# Patient Record
Sex: MALE | Race: WHITE | NOT HISPANIC OR LATINO | Employment: OTHER | URBAN - METROPOLITAN AREA
[De-identification: names, ages, dates, MRNs, and addresses within clinical notes are randomized per-mention and may not be internally consistent; named-entity substitution may affect disease eponyms.]

---

## 2017-03-15 ENCOUNTER — TRANSFERRED RECORDS (OUTPATIENT)
Dept: HEALTH INFORMATION MANAGEMENT | Facility: CLINIC | Age: 72
End: 2017-03-15

## 2017-03-15 LAB
EJECTION FRACTION: 53
HBA1C MFR BLD: 9.2 % (ref 0–5.7)

## 2017-03-16 LAB
ALT SERPL-CCNC: 19 IU/L (ref 10–60)
AST SERPL-CCNC: 15 IU/L (ref 10–50)
CHOLEST SERPL-MCNC: 133 MG/DL
CREAT SERPL-MCNC: 1.36 MG/DL (ref 0.6–1.5)
GFR SERPL CREATININE-BSD FRML MDRD: 55 ML/MIN/1.73M2
GLUCOSE SERPL-MCNC: 195 MG/DL (ref 65–99)
HDLC SERPL-MCNC: 24 MG/DL
INR PPP: 1 (ref 0.9–1.1)
LDLC SERPL CALC-MCNC: 52 MG/DL
NONHDLC SERPL-MCNC: 109 MG/DL
POTASSIUM SERPL-SCNC: 3.6 MMOL/L (ref 3.5–5.2)
TRIGL SERPL-MCNC: 285 MG/DL
TSH SERPL-ACNC: 1.69 MIU/L (ref 0.45–4.5)

## 2017-05-09 ENCOUNTER — APPOINTMENT (OUTPATIENT)
Dept: CT IMAGING | Facility: CLINIC | Age: 72
End: 2017-05-09
Attending: EMERGENCY MEDICINE
Payer: COMMERCIAL

## 2017-05-09 ENCOUNTER — HOSPITAL ENCOUNTER (EMERGENCY)
Facility: CLINIC | Age: 72
Discharge: HOME | End: 2017-05-09
Attending: EMERGENCY MEDICINE | Admitting: EMERGENCY MEDICINE
Payer: COMMERCIAL

## 2017-05-09 VITALS
RESPIRATION RATE: 18 BRPM | SYSTOLIC BLOOD PRESSURE: 149 MMHG | DIASTOLIC BLOOD PRESSURE: 98 MMHG | OXYGEN SATURATION: 97 % | HEART RATE: 96 BPM | TEMPERATURE: 98.3 F

## 2017-05-09 DIAGNOSIS — R41.0 EPISODIC CONFUSION: ICD-10-CM

## 2017-05-09 LAB
ANION GAP SERPL CALCULATED.3IONS-SCNC: 7 MMOL/L (ref 3–14)
BASOPHILS # BLD AUTO: 0.1 10E9/L (ref 0–0.2)
BASOPHILS NFR BLD AUTO: 1.2 %
BUN SERPL-MCNC: 22 MG/DL (ref 7–30)
CALCIUM SERPL-MCNC: 8.8 MG/DL (ref 8.5–10.1)
CHLORIDE SERPL-SCNC: 108 MMOL/L (ref 94–109)
CO2 SERPL-SCNC: 24 MMOL/L (ref 20–32)
CREAT BLD-MCNC: 1.5 MG/DL (ref 0.66–1.25)
CREAT SERPL-MCNC: 1.42 MG/DL (ref 0.66–1.25)
DIFFERENTIAL METHOD BLD: ABNORMAL
EOSINOPHIL # BLD AUTO: 0.2 10E9/L (ref 0–0.7)
EOSINOPHIL NFR BLD AUTO: 2.3 %
ERYTHROCYTE [DISTWIDTH] IN BLOOD BY AUTOMATED COUNT: 14.1 % (ref 10–15)
GFR SERPL CREATININE-BSD FRML MDRD: 46 ML/MIN/1.7M2
GFR SERPL CREATININE-BSD FRML MDRD: 49 ML/MIN/1.7M2
GLUCOSE SERPL-MCNC: 255 MG/DL (ref 70–99)
HCT VFR BLD AUTO: 40.2 % (ref 40–53)
HGB BLD-MCNC: 12.7 G/DL (ref 13.3–17.7)
IMM GRANULOCYTES # BLD: 0 10E9/L (ref 0–0.4)
IMM GRANULOCYTES NFR BLD: 0.6 %
INTERPRETATION ECG - MUSE: NORMAL
LYMPHOCYTES # BLD AUTO: 1 10E9/L (ref 0.8–5.3)
LYMPHOCYTES NFR BLD AUTO: 15.7 %
MCH RBC QN AUTO: 28.4 PG (ref 26.5–33)
MCHC RBC AUTO-ENTMCNC: 31.6 G/DL (ref 31.5–36.5)
MCV RBC AUTO: 90 FL (ref 78–100)
MONOCYTES # BLD AUTO: 0.5 10E9/L (ref 0–1.3)
MONOCYTES NFR BLD AUTO: 7.2 %
NEUTROPHILS # BLD AUTO: 4.8 10E9/L (ref 1.6–8.3)
NEUTROPHILS NFR BLD AUTO: 73 %
NRBC # BLD AUTO: 0 10*3/UL
NRBC BLD AUTO-RTO: 0 /100
PLATELET # BLD AUTO: 240 10E9/L (ref 150–450)
POTASSIUM SERPL-SCNC: 3.6 MMOL/L (ref 3.4–5.3)
RBC # BLD AUTO: 4.47 10E12/L (ref 4.4–5.9)
SODIUM SERPL-SCNC: 139 MMOL/L (ref 133–144)
WBC # BLD AUTO: 6.6 10E9/L (ref 4–11)

## 2017-05-09 PROCEDURE — 85025 COMPLETE CBC W/AUTO DIFF WBC: CPT | Performed by: EMERGENCY MEDICINE

## 2017-05-09 PROCEDURE — 70450 CT HEAD/BRAIN W/O DYE: CPT

## 2017-05-09 PROCEDURE — 82565 ASSAY OF CREATININE: CPT

## 2017-05-09 PROCEDURE — 93005 ELECTROCARDIOGRAM TRACING: CPT

## 2017-05-09 PROCEDURE — 99285 EMERGENCY DEPT VISIT HI MDM: CPT | Mod: 25

## 2017-05-09 PROCEDURE — 80048 BASIC METABOLIC PNL TOTAL CA: CPT | Performed by: EMERGENCY MEDICINE

## 2017-05-09 NOTE — ED PROVIDER NOTES
"  History     Chief Complaint:  Altered Mental Status      HPI   Donnie Vincent is a 71 year old male with known meningioma, seizure disorder on Keppra, and history of CVA previously on aspirin and plavix (no longer on coumadin due to GI bleed), with recent evaluation for apparent TIA presenting with similar symptoms in [place], who presents with transient altered mental status or speech difficulty.  His wife reports that they were watching TV when he became somewhat confused.  The wife states that he would not know her and couldn't repeat the name of his granddaughter.  They report that he had a similar episode in [place] several months ago. At the time, MRI shows no acute infarction and redemonstrated his meningioma.  He states that he feels \"a bit slow.\" No shaking or loss of consciousness was noted by his wife.  He denies headache, focal weakness, or any other concerns.    3/16/17 imaging in [place]  MRI Brain:   1. No evidence of acute infarction  2. Suspected 2.3 x 2.7 cm meningioma emanating from the right anterior clinoid process with resultant edematous changes in the adjacent anterior-inferior right frontal lobe. Confirmatory imaging utilizing postcontrast MRI is warranted.  3. Mild, presumed small vessel ischemic change.    MRA brain:  1. No acute abnormality.    MRA neck:  1. No acute abnormality.    Allergies:  PCN       Medications:    fenofibrate 160 MG tablet   acetaminophen-codeine (TYLENOL W/CODEINE NO. 3) 300-30 MG per tablet   insulin glargine (LANTUS SOLOSTAR) 100 UNIT/ML PEN   glipiZIDE (GLUCOTROL) 10 MG tablet   insulin pen needle 31G X 5 MM   ASPIRIN PO   FLUoxetine (PROZAC) 40 MG capsule   lisinopril (PRINIVIL,ZESTRIL) 10 MG tablet   ATORVASTATIN CALCIUM PO   ferrous sulfate (IRON) 325 (65 FE) MG tablet   budesonide-formoterol (SYMBICORT) 80-4.5 MCG/ACT inhaler   TIOTROPIUM BROMIDE INHALATION POWDER 18MCG CAP   albuterol (PROAIR HFA, PROVENTIL HFA, VENTOLIN HFA) 108 (90 BASE) MCG/ACT inhaler "   folic acid (FOLVITE) 1 MG tablet   topiramate (TOPAMAX) 50 MG tablet   tamsulosin (FLOMAX) 0.4 MG 24 hr capsule   cyanocobalamin 1000 MCG/ML injection   levetiracetam (KEPPRA) 500 MG tablet         Past Medical History:    Past Medical History:   Diagnosis Date     BPH (benign prostatic hypertrophy)      Brain tumor (benign) (H)      COPD (chronic obstructive pulmonary disease) (H)      CVA (cerebral infarction)      Diabetes new 4/09     Diverticulitis of colon (without mention of hemorrhage)(562.11) 2001; 9/06     Gastro-oesophageal reflux disease      HTN (hypertension)      Hyperlipidemia LDL goal < 100      Other forms of migraine, without mention of intractable migraine without mention of status migrainosus      PFO (patent foramen ovale)      Renal disease      Seizure disorder (H)      Seizures (H)      Sleep apnea      Unspecified congenital anomaly of lung        Past Surgical History:    Past Surgical History:   Procedure Laterality Date     C NONSPECIFIC PROCEDURE      colonoscopy 2005     COLONOSCOPY  11/4/2015    Dr. Orozco [facility]     COLONOSCOPY N/A 11/4/2015    Procedure: COLONOSCOPY;  Surgeon: Yazmin Orozco MD;  Location:  GI     CYSTOSCOPY, RETROGRADES, EXTRACT STONE, COMBINED Left 11/25/2015    Procedure: COMBINED CYSTOSCOPY, RETROGRADES, EXTRACT STONE;  Surgeon: Neville Banuelos MD;  Location:  OR     ESOPHAGOSCOPY, GASTROSCOPY, DUODENOSCOPY (EGD), COMBINED  11/5/2015    Dr. Orozco [facility]     GENITOURINARY SURGERY      kidney stone - lithotripsy     PHACOEMULSIFICATION CLEAR CORNEA WITH STANDARD INTRAOCULAR LENS IMPLANT  12/20/2011    Procedure:PHACOEMULSIFICATION CLEAR CORNEA WITH STANDARD INTRAOCULAR LENS IMPLANT; RIGHT PHACOEMULSIFICATION CLEAR CORNEA WITH STANDARD INTRAOCULAR LENS IMPLANT ; Surgeon:GUILHERME KAUFMAN; Location:[facility]       Family History:    Family History   Problem Relation Age of Onset     Family History Negative Mother      migraines     Family History  Negative Father      lived to be 92     Colon Cancer No family hx of        Social History:  Marital Status:   [2]  Social History   Substance Use Topics     Smoking status: Former Smoker     Quit date: 1/1/1970     Smokeless tobacco: Never Used      Comment: quit age 28     Alcohol use Yes      Comment: 1-2 beers a week        Review of Systems   Neurological:        Confusion episode   All other systems reviewed and are negative.        Physical Exam   First Vitals:  98.3    RR 18  100% RA  149/93    Physical Exam  Constitutional: Alert, attentive  HENT:    Nose: Nose normal.    Mouth/Throat: Oropharynx is clear, mucous membranes are moist  Eyes: EOM are normal. Pupils are equal, round, and reactive to light.   CV: Regular rate and rhythm, no murmurs, rubs or gallops.  Chest: Effort normal and breath sounds normal.   GI: No distension. There is no tenderness  MSK: Normal range of motion.   Neurological:   GCS 15; A/Ox3; Cranial nerves 2-12 intact;   5/5 strength throughout the upper and lower extremities;   sensation intact to light touch throughout the upper and lower extremities;   2+ DTRs to the bilateral upper and lower extremities (biceps, BRs, patellar, achilles);   normal fine motor coordination intact bilaterally;   No meningismus   Skin: Skin is warm and dry.      Emergency Department Course   ECG taken at 1413, ECG read at 1417  Sinus tachycardia with premature supraventricular complexes  Left axis deviation  Nonspecific intraventricular block  Abnormal ECG  Rate 111 bpm. SD interval 168. QRS duration 130. QT/QTc 362/492. P-R-T axes -16  -46  86    Results for orders placed or performed during the hospital encounter of 05/09/17 (from the past 24 hour(s))   CBC + differential   Result Value Ref Range    WBC 6.6 4.0 - 11.0 10e9/L    RBC Count 4.47 4.4 - 5.9 10e12/L    Hemoglobin 12.7 (L) 13.3 - 17.7 g/dL    Hematocrit 40.2 40.0 - 53.0 %    MCV 90 78 - 100 fl    MCH 28.4 26.5 - 33.0 pg    MCHC  31.6 31.5 - 36.5 g/dL    RDW 14.1 10.0 - 15.0 %    Platelet Count 240 150 - 450 10e9/L    Diff Method Automated Method     % Neutrophils 73.0 %    % Lymphocytes 15.7 %    % Monocytes 7.2 %    % Eosinophils 2.3 %    % Basophils 1.2 %    % Immature Granulocytes 0.6 %    Nucleated RBCs 0 0 /100    Absolute Neutrophil 4.8 1.6 - 8.3 10e9/L    Absolute Lymphocytes 1.0 0.8 - 5.3 10e9/L    Absolute Monocytes 0.5 0.0 - 1.3 10e9/L    Absolute Eosinophils 0.2 0.0 - 0.7 10e9/L    Absolute Basophils 0.1 0.0 - 0.2 10e9/L    Abs Immature Granulocytes 0.0 0 - 0.4 10e9/L    Absolute Nucleated RBC 0.0    Basic metabolic panel (BMP)   Result Value Ref Range    Sodium 139 133 - 144 mmol/L    Potassium 3.6 3.4 - 5.3 mmol/L    Chloride 108 94 - 109 mmol/L    Carbon Dioxide 24 20 - 32 mmol/L    Anion Gap 7 3 - 14 mmol/L    Glucose 255 (H) 70 - 99 mg/dL    Urea Nitrogen 22 7 - 30 mg/dL    Creatinine 1.42 (H) 0.66 - 1.25 mg/dL    GFR Estimate 49 (L) >60 mL/min/1.7m2    GFR Estimate If Black 59 (L) >60 mL/min/1.7m2    Calcium 8.8 8.5 - 10.1 mg/dL   EKG 12 lead   Result Value Ref Range    Interpretation ECG Click View Image link to view waveform and result    Creatinine POCT   Result Value Ref Range    Creatinine 1.5 (H) 0.66 - 1.25 mg/dL    GFR Estimate 46 (L) >60 mL/min/1.7m2    GFR Estimate If Black 56 (L) >60 mL/min/1.7m2   CT Head w/o Contrast    Narrative    CT SCAN OF THE HEAD WITHOUT CONTRAST  5/9/2017  2:59 PM     HISTORY: Known meningioma; confusion episode, possible seizure.    TECHNIQUE: Axial images of the head and coronal reformations without  IV contrast material. Radiation dose for this scan was reduced using  automated exposure control, adjustment of the mA and/or kV according  to patient size, or iterative reconstruction technique.    COMPARISON: MRI 11/14/2016.    FINDINGS: There is generalized atrophy of the brain. There is low  attenuation in the white matter of the cerebral hemispheres consistent  with sequelae of small  vessel ischemic disease. Encephalomalacia in  lateral left frontal lobe with underlying gliosis is unchanged. The  known meningioma of the greater wing of the right sphenoid bone  protruding into the inferior right frontal lobe is barely visible as  it is nearly isoattenuating with gray matter but it measures about 2.3  cm transverse x 1.2 cm craniocaudal x 2.6 cm diameter on the scans on  which it can be recognized. There is no adjacent vasogenic edema.  There is no evidence of intracranial hemorrhage or acute infarct.     The visualized portions of the sinuses and mastoids appear normal.  There is no evidence of trauma.       Impression    IMPRESSION:   1. No acute abnormality.  2. Atrophy of the brain. White matter changes consistent with sequelae  of small vessel ischemic disease. This is unchanged.  3. Left lateral frontal lobe encephalomalacia, unchanged.  4. Meningioma projecting superiorly off the greater ring of the right  sphenoid bone is unchanged.    ELSIE KONG MD       Emergency Department Course:  Nursing notes and vitals reviewed.  I performed an exam of the patient as documented above.     I personally reviewed the laboratory results with the Patient and answered all related questions prior to discharge.    Impression & Plan      Medical Decision Making:  This is a very pleasant 71 year old male with history of meningioma, CVA, and seizure disorder on Keppra, with recent TIA vs seizures who presents with a similar episode. His most recent episode involved slurred speech, suggesting possible TIA. This episode today appears more consistent with brief seizure and typical of previous. No acute findings are identified on CT. He cannot take Coumadin given his bleeding episode previously. The remainder of his workup is normal, and he underwent similar negative workup in [place] several weeks ago. He would prefer close Neurology follow-up to further discuss these episodes, and possible augmentation of his  antiepileptic regimen. This appears very reasonable given the context. I recommended follow up in 2-3 days with Dr. Chi; and I advised strict return precautions for pain, fever, vomiting, confusion, weakness, or any other concerning symptoms.    Visit Diagnosis, Associated Orders, and Comments     ICD-10-CM    1. Episodic confusion R41.0 Seizure vs TIA     Disposition:  Home in improved condition      Gilson Diallo MD  [facility], [facility] Emergency Department      Scribe Disclosure:  I, Abena Jones, am serving as a scribe at 2:18 PM on 5/9/2017 to document services personally performed by Gilson Diallo MD, based on my observations and the provider's statements to me.      Abena Jones  5/9/2017   [facility] EMERGENCY DEPARTMENT       Gilson Diallo MD  05/09/17 [id number]

## 2017-05-09 NOTE — ED AVS SNAPSHOT
[facility] Emergency Department    [address]    [facility] [address]    Phone:  [phone]    Fax:  [phone]                                       Donnie Vincent   MRN: [id number]    Department:  [facility] Emergency Department   Date of Visit:  5/9/2017           Patient Information     Date Of Birth          1945        Your diagnoses for this visit were:     Episodic confusion        You were seen by Gilson Diallo MD.      Follow-up Information     Follow up with Noel Chi MD In 3 days.    Specialty:  Neurology    Contact information:    [facility]  [address]  [facility] [address]  [phone]          Discharge Instructions         Altered Level of Consciousness  Level of consciousness (LOC) is a measure of a person s ability to interact with other people and to react to the surroundings. A person with an altered level of consciousness may not respond to touch or voices. He or she may look vacant or blank and may not make eye contact with others. He or she may be limp and may not move for a long time or show little interest in moving. He or she may also be confused.  There are many causes of altered LOC. They include low blood sugar, infection, medicines, injuries, or other medical problems    Altered LOC is a medical emergency. The healthcare provider will do tests to help find the cause. These may include blood tests and imaging tests. The person is treated so breathing and heart rate are stable. An intravenous (IV) line may be put into a vein in the arm or hand to give medicines. Once the cause of altered LOC is found, the goal is to treat the cause.   In almost all cases, the person will be admitted to the hospital for observation.  Home care  When your loved one is released from the hospital, you will be given guidelines for caring for him or her. In general:    Follow the healthcare provider's instructions  for giving any prescribed medicines to your child.     Stay with your loved one or have another responsible adult look after him or her. Watch carefully for any return of symptoms or changes in behavior.    If the person has diabetes, make sure that any approved medicines are given on time and as prescribed.  Follow-up care  Follow up with the healthcare provider or our staff.  When to seek medical advice  Call the healthcare provider right away for any of the following:    Symptoms of altered LOC return    New symptoms appear    [id number] [employer]. [address] [place] [address]. All rights reserved. This information is not intended as a substitute for professional medical care. Always follow your healthcare professional's instructions.          24 Hour Appointment Hotline       To make an appointment at any [facility], call [phone] ([phone]). If you don't have a family doctor or clinic, we will help you find one. [facility] are conveniently located to serve the needs of you and your family.             Review of your medicines      Our records show that you are taking the medicines listed below. If these are incorrect, please call your family doctor or clinic.        Dose / Directions Last dose taken    acetaminophen-codeine 300-30 MG per tablet   Commonly known as:  TYLENOL w/CODEINE No. 3   Dose:  1 tablet   Quantity:  40 tablet        Take 1 tablet by mouth 2 times daily as needed for mild pain   Refills:  0        albuterol 108 (90 BASE) MCG/ACT Inhaler   Commonly known as:  PROAIR HFA/PROVENTIL HFA/VENTOLIN HFA   Dose:  2 puff   Quantity:  1 Inhaler        Inhale 2 puffs into the lungs every 6 hours   Refills:  6        ASPIRIN PO   Dose:  81 mg        Take 81 mg by mouth   Refills:  0        ATORVASTATIN CALCIUM PO   Dose:  40 mg        Take 40 mg by mouth daily   Refills:  0        cyanocobalamin 1000 MCG/ML injection   Commonly known as:  VITAMIN B12    Dose:  1 mL        Inject 1 mL into the muscle every 30 days See standing orders Dr. Chi. Vit B-12 injection every week x 4. Than monthly for one year. 1000 mcg/ml   Refills:  0        fenofibrate 160 MG tablet   Dose:  160 mg   Quantity:  90 tablet        Take 1 tablet (160 mg) by mouth daily   Refills:  1        ferrous sulfate 325 (65 FE) MG tablet   Commonly known as:  IRON   Dose:  325 mg   Quantity:  60 tablet        Take 1 tablet (325 mg) by mouth 2 times daily   Refills:  2        FLUoxetine 40 MG capsule   Commonly known as:  PROzac   Dose:  40 mg   Quantity:  90 capsule        Take 1 capsule (40 mg) by mouth daily   Refills:  1        folic acid 1 MG tablet   Commonly known as:  FOLVITE   Dose:  1 mg   Quantity:  90 tablet        Take 1 tablet (1 mg) by mouth daily   Refills:  0        glipiZIDE 10 MG tablet   Commonly known as:  GLUCOTROL   Dose:  10 mg   Quantity:  180 tablet        Take 1 tablet (10 mg) by mouth 2 times daily (before meals)   Refills:  1        insulin glargine 100 UNIT/ML injection   Commonly known as:  LANTUS SOLOSTAR   Quantity:  15 mL        Lantus Solostar Pen   Refills:  3        insulin pen needle 31G X 5 MM   Quantity:  100 each        Use 1-2 pen needles daily or as directed.   Refills:  3        KEPPRA 500 MG tablet   Dose:  500 mg   Generic drug:  levETIRAcetam        Take 500 mg by mouth 2 times daily Takes 750 mg in am and 500 mg at hs   Refills:  0        lisinopril 10 MG tablet   Commonly known as:  PRINIVIL/ZESTRIL   Dose:  10 mg   Quantity:  90 tablet        Take 1 tablet (10 mg) by mouth daily   Refills:  1        SYMBICORT 80-4.5 MCG/ACT Inhaler   Dose:  2 puff   Generic drug:  budesonide-formoterol        Inhale 2 puffs into the lungs as needed   Refills:  0        tamsulosin 0.4 MG capsule   Commonly known as:  FLOMAX   Dose:  0.4 mg   Quantity:  90 capsule        Take 1 capsule (0.4 mg) by mouth daily   Refills:  3        TIOTROPIUM BROMIDE INHALATION POWDER  18MCG CAP        daily Once a day   Refills:  0        topiramate 50 MG tablet   Commonly known as:  TOPAMAX   Dose:  50 mg   Quantity:  90 tablet        Take 1 tablet (50 mg) by mouth daily   Refills:  0                Procedures and tests performed during your visit     Basic metabolic panel (BMP)    CBC + differential    CT Head w/o Contrast    Creatinine POCT    EKG 12 lead      Orders Needing Specimen Collection     None      Pending Results     No orders found from 5/7/2017 to 5/10/2017.            Pending Culture Results     No orders found from 5/7/2017 to 5/10/2017.            Pending Results Instructions     If you had any lab results that were not finalized at the time of your Discharge, you can call the ED Lab Result RN at [phone]. You will be contacted by this team for any positive Lab results or changes in treatment. The nurses are available 7 days a week from 10A to 6:30P.  You can leave a message 24 hours per day and they will return your call.        Test Results From Your Hospital Stay        5/9/2017  2:51 PM      Component Results     Component Value Ref Range & Units Status    WBC 6.6 4.0 - 11.0 10e9/L Final    RBC Count 4.47 4.4 - 5.9 10e12/L Final    Hemoglobin 12.7 (L) 13.3 - 17.7 g/dL Final    Hematocrit 40.2 40.0 - 53.0 % Final    MCV 90 78 - 100 fl Final    MCH 28.4 26.5 - 33.0 pg Final    MCHC 31.6 31.5 - 36.5 g/dL Final    RDW 14.1 10.0 - 15.0 % Final    Platelet Count 240 150 - 450 10e9/L Final    Diff Method Automated Method  Final    % Neutrophils 73.0 % Final    % Lymphocytes 15.7 % Final    % Monocytes 7.2 % Final    % Eosinophils 2.3 % Final    % Basophils 1.2 % Final    % Immature Granulocytes 0.6 % Final    Nucleated RBCs 0 0 /100 Final    Absolute Neutrophil 4.8 1.6 - 8.3 10e9/L Final    Absolute Lymphocytes 1.0 0.8 - 5.3 10e9/L Final    Absolute Monocytes 0.5 0.0 - 1.3 10e9/L Final    Absolute Eosinophils 0.2 0.0 - 0.7 10e9/L Final    Absolute Basophils 0.1 0.0 - 0.2  10e9/L Final    Abs Immature Granulocytes 0.0 0 - 0.4 10e9/L Final    Absolute Nucleated RBC 0.0  Final         5/9/2017  3:09 PM      Component Results     Component Value Ref Range & Units Status    Sodium 139 133 - 144 mmol/L Final    Potassium 3.6 3.4 - 5.3 mmol/L Final    Chloride 108 94 - 109 mmol/L Final    Carbon Dioxide 24 20 - 32 mmol/L Final    Anion Gap 7 3 - 14 mmol/L Final    Glucose 255 (H) 70 - 99 mg/dL Final    Urea Nitrogen 22 7 - 30 mg/dL Final    Creatinine 1.42 (H) 0.66 - 1.25 mg/dL Final    GFR Estimate 49 (L) >60 mL/min/1.7m2 Final    Non  GFR Calc    GFR Estimate If Black 59 (L) >60 mL/min/1.7m2 Final    African American GFR Calc    Calcium 8.8 8.5 - 10.1 mg/dL Final         5/9/2017  3:27 PM      Narrative     CT SCAN OF THE HEAD WITHOUT CONTRAST  5/9/2017  2:59 PM     HISTORY: Known meningioma; confusion episode, possible seizure.    TECHNIQUE: Axial images of the head and coronal reformations without  IV contrast material. Radiation dose for this scan was reduced using  automated exposure control, adjustment of the mA and/or kV according  to patient size, or iterative reconstruction technique.    COMPARISON: MRI 11/14/2016.    FINDINGS: There is generalized atrophy of the brain. There is low  attenuation in the white matter of the cerebral hemispheres consistent  with sequelae of small vessel ischemic disease. Encephalomalacia in  lateral left frontal lobe with underlying gliosis is unchanged. The  known meningioma of the greater wing of the right sphenoid bone  protruding into the inferior right frontal lobe is barely visible as  it is nearly isoattenuating with gray matter but it measures about 2.3  cm transverse x 1.2 cm craniocaudal x 2.6 cm diameter on the scans on  which it can be recognized. There is no adjacent vasogenic edema.  There is no evidence of intracranial hemorrhage or acute infarct.     The visualized portions of the sinuses and mastoids appear  normal.  There is no evidence of trauma.         Impression     IMPRESSION:   1. No acute abnormality.  2. Atrophy of the brain. White matter changes consistent with sequelae  of small vessel ischemic disease. This is unchanged.  3. Left lateral frontal lobe encephalomalacia, unchanged.  4. Meningioma projecting superiorly off the greater ring of the right  sphenoid bone is unchanged.    ELSIE KONG MD         5/9/2017  2:35 PM      Component Results     Component Value Ref Range & Units Status    Creatinine 1.5 (H) 0.66 - 1.25 mg/dL Final    GFR Estimate 46 (L) >60 mL/min/1.7m2 Final    GFR Estimate If Black 56 (L) >60 mL/min/1.7m2 Final                Clinical Quality Measure: Blood Pressure Screening     Your blood pressure was checked while you were in the emergency department today. The last reading we obtained was  BP: (!) 149/98 . Please read the guidelines below about what these numbers mean and what you should do about them.  If your systolic blood pressure (the top number) is less than 120 and your diastolic blood pressure (the bottom number) is less than 80, then your blood pressure is normal. There is nothing more that you need to do about it.  If your systolic blood pressure (the top number) is 120-139 or your diastolic blood pressure (the bottom number) is 80-89, your blood pressure may be higher than it should be. You should have your blood pressure rechecked within a year by a primary care provider.  If your systolic blood pressure (the top number) is 140 or greater or your diastolic blood pressure (the bottom number) is 90 or greater, you may have high blood pressure. High blood pressure is treatable, but if left untreated over time it can put you at risk for heart attack, stroke, or kidney failure. You should have your blood pressure rechecked by a primary care provider within the next 4 weeks.  If your provider in the emergency department today gave you specific instructions to follow-up with  "your doctor or provider even sooner than that, you should follow that instruction and not wait for up to 4 weeks for your follow-up visit.        Thank you for choosing [facility]       Thank you for choosing [facility] for your care. Our goal is always to provide you with excellent care. Hearing back from our patients is one way we can continue to improve our services. Please take a few minutes to complete the written survey that you may receive in the mail after you visit with us. Thank you!        [employer] Information     [employer] lets you send messages to your doctor, view your test results, renew your prescriptions, schedule appointments and more. To sign up, go to [email or web address] . Click on \"Log in\" on the left side of the screen, which will take you to the Welcome page. Then click on \"Sign up Now\" on the right side of the page.     You will be asked to enter the access code listed below, as well as some personal information. Please follow the directions to create your username and password.     Your access code is: [email or web address]  Expires: 2017  3:28 PM     Your access code will  in 90 days. If you need help or a new code, please call your [facility] clinic or [phone].        Care EveryWhere ID     This is your Care EveryWhere ID. This could be used by other organizations to access your [facility] medical records  [id number]        After Visit Summary       This is your record. Keep this with you and show to your community pharmacist(s) and doctor(s) at your next visit.                  "

## 2017-05-09 NOTE — ED NOTES
Pt arrives with AMS per wife. Approx at 1345 didn't recognize his granddaughter, didn't know how old he is. Symptoms resolved upon arrival to ED. Denies CP, SOB, n/v/d, weakness. A&O x4. No slurred speech, facial droop.

## 2017-05-09 NOTE — DISCHARGE INSTRUCTIONS
Altered Level of Consciousness  Level of consciousness (LOC) is a measure of a person s ability to interact with other people and to react to the surroundings. A person with an altered level of consciousness may not respond to touch or voices. He or she may look vacant or blank and may not make eye contact with others. He or she may be limp and may not move for a long time or show little interest in moving. He or she may also be confused.  There are many causes of altered LOC. They include low blood sugar, infection, medicines, injuries, or other medical problems    Altered LOC is a medical emergency. The healthcare provider will do tests to help find the cause. These may include blood tests and imaging tests. The person is treated so breathing and heart rate are stable. An intravenous (IV) line may be put into a vein in the arm or hand to give medicines. Once the cause of altered LOC is found, the goal is to treat the cause.   In almost all cases, the person will be admitted to the hospital for observation.  Home care  When your loved one is released from the hospital, you will be given guidelines for caring for him or her. In general:    Follow the healthcare provider's instructions for giving any prescribed medicines to your child.     Stay with your loved one or have another responsible adult look after him or her. Watch carefully for any return of symptoms or changes in behavior.    If the person has diabetes, make sure that any approved medicines are given on time and as prescribed.  Follow-up care  Follow up with the healthcare provider or our staff.  When to seek medical advice  Call the healthcare provider right away for any of the following:    Symptoms of altered LOC return    New symptoms appear    1832-2429 [facility]. [address] [place] [address], [place] [address]. All rights reserved. This information is not intended as a substitute for professional medical care. Always follow your  healthcare professional's instructions.

## 2017-05-09 NOTE — ED AVS SNAPSHOT
[facility] Emergency Department    [address]    [facility] [address]    Phone:  [phone]    Fax:  [phone]                                       Donnie Vincent   MRN: [id number]    Department:  [facility] Emergency Department   Date of Visit:  5/9/2017           After Visit Summary Signature Page     I have received my discharge instructions, and my questions have been answered. I have discussed any challenges I see with this plan with the nurse or doctor.    ..........................................................................................................................................  Patient/Patient Representative Signature      ..........................................................................................................................................  Patient Representative Print Name and Relationship to Patient    ..................................................               ................................................  Date                                            Time    ..........................................................................................................................................  Reviewed by Signature/Title    ...................................................              ..............................................  Date                                                            Time

## 2017-05-15 ENCOUNTER — TELEPHONE (OUTPATIENT)
Dept: INTERNAL MEDICINE | Facility: CLINIC | Age: 72
End: 2017-05-15

## 2017-05-15 DIAGNOSIS — I63.9 ACUTE ISCHEMIC STROKE (H): Primary | ICD-10-CM

## 2017-05-15 RX ORDER — CLOPIDOGREL BISULFATE 75 MG/1
75 TABLET ORAL DAILY
Qty: 90 TABLET | Refills: 0 | Status: SHIPPED | OUTPATIENT
Start: 2017-05-15 | End: 2018-10-12

## 2017-05-15 NOTE — TELEPHONE ENCOUNTER
Pt calling back.  States his provider in [place] (goes to [place] for the winter) started him on this med x 4 mo ago.  Advised of PCP's message below re: OV.  Transferred to schedule.    Pt states he'll run out of med prior to OV.  Asking if he can get a refill until he is seen.    Please advise, thanks.    (Has a future appt scheduled 5-22-17.)

## 2017-05-15 NOTE — TELEPHONE ENCOUNTER
Plavix is not in pts med list, pl check who started this and when and for what reason this was started.  .Pt is over due for diabetic labs and  office visit. Pl advise appt to see me.

## 2017-05-15 NOTE — TELEPHONE ENCOUNTER
Plavix 75MG           Last Written Prescription Date: 12/31/13  Last Fill Quantity: 30, # refills: 0    Last Office Visit with [facility] P or [facility] prescribing provider:  08/03/16   Future Office Visit:       Lab Results   Component Value Date    WBC 6.6 05/09/2017     Lab Results   Component Value Date    RBC 4.47 05/09/2017     Lab Results   Component Value Date    HGB 12.7 05/09/2017     Lab Results   Component Value Date    HCT 40.2 05/09/2017     No components found for: MCT  Lab Results   Component Value Date    MCV 90 05/09/2017     Lab Results   Component Value Date    MCH 28.4 05/09/2017     Lab Results   Component Value Date    MCHC 31.6 05/09/2017     Lab Results   Component Value Date    RDW 14.1 05/09/2017     Lab Results   Component Value Date     05/09/2017     Lab Results   Component Value Date    AST 14 05/13/2016     Lab Results   Component Value Date    ALT 24 05/13/2016     Creatinine   Date Value Ref Range Status   05/09/2017 1.42 (H) 0.66 - 1.25 mg/dL Final   ]    Labs showing if normal/abnormal  Lab Results   Component Value Date    WBC 6.6 05/09/2017    RBC 4.47 05/09/2017    HGB 12.7 (L) 05/09/2017    HCT 40.2 05/09/2017    MCV 90 05/09/2017    MCH 28.4 05/09/2017    MCHC 31.6 05/09/2017    RDW 14.1 05/09/2017     05/09/2017    DTYP Automated Method 05/09/2017    NEUTROPHIL 73.0 05/09/2017    LYMPH 15.7 05/09/2017    MONOCYTE 7.2 05/09/2017    EOSINOPHIL 2.3 05/09/2017    BASOPHIL 1.2 05/09/2017    IG 0.6 05/09/2017    ANEU 4.8 05/09/2017    ALYM 1.0 05/09/2017    KENDRA 0.5 05/09/2017    AEOS 0.2 05/09/2017    ABAS 0.1 05/09/2017    AIG 0.0 05/09/2017      Lab Results   Component Value Date    AST 14 05/13/2016    ALT 24 05/13/2016

## 2017-05-22 ENCOUNTER — OFFICE VISIT (OUTPATIENT)
Dept: INTERNAL MEDICINE | Facility: CLINIC | Age: 72
End: 2017-05-22
Payer: COMMERCIAL

## 2017-05-22 ENCOUNTER — RADIANT APPOINTMENT (OUTPATIENT)
Dept: GENERAL RADIOLOGY | Facility: CLINIC | Age: 72
End: 2017-05-22
Attending: INTERNAL MEDICINE
Payer: COMMERCIAL

## 2017-05-22 VITALS
SYSTOLIC BLOOD PRESSURE: 112 MMHG | HEIGHT: 71 IN | BODY MASS INDEX: 36.96 KG/M2 | DIASTOLIC BLOOD PRESSURE: 72 MMHG | OXYGEN SATURATION: 96 % | WEIGHT: 264 LBS | HEART RATE: 82 BPM | TEMPERATURE: 98.1 F

## 2017-05-22 DIAGNOSIS — E78.5 HYPERLIPIDEMIA LDL GOAL <100: ICD-10-CM

## 2017-05-22 DIAGNOSIS — J44.9 CHRONIC OBSTRUCTIVE PULMONARY DISEASE, UNSPECIFIED COPD TYPE (H): ICD-10-CM

## 2017-05-22 DIAGNOSIS — F33.0 MAJOR DEPRESSIVE DISORDER, RECURRENT EPISODE, MILD (H): ICD-10-CM

## 2017-05-22 DIAGNOSIS — N18.30 TYPE 2 DIABETES MELLITUS WITH STAGE 3 CHRONIC KIDNEY DISEASE, WITH LONG-TERM CURRENT USE OF INSULIN (H): Primary | ICD-10-CM

## 2017-05-22 DIAGNOSIS — I63.9 ACUTE ISCHEMIC STROKE (H): ICD-10-CM

## 2017-05-22 DIAGNOSIS — E11.22 TYPE 2 DIABETES MELLITUS WITH STAGE 3 CHRONIC KIDNEY DISEASE, WITH LONG-TERM CURRENT USE OF INSULIN (H): Primary | ICD-10-CM

## 2017-05-22 DIAGNOSIS — E78.2 MIXED HYPERLIPIDEMIA: ICD-10-CM

## 2017-05-22 DIAGNOSIS — M25.561 RIGHT KNEE PAIN, UNSPECIFIED CHRONICITY: ICD-10-CM

## 2017-05-22 DIAGNOSIS — Z79.4 TYPE 2 DIABETES MELLITUS WITH STAGE 3 CHRONIC KIDNEY DISEASE, WITH LONG-TERM CURRENT USE OF INSULIN (H): Primary | ICD-10-CM

## 2017-05-22 DIAGNOSIS — E66.01 MORBID OBESITY, UNSPECIFIED OBESITY TYPE (H): ICD-10-CM

## 2017-05-22 DIAGNOSIS — I10 ESSENTIAL HYPERTENSION: ICD-10-CM

## 2017-05-22 LAB — HBA1C MFR BLD: 7.8 % (ref 4.3–6)

## 2017-05-22 PROCEDURE — 84443 ASSAY THYROID STIM HORMONE: CPT | Performed by: INTERNAL MEDICINE

## 2017-05-22 PROCEDURE — 73562 X-RAY EXAM OF KNEE 3: CPT | Mod: RT

## 2017-05-22 PROCEDURE — 36415 COLL VENOUS BLD VENIPUNCTURE: CPT | Performed by: INTERNAL MEDICINE

## 2017-05-22 PROCEDURE — 82043 UR ALBUMIN QUANTITATIVE: CPT | Performed by: INTERNAL MEDICINE

## 2017-05-22 PROCEDURE — 99215 OFFICE O/P EST HI 40 MIN: CPT | Performed by: INTERNAL MEDICINE

## 2017-05-22 PROCEDURE — 83036 HEMOGLOBIN GLYCOSYLATED A1C: CPT | Performed by: INTERNAL MEDICINE

## 2017-05-22 RX ORDER — LEVETIRACETAM 500 MG/1
500 TABLET ORAL 2 TIMES DAILY
COMMUNITY

## 2017-05-22 RX ORDER — PANTOPRAZOLE SODIUM 40 MG/1
1 FOR SUSPENSION ORAL DAILY
COMMUNITY
End: 2018-05-18

## 2017-05-22 RX ORDER — METOPROLOL SUCCINATE 100 MG/1
100 TABLET, EXTENDED RELEASE ORAL 2 TIMES DAILY
COMMUNITY
End: 2018-05-18

## 2017-05-22 NOTE — NURSING NOTE
"Chief Complaint   Patient presents with     Chronic Disease Management     depression-maybe increase med, DM, htn, lipid,COPD, fasting       Initial /72 (BP Location: Right arm, Cuff Size: Adult Large)  Pulse 82  Temp 98.1  F (36.7  C) (Oral)  Ht 5' 11\" (1.803 m)  Wt 264 lb (119.7 kg)  SpO2 96%  BMI 36.82 kg/m2 Estimated body mass index is 36.82 kg/(m^2) as calculated from the following:    Height as of this encounter: 5' 11\" (1.803 m).    Weight as of this encounter: 264 lb (119.7 kg).  Medication Reconciliation: complete   Gosia Shipley CMA      "

## 2017-05-22 NOTE — MR AVS SNAPSHOT
"              After Visit Summary   5/22/2017    Donnie Vincent    MRN: [id number]           Patient Information     Date Of Birth          1945        Visit Information        Provider Department      5/22/2017 1:00 PM Suyapa Leon MD [facility]        Today's Diagnoses     Type 2 diabetes mellitus with stage 3 chronic kidney disease, with long-term current use of insulin (H)    -  1    Acute ischemic stroke (H)        Mixed hyperlipidemia        Hyperlipidemia LDL goal <100        Chronic obstructive pulmonary disease, unspecified COPD type (H)        Major depressive disorder, recurrent episode, mild (H)        Morbid obesity, unspecified obesity type (H)        Right knee pain, unspecified chronicity        Essential hypertension           Follow-ups after your visit        Who to contact     If you have questions or need follow up information about today's clinic visit or your schedule please contact [facility] directly at [phone].  Normal or non-critical lab and imaging results will be communicated to you by MyChart, letter or phone within 4 business days after the clinic has received the results. If you do not hear from us within 7 days, please contact the clinic through MyChart or phone. If you have a critical or abnormal lab result, we will notify you by phone as soon as possible.  Submit refill requests through [employer] or call your pharmacy and they will forward the refill request to us. Please allow 3 business days for your refill to be completed.          Additional Information About Your Visit        MyChart Information     [employer] lets you send messages to your doctor, view your test results, renew your prescriptions, schedule appointments and more. To sign up, go to [email or web address] . Click on \"Log in\" on the left side of the screen, which will take you to the Welcome page. Then click on \"Sign up Now\" on the right side of the page. " "    You will be asked to enter the access code listed below, as well as some personal information. Please follow the directions to create your username and password.     Your access code is: [email or web address]  Expires: 2017  3:28 PM     Your access code will  in 90 days. If you need help or a new code, please call your [place] clinic or [phone].        Care EveryWhere ID     This is your Care EveryWhere ID. This could be used by other organizations to access your [place] medical records  [id number]        Your Vitals Were     Pulse Temperature Height Pulse Oximetry BMI (Body Mass Index)       82 98.1  F (36.7  C) (Oral) 5' 11\" (1.803 m) 96% 36.82 kg/m2        Blood Pressure from Last 3 Encounters:   17 112/72   17 (!) 149/98   16 124/72    Weight from Last 3 Encounters:   17 264 lb (119.7 kg)   16 265 lb (120.2 kg)   16 262 lb (118.8 kg)              We Performed the Following     Albumin Random Urine Quantitative     HEMOGLOBIN A1C     TSH with free T4 reflex          Today's Medication Changes          These changes are accurate as of: 17 11:59 PM.  If you have any questions, ask your nurse or doctor.               These medicines have changed or have updated prescriptions.        Dose/Directions    albuterol 108 (90 BASE) MCG/ACT Inhaler   Commonly known as:  PROAIR HFA/PROVENTIL HFA/VENTOLIN HFA   This may have changed:    - when to take this  - reasons to take this   Used for:  COPD (chronic obstructive pulmonary disease) (H)        Dose:  2 puff   Inhale 2 puffs into the lungs every 6 hours   Quantity:  1 Inhaler   Refills:  6       KEPPRA 500 MG tablet   This may have changed:  Another medication with the same name was removed. Continue taking this medication, and follow the directions you see here.   Generic drug:  levETIRAcetam   Changed by:  Suyapa Leon MD        Dose:  500 mg   Take 500 mg by mouth 2 times daily   Refills:  0       " LANTUS SOLOSTAR 100 UNIT/ML injection   This may have changed:  Another medication with the same name was removed. Continue taking this medication, and follow the directions you see here.   Generic drug:  insulin glargine   Changed by:  Suyapa Leon MD        Dose:  30 Units   Inject 30 Units Subcutaneous At Bedtime   Refills:  0         Stop taking these medicines if you haven't already. Please contact your care team if you have questions.     ATORVASTATIN CALCIUM PO   Stopped by:  Suyapa Leon MD                Where to get your medicines      These medications were sent to [facility] [address] [place] [address] [id number] [place] [address] AT [place] [address]  [id number] [place] [address]    Hours:  24-hours Phone:  [phone]     fenofibrate 160 MG tablet    glipiZIDE 10 MG tablet    lisinopril 10 MG tablet                Primary Care Provider Office Phone # Fax #    Suyapa Leon [phone]       [facility] [address] [place] [address]        Thank you!     Thank you for choosing [facility]  for your care. Our goal is always to provide you with excellent care. Hearing back from our patients is one way we can continue to improve our services. Please take a few minutes to complete the written survey that you may receive in the mail after your visit with us. Thank you!             Your Updated Medication List - Protect others around you: Learn how to safely use, store and throw away your medicines at www.disposemymeds.org.          This list is accurate as of: 5/22/17 11:59 PM.  Always use your most recent med list.                   Brand Name Dispense Instructions for use    acetaminophen-codeine 300-30 MG per tablet    TYLENOL w/CODEINE No. 3    40 tablet    Take 1 tablet by mouth 2 times daily as needed for mild pain       albuterol 108 (90 BASE) MCG/ACT Inhaler    PROAIR HFA/PROVENTIL  HFA/VENTOLIN HFA    1 Inhaler    Inhale 2 puffs into the lungs every 6 hours       ASPIRIN PO      Take 81 mg by mouth       clopidogrel 75 MG tablet    PLAVIX    90 tablet    Take 1 tablet (75 mg) by mouth daily       cyanocobalamin 1000 MCG/ML injection    VITAMIN B12     Inject 1 mL into the muscle every 30 days See standing orders Dr. Chi. Vit B-12 injection every week x 4. Than monthly for one year. 1000 mcg/ml       fenofibrate 160 MG tablet     90 tablet    Take 1 tablet (160 mg) by mouth daily       FLUoxetine 40 MG capsule    PROzac    90 capsule    Take 1 capsule (40 mg) by mouth daily       folic acid 1 MG tablet    FOLVITE    90 tablet    Take 1 tablet (1 mg) by mouth daily       glipiZIDE 10 MG tablet    GLUCOTROL    180 tablet    Take 1 tablet (10 mg) by mouth 2 times daily (before meals)       insulin pen needle 31G X 5 MM     100 each    Use 1-2 pen needles daily or as directed.       KEPPRA 500 MG tablet   Generic drug:  levETIRAcetam      Take 500 mg by mouth 2 times daily       LANTUS SOLOSTAR 100 UNIT/ML injection   Generic drug:  insulin glargine      Inject 30 Units Subcutaneous At Bedtime       lisinopril 10 MG tablet    PRINIVIL/ZESTRIL    90 tablet    Take 1 tablet (10 mg) by mouth daily       metoprolol 100 MG 24 hr tablet    TOPROL-XL     Take 100 mg by mouth 2 times daily       pantoprazole sodium 40 MG tablet      Take 1 packet by mouth daily       simvastatin 10 MG tablet    ZOCOR    90 tablet    Take 1 tablet (10 mg) by mouth At Bedtime       SYMBICORT 80-4.5 MCG/ACT Inhaler   Generic drug:  budesonide-formoterol      Inhale 2 puffs into the lungs as needed       tamsulosin 0.4 MG capsule    FLOMAX    90 capsule    Take 1 capsule (0.4 mg) by mouth daily       TIOTROPIUM BROMIDE INHALATION POWDER 18MCG CAP      daily Once a day       topiramate 50 MG tablet    TOPAMAX    90 tablet    Take 1 tablet (50 mg) by mouth daily       TRADJENTA 5 MG Tabs tablet   Generic drug:  linagliptin       Take 5 mg by mouth daily Pt takes 1/2 tab daily

## 2017-05-22 NOTE — PROGRESS NOTES
SUBJECTIVE:                                                    Donnie Vincent is a 71 year old male who presents to clinic today for the following health issues:      Diabetes Follow-up    Patient is checking blood sugars: once daily.  Results are as follows:         am - 150-180    Diabetic concerns: None     Symptoms of hypoglycemia (low blood sugar): none     Paresthesias (numbness or burning in feet) or sores: No     Date of last diabetic eye exam: 1/2/17 at [facility] ,seeign ophthalmologist in 3 wks.      Hyperlipidemia Follow-Up      Rate your low fat/cholesterol diet?: good    Taking statin?  Yes, no muscle aches from statin    Other lipid medications/supplements?:  none     Hypertension Follow-up      Outpatient blood pressures are not being checked.    Low Salt Diet: low salt     Cerebrovascular Follow-up    Patient history: ischemic cerebrovascular incident and TIA. Pt recently had an episode of TIA in 03/17 while in [place] and was started on Plavix,   Recent MRI brain 03/17 imaging in [place]     No evidence of acute infarction  Suspected 2.3 x 2.7 cm meningioma emanating from the right anterior clinoid process with resultant edematous changes in the adjacent anterior-inferior right frontal lobe. Mild, presumed small vessel ischemic change.    Residual symptoms: None    Worsened or new symptoms since last visit: No    Daily aspirin use: Yes    Hypertension controlled: Yes       COPD Follow-Up    Symptoms are currently: stable    Current fatigue or dyspnea with ambulation: none    Shortness of breath: some sob     Increased or change in Cough/Sputum: No    Fever(s): No     Any ER/UC or hospital admissions since your last visit? No     History   Smoking Status     Former Smoker     Quit date: 1/1/1970   Smokeless Tobacco     Never Used     Comment: quit age 28     No results found for: FEV1, ELK7CCA     Depression Followup    Status since last visit: Stable     See PHQ-9 for current symptoms.  Other  associated symptoms: None    Complicating factors:   Significant life event:  No   Current substance abuse:  None  Anxiety or Panic symptoms:  No    PHQ-9  English PHQ-9   Any Language          Pt also c/o rt knee pain since 6mths, pain with walking and stairs, no h/o injury, no swelling, not takign any meds for pain        Amount of exercise or physical activity: None    Problems taking medications regularly: No    Medication side effects: none    Diet: regular (no restrictions)       Patient Active Problem List   Diagnosis     Other type of migraine     Sleep apnea     Diverticulitis     Seizure disorder (H)     Mixed hyperlipidemia     HYPERLIPIDEMIA LDL GOAL <100     Obstructive sleep apnea     Meningioma (H)     Advanced directives, counseling/discussion     COPD (chronic obstructive pulmonary disease) (H)     GERD (gastroesophageal reflux disease)     Thoracic aortic aneurysm (H)     Bilateral renal cysts     Acute ischemic stroke (H)     Increased homocysteine (H)     BPH (benign prostatic hypertrophy)     Cerebral infarction (H)     PFO (patent foramen ovale)     Type 2 diabetes with stage 3 chronic kidney disease GFR 30-59 (H)     Morbid obesity (H)     Major depressive disorder, recurrent episode, mild (H)     Gastrointestinal hemorrhage, unspecified gastrointestinal hemorrhage type     Essential hypertension     Thoracic aortic aneurysm without rupture (H)     Essential hypertension with goal blood pressure less than 130/80     Renal failure     SOB (shortness of breath)     Long-term (current) use of anticoagulants [Z79.01]     Past Surgical History:   Procedure Laterality Date     C NONSPECIFIC PROCEDURE      colonoscopy 2005     COLONOSCOPY  11/4/2015    Dr. Orozco [facility]     COLONOSCOPY N/A 11/4/2015    Procedure: COLONOSCOPY;  Surgeon: Yazmin Orozco MD;  Location:  GI     CYSTOSCOPY, RETROGRADES, EXTRACT STONE, COMBINED Left 11/25/2015    Procedure: COMBINED CYSTOSCOPY, RETROGRADES, EXTRACT  EDOUARD;  Surgeon: Neville Banuelos MD;  Location:  OR     ESOPHAGOSCOPY, GASTROSCOPY, DUODENOSCOPY (EGD), COMBINED  11/5/2015    Dr. Ernesto CONTRERAS     GENITOURINARY SURGERY      kidney stone - lithotripsy     PHACOEMULSIFICATION CLEAR CORNEA WITH STANDARD INTRAOCULAR LENS IMPLANT  12/20/2011    Procedure:PHACOEMULSIFICATION CLEAR CORNEA WITH STANDARD INTRAOCULAR LENS IMPLANT; RIGHT PHACOEMULSIFICATION CLEAR CORNEA WITH STANDARD INTRAOCULAR LENS IMPLANT ; Surgeon:GUILHERME KAUFMAN; Location:[facility]       Social History   Substance Use Topics     Smoking status: Former Smoker     Quit date: 1/1/1970     Smokeless tobacco: Never Used      Comment: quit age 28     Alcohol use Yes      Comment: 1-2 beers a week     Family History   Problem Relation Age of Onset     Family History Negative Mother      migraines     Family History Negative Father      lived to be 92     Colon Cancer No family hx of          Current Outpatient Prescriptions   Medication Sig Dispense Refill     insulin glargine (LANTUS SOLOSTAR) 100 UNIT/ML injection Inject 30 Units Subcutaneous At Bedtime       levETIRAcetam (KEPPRA) 500 MG tablet Take 500 mg by mouth 2 times daily       pantoprazole sodium 40 MG tablet Take 1 packet by mouth daily       metoprolol (TOPROL-XL) 100 MG 24 hr tablet Take 100 mg by mouth 2 times daily       linagliptin (TRADJENTA) 5 MG TABS tablet Take 5 mg by mouth daily Pt takes 1/2 tab daily       clopidogrel (PLAVIX) 75 MG tablet Take 1 tablet (75 mg) by mouth daily 90 tablet 0     fenofibrate 160 MG tablet Take 1 tablet (160 mg) by mouth daily 90 tablet 1     glipiZIDE (GLUCOTROL) 10 MG tablet Take 1 tablet (10 mg) by mouth 2 times daily (before meals) 180 tablet 1     insulin pen needle 31G X 5 MM Use 1-2 pen needles daily or as directed. 100 each 3     ASPIRIN PO Take 81 mg by mouth       FLUoxetine (PROZAC) 40 MG capsule Take 1 capsule (40 mg) by mouth daily 90 capsule 1     lisinopril (PRINIVIL,ZESTRIL) 10 MG  "tablet Take 1 tablet (10 mg) by mouth daily 90 tablet 1     TIOTROPIUM BROMIDE INHALATION POWDER 18MCG CAP daily Once a day       folic acid (FOLVITE) 1 MG tablet Take 1 tablet (1 mg) by mouth daily 90 tablet 0     topiramate (TOPAMAX) 50 MG tablet Take 1 tablet (50 mg) by mouth daily 90 tablet 0     tamsulosin (FLOMAX) 0.4 MG 24 hr capsule Take 1 capsule (0.4 mg) by mouth daily 90 capsule 3     cyanocobalamin 1000 MCG/ML injection Inject 1 mL into the muscle every 30 days See standing orders Dr. Chi.  Vit B-12 injection every week x 4.  Than monthly for one year.  1000 mcg/ml       ferrous sulfate (IRON) 325 (65 FE) MG tablet Take 1 tablet (325 mg) by mouth daily (with breakfast) 90 tablet 1     acetaminophen-codeine (TYLENOL W/CODEINE NO. 3) 300-30 MG per tablet Take 1 tablet by mouth 2 times daily as needed for mild pain 40 tablet 0     budesonide-formoterol (SYMBICORT) 80-4.5 MCG/ACT inhaler Inhale 2 puffs into the lungs as needed       albuterol (PROAIR HFA, PROVENTIL HFA, VENTOLIN HFA) 108 (90 BASE) MCG/ACT inhaler Inhale 2 puffs into the lungs every 6 hours (Patient taking differently: Inhale 2 puffs into the lungs every 6 hours as needed ) 1 Inhaler 6       Reviewed and updated as needed this visit by clinical staff       Reviewed and updated as needed this visit by Provider         ROS:  C: NEGATIVE for fever, chills, change in weight  EYES: NEGATIVE for vision changes or irritation  E/M: NEGATIVE for ear, mouth and throat problems  R: NEGATIVE for significant cough or SOB  CV: NEGATIVE for chest pain, palpitations or peripheral edema  MUSCULOSKELETAL: rt knee pain   Endo; diabetes  Neuro; no tingling.numbness or weakness   Psych;depression stable   Ros otherwise negative      OBJECTIVE:                                                    /72 (BP Location: Right arm, Cuff Size: Adult Large)  Pulse 82  Temp 98.1  F (36.7  C) (Oral)  Ht 5' 11\" (1.803 m)  Wt 264 lb (119.7 kg)  SpO2 96%  BMI 36.82 " kg/m2  Body mass index is 36.82 kg/(m^2).   GENERAL:   alert, well nourished, well hydrated, no distress  EYES: Eyes grossly normal to inspection, extraocular movements - intact, and PERRL  HENT:   Mouth- no ulcers, no lesions  NECK: no tenderness, no adenopathy, no asymmetry, no masses, no stiffness; thyroid- normal to palpation  RESP: lungs clear to auscultation - no rales, no rhonchi, no wheezes  CV: regular rates and rhythm, normal S1 S2,    MS: extremities- no gross deformities noted, no edema, no calf tenderness  NEURO: strength and tone- normal, sensory exam- grossly normal, mentation- intact, speech- normal, reflexes- symmetric  Diabetic foot exam: normal DP and PT pulses, no trophic changes or ulcerative lesions and normal sensory exam         ASSESSMENT/PLAN:                                                       (E11.22,  N18.3,  Z79.4) Type 2 diabetes mellitus with stage 3 chronic kidney disease, with long-term current use of insulin (H)  (primary encounter diagnosis)  Plan: on lantus 30 units , check HEMOGLOBIN A1C, TSH with free T4 reflex,         Albumin Random Urine Quantitative, refilled glipiZIDE         (GLUCOTROL) 10 MG tablet.explained clearly about the medication,insructions and side effects.  Advised to follow ADA diet and exercise and f/u in 6 mths. Had lipid panel at [place] recently.      (I63.9) Acute ischemic stroke (H)  Plan: recent TIA, on plavix. ,seeign neurologist next week.     (E78.5) Hyperlipidemia LDL goal <100  Plan: fenofibrate 160 MG tablet refilled.explained clearly about the medication,insructions and side effects. Continue simvastatin           (J44.9) Chronic obstructive pulmonary disease, unspecified COPD type (H)  Plan: stable.    (F33.0) Major depressive disorder, recurrent episode, mild (H)  Plan: controlled on prozac 40 mg daily.    (E66.01) Morbid obesity, unspecified obesity type (H)  Plan:  Discussed in detail about Diet,calorie intake,and importance of regular  exercise       (M25.561) Right knee pain, unspecified chronicity  Plan: XR Knee Right 3 Views.pt was told I will contact him after results and proceed accordingly.          (I10) Essential hypertension  Plan: lisinopril (PRINIVIL/ZESTRIL) 10 MG tablet refilled.explained clearly about the medication,insructions and side effects.Advised to follow low salt diet and exercise and f/u in 6 mths.     40 minutes is spent with the patient discussing multiple health concerns; Over 50% Counselling/Education provided.            Suyapa Leon MD  [facility]

## 2017-05-23 ENCOUNTER — TRANSFERRED RECORDS (OUTPATIENT)
Dept: HEALTH INFORMATION MANAGEMENT | Facility: CLINIC | Age: 72
End: 2017-05-23

## 2017-05-23 LAB
CREAT UR-MCNC: 209 MG/DL
MICROALBUMIN UR-MCNC: 22 MG/L
MICROALBUMIN/CREAT UR: 10.48 MG/G CR (ref 0–17)
TSH SERPL DL<=0.005 MIU/L-ACNC: 2.11 MU/L (ref 0.4–4)

## 2017-05-25 DIAGNOSIS — D50.9 IRON DEFICIENCY ANEMIA, UNSPECIFIED: ICD-10-CM

## 2017-05-25 NOTE — TELEPHONE ENCOUNTER
IRON        Last Written Prescription Date: 10/26/15  Last Fill Quantity: 60,    # refills: 2  [place] [facility] [place] prescribing provider:  05/22/17      Lab Results   Component Value Date    WBC 6.6 05/09/2017     Lab Results   Component Value Date    RBC 4.47 05/09/2017     Lab Results   Component Value Date    HGB 12.7 05/09/2017     Lab Results   Component Value Date    HCT 40.2 05/09/2017     No components found for: MCT  Lab Results   Component Value Date    MCV 90 05/09/2017     Lab Results   Component Value Date    MCH 28.4 05/09/2017     Lab Results   Component Value Date    MCHC 31.6 05/09/2017     Lab Results   Component Value Date    RDW 14.1 05/09/2017     Lab Results   Component Value Date     05/09/2017     Lab Results   Component Value Date    AST 14 05/13/2016     Lab Results   Component Value Date    ALT 24 05/13/2016     Creatinine   Date Value Ref Range Status   05/09/2017 1.42 (H) 0.66 - 1.25 mg/dL Final       Labs showing if normal/abnormal  Lab Results   Component Value Date    WBC 6.6 05/09/2017    RBC 4.47 05/09/2017    HGB 12.7 (L) 05/09/2017    HCT 40.2 05/09/2017    MCV 90 05/09/2017    MCH 28.4 05/09/2017    MCHC 31.6 05/09/2017    RDW 14.1 05/09/2017     05/09/2017    DTYP Automated Method 05/09/2017    NEUTROPHIL 73.0 05/09/2017    LYMPH 15.7 05/09/2017    MONOCYTE 7.2 05/09/2017    EOSINOPHIL 2.3 05/09/2017    BASOPHIL 1.2 05/09/2017    IG 0.6 05/09/2017    ANEU 4.8 05/09/2017    ALYM 1.0 05/09/2017    KENDRA 0.5 05/09/2017    AEOS 0.2 05/09/2017    ABAS 0.1 05/09/2017    AIG 0.0 05/09/2017      Lab Results   Component Value Date    AST 14 05/13/2016    ALT 24 05/13/2016

## 2017-05-26 RX ORDER — FERROUS SULFATE 325(65) MG
325 TABLET ORAL
Qty: 90 TABLET | Refills: 1 | Status: SHIPPED | OUTPATIENT
Start: 2017-05-26 | End: 2018-10-17

## 2017-05-29 RX ORDER — LISINOPRIL 10 MG/1
10 TABLET ORAL DAILY
Qty: 90 TABLET | Refills: 1 | Status: SHIPPED | OUTPATIENT
Start: 2017-05-29 | End: 2018-05-18

## 2017-05-29 RX ORDER — GLIPIZIDE 10 MG/1
10 TABLET ORAL
Qty: 180 TABLET | Refills: 1 | Status: SHIPPED | OUTPATIENT
Start: 2017-05-29 | End: 2018-05-17

## 2017-05-29 RX ORDER — FENOFIBRATE 160 MG/1
160 TABLET ORAL DAILY
Qty: 90 TABLET | Refills: 1 | Status: SHIPPED | OUTPATIENT
Start: 2017-05-29 | End: 2018-05-17

## 2017-05-29 RX ORDER — SIMVASTATIN 10 MG
10 TABLET ORAL AT BEDTIME
Qty: 90 TABLET | Refills: 1 | COMMUNITY
Start: 2017-05-29 | End: 2018-05-17

## 2017-05-30 ASSESSMENT — PATIENT HEALTH QUESTIONNAIRE - PHQ9: SUM OF ALL RESPONSES TO PHQ QUESTIONS 1-9: 4

## 2017-06-08 ENCOUNTER — TELEPHONE (OUTPATIENT)
Dept: INTERNAL MEDICINE | Facility: CLINIC | Age: 72
End: 2017-06-08

## 2017-06-08 NOTE — TELEPHONE ENCOUNTER
Maico calls asking for pts Xray results. Advised of normal results except mild arthritis. She agrees, but wants to know if he should be doing anything for the pain. Still has the pain intermittently.     Please advise.

## 2017-06-08 NOTE — TELEPHONE ENCOUNTER
Mild osteoarthritis, can take OTC Tylenol for pain , will refer to physical therapy if pain continues.

## 2017-08-29 ENCOUNTER — TRANSFERRED RECORDS (OUTPATIENT)
Dept: HEALTH INFORMATION MANAGEMENT | Facility: CLINIC | Age: 72
End: 2017-08-29

## 2017-10-05 ENCOUNTER — TRANSFERRED RECORDS (OUTPATIENT)
Dept: HEALTH INFORMATION MANAGEMENT | Facility: CLINIC | Age: 72
End: 2017-10-05

## 2018-05-08 ENCOUNTER — TRANSFERRED RECORDS (OUTPATIENT)
Dept: HEALTH INFORMATION MANAGEMENT | Facility: CLINIC | Age: 73
End: 2018-05-08

## 2018-05-15 ENCOUNTER — OFFICE VISIT (OUTPATIENT)
Dept: INTERNAL MEDICINE | Facility: CLINIC | Age: 73
End: 2018-05-15
Payer: COMMERCIAL

## 2018-05-15 VITALS
WEIGHT: 250.5 LBS | RESPIRATION RATE: 16 BRPM | OXYGEN SATURATION: 98 % | SYSTOLIC BLOOD PRESSURE: 118 MMHG | HEART RATE: 102 BPM | BODY MASS INDEX: 34.94 KG/M2 | DIASTOLIC BLOOD PRESSURE: 74 MMHG | TEMPERATURE: 97.7 F

## 2018-05-15 DIAGNOSIS — E66.01 MORBID OBESITY (H): ICD-10-CM

## 2018-05-15 DIAGNOSIS — E78.2 MIXED HYPERLIPIDEMIA: ICD-10-CM

## 2018-05-15 DIAGNOSIS — E11.22 TYPE 2 DIABETES MELLITUS WITH STAGE 3 CHRONIC KIDNEY DISEASE, WITH LONG-TERM CURRENT USE OF INSULIN (H): Primary | ICD-10-CM

## 2018-05-15 DIAGNOSIS — D50.9 IRON DEFICIENCY ANEMIA, UNSPECIFIED IRON DEFICIENCY ANEMIA TYPE: ICD-10-CM

## 2018-05-15 DIAGNOSIS — G40.909 SEIZURE DISORDER (H): ICD-10-CM

## 2018-05-15 DIAGNOSIS — E78.5 HYPERLIPIDEMIA LDL GOAL <100: ICD-10-CM

## 2018-05-15 DIAGNOSIS — R53.83 OTHER FATIGUE: ICD-10-CM

## 2018-05-15 DIAGNOSIS — Z79.4 TYPE 2 DIABETES MELLITUS WITH STAGE 3 CHRONIC KIDNEY DISEASE, WITH LONG-TERM CURRENT USE OF INSULIN (H): Primary | ICD-10-CM

## 2018-05-15 DIAGNOSIS — I10 ESSENTIAL HYPERTENSION: ICD-10-CM

## 2018-05-15 DIAGNOSIS — F33.0 MAJOR DEPRESSIVE DISORDER, RECURRENT EPISODE, MILD (H): ICD-10-CM

## 2018-05-15 DIAGNOSIS — N18.30 TYPE 2 DIABETES MELLITUS WITH STAGE 3 CHRONIC KIDNEY DISEASE, WITH LONG-TERM CURRENT USE OF INSULIN (H): Primary | ICD-10-CM

## 2018-05-15 LAB
ERYTHROCYTE [DISTWIDTH] IN BLOOD BY AUTOMATED COUNT: 16.7 % (ref 10–15)
HBA1C MFR BLD: 6 % (ref 0–5.6)
HCT VFR BLD AUTO: 29.3 % (ref 40–53)
HGB BLD-MCNC: 8.2 G/DL (ref 13.3–17.7)
MCH RBC QN AUTO: 21.7 PG (ref 26.5–33)
MCHC RBC AUTO-ENTMCNC: 28 G/DL (ref 31.5–36.5)
MCV RBC AUTO: 78 FL (ref 78–100)
PLATELET # BLD AUTO: 291 10E9/L (ref 150–450)
RBC # BLD AUTO: 3.78 10E12/L (ref 4.4–5.9)
VIT B12 SERPL-MCNC: 482 PG/ML (ref 193–986)
WBC # BLD AUTO: 7.1 10E9/L (ref 4–11)

## 2018-05-15 PROCEDURE — 99215 OFFICE O/P EST HI 40 MIN: CPT | Performed by: INTERNAL MEDICINE

## 2018-05-15 PROCEDURE — 84443 ASSAY THYROID STIM HORMONE: CPT | Performed by: INTERNAL MEDICINE

## 2018-05-15 PROCEDURE — 82607 VITAMIN B-12: CPT | Performed by: INTERNAL MEDICINE

## 2018-05-15 PROCEDURE — 82043 UR ALBUMIN QUANTITATIVE: CPT | Performed by: INTERNAL MEDICINE

## 2018-05-15 PROCEDURE — 85027 COMPLETE CBC AUTOMATED: CPT | Performed by: INTERNAL MEDICINE

## 2018-05-15 PROCEDURE — 80053 COMPREHEN METABOLIC PANEL: CPT | Performed by: INTERNAL MEDICINE

## 2018-05-15 PROCEDURE — 80061 LIPID PANEL: CPT | Performed by: INTERNAL MEDICINE

## 2018-05-15 PROCEDURE — 36415 COLL VENOUS BLD VENIPUNCTURE: CPT | Performed by: INTERNAL MEDICINE

## 2018-05-15 PROCEDURE — 83036 HEMOGLOBIN GLYCOSYLATED A1C: CPT | Performed by: INTERNAL MEDICINE

## 2018-05-15 RX ORDER — LIRAGLUTIDE 6 MG/ML
1.8 INJECTION SUBCUTANEOUS AT BEDTIME
Status: ON HOLD | COMMUNITY
End: 2018-10-09

## 2018-05-15 NOTE — PROGRESS NOTES
SUBJECTIVE:   Donnie Vincent is a 72 year old male who presents to clinic today for the following health issues:      Diabetes Follow-up      Patient is checking blood sugars: 3 times a week, patient is on now Lantus 14 units daily ,was on 30 units per Epic. Patient also has some medication changes for his diabetes. Not sure if the changes were [facility] in [place] [facility]. He has been started on Victoza and also Tradjenta. Patient is also on glipizide.    Diabetic concerns: None     Symptoms of hypoglycemia (low blood sugar): none     Paresthesias (numbness or burning in feet) or sores: Yes     Date of last diabetic eye exam: 2018    Hyperlipidemia Follow-Up      Rate your low fat/cholesterol diet?: good    Taking statin?  Yes, no muscle aches from statin    Other lipid medications/supplements?:  none    Hypertension Follow-up      Outpatient blood pressures are being checked at home.  Results are <120/80.    Low Salt Diet: no added salt    Cerebrovascular Follow-up      Patient history: ischemic cerebrovascular incident    Residual symptoms: None    Worsened or new symptoms since last visit: No    Daily aspirin use: Yes    Hypertension controlled: Yes    Depression Followup    Status since last visit: Stable      See PHQ-9 for current symptoms.  Other associated symptoms: None    Complicating factors:   Significant life event:  No   Current substance abuse:  None  Anxiety or Panic symptoms:  No    PHQ-9 8/3/2016 5/22/2017 5/15/2018   Total Score 4 4 3   Q9: Suicide Ideation Not at all Not at all Not at all          COPD Follow-Up    Symptoms are currently: stable    Current fatigue or dyspnea with ambulation: none    Shortness of breath: stable    Increased or change in Cough/Sputum: No    Fever(s): No  History   Smoking Status     Former Smoker     Quit date: 1/1/1970   Smokeless Tobacco     Never Used     Comment: quit age 28         BP Readings from Last 2 Encounters:   05/15/18 118/74   05/22/17  112/72     Hemoglobin A1C (%)   Date Value   05/22/2017 7.8 (H)   03/15/2017 9.2 (H)     LDL Cholesterol Calculated (mg/dL)   Date Value   03/16/2017 52   05/13/2016 33       Also complains of having fatigue, tiredness f. Has not been taking his B-12 injections and would like to be checked for B-12 today.    Pt says he has SOB since 6-8 mths,mainly with exertion,mowing lawn ,with  one flight of stairs. No orthopnea. Pt had on and off chest discomfort also while in [place] and was admitted to hospital in 03/18 and had extensive evaluation for SOB and chest tightness, no records here , pt says he had CT chest, possible stress test and will be getting records from [place]        Amount of exercise or physical activity: 2-3 days/week for an average of 15-30 minutes    Problems taking medications regularly: No    Medication side effects: none    Diet: low salt      Patient Active Problem List   Diagnosis     Other type of migraine     Sleep apnea     Diverticulitis     Seizure disorder (H)     Mixed hyperlipidemia     HYPERLIPIDEMIA LDL GOAL <100     Obstructive sleep apnea     Meningioma (H)     Advanced directives, counseling/discussion     COPD (chronic obstructive pulmonary disease) (H)     GERD (gastroesophageal reflux disease)     Thoracic aortic aneurysm (H)     Bilateral renal cysts     Acute ischemic stroke (H)     Increased homocysteine (H)     BPH (benign prostatic hypertrophy)     Cerebral infarction (H)     PFO (patent foramen ovale)     Type 2 diabetes with stage 3 chronic kidney disease GFR 30-59 (H)     Morbid obesity (H)     Major depressive disorder, recurrent episode, mild (H)     Gastrointestinal hemorrhage, unspecified gastrointestinal hemorrhage type     Essential hypertension     Thoracic aortic aneurysm without rupture (H)     Essential hypertension with goal blood pressure less than 130/80     Renal failure     SOB (shortness of breath)     Long-term (current) use of anticoagulants [Z79.01]      Past Surgical History:   Procedure Laterality Date     C NONSPECIFIC PROCEDURE      colonoscopy 2005     COLONOSCOPY  11/4/2015    Dr. Orozco [facility]     COLONOSCOPY N/A 11/4/2015    Procedure: COLONOSCOPY;  Surgeon: Yazmin Orozco MD;  Location:  GI     CYSTOSCOPY, RETROGRADES, EXTRACT STONE, COMBINED Left 11/25/2015    Procedure: COMBINED CYSTOSCOPY, RETROGRADES, EXTRACT STONE;  Surgeon: Neville Banuelos MD;  Location:  OR     ESOPHAGOSCOPY, GASTROSCOPY, DUODENOSCOPY (EGD), COMBINED  11/5/2015    Dr. Orozco [facility]     GENITOURINARY SURGERY      kidney stone - lithotripsy     PHACOEMULSIFICATION CLEAR CORNEA WITH STANDARD INTRAOCULAR LENS IMPLANT  12/20/2011    Procedure:PHACOEMULSIFICATION CLEAR CORNEA WITH STANDARD INTRAOCULAR LENS IMPLANT; RIGHT PHACOEMULSIFICATION CLEAR CORNEA WITH STANDARD INTRAOCULAR LENS IMPLANT ; Surgeon:GUILHERME KAUFMAN; Location:[facility]       Social History   Substance Use Topics     Smoking status: Former Smoker     Quit date: 1/1/1970     Smokeless tobacco: Never Used      Comment: quit age 28     Alcohol use Yes      Comment: 1-2 beers a week     Family History   Problem Relation Age of Onset     Family History Negative Mother      migraines     Family History Negative Father      lived to be 92     Colon Cancer No family hx of          Current Outpatient Prescriptions   Medication Sig Dispense Refill     acetaminophen-codeine (TYLENOL W/CODEINE NO. 3) 300-30 MG per tablet Take 1 tablet by mouth 2 times daily as needed for mild pain 40 tablet 0     albuterol (PROAIR HFA, PROVENTIL HFA, VENTOLIN HFA) 108 (90 BASE) MCG/ACT inhaler Inhale 2 puffs into the lungs every 6 hours (Patient taking differently: Inhale 2 puffs into the lungs every 6 hours as needed ) 1 Inhaler 6     ASPIRIN PO Take 81 mg by mouth       budesonide-formoterol (SYMBICORT) 80-4.5 MCG/ACT inhaler Inhale 2 puffs into the lungs as needed       clopidogrel (PLAVIX) 75 MG tablet Take 1 tablet (75 mg) by mouth  daily 90 tablet 0     cyanocobalamin 1000 MCG/ML injection Inject 1 mL into the muscle every 30 days See standing orders Dr. Chi.  Vit B-12 injection every week x 4.  Than monthly for one year.  1000 mcg/ml       fenofibrate 160 MG tablet Take 1 tablet (160 mg) by mouth daily 90 tablet 1     ferrous sulfate (IRON) 325 (65 FE) MG tablet Take 1 tablet (325 mg) by mouth daily (with breakfast) 90 tablet 1     FLUoxetine (PROZAC) 40 MG capsule Take 1 capsule (40 mg) by mouth daily 90 capsule 1     folic acid (FOLVITE) 1 MG tablet Take 1 tablet (1 mg) by mouth daily 90 tablet 0     glipiZIDE (GLUCOTROL) 10 MG tablet Take 1 tablet (10 mg) by mouth 2 times daily (before meals) 180 tablet 1     insulin glargine (LANTUS SOLOSTAR) 100 UNIT/ML pen Inject 14 Units Subcutaneous At Bedtime       insulin pen needle 31G X 5 MM Use 1-2 pen needles daily or as directed. 100 each 3     levETIRAcetam (KEPPRA) 500 MG tablet Take 500 mg by mouth 2 times daily       linagliptin (TRADJENTA) 5 MG TABS tablet Take 5 mg by mouth daily Pt takes 1/2 tab daily       liraglutide (VICTOZA) 18 MG/3ML soln Inject 1.8 mg Subcutaneous daily       lisinopril (PRINIVIL/ZESTRIL) 10 MG tablet Take 1 tablet (10 mg) by mouth daily 90 tablet 1     metoprolol (TOPROL-XL) 100 MG 24 hr tablet Take 100 mg by mouth 2 times daily       pantoprazole sodium 40 MG tablet Take 1 packet by mouth daily       simvastatin (ZOCOR) 10 MG tablet Take 1 tablet (10 mg) by mouth At Bedtime 90 tablet 1     tamsulosin (FLOMAX) 0.4 MG 24 hr capsule Take 1 capsule (0.4 mg) by mouth daily 90 capsule 3     TIOTROPIUM BROMIDE INHALATION POWDER 18MCG CAP daily Once a day       topiramate (TOPAMAX) 50 MG tablet Take 1 tablet (50 mg) by mouth daily 90 tablet 0     [DISCONTINUED] insulin glargine (LANTUS SOLOSTAR) 100 UNIT/ML injection Inject 30 Units Subcutaneous At Bedtime         Reviewed and updated as needed this visit by clinical staff  Tobacco  Allergies  Meds  Problems  Med  Hx  Surg Hx  Fam Hx  Soc Hx        Reviewed and updated as needed this visit by Provider         ROS:  CONSTITUTIONAL: NEGATIVE for fever, chills, change in weight  EYES: NEGATIVE for vision changes or irritation  ENT/MOUTH: NEGATIVE for ear, mouth and throat problems  RESP: on and off sob, no cough  CV: NEGATIVE for chest pain, palpitations or peripheral edema  MUSCULOSKELETAL: NEGATIVE for significant arthralgias or myalgia  NEURO: NEGATIVE for weakness,   paresthesias  ENDOCRINE: some fatigue ,NEGATIVE for temperature intolerance, skin/hair changes  PSYCHIATRIC: HX depression  ROS otherwise negative    OBJECTIVE:                                                    /74  Pulse 102  Temp 97.7  F (36.5  C) (Oral)  Resp 16  Wt 250 lb 8 oz (113.6 kg)  SpO2 98%  BMI 34.94 kg/m2  Body mass index is 34.94 kg/(m^2).   GENERAL: healthy, alert, well nourished, well hydrated, no distress  EYES: Eyes grossly normal to inspection, extraocular movements - intact, and PERRL  HENT: ear canals- normal; TMs- normal; Nose- normal; Mouth- no ulcers, no lesions  NECK: no tenderness, no adenopathy, no asymmetry, no masses, no stiffness; thyroid- normal to palpation  RESP: lungs clear to auscultation - no rales, no rhonchi, no wheezes  CV: regular rates and rhythm,    MS: extremities- no gross deformities noted, no edema, no calf tenderness  NEURO: strength and tone- normal, sensory exam- grossly normal, mentation- intact, speech- normal, reflexes- symmetric  Diabetic foot exam: normal DP and PT pulses, no trophic changes or ulcerative lesions, normal sensory exam and normal monofilament exam         ASSESSMENT/PLAN:                                                         (E11.22,  N18.3,  Z79.4) Type 2 diabetes mellitus with stage 3 chronic kidney disease, with long-term current use of insulin (H)  (primary encounter diagnosis)  Plan: Comprehensive metabolic panel, Lipid panel reflex to direct LDL Fasting, TSH with free T4  reflex, Vitamin B12, Hemoglobin A1c, Albumin Random Urine Quantitative with Creat Ratio, pt [facility] Victoza[facility] and lantus has been reduced to 14 units, also on glipiZIDE (GLUCOTROL) 10 MG tablet bid. Will adjust med after A1c results.Advised to follow ADA diet and exercise and f/u in 6 mths.       (F33.0) Major depressive disorder, recurrent episode, mild (H)  Plan: stable, PHQ 9 score 3, continue Prozac 40 mg daily.      (R53.83) Other fatigue  Plan: TSH with free T4 reflex, Vitamin B12, CBC with         platelets            (G40.909) Seizure disorder (H)  Plan: see neurologist and on Keppra       (E66.01) Morbid obesity (H)  Plan:  Discussed in detail about Diet,calorie intake,and importance of regular exercise       (I10) Essential hypertension  Plan: BP well controlled, continue lisinopril 10 mg and metoprolol 100 mg daily,.Advised to follow low salt diet and exercise and f/u in 6 mths.        (E78.5) Hyperlipidemia LDL goal <100  Plan: fenofibrate 160 MG tablet, simvastatin (ZOCOR) 10 MG tablet refilled..explained clearly about the medication,insructions and side effects.            (D50.9) Iron deficiency anemia, unspecified iron deficiency anemia type  Plan:h/o bleeding AVM noted in upper endoscopy in 2015, on iron tabs 325 mg once daily , check CBC today. pt was told I will contact her after results and proceed accordingly.  CBC - Iron deficiency anemia with Hgb - 8.2, will get stool test and also refer to GI , will increase ferrous sulfate 325 mg po bid. Rpt cbc in 1 mth.    40 minutes is spent with the patient discussing multiple health concerns; Over 50% Counselling/Education provided.    Suyapa Leon MD  [facility]

## 2018-05-16 ASSESSMENT — PATIENT HEALTH QUESTIONNAIRE - PHQ9: SUM OF ALL RESPONSES TO PHQ QUESTIONS 1-9: 3

## 2018-05-17 LAB
ALBUMIN SERPL-MCNC: 3.8 G/DL (ref 3.4–5)
ALP SERPL-CCNC: 56 U/L (ref 40–150)
ALT SERPL W P-5'-P-CCNC: 16 U/L (ref 0–70)
ANION GAP SERPL CALCULATED.3IONS-SCNC: 8 MMOL/L (ref 3–14)
AST SERPL W P-5'-P-CCNC: 10 U/L (ref 0–45)
BILIRUB SERPL-MCNC: 0.5 MG/DL (ref 0.2–1.3)
BUN SERPL-MCNC: 17 MG/DL (ref 7–30)
CALCIUM SERPL-MCNC: 8.8 MG/DL (ref 8.5–10.1)
CHLORIDE SERPL-SCNC: 113 MMOL/L (ref 94–109)
CHOLEST SERPL-MCNC: 108 MG/DL
CO2 SERPL-SCNC: 21 MMOL/L (ref 20–32)
CREAT SERPL-MCNC: 1.25 MG/DL (ref 0.66–1.25)
CREAT UR-MCNC: 235 MG/DL
GFR SERPL CREATININE-BSD FRML MDRD: 56 ML/MIN/1.7M2
GLUCOSE SERPL-MCNC: 179 MG/DL (ref 70–99)
HDLC SERPL-MCNC: 32 MG/DL
LDLC SERPL CALC-MCNC: 54 MG/DL
MICROALBUMIN UR-MCNC: 92 MG/L
MICROALBUMIN/CREAT UR: 39.32 MG/G CR (ref 0–17)
NONHDLC SERPL-MCNC: 76 MG/DL
POTASSIUM SERPL-SCNC: 4.5 MMOL/L (ref 3.4–5.3)
PROT SERPL-MCNC: 7.4 G/DL (ref 6.8–8.8)
SODIUM SERPL-SCNC: 142 MMOL/L (ref 133–144)
TRIGL SERPL-MCNC: 110 MG/DL
TSH SERPL DL<=0.005 MIU/L-ACNC: 1.53 MU/L (ref 0.4–4)

## 2018-05-17 RX ORDER — GLIPIZIDE 10 MG/1
10 TABLET ORAL
Qty: 180 TABLET | Refills: 1 | Status: ON HOLD | OUTPATIENT
Start: 2018-05-17 | End: 2018-10-09

## 2018-05-17 RX ORDER — SIMVASTATIN 10 MG
10 TABLET ORAL AT BEDTIME
Qty: 90 TABLET | Refills: 1 | Status: SHIPPED | OUTPATIENT
Start: 2018-05-17 | End: 2018-10-12

## 2018-05-17 RX ORDER — FENOFIBRATE 160 MG/1
160 TABLET ORAL DAILY
Qty: 90 TABLET | Refills: 1 | Status: SHIPPED | OUTPATIENT
Start: 2018-05-17 | End: 2018-10-12

## 2018-05-18 ENCOUNTER — TELEPHONE (OUTPATIENT)
Dept: INTERNAL MEDICINE | Facility: CLINIC | Age: 73
End: 2018-05-18

## 2018-05-23 NOTE — TELEPHONE ENCOUNTER
"Need name of facility that patient requested Release of Information Form for. Pt did give name \"[facility], -[phone]\" but unable to locate facility.  Form can be found at Celeste's desk when able to update form.  Zoe Taylor, ROBERTO    "
[facility], [place], [place] Fax [phone].  Looking for information from 3/9/18, lab work.   Please update form and fax request with date of service to obtain labs for patient.   
Form completed and faxed.  Zoe Taylor, ROBERTO    
No

## 2018-05-30 ENCOUNTER — TRANSFERRED RECORDS (OUTPATIENT)
Dept: HEALTH INFORMATION MANAGEMENT | Facility: CLINIC | Age: 73
End: 2018-05-30

## 2018-06-01 ENCOUNTER — OFFICE VISIT (OUTPATIENT)
Dept: INTERNAL MEDICINE | Facility: CLINIC | Age: 73
End: 2018-06-01
Payer: COMMERCIAL

## 2018-06-01 VITALS
WEIGHT: 245.6 LBS | OXYGEN SATURATION: 98 % | RESPIRATION RATE: 16 BRPM | TEMPERATURE: 98 F | DIASTOLIC BLOOD PRESSURE: 60 MMHG | HEART RATE: 99 BPM | SYSTOLIC BLOOD PRESSURE: 108 MMHG | BODY MASS INDEX: 34.25 KG/M2

## 2018-06-01 DIAGNOSIS — R31.9 URINARY TRACT INFECTION WITH HEMATURIA, SITE UNSPECIFIED: ICD-10-CM

## 2018-06-01 DIAGNOSIS — R30.0 DYSURIA: Primary | ICD-10-CM

## 2018-06-01 DIAGNOSIS — N39.0 URINARY TRACT INFECTION WITH HEMATURIA, SITE UNSPECIFIED: ICD-10-CM

## 2018-06-01 DIAGNOSIS — D64.9 ANEMIA, UNSPECIFIED TYPE: ICD-10-CM

## 2018-06-01 LAB
ALBUMIN UR-MCNC: 100 MG/DL
AMORPH CRY #/AREA URNS HPF: ABNORMAL /HPF
APPEARANCE UR: ABNORMAL
BILIRUB UR QL STRIP: NEGATIVE
COLOR UR AUTO: YELLOW
ERYTHROCYTE [DISTWIDTH] IN BLOOD BY AUTOMATED COUNT: 17.5 % (ref 10–15)
GLUCOSE UR STRIP-MCNC: NEGATIVE MG/DL
HCT VFR BLD AUTO: 30.7 % (ref 40–53)
HGB BLD-MCNC: 8.7 G/DL (ref 13.3–17.7)
HGB UR QL STRIP: ABNORMAL
KETONES UR STRIP-MCNC: NEGATIVE MG/DL
LEUKOCYTE ESTERASE UR QL STRIP: ABNORMAL
MCH RBC QN AUTO: 21.4 PG (ref 26.5–33)
MCHC RBC AUTO-ENTMCNC: 28.3 G/DL (ref 31.5–36.5)
MCV RBC AUTO: 76 FL (ref 78–100)
MUCOUS THREADS #/AREA URNS LPF: PRESENT /LPF
NITRATE UR QL: POSITIVE
PH UR STRIP: 6 PH (ref 5–7)
PLATELET # BLD AUTO: 339 10E9/L (ref 150–450)
RBC # BLD AUTO: 4.06 10E12/L (ref 4.4–5.9)
RBC #/AREA URNS AUTO: ABNORMAL /HPF
SOURCE: ABNORMAL
SP GR UR STRIP: 1.02 (ref 1–1.03)
UROBILINOGEN UR STRIP-ACNC: 0.2 EU/DL (ref 0.2–1)
WBC # BLD AUTO: 8.2 10E9/L (ref 4–11)
WBC #/AREA URNS AUTO: >100 /HPF

## 2018-06-01 PROCEDURE — 81001 URINALYSIS AUTO W/SCOPE: CPT | Performed by: INTERNAL MEDICINE

## 2018-06-01 PROCEDURE — 36415 COLL VENOUS BLD VENIPUNCTURE: CPT | Performed by: INTERNAL MEDICINE

## 2018-06-01 PROCEDURE — 87086 URINE CULTURE/COLONY COUNT: CPT | Performed by: INTERNAL MEDICINE

## 2018-06-01 PROCEDURE — 99214 OFFICE O/P EST MOD 30 MIN: CPT | Performed by: INTERNAL MEDICINE

## 2018-06-01 PROCEDURE — 85027 COMPLETE CBC AUTOMATED: CPT | Performed by: INTERNAL MEDICINE

## 2018-06-01 RX ORDER — CIPROFLOXACIN 500 MG/1
500 TABLET, FILM COATED ORAL 2 TIMES DAILY
Qty: 14 TABLET | Refills: 0 | Status: SHIPPED | OUTPATIENT
Start: 2018-06-01 | End: 2018-06-01

## 2018-06-01 RX ORDER — CIPROFLOXACIN 500 MG/1
500 TABLET, FILM COATED ORAL 2 TIMES DAILY
Qty: 14 TABLET | Refills: 0 | Status: SHIPPED | OUTPATIENT
Start: 2018-06-01 | End: 2018-07-10

## 2018-06-01 NOTE — PROGRESS NOTES
SUBJECTIVE:   Donnie Vincent is a 72 year old male who presents to clinic today for the following health issues:      Genitourinary symptoms      Duration: 1 week    Description:  dysuria, frequency, hesitancy and retention    Intensity:  mild    Accompanying signs and symptoms (fever/discharge/nausea/vomiting/back or abdominal pain):  None    History (frequent UTI's/kidney stones/prostate problems): None  Sexually active: no     Precipitating or alleviating factors: None    Therapies tried and outcome: none            Pt is also here to f/u on anemia, has increased iron supplements to bid. Has seen GI and getting colonoscopy and upper endoscopy       Current Outpatient Prescriptions   Medication Sig Dispense Refill     albuterol (PROAIR HFA, PROVENTIL HFA, VENTOLIN HFA) 108 (90 BASE) MCG/ACT inhaler Inhale 2 puffs into the lungs every 6 hours (Patient taking differently: Inhale 2 puffs into the lungs every 6 hours as needed ) 1 Inhaler 6     budesonide-formoterol (SYMBICORT) 80-4.5 MCG/ACT inhaler Inhale 2 puffs into the lungs as needed       ciprofloxacin (CIPRO) 500 MG tablet Take 1 tablet (500 mg) by mouth 2 times daily 14 tablet 0     clopidogrel (PLAVIX) 75 MG tablet Take 1 tablet (75 mg) by mouth daily 90 tablet 0     fenofibrate 160 MG tablet Take 1 tablet (160 mg) by mouth daily 90 tablet 1     ferrous sulfate (IRON) 325 (65 FE) MG tablet Take 1 tablet (325 mg) by mouth daily (with breakfast) 90 tablet 1     FLUoxetine (PROZAC) 40 MG capsule Take 1 capsule (40 mg) by mouth daily 90 capsule 1     folic acid (FOLVITE) 1 MG tablet Take 1 tablet (1 mg) by mouth daily 90 tablet 0     glipiZIDE (GLUCOTROL) 10 MG tablet Take 1 tablet (10 mg) by mouth 2 times daily (before meals) 180 tablet 1     insulin glargine (LANTUS SOLOSTAR) 100 UNIT/ML pen Inject 14 Units Subcutaneous At Bedtime       insulin pen needle 31G X 5 MM Use 1-2 pen needles daily or as directed. 100 each 3     levETIRAcetam (KEPPRA) 500 MG  tablet Take 500 mg by mouth 2 times daily       linagliptin (TRADJENTA) 5 MG TABS tablet Take 5 mg by mouth daily Pt takes 1/2 tab daily       liraglutide (VICTOZA) 18 MG/3ML soln Inject 1.8 mg Subcutaneous daily       LISINOPRIL PO Take 20 mg by mouth daily       METFORMIN HCL PO Take 500 mg by mouth 2 times daily (with meals)       METOPROLOL SUCCINATE ER PO Take 50 mg by mouth daily       PANTOPRAZOLE SODIUM PO Take 40 mg by mouth 2 times daily (before meals)       simvastatin (ZOCOR) 10 MG tablet Take 1 tablet (10 mg) by mouth At Bedtime 90 tablet 1     tamsulosin (FLOMAX) 0.4 MG 24 hr capsule Take 1 capsule (0.4 mg) by mouth daily 90 capsule 3     TIOTROPIUM BROMIDE INHALATION POWDER 18MCG CAP daily Once a day       Topiramate (TOPAMAX PO) Take 50 mg by mouth 2 times daily         Reviewed and updated as needed this visit by clinical staff  Tobacco  Allergies  Meds  Med Hx  Surg Hx  Fam Hx  Soc Hx      Reviewed and updated as needed this visit by Provider         ROS:  CONSTITUTIONAL: NEGATIVE for fever, chills, change in weight  : dysuria, frequency and hesitancy    OBJECTIVE:                                                    /60  Pulse 99  Temp 98  F (36.7  C) (Oral)  Resp 16  Wt 245 lb 9.6 oz (111.4 kg)  SpO2 98%  BMI 34.25 kg/m2  Body mass index is 34.25 kg/(m^2).   GENERAL: healthy, alert, well nourished, well hydrated, no distress  ABDOMEN: soft, no tenderness,  normal bowel sounds  BACK: no CVA tenderness, no paralumbar tenderness    UA- Nitrite positive, WBC > 100        ASSESSMENT/PLAN:                                                         (N39.0,  R31.9) Urinary tract infection with hematuria, site unspecified  Plan: started on ciprofloxacin (CIPRO) 500 MG tablet, bid for 7 days.explained clearly about the medication,insructions and side effects. Call or return to clinic prn if these symtoms worsen, fail to improve as anticipated, or if new symptoms develop.        (R30.0)  Dysuria   Plan: UA reflex to Microscopic and Culture, Urine         Microscopic, Urine Culture Aerobic Bacterial           (D64.9) Anemia, unspecified type  Plan: CBC with platelets- Hgb 8.7 , slightly  improved since before , continue Ferrous sulphate 325 mg bid and getting  colonoscopy and upper endoscopy ,h/o bleeding AVM in 2015.                Suyapa Leon MD  [facility]

## 2018-06-01 NOTE — MR AVS SNAPSHOT
"              After Visit Summary   2018    Donnie Vincent    MRN: [id number]           Patient Information     Date Of Birth          1945        Visit Information        Provider Department      2018 2:40 PM Suyapa Leon MD [facility]        Today's Diagnoses     Dysuria    -  1    Urinary tract infection with hematuria, site unspecified        Anemia, unspecified type           Follow-ups after your visit        Who to contact     If you have questions or need follow up information about today's clinic visit or your schedule please contact [facility] directly at [phone].  Normal or non-critical lab and imaging results will be communicated to you by MyChart, letter or phone within 4 business days after the clinic has received the results. If you do not hear from us within 7 days, please contact the clinic through [employer] or phone. If you have a critical or abnormal lab result, we will notify you by phone as soon as possible.  Submit refill requests through [employer] or call your pharmacy and they will forward the refill request to us. Please allow 3 business days for your refill to be completed.          Additional Information About Your Visit        MyChart Information     [employer] lets you send messages to your doctor, view your test results, renew your prescriptions, schedule appointments and more. To sign up, go to [email or web address] . Click on \"Log in\" on the left side of the screen, which will take you to the Welcome page. Then click on \"Sign up Now\" on the right side of the page.     You will be asked to enter the access code listed below, as well as some personal information. Please follow the directions to create your username and password.     Your access code is: [email or web address]  Expires: 8/15/2018 11:04 PM     Your access code will  in 90 days. If you need help or a new code, please call your [facility] or [phone].   "      Care EveryWhere ID     This is your Care EveryWhere ID. This could be used by other organizations to access your [facility] medical records  [id number]        Your Vitals Were     Pulse Temperature Respirations Pulse Oximetry BMI (Body Mass Index)       99 98  F (36.7  C) (Oral) 16 98% 34.25 kg/m2        Blood Pressure from Last 3 Encounters:   06/01/18 108/60   05/15/18 118/74   05/22/17 112/72    Weight from Last 3 Encounters:   06/01/18 245 lb 9.6 oz (111.4 kg)   05/15/18 250 lb 8 oz (113.6 kg)   05/22/17 264 lb (119.7 kg)              We Performed the Following     CBC with platelets     UA reflex to Microscopic and Culture     Urine Culture Aerobic Bacterial     Urine Microscopic          Today's Medication Changes          These changes are accurate as of 6/1/18 11:59 PM.  If you have any questions, ask your nurse or doctor.               Start taking these medicines.        Dose/Directions    ciprofloxacin 500 MG tablet   Commonly known as:  CIPRO   Used for:  Urinary tract infection with hematuria, site unspecified   Started by:  Suyaap Leon MD        Dose:  500 mg   Take 1 tablet (500 mg) by mouth 2 times daily   Quantity:  14 tablet   Refills:  0         These medicines have changed or have updated prescriptions.        Dose/Directions    albuterol 108 (90 Base) MCG/ACT Inhaler   Commonly known as:  PROAIR HFA/PROVENTIL HFA/VENTOLIN HFA   This may have changed:    - when to take this  - reasons to take this   Used for:  COPD (chronic obstructive pulmonary disease) (H)        Dose:  2 puff   Inhale 2 puffs into the lungs every 6 hours   Quantity:  1 Inhaler   Refills:  6            Where to get your medicines      These medications were sent to [facility] [place], [place] - [address]  [address] [facility] [address]     Phone:  [phone]     ciprofloxacin 500 MG tablet                Primary Care Provider Office Phone # Fax #    Suyapa DUMAS  MD Edward [phone]       [address] [facility] [address]        Equal Access to Services     KINA CEDILLO : Hadii aad ku hadchinojuan Fishman, wamajorda alejandrazainabha, barbieta kaparminder janisdenise, aquilino jenniferin hayaan janismanfred baker lahardeepjanny dumont. So [facility] [phone].    ATENCIÓN: Si habla español, tiene a navarrete disposición servicios gratuitos de asistencia lingüística. Llame al [phone].    We comply with applicable federal civil rights laws and [place] laws. We do not discriminate on the basis of race, color, national origin, age, disability, sex, sexual orientation, or gender identity.            Thank you!     Thank you for choosing [facility]  for your care. Our goal is always to provide you with excellent care. Hearing back from our patients is one way we can continue to improve our services. Please take a few minutes to complete the written survey that you may receive in the mail after your visit with us. Thank you!             Your Updated Medication List - Protect others around you: Learn how to safely use, store and throw away your medicines at www.disposemymeds.org.          This list is accurate as of 6/1/18 11:59 PM.  Always use your most recent med list.                   Brand Name Dispense Instructions for use Diagnosis    albuterol 108 (90 Base) MCG/ACT Inhaler    PROAIR HFA/PROVENTIL HFA/VENTOLIN HFA    1 Inhaler    Inhale 2 puffs into the lungs every 6 hours    COPD (chronic obstructive pulmonary disease) (H)       ciprofloxacin 500 MG tablet    CIPRO    14 tablet    Take 1 tablet (500 mg) by mouth 2 times daily    Urinary tract infection with hematuria, site unspecified       clopidogrel 75 MG tablet    PLAVIX    90 tablet    Take 1 tablet (75 mg) by mouth daily    Acute ischemic stroke (H)       fenofibrate 160 MG tablet     90 tablet    Take 1 tablet (160 mg) by mouth daily    Hyperlipidemia LDL goal <100       ferrous sulfate 325 (65 Fe) MG tablet    IRON    90  tablet    Take 1 tablet (325 mg) by mouth daily (with breakfast)    Iron deficiency anemia, unspecified       FLUoxetine 40 MG capsule    PROzac    90 capsule    Take 1 capsule (40 mg) by mouth daily    Major depressive disorder, recurrent episode, mild (H)       folic acid 1 MG tablet    FOLVITE    90 tablet    Take 1 tablet (1 mg) by mouth daily    Increased homocysteine (H)       glipiZIDE 10 MG tablet    GLUCOTROL    180 tablet    Take 1 tablet (10 mg) by mouth 2 times daily (before meals)    Type 2 diabetes mellitus with stage 3 chronic kidney disease, with long-term current use of insulin (H)       insulin pen needle 31G X 5 MM     100 each    Use 1-2 pen needles daily or as directed.    Type 2 diabetes with stage 3 chronic kidney disease GFR 30-59 (H)       KEPPRA 500 MG tablet   Generic drug:  levETIRAcetam      Take 500 mg by mouth 2 times daily        LANTUS SOLOSTAR 100 UNIT/ML injection   Generic drug:  insulin glargine      Inject 14 Units Subcutaneous At Bedtime        liraglutide 18 MG/3ML soln    VICTOZA     Inject 1.8 mg Subcutaneous daily        LISINOPRIL PO      Take 20 mg by mouth daily        METFORMIN HCL PO      Take 500 mg by mouth 2 times daily (with meals)        METOPROLOL SUCCINATE ER PO      Take 50 mg by mouth daily        PANTOPRAZOLE SODIUM PO      Take 40 mg by mouth 2 times daily (before meals)        simvastatin 10 MG tablet    ZOCOR    90 tablet    Take 1 tablet (10 mg) by mouth At Bedtime    Hyperlipidemia LDL goal <100       SYMBICORT 80-4.5 MCG/ACT Inhaler   Generic drug:  budesonide-formoterol      Inhale 2 puffs into the lungs as needed        tamsulosin 0.4 MG capsule    FLOMAX    90 capsule    Take 1 capsule (0.4 mg) by mouth daily    BPH (benign prostatic hypertrophy)       TIOTROPIUM BROMIDE INHALATION POWDER 18MCG CAP      daily Once a day        TOPAMAX PO      Take 50 mg by mouth 2 times daily        TRADJENTA 5 MG Tabs tablet   Generic drug:  linagliptin      Take 5  mg by mouth daily Pt takes 1/2 tab daily

## 2018-06-02 LAB
BACTERIA SPEC CULT: NORMAL
SPECIMEN SOURCE: NORMAL

## 2018-06-05 ENCOUNTER — TELEPHONE (OUTPATIENT)
Dept: INTERNAL MEDICINE | Facility: CLINIC | Age: 73
End: 2018-06-05

## 2018-06-05 DIAGNOSIS — R53.83 FATIGUE: Primary | ICD-10-CM

## 2018-06-05 NOTE — TELEPHONE ENCOUNTER
Wife asking when pt should have his hemoglobin rechecked and how often.  She just wants to make sure it is checked often enough that a downward trend would be caught right away.  He is currently taking iron twice a day.  DORETHA Noriega R.N.

## 2018-06-06 NOTE — TELEPHONE ENCOUNTER
Patient advised of message below from provider, ordered CBC per written order. Scheduled patient for lab only appointment 6/20/18 at 1000.

## 2018-06-13 DIAGNOSIS — R53.83 FATIGUE: ICD-10-CM

## 2018-06-13 LAB
BASOPHILS # BLD AUTO: 0 10E9/L (ref 0–0.2)
BASOPHILS NFR BLD AUTO: 0.5 %
DIFFERENTIAL METHOD BLD: ABNORMAL
EOSINOPHIL # BLD AUTO: 0.3 10E9/L (ref 0–0.7)
EOSINOPHIL NFR BLD AUTO: 4.5 %
ERYTHROCYTE [DISTWIDTH] IN BLOOD BY AUTOMATED COUNT: 17.4 % (ref 10–15)
HCT VFR BLD AUTO: 28.9 % (ref 40–53)
HGB BLD-MCNC: 8.2 G/DL (ref 13.3–17.7)
LYMPHOCYTES # BLD AUTO: 0.8 10E9/L (ref 0.8–5.3)
LYMPHOCYTES NFR BLD AUTO: 12.6 %
MCH RBC QN AUTO: 21.3 PG (ref 26.5–33)
MCHC RBC AUTO-ENTMCNC: 28.4 G/DL (ref 31.5–36.5)
MCV RBC AUTO: 75 FL (ref 78–100)
MONOCYTES # BLD AUTO: 0.5 10E9/L (ref 0–1.3)
MONOCYTES NFR BLD AUTO: 8.7 %
NEUTROPHILS # BLD AUTO: 4.6 10E9/L (ref 1.6–8.3)
NEUTROPHILS NFR BLD AUTO: 73.7 %
PLATELET # BLD AUTO: 270 10E9/L (ref 150–450)
RBC # BLD AUTO: 3.85 10E12/L (ref 4.4–5.9)
WBC # BLD AUTO: 6.2 10E9/L (ref 4–11)

## 2018-06-13 PROCEDURE — 85025 COMPLETE CBC W/AUTO DIFF WBC: CPT | Performed by: INTERNAL MEDICINE

## 2018-06-13 PROCEDURE — 36415 COLL VENOUS BLD VENIPUNCTURE: CPT | Performed by: INTERNAL MEDICINE

## 2018-06-19 ENCOUNTER — TELEPHONE (OUTPATIENT)
Dept: INTERNAL MEDICINE | Facility: CLINIC | Age: 73
End: 2018-06-19

## 2018-06-19 DIAGNOSIS — D50.9 IRON DEFICIENCY ANEMIA, UNSPECIFIED IRON DEFICIENCY ANEMIA TYPE: Primary | ICD-10-CM

## 2018-06-19 NOTE — TELEPHONE ENCOUNTER
Called pt-left detailed message order has been ordered, and left ph# for pt to call to sched test

## 2018-06-19 NOTE — TELEPHONE ENCOUNTER
Pt has been scheduled for colonoscopy, requesting endoscopy also , ordered     Notes Recorded by Gina Marmolejo RN on 6/18/2018 at 11:39 AM  Pt called. Relayed above. Pt stated he is sched for a colonoscopy on 7/10-Relayed I will ask Kushr to enter orders for Endoscopy.  ------

## 2018-06-19 NOTE — TELEPHONE ENCOUNTER
Pt's wife called. Stated she called [facility] to schedule Endo, and was told the soonest pt could get in for both colonoscopy and Endo would be 7/10. Pt is already sched for Colonoscopy at [facility] (will be done by [facility] doc) on 7/10, and first Endo appt is not til 7/10. Wife was told that if Edward calls and does a doc to doc call pt could get in sooner for both. Can call [phone] chose option #4

## 2018-06-27 ENCOUNTER — HOSPITAL ENCOUNTER (OUTPATIENT)
Facility: CLINIC | Age: 73
Discharge: HOME | End: 2018-06-27
Attending: INTERNAL MEDICINE | Admitting: INTERNAL MEDICINE
Payer: COMMERCIAL

## 2018-06-27 VITALS
OXYGEN SATURATION: 98 % | SYSTOLIC BLOOD PRESSURE: 117 MMHG | DIASTOLIC BLOOD PRESSURE: 66 MMHG | RESPIRATION RATE: 16 BRPM | HEIGHT: 72 IN | HEART RATE: 61 BPM | WEIGHT: 245 LBS | BODY MASS INDEX: 33.18 KG/M2

## 2018-06-27 LAB
GLUCOSE BLDC GLUCOMTR-MCNC: 149 MG/DL (ref 70–99)
UPPER GI ENDOSCOPY: NORMAL

## 2018-06-27 PROCEDURE — 82962 GLUCOSE BLOOD TEST: CPT

## 2018-06-27 PROCEDURE — G0500 MOD SEDAT ENDO SERVICE >5YRS: HCPCS | Performed by: INTERNAL MEDICINE

## 2018-06-27 PROCEDURE — 25000125 ZZHC RX 250: Performed by: INTERNAL MEDICINE

## 2018-06-27 PROCEDURE — 43255 EGD CONTROL BLEEDING ANY: CPT | Performed by: INTERNAL MEDICINE

## 2018-06-27 PROCEDURE — 25000128 H RX IP 250 OP 636: Performed by: INTERNAL MEDICINE

## 2018-06-27 PROCEDURE — 27210585 ZZH DEVICE CLIP QUICK: Performed by: INTERNAL MEDICINE

## 2018-06-27 RX ORDER — NALOXONE HYDROCHLORIDE 0.4 MG/ML
.1-.4 INJECTION, SOLUTION INTRAMUSCULAR; INTRAVENOUS; SUBCUTANEOUS
Status: DISCONTINUED | OUTPATIENT
Start: 2018-06-27 | End: 2018-06-27 | Stop reason: HOSPADM

## 2018-06-27 RX ORDER — FLUMAZENIL 0.1 MG/ML
0.2 INJECTION, SOLUTION INTRAVENOUS
Status: DISCONTINUED | OUTPATIENT
Start: 2018-06-27 | End: 2018-06-27 | Stop reason: HOSPADM

## 2018-06-27 RX ORDER — ONDANSETRON 2 MG/ML
4 INJECTION INTRAMUSCULAR; INTRAVENOUS EVERY 6 HOURS PRN
Status: DISCONTINUED | OUTPATIENT
Start: 2018-06-27 | End: 2018-06-27 | Stop reason: HOSPADM

## 2018-06-27 RX ORDER — LIDOCAINE 40 MG/G
CREAM TOPICAL
Status: DISCONTINUED | OUTPATIENT
Start: 2018-06-27 | End: 2018-06-27 | Stop reason: HOSPADM

## 2018-06-27 RX ORDER — ONDANSETRON 2 MG/ML
4 INJECTION INTRAMUSCULAR; INTRAVENOUS
Status: DISCONTINUED | OUTPATIENT
Start: 2018-06-27 | End: 2018-06-27 | Stop reason: HOSPADM

## 2018-06-27 RX ORDER — ONDANSETRON 4 MG/1
4 TABLET, ORALLY DISINTEGRATING ORAL EVERY 6 HOURS PRN
Status: DISCONTINUED | OUTPATIENT
Start: 2018-06-27 | End: 2018-06-27 | Stop reason: HOSPADM

## 2018-06-27 RX ORDER — FENTANYL CITRATE 50 UG/ML
INJECTION, SOLUTION INTRAMUSCULAR; INTRAVENOUS PRN
Status: DISCONTINUED | OUTPATIENT
Start: 2018-06-27 | End: 2018-06-27 | Stop reason: HOSPADM

## 2018-06-27 NOTE — LETTER
June 27, 2018      Donnie Vincent  [address] [place] [address]        Dear Donnie,         Thank you for choosing [facility]. You are scheduled for the following service.   Date:  07-25-18       Procedure: COLONOSCOPY  Doctor:   Ernesto      Arrival Time:  1045   *check in at Emergency/Endoscopy desk*  Procedure Time:      1115   Location:   [facility]        Endoscopy Department, First Floor (Enter through ER Doors) *         [address] [place], [place] [address]      [phone] or [phone] ([facility]) to reschedule        NuLYTELY  PREP  Colonoscopy is the most accurate test to detect colon polyps and colon cancer; and the only test where polyps can be removed. During this procedure, a doctor examines the lining of your large intestine and rectum through a flexible tube.         Transportation  Arrange for a ride for the day of your procedures with a responsible adult.  A taxi ride is not an option unless you are accompanied by a responsible adult. If you fail to arrange transportation with a responsible adult, your procedure will be cancelled and rescheduled.    Fill your enclosed prescription for NuLYTELY  at your local pharmacy. Please call our office at [phone] if you did not receive a prescription.      PREPARATION FOR COLONOSCOPY    7 days before:    Discontinue fiber supplements and medications containing iron. This includes Metamucil  and Fibercon ; and multivitamins with iron.  3 days before:    Begin a low-fiber diet. A low-fiber diet helps making the cleanout more effective. For additional details on low-fiber diet, please refer to the table on the last page.  2 days before:    In the morning: begin a clear liquid diet (liquids you can see through).     Examples of a clear liquid diet include: water, clear broth or bouillon, Gatorade, Pedialyte or Powerade, carbonated and non-carbonated soft drinks (Sprite , 7-Up , kilo  kamala), strained fruit juices without pulp (apple, white grape, white cranberry), Jell-O  and popsicles.     The following are not allowed on a clear liquid diet: red liquids, alcoholic beverages, , dairy products (milk, creamer, and yogurt), protein shakes,  juice with pulp and chewing tobacco.    At 4pm: drink 1 (one) 8 oz glass of NuLYTELY  solution every 15 minutes until the bottle (approximately 16 glasses of 8 oz) is gone. Keep the solution refrigerated. Do not drink any other liquids while you are drinking the NuLYTELY  solution.    Over the course of the evening, drink an additional   liter of clear liquids and continue clear liquid diet.  1 day before:    In the morning: begin a clear liquid diet (liquids you can see through).     Examples of a clear liquid diet include: water, clear broth or bouillon, Gatorade, Pedialyte or Powerade, carbonated and non-carbonated soft drinks (Sprite , 7-Up , ginger ale), strained fruit juices without pulp (apple, white grape, white cranberry), Jell-O  and popsicles.     The following are not allowed on a clear liquid diet: red liquids, alcoholic beverages, , dairy products (milk, creamer, and yogurt), protein shakes,  juice with pulp and chewing tobacco.    At 4pm: drink 1 (one) 8 oz glass of NuLYTELY  solution every 15 minutes until the bottle (approximately 16 glasses of 8 oz) is gone. Keep the solution refrigerated. Do not drink any other liquids while you are drinking the NuLYTELY  solution.    Over the course of the evening, drink an additional   liter of clear liquids and continue clear liquid diet.    COLON CLEANSING TIPS: drink adequate amounts of fluids before and after your colon cleansing to prevent dehydration. Stay near a toilet because you will have diarrhea. Even if you are sitting on the toilet, continue to drink the cleansing solution every 15 minutes. If you feel nauseous or vomit, rinse your mouth with water, take a 15 to 30-minute-break and then continue  drinking the solution. You will be uncomfortable until the stool has flushed from your colon (in about 2 to 4 hours). You may feel chilled.    DAY OF YOUR PROCEDURE  You may take all of your morning medications including blood pressure medications, blood thinners (if you have not been instructed to stop these by our office), methadone, and anti-seizure medications with sips of water 3 hours prior to your procedure or earlier. Do not take insulin or vitamins prior to your procedure. Continue the clear liquid diet.      2 hours prior:   o STOP consuming all liquids   o Do not take anything by mouth during this time.   o Allow extra time to travel to your procedure as you may need to stop and use a restroom along the way.  You are ready for the procedure, if you followed all instructions and your stool is no longer formed, but clear or yellow liquid. If you are unsure whether your colon is clean, please call our office at [phone] before you leave for your appointment.    Bring the following to your procedure:  - Insurance Card/Photo ID.   - List of current medications including over-the-counter medications and supplements.   - Your rescue inhaler if you currently use one to control asthma.    Canceling or rescheduling your appointment:   If you must cancel or reschedule your appointment, please call [phone] as soon as possible.    COLONOSCOPY PRE-PROCEDURE CHECKLIST  If you have diabetes, ask your regular doctor for diet and medication restrictions.  If you take an anticoagulant or anti-platelet medication (such as Coumadin , Lovenox , Pradaxa , Xarelto , Eliquis , etc.), please call your primary doctor for advice on holding this medication.  If you take aspirin you may continue to do so.  If you are or may be pregnant, please discuss the risks and benefits of this procedure with your doctor.    What happens during a colonoscopy?    Plan to spend up to two hours, starting at registration time, at the  endoscopy center the day of your procedure. The colonoscopy takes an average of 15 to 30 minutes. Recovery time is about 30 minutes.    Before the exam:    You will change into a gown.    Your medical history and medication list will be reviewed with you, unless that has been done over the phone prior to the procedure.     A nurse will insert an intravenous (IV) line into your hand or arm.    The doctor will meet with you and will give you a consent form to sign.    During the exam:     Medicine will be given through the IV line to help you relax.     Your heart rate and oxygen levels will be monitored. If your blood pressure is low, you may be given fluids through the IV line.     The doctor will insert a flexible hollow tube, called a colonoscope, into your rectum. The scope will be advanced slowly through the large intestine (colon).    You may have a feeling of fullness or pressure.     If an abnormal tissue or a polyp is found, the doctor may remove it through the endoscope for closer examination, or biopsy. Tissue removal is painless.    After the exam:           Any tissue samples removed during the exam will be sent to a lab for evaluation. It may take 5-7 working days for you to be notified of the results.     A nurse will provide you with complete discharge instructions before you leave the endoscopy center. Be sure to ask the nurse for specific instructions if you take blood thinners such as Aspirin, Coumadin or Plavix.     The doctor will prepare a full report for you and for the physician who referred you for the procedure.     Your doctor will talk with you about the initial results of your exam.      Medication given during the exam will prohibit you from driving for the rest of the day.     Following the exam, you may resume your normal diet. Your first meal should be light, no greasy foods. Avoid alcohol until the next day.     You may resume your regular activities the day after the procedure.      LOW-FIBER DIET  Foods RECOMMENDED Foods to AVOID   Breads, Cereal, Rice and Pasta:   White bread, rolls, biscuits, croissant and eddie toast.   Waffles, Italian toast and pancakes.   White rice, noodles, pasta, macaroni and peeled cooked potatoes.   Plain crackers and saltines.   Cooked cereals: farina, cream of rice.   Cold cereals: Puffed Rice , Rice Krispies , Corn Flakes  and Special K    Breads, Cereal, Rice and Pasta:   Breads or rolls with nuts, seeds or fruit.   Whole wheat, pumpernickel, rye breads and cornbread.   Potatoes with skin, brown or wild rice, and kasha (buckwheat).     Vegetables:   Tender cooked and canned vegetables without seeds: carrots, asparagus tips, green or wax beans, pumpkin, spinach, lima beans. Vegetables:   Raw or steamed vegetables.   Vegetables with seeds.   Sauerkraut.   Winter squash, peas, broccoli, Brussel sprouts, cabbage, onions, cauliflower, baked beans, peas and corn.   Fruits:   Strained fruit juice.   Canned fruit, except pineapple.   Ripe bananas and melon. Fruits:   Prunes and prune juice.   Raw fruits.   Dried fruits: figs, dates and raisins.   Milk/Dairy:   Milk: plain or flavored.   Yogurt, custard and ice cream.   Cheese and cottage cheese Milk/Dairy:     Meat and other proteins:   ground, well-cooked tender beef, lamb, ham, veal, pork, fish, poultry and organ meats.   Eggs.   Peanut butter without nuts. Meat and other proteins:   Tough, fibrous meats with gristle.   Dry beans, peas and lentils.   Peanut butter with nuts.   Tofu.   Fats, Snack, Sweets, Condiments and Beverages:   Margarine, butter, oils, mayonnaise, sour cream and salad dressing, plain gravy.   Sugar, hard candy, clear jelly, honey and syrup.   Spices, cooked herbs, bouillon, broth and soups made with allowed vegetable, ketchup and mustard.   Coffee, tea and carbonated drinks.   Plain cakes, cookies and pretzels.   Gelatin, plain puddings, custard, ice cream, sherbet and popsicles. Fats, Snack,  Sweets, Condiments and Beverages:   Nuts, seeds and coconut.   Jam, marmalade and preserves.   Pickles, olives, relish and horseradish.   All desserts containing nuts, seeds, dried fruit and coconut; or made from whole grains or bran.   Candy made with nuts or seeds.   Popcorn.       DIRECTIONS TO THE ENDOSCOPY DEPARTMENT    From the north ([facility])  Take [address] South, exit on [address] . Get into the left hand roslyn, turn left (east), go one-half mile to [address] and turn left. Go north to the first stoplight, take a right on [address] and follow it to the Emergency entrance.    From the south ([facility])  Take 35N to the 35E split and exit on [address] . On [address] , turn left (west) to [address]. Turn right (north) on [address]. Go north to the first stoplight, take a right on [address] and follow it to the Emergency entrance.    From the east via 35E ([facility])  Take [address] south to [place] [address] exit. Turn right on [address]. Go west to [address]. Turn right (north) on [address]. Go to the first stoplight, take a right and follow on [address] to the Emergency entrance.    From the east via [address] ([facility])  Take [address] to [address]. Turn left (south) on [address] to [address]. Turn left (east) on [address] and follow it to the Emergency entrance.    From the west via [address] ([place])  Take [address] east to [address]. Turn right (south) on [address] to [address]. Turn left (east) on [address] and follow it to the Emergency entrance.

## 2018-06-27 NOTE — PROCEDURES
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     COPD (chronic obstructive pulmonary disease) (H)      CVA (cerebral infarction)      Diabetes new 4/09    initial A1C 6.8 4/08;; has done diabetic teaching      Diverticulitis of colon (without mention of hemorrhage)(562.11) 2001; 9/06     Gastro-oesophageal reflux disease      HTN (hypertension)      Hyperlipidemia LDL goal < 100      Other forms of migraine, without mention of intractable migraine without mention of status migrainosus      PFO (patent foramen ovale)     small per echo 11/2013     Renal disease     Hx kidney stones 12 yrs ago     Seizure disorder (H)     sees neurologist     Seizures (H)     8 years ago - no recurrence     Sleep apnea     wears CPAP     Stroke (H) early april 2017     Unspecified congenital anomaly of lung     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 possibly from asbestos exposure in [employer] (remote)       Past Surgical History:   Procedure Laterality Date     C NONSPECIFIC PROCEDURE      colonoscopy 2005     COLONOSCOPY  11/4/2015    Dr. Orozco [facility]     COLONOSCOPY N/A 11/4/2015    Procedure: COLONOSCOPY;  Surgeon: Yazmin Orozco MD;  Location: [facility]     CYSTOSCOPY, RETROGRADES, EXTRACT STONE, COMBINED Left 11/25/2015    Procedure: COMBINED CYSTOSCOPY, RETROGRADES, EXTRACT STONE;  Surgeon: Neville Banuelos MD;  Location: [facility] OR     ESOPHAGOSCOPY, GASTROSCOPY, DUODENOSCOPY (EGD), COMBINED  11/5/2015    Dr. Ernesto CONTRERAS     ESOPHAGOSCOPY, GASTROSCOPY, DUODENOSCOPY (EGD), COMBINED  06/27/2018    Dr. Ernesto SPEARS     GENITOURINARY SURGERY      kidney stone - lithotripsy     PHACOEMULSIFICATION CLEAR CORNEA WITH STANDARD INTRAOCULAR LENS IMPLANT  12/20/2011    Procedure:PHACOEMULSIFICATION CLEAR CORNEA  WITH STANDARD INTRAOCULAR LENS IMPLANT; RIGHT PHACOEMULSIFICATION CLEAR CORNEA WITH STANDARD INTRAOCULAR LENS IMPLANT ; Surgeon:GUILHERME KAUFMAN; Location:[facility]         Bleeding tendencies:  No    Relevant Family History:  NONE     Relevant Social History:  NONE      A relevant review of systems was performed and was negative      ALLERGIES/SENSITIVITIES:   Allergies   Allergen Reactions     Penicillins      Sore joints and he had to be hospitalized for it       CURRENT MEDICATIONS:   No current outpatient prescriptions on file.        PRE-SEDATION ASSESSMENT:    Lung Exam:  normal  Heart Exam:  normal  Airway Exam: normal  Previous reaction to anesthesia/sedation:   No  Sedation plan based on assessment: Moderate (conscious) sedation  ASA Classification:  3 - Severe systemic disease, but not incapacitating        IMPRESSION:  anemia    PLAN:  EGD     Yazmin Orozco  [facility]  Office: [phone]

## 2018-07-09 ENCOUNTER — TELEPHONE (OUTPATIENT)
Dept: INTERNAL MEDICINE | Facility: CLINIC | Age: 73
End: 2018-07-09

## 2018-07-09 NOTE — TELEPHONE ENCOUNTER
Patient calling.  Req a refill on Cipro for a UTI.  Was given rx 6-1-18 and helped.  But symptoms have returned.    C/o dysuria, slow stream, and low back pain x 2 days.    Refill or recommend office visit?    Please advise, thanks.

## 2018-07-10 ENCOUNTER — TELEPHONE (OUTPATIENT)
Dept: INTERNAL MEDICINE | Facility: CLINIC | Age: 73
End: 2018-07-10

## 2018-07-10 ENCOUNTER — OFFICE VISIT (OUTPATIENT)
Dept: INTERNAL MEDICINE | Facility: CLINIC | Age: 73
End: 2018-07-10
Payer: COMMERCIAL

## 2018-07-10 VITALS
OXYGEN SATURATION: 98 % | TEMPERATURE: 97.6 F | WEIGHT: 250 LBS | DIASTOLIC BLOOD PRESSURE: 78 MMHG | BODY MASS INDEX: 33.86 KG/M2 | RESPIRATION RATE: 16 BRPM | HEIGHT: 72 IN | SYSTOLIC BLOOD PRESSURE: 130 MMHG | HEART RATE: 114 BPM

## 2018-07-10 DIAGNOSIS — R82.90 NONSPECIFIC FINDING ON EXAMINATION OF URINE: ICD-10-CM

## 2018-07-10 DIAGNOSIS — N40.1 BENIGN PROSTATIC HYPERPLASIA WITH LOWER URINARY TRACT SYMPTOMS, SYMPTOM DETAILS UNSPECIFIED: ICD-10-CM

## 2018-07-10 DIAGNOSIS — R30.0 DYSURIA: Primary | ICD-10-CM

## 2018-07-10 LAB
ALBUMIN UR-MCNC: NEGATIVE MG/DL
APPEARANCE UR: ABNORMAL
BACTERIA #/AREA URNS HPF: ABNORMAL /HPF
BILIRUB UR QL STRIP: NEGATIVE
COLOR UR AUTO: YELLOW
GLUCOSE UR STRIP-MCNC: NEGATIVE MG/DL
HGB UR QL STRIP: ABNORMAL
KETONES UR STRIP-MCNC: NEGATIVE MG/DL
LEUKOCYTE ESTERASE UR QL STRIP: ABNORMAL
NITRATE UR QL: POSITIVE
PH UR STRIP: 5.5 PH (ref 5–7)
RBC #/AREA URNS AUTO: ABNORMAL /HPF
SOURCE: ABNORMAL
SP GR UR STRIP: 1.02 (ref 1–1.03)
UROBILINOGEN UR STRIP-ACNC: 0.2 EU/DL (ref 0.2–1)
WBC #/AREA URNS AUTO: >100 /HPF

## 2018-07-10 PROCEDURE — 81001 URINALYSIS AUTO W/SCOPE: CPT | Performed by: NURSE PRACTITIONER

## 2018-07-10 PROCEDURE — 87086 URINE CULTURE/COLONY COUNT: CPT | Performed by: NURSE PRACTITIONER

## 2018-07-10 PROCEDURE — 99213 OFFICE O/P EST LOW 20 MIN: CPT | Performed by: NURSE PRACTITIONER

## 2018-07-10 RX ORDER — NITROFURANTOIN 25; 75 MG/1; MG/1
100 CAPSULE ORAL 2 TIMES DAILY
Qty: 14 CAPSULE | Refills: 0 | Status: SHIPPED | OUTPATIENT
Start: 2018-07-10 | End: 2018-09-28

## 2018-07-10 NOTE — MR AVS SNAPSHOT
After Visit Summary   7/10/2018    Donnie Vincent    MRN: [id number]           Patient Information     Date Of Birth          1945        Visit Information        Provider Department      7/10/2018 11:00 AM Kayla Lorenzo NP [facility]        Today's Diagnoses     Dysuria    -  1    Benign prostatic hyperplasia with lower urinary tract symptoms, symptom details unspecified           Follow-ups after your visit        Additional Services     UROLOGY ADULT REFERRAL       Your provider has referred you to: [facility]: [facility] - [place] [phone]   [email or web address]    Please be aware that coverage of these services is subject to the terms and limitations of your health insurance plan.  Call member services at your health plan with any benefit or coverage questions.      Please bring the following with you to your appointment:    (1) Any X-Rays, CTs or MRIs which have been performed.  Contact the facility where they were done to arrange for  prior to your scheduled appointment.    (2) List of current medications  (3) This referral request   (4) Any documents/labs given to you for this referral                  Your next 10 appointments already scheduled     Jul 25, 2018   Procedure with Yazmin Orozco MD   [facility] Endoscopy ([facility])    [address] [place] [address] [phone]           [facility] is located at [address] [place]              Who to contact     If you have questions or need follow up information about today's clinic visit or your schedule please contact [facility] directly at [phone].  Normal or non-critical lab and imaging results will be communicated to you by MyChart, letter or phone within 4 business days after the clinic has received the results. If you do not hear from us within 7 days, please  "contact the clinic through [employer] or phone. If you have a critical or abnormal lab result, we will notify you by phone as soon as possible.  Submit refill requests through [employer] or call your pharmacy and they will forward the refill request to us. Please allow 3 business days for your refill to be completed.          Additional Information About Your Visit        Construct Information     Construct lets you send messages to your doctor, view your test results, renew your prescriptions, schedule appointments and more. To sign up, go to [email or web address] . Click on \"Log in\" on the left side of the screen, which will take you to the Welcome page. Then click on \"Sign up Now\" on the right side of the page.     You will be asked to enter the access code listed below, as well as some personal information. Please follow the directions to create your username and password.     Your access code is: [email or web address]  Expires: 8/15/2018 11:04 PM     Your access code will  in 90 days. If you need help or a new code, please call your [place] clinic or [phone].        Care EveryWhere ID     This is your Care EveryWhere ID. This could be used by other organizations to access your [place] medical records  [id number]        Your Vitals Were     Pulse Temperature Respirations Height Pulse Oximetry BMI (Body Mass Index)    114 97.6  F (36.4  C) (Oral) 16 6' (1.829 m) 98% 33.91 kg/m2       Blood Pressure from Last 3 Encounters:   07/10/18 130/78   18 117/66   18 108/60    Weight from Last 3 Encounters:   07/10/18 250 lb (113.4 kg)   18 245 lb (111.1 kg)   18 245 lb 9.6 oz (111.4 kg)              We Performed the Following     UA reflex to Microscopic and Culture     UROLOGY ADULT REFERRAL          Today's Medication Changes          These changes are accurate as of 7/10/18 11:29 AM.  If you have any questions, ask your nurse or doctor.               These medicines have changed or have updated " prescriptions.        Dose/Directions    albuterol 108 (90 Base) MCG/ACT Inhaler   Commonly known as:  PROAIR HFA/PROVENTIL HFA/VENTOLIN HFA   This may have changed:    - when to take this  - reasons to take this   Used for:  COPD (chronic obstructive pulmonary disease) (H)        Dose:  2 puff   Inhale 2 puffs into the lungs every 6 hours   Quantity:  1 Inhaler   Refills:  6                Primary Care Provider Office Phone # Fax #    Suyapa DUMAS MD Edward [phone]       Gianfranco JOSEPHANA M [facility] [address]        Equal Access to Services     [facility]: Hadii raisa jo hadasho Soomaali, waaxda luqadaha, qaybta kaalmada simonyajulia, aquilino luna . So [facility] [phone].    ATENCIÓN: Si habla español, tiene a navarrete disposición servicios gratuitos de asistencia lingüística. Ll[facility] [phone].    We comply with applicable federal civil rights laws and [place] laws. We do not discriminate on the basis of race, color, national origin, age, disability, sex, sexual orientation, or gender identity.            Thank you!     Thank you for choosing [facility]  for your care. Our goal is always to provide you with excellent care. Hearing back from our patients is one way we can continue to improve our services. Please take a few minutes to complete the written survey that you may receive in the mail after your visit with us. Thank you!             Your Updated Medication List - Protect others around you: Learn how to safely use, store and throw away your medicines at www.disposemymeds.org.          This list is accurate as of 7/10/18 11:29 AM.  Always use your most recent med list.                   Brand Name Dispense Instructions for use Diagnosis    albuterol 108 (90 Base) MCG/ACT Inhaler    PROAIR HFA/PROVENTIL HFA/VENTOLIN HFA    1 Inhaler    Inhale 2 puffs into the lungs every 6 hours    COPD (chronic obstructive pulmonary disease) (H)        clopidogrel 75 MG tablet    PLAVIX    90 tablet    Take 1 tablet (75 mg) by mouth daily    Acute ischemic stroke (H)       fenofibrate 160 MG tablet     90 tablet    Take 1 tablet (160 mg) by mouth daily    Hyperlipidemia LDL goal <100       ferrous sulfate 325 (65 Fe) MG tablet    IRON    90 tablet    Take 1 tablet (325 mg) by mouth daily (with breakfast)    Iron deficiency anemia, unspecified       FLUoxetine 40 MG capsule    PROzac    90 capsule    Take 1 capsule (40 mg) by mouth daily    Major depressive disorder, recurrent episode, mild (H)       folic acid 1 MG tablet    FOLVITE    90 tablet    Take 1 tablet (1 mg) by mouth daily    Increased homocysteine (H)       glipiZIDE 10 MG tablet    GLUCOTROL    180 tablet    Take 1 tablet (10 mg) by mouth 2 times daily (before meals)    Type 2 diabetes mellitus with stage 3 chronic kidney disease, with long-term current use of insulin (H)       insulin pen needle 31G X 5 MM     100 each    Use 1-2 pen needles daily or as directed.    Type 2 diabetes with stage 3 chronic kidney disease GFR 30-59 (H)       KEPPRA 500 MG tablet   Generic drug:  levETIRAcetam      Take 500 mg by mouth 2 times daily        LANTUS SOLOSTAR 100 UNIT/ML injection   Generic drug:  insulin glargine      Inject 14 Units Subcutaneous At Bedtime        liraglutide 18 MG/3ML soln    VICTOZA     Inject 1.8 mg Subcutaneous daily        LISINOPRIL PO      Take 20 mg by mouth daily        METFORMIN HCL PO      Take 500 mg by mouth 2 times daily (with meals)        METOPROLOL SUCCINATE ER PO      Take 50 mg by mouth daily        PANTOPRAZOLE SODIUM PO      Take 40 mg by mouth 2 times daily (before meals)        simvastatin 10 MG tablet    ZOCOR    90 tablet    Take 1 tablet (10 mg) by mouth At Bedtime    Hyperlipidemia LDL goal <100       SYMBICORT 80-4.5 MCG/ACT Inhaler   Generic drug:  budesonide-formoterol      Inhale 2 puffs into the lungs as needed        tamsulosin 0.4 MG capsule    FLOMAX    90  capsule    Take 1 capsule (0.4 mg) by mouth daily    BPH (benign prostatic hypertrophy)       TIOTROPIUM BROMIDE INHALATION POWDER 18MCG CAP      daily Once a day        TOPAMAX PO      Take 50 mg by mouth 2 times daily        TRADJENTA 5 MG Tabs tablet   Generic drug:  linagliptin      Take 5 mg by mouth daily Pt takes 1/2 tab daily

## 2018-07-10 NOTE — TELEPHONE ENCOUNTER
Please advise pt UA positive for small blood and white cells, UC pending.  eRx sent macrobid x 7 days.  Schedule urology consult as planned.  Kayla Lorenzo CNP

## 2018-07-10 NOTE — PROGRESS NOTES
SUBJECTIVE:   Donnie Vincent is a 72 year old male who presents to clinic today for the following health issues:      Genitourinary symptoms      Duration: 5 days    Description:  dysuria and frequency, urgency    Intensity:  mild, moderate    Accompanying signs and symptoms (fever/discharge/nausea/vomiting/back or abdominal pain):  None    History (frequent UTI's/kidney stones/prostate problems): BPH, treated UTI last month  Sexually active: no     Precipitating or alleviating factors: None    Therapies tried and outcome: none               Patient Active Problem List   Diagnosis     Other type of migraine     Diverticulitis     Seizure disorder (H)     Mixed hyperlipidemia     HYPERLIPIDEMIA LDL GOAL <100     Obstructive sleep apnea     Meningioma (H)     Advanced directives, counseling/discussion     COPD (chronic obstructive pulmonary disease) (H)     GERD (gastroesophageal reflux disease)     Thoracic aortic aneurysm (H)     Bilateral renal cysts     Acute ischemic stroke (H)     Increased homocysteine (H)     Benign prostatic hyperplasia with lower urinary tract symptoms     Cerebral infarction (H)     PFO (patent foramen ovale)     Type 2 diabetes with stage 3 chronic kidney disease GFR 30-59 (H)     Morbid obesity (H)     Major depressive disorder, recurrent episode, mild (H)     Gastrointestinal hemorrhage, unspecified gastrointestinal hemorrhage type     Essential hypertension     Thoracic aortic aneurysm without rupture (H)     Renal failure     SOB (shortness of breath)     Long-term (current) use of anticoagulants [Z79.01]     Past Surgical History:   Procedure Laterality Date     C NONSPECIFIC PROCEDURE      colonoscopy 2005     COLONOSCOPY  11/4/2015    Dr. Orozco [facility]     COLONOSCOPY N/A 11/4/2015    Procedure: COLONOSCOPY;  Surgeon: Yazmin Orozco MD;  Location:  GI     CYSTOSCOPY, RETROGRADES, EXTRACT STONE, COMBINED Left 11/25/2015    Procedure: COMBINED CYSTOSCOPY, RETROGRADES,  EXTRACT STONE;  Surgeon: Neville Banuelos MD;  Location:  OR     ESOPHAGOSCOPY, GASTROSCOPY, DUODENOSCOPY (EGD), COMBINED  11/5/2015    Dr. Ernesto CONTRERAS     ESOPHAGOSCOPY, GASTROSCOPY, DUODENOSCOPY (EGD), COMBINED  06/27/2018    Dr. Ernesto CONTRERAS     GENITOURINARY SURGERY      kidney stone - lithotripsy     PHACOEMULSIFICATION CLEAR CORNEA WITH STANDARD INTRAOCULAR LENS IMPLANT  12/20/2011    Procedure:PHACOEMULSIFICATION CLEAR CORNEA WITH STANDARD INTRAOCULAR LENS IMPLANT; RIGHT PHACOEMULSIFICATION CLEAR CORNEA WITH STANDARD INTRAOCULAR LENS IMPLANT ; Surgeon:GUILHERME KAUFMAN; Location:[facility]       Social History   Substance Use Topics     Smoking status: Former Smoker     Quit date: 1/1/1970     Smokeless tobacco: Never Used      Comment: quit age 28     Alcohol use Yes      Comment: 1-2 beers a week     Family History   Problem Relation Age of Onset     Family History Negative Mother      migraines     Family History Negative Father      lived to be 92     Colon Cancer No family hx of          Current Outpatient Prescriptions   Medication Sig Dispense Refill     albuterol (PROAIR HFA, PROVENTIL HFA, VENTOLIN HFA) 108 (90 BASE) MCG/ACT inhaler Inhale 2 puffs into the lungs every 6 hours (Patient taking differently: Inhale 2 puffs into the lungs every 6 hours as needed ) 1 Inhaler 6     budesonide-formoterol (SYMBICORT) 80-4.5 MCG/ACT inhaler Inhale 2 puffs into the lungs as needed       clopidogrel (PLAVIX) 75 MG tablet Take 1 tablet (75 mg) by mouth daily 90 tablet 0     fenofibrate 160 MG tablet Take 1 tablet (160 mg) by mouth daily 90 tablet 1     ferrous sulfate (IRON) 325 (65 FE) MG tablet Take 1 tablet (325 mg) by mouth daily (with breakfast) 90 tablet 1     FLUoxetine (PROZAC) 40 MG capsule Take 1 capsule (40 mg) by mouth daily 90 capsule 1     folic acid (FOLVITE) 1 MG tablet Take 1 tablet (1 mg) by mouth daily 90 tablet 0     glipiZIDE (GLUCOTROL) 10 MG tablet Take 1 tablet (10 mg) by mouth 2 times  daily (before meals) 180 tablet 1     insulin glargine (LANTUS SOLOSTAR) 100 UNIT/ML pen Inject 14 Units Subcutaneous At Bedtime       insulin pen needle 31G X 5 MM Use 1-2 pen needles daily or as directed. 100 each 3     levETIRAcetam (KEPPRA) 500 MG tablet Take 500 mg by mouth 2 times daily       linagliptin (TRADJENTA) 5 MG TABS tablet Take 5 mg by mouth daily Pt takes 1/2 tab daily       liraglutide (VICTOZA) 18 MG/3ML soln Inject 1.8 mg Subcutaneous daily       LISINOPRIL PO Take 20 mg by mouth daily       METFORMIN HCL PO Take 500 mg by mouth 2 times daily (with meals)       METOPROLOL SUCCINATE ER PO Take 50 mg by mouth daily       PANTOPRAZOLE SODIUM PO Take 40 mg by mouth 2 times daily (before meals)       simvastatin (ZOCOR) 10 MG tablet Take 1 tablet (10 mg) by mouth At Bedtime 90 tablet 1     tamsulosin (FLOMAX) 0.4 MG 24 hr capsule Take 1 capsule (0.4 mg) by mouth daily 90 capsule 3     TIOTROPIUM BROMIDE INHALATION POWDER 18MCG CAP daily Once a day       Topiramate (TOPAMAX PO) Take 50 mg by mouth 2 times daily       nitroFURantoin, macrocrystal-monohydrate, (MACROBID) 100 MG capsule Take 1 capsule (100 mg) by mouth 2 times daily 14 capsule 0     BP Readings from Last 3 Encounters:   07/10/18 130/78   06/27/18 117/66   06/01/18 108/60    Wt Readings from Last 3 Encounters:   07/10/18 250 lb (113.4 kg)   06/27/18 245 lb (111.1 kg)   06/01/18 245 lb 9.6 oz (111.4 kg)                    Reviewed and updated as needed this visit by clinical staff  Tobacco  Allergies  Meds  Med Hx  Surg Hx  Fam Hx  Soc Hx      Reviewed and updated as needed this visit by Provider         ROS:  Constitutional, HEENT, cardiovascular, pulmonary, gi and gu systems are negative, except as otherwise noted.    OBJECTIVE:     /78 (BP Location: Right arm, Patient Position: Sitting, Cuff Size: Adult Large)  Pulse 114  Temp 97.6  F (36.4  C) (Oral)  Resp 16  Ht 6' (1.829 m)  Wt 250 lb (113.4 kg)  SpO2 98%  BMI 33.91  kg/m2  Body mass index is 33.91 kg/(m^2).  GENERAL: no distress, over weight and elderly  ABDOMEN: soft, nontender, no hepatosplenomegaly, no masses and bowel sounds normal  BACK: no CVA tenderness, no paralumbar tenderness        ASSESSMENT/PLAN:               ICD-10-CM    1. Dysuria R30.0 UA reflex to Microscopic and Culture     UROLOGY ADULT REFERRAL     Urine Microscopic   2. Benign prostatic hyperplasia with lower urinary tract symptoms, symptom details unspecified N40.1    3. Nonspecific finding on examination of urine R82.90 Urine Culture Aerobic Bacterial       F/u pending UA/UC    Kayla Lorenzo NP  [facility]

## 2018-07-11 ENCOUNTER — TELEPHONE (OUTPATIENT)
Dept: INTERNAL MEDICINE | Facility: CLINIC | Age: 73
End: 2018-07-11

## 2018-07-11 LAB
BACTERIA SPEC CULT: NORMAL
SPECIMEN SOURCE: NORMAL

## 2018-07-11 NOTE — TELEPHONE ENCOUNTER
Please advise pt UC showed no bacterial infection, finish antibiotics anyway and see urology as discussed  Kayla Lorenzo CNP

## 2018-07-13 DIAGNOSIS — N39.0 URINARY TRACT INFECTION: Primary | ICD-10-CM

## 2018-07-17 ENCOUNTER — OFFICE VISIT (OUTPATIENT)
Dept: UROLOGY | Facility: CLINIC | Age: 73
End: 2018-07-17
Payer: COMMERCIAL

## 2018-07-17 VITALS
SYSTOLIC BLOOD PRESSURE: 122 MMHG | WEIGHT: 245 LBS | HEART RATE: 104 BPM | DIASTOLIC BLOOD PRESSURE: 78 MMHG | OXYGEN SATURATION: 98 % | BODY MASS INDEX: 33.18 KG/M2 | HEIGHT: 72 IN

## 2018-07-17 DIAGNOSIS — R30.0 DYSURIA: Primary | ICD-10-CM

## 2018-07-17 DIAGNOSIS — Z12.5 SCREENING FOR PROSTATE CANCER: ICD-10-CM

## 2018-07-17 DIAGNOSIS — N30.01 ACUTE CYSTITIS WITH HEMATURIA: ICD-10-CM

## 2018-07-17 LAB
ALBUMIN UR-MCNC: ABNORMAL MG/DL
APPEARANCE UR: CLEAR
BILIRUB UR QL STRIP: NEGATIVE
CAOX CRY #/AREA URNS HPF: ABNORMAL /HPF
COLOR UR AUTO: YELLOW
GLUCOSE UR STRIP-MCNC: NEGATIVE MG/DL
HGB UR QL STRIP: ABNORMAL
KETONES UR STRIP-MCNC: NEGATIVE MG/DL
LEUKOCYTE ESTERASE UR QL STRIP: ABNORMAL
MUCOUS THREADS #/AREA URNS LPF: PRESENT /LPF
NITRATE UR QL: POSITIVE
PH UR STRIP: 7 PH (ref 5–7)
PSA SERPL-MCNC: 1 NG/ML (ref 0–4)
RBC #/AREA URNS AUTO: <1 /HPF (ref 0–2)
RESIDUAL VOLUME (RV) (EXTERNAL): 39
SOURCE: ABNORMAL
SP GR UR STRIP: 1.02 (ref 1–1.03)
UROBILINOGEN UR STRIP-ACNC: 0.2 EU/DL (ref 0.2–1)
WBC #/AREA URNS AUTO: 33 /HPF (ref 0–5)

## 2018-07-17 PROCEDURE — 36415 COLL VENOUS BLD VENIPUNCTURE: CPT | Performed by: PHYSICIAN ASSISTANT

## 2018-07-17 PROCEDURE — 87086 URINE CULTURE/COLONY COUNT: CPT | Performed by: PHYSICIAN ASSISTANT

## 2018-07-17 PROCEDURE — 81003 URINALYSIS AUTO W/O SCOPE: CPT | Performed by: PHYSICIAN ASSISTANT

## 2018-07-17 PROCEDURE — 51798 US URINE CAPACITY MEASURE: CPT | Performed by: PHYSICIAN ASSISTANT

## 2018-07-17 PROCEDURE — 81015 MICROSCOPIC EXAM OF URINE: CPT | Performed by: PHYSICIAN ASSISTANT

## 2018-07-17 PROCEDURE — 99203 OFFICE O/P NEW LOW 30 MIN: CPT | Mod: 25 | Performed by: PHYSICIAN ASSISTANT

## 2018-07-17 PROCEDURE — G0103 PSA SCREENING: HCPCS | Performed by: PHYSICIAN ASSISTANT

## 2018-07-17 ASSESSMENT — PAIN SCALES - GENERAL: PAINLEVEL: MODERATE PAIN (4)

## 2018-07-17 NOTE — NURSING NOTE
Chief Complaint   Patient presents with     Clinic Care Coordination - Follow-up     Pt here for UTI symptoms   UA RESULTS:  Recent Labs   Lab Test  07/17/18   1254  07/10/18   1110   COLOR  Yellow  Yellow   APPEARANCE  Clear  Slightly Cloudy   URINEGLC  Negative  Negative   URINEBILI  Negative  Negative   URINEKETONE  Negative  Negative   SG  1.020  1.025   UBLD  Trace*  Small*   URINEPH  7.0  5.5   PROTEIN  Trace*  Negative   UROBILINOGEN  0.2  0.2   NITRITE  Positive*  Positive*   LEUKEST  Moderate*  Small*   RBCU   --   O - 2   WBCU   --   >100*     UA done today shows trace blood, positive nitrites, and moderate leuks  Gricel Hamm, CMA

## 2018-07-17 NOTE — PROGRESS NOTES
July 17, 2018      CC: Dysuria    HPI:  Donnie Vincent is a 72 year old male who presents in consultation from Kayla Lorenzo CNP for evaluation of the above. Ongoing for over a month now (urgency, freq, double voiding). Has been treated for UTI twice (Mixed jono). Hx of bladder stones and BPH with LUTS (On Flomax for years). Removal of bladder stones by Dr. Banuelos in 2015.    On Plavix for history of CVA.    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     COPD (chronic obstructive pulmonary disease) (H)      CVA (cerebral infarction)      Diabetes new 4/09    initial A1C 6.8 4/08;; has done diabetic teaching      Diverticulitis of colon (without mention of hemorrhage)(562.11) 2001; 9/06     Gastro-oesophageal reflux disease      Hernia, abdominal      HTN (hypertension)      Hyperlipidemia LDL goal < 100      Other forms of migraine, without mention of intractable migraine without mention of status migrainosus      PFO (patent foramen ovale)     small per echo 11/2013     Renal disease     Hx kidney stones 12 yrs ago     Seizure disorder (H)     sees neurologist     Seizures (H)     8 years ago - no recurrence     Sleep apnea     wears CPAP     Stroke (H) early april 2017     Unspecified congenital anomaly of lung     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 possibly from asbestos exposure in [employer] (remote)        Past Surgical History:   Procedure Laterality Date     C NONSPECIFIC PROCEDURE      colonoscopy 2005     COLONOSCOPY  11/4/2015    Dr. Orozco [facility]     COLONOSCOPY N/A 11/4/2015    Procedure: COLONOSCOPY;  Surgeon: Yazmin Orozco MD;  Location:  GI     CYSTOSCOPY, RETROGRADES, EXTRACT STONE, COMBINED Left 11/25/2015    Procedure: COMBINED CYSTOSCOPY, RETROGRADES, EXTRACT STONE;  Surgeon: Neville Banuelos MD;  Location:  OR     ESOPHAGOSCOPY,  GASTROSCOPY, DUODENOSCOPY (EGD), COMBINED  11/5/2015    Dr. Ernesto CONTRERAS     ESOPHAGOSCOPY, GASTROSCOPY, DUODENOSCOPY (EGD), COMBINED  06/27/2018    Dr. Ernesto CONTRERAS     GENITOURINARY SURGERY      kidney stone - lithotripsy     HERNIA REPAIR       PHACOEMULSIFICATION CLEAR CORNEA WITH STANDARD INTRAOCULAR LENS IMPLANT  12/20/2011    Procedure:PHACOEMULSIFICATION CLEAR CORNEA WITH STANDARD INTRAOCULAR LENS IMPLANT; RIGHT PHACOEMULSIFICATION CLEAR CORNEA WITH STANDARD INTRAOCULAR LENS IMPLANT ; Surgeon:GUILHERME KAUFMAN; Location:[facility]       Social History     Social History     Marital status:      Spouse name: Maico     Number of children: 2     Years of education: N/A     Occupational History     teacher Retired     retired 2005      Social History Main Topics     Smoking status: Former Smoker     Quit date: 1/1/1970     Smokeless tobacco: Never Used      Comment: quit age 28     Alcohol use Yes      Comment: 1-2 beers a week     Drug use: No     Sexual activity: Yes     Partners: Female     Other Topics Concern     Caffeine Concern No     1-2 coffee a day     Sleep Concern No     sleep apnea , c-pap     Special Diet Yes     trying diabetic      Exercise No     Seat Belt Yes     Social History Narrative       Family History   Problem Relation Age of Onset     Family History Negative Mother      migraines     Family History Negative Father      lived to be 92     Colon Cancer No family hx of        ROS:14 point ROS neg other than the symptoms noted above in the HPI.    Allergies   Allergen Reactions     Penicillins      Sore joints and he had to be hospitalized for it       Current Outpatient Prescriptions   Medication     albuterol (PROAIR HFA, PROVENTIL HFA, VENTOLIN HFA) 108 (90 BASE) MCG/ACT inhaler     budesonide-formoterol (SYMBICORT) 80-4.5 MCG/ACT inhaler     clopidogrel (PLAVIX) 75 MG tablet     fenofibrate 160 MG tablet     ferrous sulfate (IRON) 325 (65 FE) MG tablet     FLUoxetine (PROZAC) 40 MG  "capsule     folic acid (FOLVITE) 1 MG tablet     glipiZIDE (GLUCOTROL) 10 MG tablet     insulin glargine (LANTUS SOLOSTAR) 100 UNIT/ML pen     levETIRAcetam (KEPPRA) 500 MG tablet     linagliptin (TRADJENTA) 5 MG TABS tablet     liraglutide (VICTOZA) 18 MG/3ML soln     LISINOPRIL PO     METFORMIN HCL PO     METOPROLOL SUCCINATE ER PO     NITROFURANTOIN MACROCRYSTAL PO     nitroFURantoin, macrocrystal-monohydrate, (MACROBID) 100 MG capsule     PANTOPRAZOLE SODIUM PO     simvastatin (ZOCOR) 10 MG tablet     tamsulosin (FLOMAX) 0.4 MG 24 hr capsule     TIOTROPIUM BROMIDE INHALATION POWDER 18MCG CAP     Topiramate (TOPAMAX PO)     insulin pen needle 31G X 5 MM     No current facility-administered medications for this visit.      Facility-Administered Medications Ordered in Other Visits   Medication     gadobutrol (GADAVIST) injection 15 mL         PEx:   Blood pressure 122/78, pulse 104, height 1.829 m (6'), weight 111.1 kg (245 lb), SpO2 98 %.  6' 0\", Body mass index is 33.23 kg/(m^2)., 245 lbs 0 oz  Gen appearance:  Well groomed,: age-appropriate appearing male in NAD.   HEENT:  EOMI, AT NC, CN grossly normal  Psych:  alert , comfortable in no acute distress  Neuro:  A/O X 3  Skin:  Warm to touch, clear of rashes, ecchymoses  Resp:  No increased respiratory effort  lymph:  No LE edema  Abd:  Soft/NT, ND, no palpable masses, no CVAT  Back: bony spine is non-tender, flanks are nontender  Normal external genitalia.  Normal appearing urethral meatus.  Bilateral descended testes without masses or tenderness.    MARIA D: normal tone, no masses, (large) prostate without nodules or tenderness.    Urine: +nitrite, trace blood, mod leuk est  PVR 39cc    A/P: Donnie Vincent is a 72 year old male with symptomatic BPH vs bladder stones.   Over due for PSA, check today  Micro  Cysto with Dr. Banuelos  Continue on Flomax    Sadia Hawthorne PA-C  [facility]    30 minutes were spent with the patient today, > 50% in counseling and " coordination of care of dysuria

## 2018-07-17 NOTE — LETTER
7/17/2018       RE: Donnie Vincent  [address]  [place] [address]     Dear Colleague,    Thank you for referring your patient, Donnie Vincent, to the [facility] [place] at [facility]. Please see a copy of my visit note below.    July 17, 2018      CC: Dysuria    HPI:  Donnie Vincent is a 72 year old male who presents in consultation from Kayla Lorenzo CNP for evaluation of the above. Ongoing for over a month now (urgency, freq, double voiding). Has been treated for UTI twice (Mixed jono). Hx of bladder stones and BPH with LUTS (On Flomax for years). Removal of bladder stones by Dr. Banuelos in 2015.    On Plavix for history of CVA.    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     COPD (chronic obstructive pulmonary disease) (H)      CVA (cerebral infarction)      Diabetes new 4/09    initial A1C 6.8 4/08;; has done diabetic teaching      Diverticulitis of colon (without mention of hemorrhage)(562.11) 2001; 9/06     Gastro-oesophageal reflux disease      Hernia, abdominal      HTN (hypertension)      Hyperlipidemia LDL goal < 100      Other forms of migraine, without mention of intractable migraine without mention of status migrainosus      PFO (patent foramen ovale)     small per echo 11/2013     Renal disease     Hx kidney stones 12 yrs ago     Seizure disorder (H)     sees neurologist     Seizures (H)     8 years ago - no recurrence     Sleep apnea     wears CPAP     Stroke (H) early april 2017     Unspecified congenital anomaly of lung     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 possibly from asbestos exposure in [employer] (remote)        Past Surgical History:   Procedure Laterality Date     C NONSPECIFIC PROCEDURE      colonoscopy 2005     COLONOSCOPY  11/4/2015    Dr. Orozco [facility]      COLONOSCOPY N/A 11/4/2015    Procedure: COLONOSCOPY;  Surgeon: Yazmin Orozco MD;  Location:  GI     CYSTOSCOPY, RETROGRADES, EXTRACT STONE, COMBINED Left 11/25/2015    Procedure: COMBINED CYSTOSCOPY, RETROGRADES, EXTRACT STONE;  Surgeon: Neville Banuelos MD;  Location:  OR     ESOPHAGOSCOPY, GASTROSCOPY, DUODENOSCOPY (EGD), COMBINED  11/5/2015    Dr. Ernesto CONTRERAS     ESOPHAGOSCOPY, GASTROSCOPY, DUODENOSCOPY (EGD), COMBINED  06/27/2018    Dr. Ernesto CONTRERAS     GENITOURINARY SURGERY      kidney stone - lithotripsy     HERNIA REPAIR       PHACOEMULSIFICATION CLEAR CORNEA WITH STANDARD INTRAOCULAR LENS IMPLANT  12/20/2011    Procedure:PHACOEMULSIFICATION CLEAR CORNEA WITH STANDARD INTRAOCULAR LENS IMPLANT; RIGHT PHACOEMULSIFICATION CLEAR CORNEA WITH STANDARD INTRAOCULAR LENS IMPLANT ; Surgeon:GUILHERME KAUFMAN; Location:[facility]       Social History     Social History     Marital status:      Spouse name: Maico     Number of children: 2     Years of education: N/A     Occupational History     teacher Retired     retired 2005      Social History Main Topics     Smoking status: Former Smoker     Quit date: 1/1/1970     Smokeless tobacco: Never Used      Comment: quit age 28     Alcohol use Yes      Comment: 1-2 beers a week     Drug use: No     Sexual activity: Yes     Partners: Female     Other Topics Concern     Caffeine Concern No     1-2 coffee a day     Sleep Concern No     sleep apnea , c-pap     Special Diet Yes     trying diabetic      Exercise No     Seat Belt Yes     Social History Narrative       Family History   Problem Relation Age of Onset     Family History Negative Mother      migraines     Family History Negative Father      lived to be 92     Colon Cancer No family hx of        ROS:14 point ROS neg other than the symptoms noted above in the HPI.    Allergies   Allergen Reactions     Penicillins      Sore joints and he had to be hospitalized for it       Current Outpatient  "Prescriptions   Medication     albuterol (PROAIR HFA, PROVENTIL HFA, VENTOLIN HFA) 108 (90 BASE) MCG/ACT inhaler     budesonide-formoterol (SYMBICORT) 80-4.5 MCG/ACT inhaler     clopidogrel (PLAVIX) 75 MG tablet     fenofibrate 160 MG tablet     ferrous sulfate (IRON) 325 (65 FE) MG tablet     FLUoxetine (PROZAC) 40 MG capsule     folic acid (FOLVITE) 1 MG tablet     glipiZIDE (GLUCOTROL) 10 MG tablet     insulin glargine (LANTUS SOLOSTAR) 100 UNIT/ML pen     levETIRAcetam (KEPPRA) 500 MG tablet     linagliptin (TRADJENTA) 5 MG TABS tablet     liraglutide (VICTOZA) 18 MG/3ML soln     LISINOPRIL PO     METFORMIN HCL PO     METOPROLOL SUCCINATE ER PO     NITROFURANTOIN MACROCRYSTAL PO     nitroFURantoin, macrocrystal-monohydrate, (MACROBID) 100 MG capsule     PANTOPRAZOLE SODIUM PO     simvastatin (ZOCOR) 10 MG tablet     tamsulosin (FLOMAX) 0.4 MG 24 hr capsule     TIOTROPIUM BROMIDE INHALATION POWDER 18MCG CAP     Topiramate (TOPAMAX PO)     insulin pen needle 31G X 5 MM     No current facility-administered medications for this visit.      Facility-Administered Medications Ordered in Other Visits   Medication     gadobutrol (GADAVIST) injection 15 mL         PEx:   Blood pressure 122/78, pulse 104, height 1.829 m (6'), weight 111.1 kg (245 lb), SpO2 98 %.  6' 0\", Body mass index is 33.23 kg/(m^2)., 245 lbs 0 oz  Gen appearance:  Well groomed,: age-appropriate appearing male in NAD.   HEENT:  EOMI, AT NC, CN grossly normal  Psych:  alert , comfortable in no acute distress  Neuro:  A/O X 3  Skin:  Warm to touch, clear of rashes, ecchymoses  Resp:  No increased respiratory effort  lymph:  No LE edema  Abd:  Soft/NT, ND, no palpable masses, no CVAT  Back: bony spine is non-tender, flanks are nontender  Normal external genitalia.  Normal appearing urethral meatus.  Bilateral descended testes without masses or tenderness.    MARIA D: normal tone, no masses, (large) prostate without nodules or tenderness.    Urine: +nitrite, " trace blood, mod leuk est  PVR 39cc    A/P: Donnie Vincent is a 72 year old male with symptomatic BPH vs bladder stones.   Over due for PSA, check today  Micro  Cysto with Dr. Banuelos  Continue on Flomax    Sadia [place] PA-C  [facility]    30 minutes were spent with the patient today, > 50% in counseling and coordination of care of dysuria         Called patient and LM. Will wait for a return phone call. Alejandra Holm LPN

## 2018-07-17 NOTE — PATIENT INSTRUCTIONS
Cystoscopy, to check for bladder stones and look at the prostate.     PSA screening test for prostate cancer.

## 2018-07-17 NOTE — MR AVS SNAPSHOT
After Visit Summary   7/17/2018    Donnie Vincent    MRN: [id number]           Patient Information     Date Of Birth          1945        Visit Information        Provider Department      7/17/2018 1:00 PM Sadia [place] PA-C [facility] [place]        Today's Diagnoses     Dysuria    -  1    Urinary tract infection        Screening for prostate cancer          Care Instructions    Cystoscopy, to check for bladder stones and look at the prostate.     PSA screening test for prostate cancer.          Follow-ups after your visit        Your next 10 appointments already scheduled     Jul 25, 2018   Procedure with Yazmin Orozco MD   [facility] ([facility])    [address]  [facility] [id number] [phone]           [facility] is located at [address] [place]            Aug 22, 2018  9:00 AM CDT   Cystoscopy with Neville Banuelos MD, UB CYF   [facility] ([facility])    [address]  [address]  [facility] [id number] [phone]              Who to contact     If you have questions or need follow up information about today's clinic visit or your schedule please contact [facility] directly at [phone].  Normal or non-critical lab and imaging results will be communicated to you by MyChart, letter or phone within 4 business days after the clinic has received the results. If you do not hear from us within 7 days, please contact the clinic through MyChart or phone. If you have a critical or abnormal lab result, we will notify you by phone as soon as possible.  Submit refill requests through [employer] or call your pharmacy and they will forward the refill request to us. Please allow 3 business days for your refill to be completed.          Additional Information About  "Your Visit        "[employer]hart Information     The Author Hub lets you send messages to your doctor, view your test results, renew your prescriptions, schedule appointments and more. To sign up, go to [email or web address] . Click on \"Log in\" on the left side of the screen, which will take you to the Welcome page. Then click on \"Sign up Now\" on the right side of the page.     You will be asked to enter the access code listed below, as well as some personal information. Please follow the directions to create your username and password.     Your access code is: [email or web address]  Expires: 8/15/2018 11:04 PM     Your access code will  in 90 days. If you need help or a new code, please call your [facility] clinic or [phone].        Care EveryWhere ID     This is your Care EveryWhere ID. This could be used by other organizations to access your [facility] medical records  [id number]        Your Vitals Were     Pulse Height Pulse Oximetry BMI (Body Mass Index)          104 1.829 m (6') 98% 33.23 kg/m2         Blood Pressure from Last 3 Encounters:   18 122/78   07/10/18 130/78   18 117/66    Weight from Last 3 Encounters:   18 111.1 kg (245 lb)   07/10/18 113.4 kg (250 lb)   18 111.1 kg (245 lb)              We Performed the Following     MEASURE POST-VOID RESIDUAL URINE/BLADDER CAPACITY, US NON-IMAGING (56280)     PSA Screen Urologic Phys [[id number]]     UA without Microscopic     Urine Culture Aerobic Bacterial          Today's Medication Changes          These changes are accurate as of 18  1:32 PM.  If you have any questions, ask your nurse or doctor.               These medicines have changed or have updated prescriptions.        Dose/Directions    albuterol 108 (90 Base) MCG/ACT Inhaler   Commonly known as:  PROAIR HFA/PROVENTIL HFA/VENTOLIN HFA   This may have changed:    - when to take this  - reasons to take this   Used for:  COPD (chronic obstructive pulmonary disease) (H)        Dose:  2 " puff   Inhale 2 puffs into the lungs every 6 hours   Quantity:  1 Inhaler   Refills:  6                Primary Care Provider Office Phone # Fax #    Suyapa DUMAS MD Edward [phone]       [address]  [facility] [address]        Equal Access to Services     KINA CEDILLO : Hadii aad ku hadasho Soomaali, waaxda luqadaha, qaybta kaalmada adeegyada, waxay jenniferin haysekoun ademanfred pauloairam luna . So [facility] [phone].    ATENCIÓN: Si habla español, tiene a navarrete disposición servicios gratuitos de asistencia lingüística. Llame al [phone].    We comply with applicable federal civil rights laws and [place] laws. We do not discriminate on the basis of race, color, national origin, age, disability, sex, sexual orientation, or gender identity.            Thank you!     Thank you for choosing [facility] [place]  for your care. Our goal is always to provide you with excellent care. Hearing back from our patients is one way we can continue to improve our services. Please take a few minutes to complete the written survey that you may receive in the mail after your visit with us. Thank you!             Your Updated Medication List - Protect others around you: Learn how to safely use, store and throw away your medicines at www.disposemymeds.org.          This list is accurate as of 7/17/18  1:32 PM.  Always use your most recent med list.                   Brand Name Dispense Instructions for use Diagnosis    albuterol 108 (90 Base) MCG/ACT Inhaler    PROAIR HFA/PROVENTIL HFA/VENTOLIN HFA    1 Inhaler    Inhale 2 puffs into the lungs every 6 hours    COPD (chronic obstructive pulmonary disease) (H)       clopidogrel 75 MG tablet    PLAVIX    90 tablet    Take 1 tablet (75 mg) by mouth daily    Acute ischemic stroke (H)       fenofibrate 160 MG tablet     90 tablet    Take 1 tablet (160 mg) by mouth daily    Hyperlipidemia LDL goal <100       ferrous sulfate 325 (65 Fe) MG  tablet    IRON    90 tablet    Take 1 tablet (325 mg) by mouth daily (with breakfast)    Iron deficiency anemia, unspecified       FLUoxetine 40 MG capsule    PROzac    90 capsule    Take 1 capsule (40 mg) by mouth daily    Major depressive disorder, recurrent episode, mild (H)       folic acid 1 MG tablet    FOLVITE    90 tablet    Take 1 tablet (1 mg) by mouth daily    Increased homocysteine (H)       glipiZIDE 10 MG tablet    GLUCOTROL    180 tablet    Take 1 tablet (10 mg) by mouth 2 times daily (before meals)    Type 2 diabetes mellitus with stage 3 chronic kidney disease, with long-term current use of insulin (H)       insulin pen needle 31G X 5 MM     100 each    Use 1-2 pen needles daily or as directed.    Type 2 diabetes with stage 3 chronic kidney disease GFR 30-59 (H)       KEPPRA 500 MG tablet   Generic drug:  levETIRAcetam      Take 500 mg by mouth 2 times daily        LANTUS SOLOSTAR 100 UNIT/ML injection   Generic drug:  insulin glargine      Inject 14 Units Subcutaneous At Bedtime        liraglutide 18 MG/3ML soln    VICTOZA     Inject 1.8 mg Subcutaneous daily        LISINOPRIL PO      Take 20 mg by mouth daily        METFORMIN HCL PO      Take 500 mg by mouth 2 times daily (with meals)        METOPROLOL SUCCINATE ER PO      Take 50 mg by mouth daily        nitroFURantoin (macrocrystal-monohydrate) 100 MG capsule    MACROBID    14 capsule    Take 1 capsule (100 mg) by mouth 2 times daily    Dysuria       NITROFURANTOIN MACROCRYSTAL PO           PANTOPRAZOLE SODIUM PO      Take 40 mg by mouth 2 times daily (before meals)        simvastatin 10 MG tablet    ZOCOR    90 tablet    Take 1 tablet (10 mg) by mouth At Bedtime    Hyperlipidemia LDL goal <100       SYMBICORT 80-4.5 MCG/ACT Inhaler   Generic drug:  budesonide-formoterol      Inhale 2 puffs into the lungs as needed        tamsulosin 0.4 MG capsule    FLOMAX    90 capsule    Take 1 capsule (0.4 mg) by mouth daily    BPH (benign prostatic  hypertrophy)       TIOTROPIUM BROMIDE INHALATION POWDER 18MCG CAP      daily Once a day        TOPAMAX PO      Take 50 mg by mouth 2 times daily        TRADJENTA 5 MG Tabs tablet   Generic drug:  linagliptin      Take 5 mg by mouth daily Pt takes 1/2 tab daily

## 2018-07-18 LAB
BACTERIA SPEC CULT: NORMAL
Lab: NORMAL
SPECIMEN SOURCE: NORMAL

## 2018-07-19 ENCOUNTER — TELEPHONE (OUTPATIENT)
Dept: INTERNAL MEDICINE | Facility: CLINIC | Age: 73
End: 2018-07-19

## 2018-07-19 DIAGNOSIS — D50.9 IRON DEFICIENCY ANEMIA, UNSPECIFIED IRON DEFICIENCY ANEMIA TYPE: Primary | ICD-10-CM

## 2018-07-19 NOTE — TELEPHONE ENCOUNTER
Patient 's wife called. Stated patient is scheduled for colonoscopy on 7/25, and Pallegar wanted patient 's hgb checked one more time, but there is not lab orders entered    I dont see any information on Pallegar wanting hgb rechecked       Lab Results   Component Value Date    WBC 6.2 06/13/2018     Lab Results   Component Value Date    RBC 3.85 06/13/2018     Lab Results   Component Value Date    HGB 8.2 06/13/2018     Lab Results   Component Value Date    HCT 28.9 06/13/2018     Lab Results   Component Value Date    MCV 75 06/13/2018     Lab Results   Component Value Date    MCH 21.3 06/13/2018     Lab Results   Component Value Date    MCHC 28.4 06/13/2018     Lab Results   Component Value Date    RDW 17.4 06/13/2018     Lab Results   Component Value Date     06/13/2018

## 2018-07-20 NOTE — TELEPHONE ENCOUNTER
Called patient 's wife (Maico)-left message relaying orders have been entered and patient just needs to scheduled a lab only appointment

## 2018-07-25 ENCOUNTER — HOSPITAL ENCOUNTER (OUTPATIENT)
Facility: CLINIC | Age: 73
Discharge: HOME | End: 2018-07-25
Attending: INTERNAL MEDICINE | Admitting: INTERNAL MEDICINE
Payer: COMMERCIAL

## 2018-07-25 ENCOUNTER — SURGERY (OUTPATIENT)
Age: 73
End: 2018-07-25

## 2018-07-25 VITALS
OXYGEN SATURATION: 99 % | RESPIRATION RATE: 18 BRPM | DIASTOLIC BLOOD PRESSURE: 77 MMHG | SYSTOLIC BLOOD PRESSURE: 132 MMHG

## 2018-07-25 LAB
COLONOSCOPY: NORMAL
GLUCOSE BLDC GLUCOMTR-MCNC: 114 MG/DL (ref 70–99)

## 2018-07-25 PROCEDURE — 45382 COLONOSCOPY W/CONTROL BLEED: CPT | Performed by: INTERNAL MEDICINE

## 2018-07-25 PROCEDURE — 25000128 H RX IP 250 OP 636: Performed by: INTERNAL MEDICINE

## 2018-07-25 PROCEDURE — 99153 MOD SED SAME PHYS/QHP EA: CPT | Performed by: INTERNAL MEDICINE

## 2018-07-25 PROCEDURE — 82962 GLUCOSE BLOOD TEST: CPT

## 2018-07-25 PROCEDURE — 88305 TISSUE EXAM BY PATHOLOGIST: CPT | Mod: 26 | Performed by: INTERNAL MEDICINE

## 2018-07-25 PROCEDURE — 45385 COLONOSCOPY W/LESION REMOVAL: CPT | Performed by: INTERNAL MEDICINE

## 2018-07-25 PROCEDURE — 88305 TISSUE EXAM BY PATHOLOGIST: CPT | Performed by: INTERNAL MEDICINE

## 2018-07-25 PROCEDURE — G0500 MOD SEDAT ENDO SERVICE >5YRS: HCPCS | Performed by: INTERNAL MEDICINE

## 2018-07-25 RX ORDER — ONDANSETRON 2 MG/ML
4 INJECTION INTRAMUSCULAR; INTRAVENOUS EVERY 6 HOURS PRN
Status: DISCONTINUED | OUTPATIENT
Start: 2018-07-25 | End: 2018-07-25 | Stop reason: HOSPADM

## 2018-07-25 RX ORDER — FENTANYL CITRATE 50 UG/ML
INJECTION, SOLUTION INTRAMUSCULAR; INTRAVENOUS PRN
Status: DISCONTINUED | OUTPATIENT
Start: 2018-07-25 | End: 2018-07-25 | Stop reason: HOSPADM

## 2018-07-25 RX ORDER — LIDOCAINE 40 MG/G
CREAM TOPICAL
Status: DISCONTINUED | OUTPATIENT
Start: 2018-07-25 | End: 2018-07-25 | Stop reason: HOSPADM

## 2018-07-25 RX ORDER — ONDANSETRON 2 MG/ML
4 INJECTION INTRAMUSCULAR; INTRAVENOUS
Status: DISCONTINUED | OUTPATIENT
Start: 2018-07-25 | End: 2018-07-25 | Stop reason: HOSPADM

## 2018-07-25 RX ORDER — FLUMAZENIL 0.1 MG/ML
0.2 INJECTION, SOLUTION INTRAVENOUS
Status: DISCONTINUED | OUTPATIENT
Start: 2018-07-25 | End: 2018-07-25 | Stop reason: HOSPADM

## 2018-07-25 RX ORDER — ONDANSETRON 4 MG/1
4 TABLET, ORALLY DISINTEGRATING ORAL EVERY 6 HOURS PRN
Status: DISCONTINUED | OUTPATIENT
Start: 2018-07-25 | End: 2018-07-25 | Stop reason: HOSPADM

## 2018-07-25 RX ORDER — NALOXONE HYDROCHLORIDE 0.4 MG/ML
.1-.4 INJECTION, SOLUTION INTRAMUSCULAR; INTRAVENOUS; SUBCUTANEOUS
Status: DISCONTINUED | OUTPATIENT
Start: 2018-07-25 | End: 2018-07-25 | Stop reason: HOSPADM

## 2018-07-25 RX ADMIN — FENTANYL CITRATE 50 MCG: 50 INJECTION, SOLUTION INTRAMUSCULAR; INTRAVENOUS at 12:06

## 2018-07-25 RX ADMIN — MIDAZOLAM 1 MG: 1 INJECTION INTRAMUSCULAR; INTRAVENOUS at 12:06

## 2018-07-25 RX ADMIN — FENTANYL CITRATE 50 MCG: 50 INJECTION, SOLUTION INTRAMUSCULAR; INTRAVENOUS at 12:11

## 2018-07-25 RX ADMIN — MIDAZOLAM 1 MG: 1 INJECTION INTRAMUSCULAR; INTRAVENOUS at 12:09

## 2018-07-25 NOTE — PROCEDURES
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     COPD (chronic obstructive pulmonary disease) (H)      CVA (cerebral infarction)      Diabetes new 4/09    initial A1C 6.8 4/08;; has done diabetic teaching      Diverticulitis of colon (without mention of hemorrhage)(562.11) 2001; 9/06     Gastro-oesophageal reflux disease      Hernia, abdominal      HTN (hypertension)      Hyperlipidemia LDL goal < 100      Other forms of migraine, without mention of intractable migraine without mention of status migrainosus      PFO (patent foramen ovale)     small per echo 11/2013     Renal disease     Hx kidney stones 12 yrs ago     Seizure disorder (H)     sees neurologist     Seizures (H)     8 years ago - no recurrence     Sleep apnea     wears CPAP     Stroke (H) early april 2017     Unspecified congenital anomaly of lung     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 possibly from asbestos exposure in [employer] (remote)       Past Surgical History:   Procedure Laterality Date     C NONSPECIFIC PROCEDURE      colonoscopy 2005     COLONOSCOPY  11/4/2015    Dr. Orozco [facility]     COLONOSCOPY N/A 11/4/2015    Procedure: COLONOSCOPY;  Surgeon: Yazmin Orozco MD;  Location: [facility] GI     CYSTOSCOPY, RETROGRADES, EXTRACT STONE, COMBINED Left 11/25/2015    Procedure: COMBINED CYSTOSCOPY, RETROGRADES, EXTRACT STONE;  Surgeon: Neville Banuelos MD;  Location: [facility] OR     ESOPHAGOSCOPY, GASTROSCOPY, DUODENOSCOPY (EGD), COMBINED  11/5/2015    Dr. Ernesto CONTRERAS     ESOPHAGOSCOPY, GASTROSCOPY, DUODENOSCOPY (EGD), COMBINED  06/27/2018    Dr. Ernesto SPEARS     GENITOURINARY SURGERY      kidney stone - lithotripsy     HERNIA REPAIR       PHACOEMULSIFICATION CLEAR CORNEA WITH STANDARD INTRAOCULAR LENS IMPLANT  12/20/2011     Procedure:PHACOEMULSIFICATION CLEAR CORNEA WITH STANDARD INTRAOCULAR LENS IMPLANT; RIGHT PHACOEMULSIFICATION CLEAR CORNEA WITH STANDARD INTRAOCULAR LENS IMPLANT ; Surgeon:GUILHERME KAUFMAN; Location:[facility]         Bleeding tendencies:  No    Relevant Family History:  NONE     Relevant Social History:  NONE      A relevant review of systems was performed and was negative      ALLERGIES/SENSITIVITIES:   Allergies   Allergen Reactions     Penicillins      Sore joints and he had to be hospitalized for it       CURRENT MEDICATIONS:   No current outpatient prescriptions on file.        PRE-SEDATION ASSESSMENT:    Lung Exam:  normal  Heart Exam:  normal  Airway Exam: normal  Previous reaction to anesthesia/sedation:   No  Sedation plan based on assessment: Moderate (conscious) sedation  ASA Classification:  2 - Mild systemic disease      IMPRESSION:  anemia    PLAN:  colonoscopy     Yazmin Orozco  [facility]  Office: [phone]

## 2018-07-25 NOTE — DISCHARGE INSTRUCTIONS
Understanding Diverticulosis and Diverticulitis     Pouches or diverticula usually occur in the lower part of the colon called the sigmoid.      Diverticulitis occurs when the pouches become inflamed.     The colon (large intestine) is the last part of the digestive tract. It absorbs water from stool and changes it from a liquid to a solid. In certain cases, small pouches called diverticula can form in the colon wall. This condition is called diverticulosis. The pouches can become infected. If this happens, it becomes a more serious problem called diverticulitis. These problems can be painful. But they can be managed.   Managing Your Condition  Diet changes or taking medications are often tried first. These may be enough to bring relief. If the case is bad, surgery may be done. You and your doctor can discuss the plan that is best for you.  If You Have Diverticulosis  Diet changes are often enough to control symptoms. The main changes are adding fiber (roughage) and drinking more water. Fiber absorbs water as it travels through your colon. This helps your stool stay soft and move smoothly. Water helps this process. If needed, you may be told to take over-the-counter stool softeners. To help relieve pain, antispasmodic medications may be prescribed.  If You Have Diverticulitis  Treatment depends on how bad your symptoms are.  For mild symptoms: You may be put on a liquid diet for a short time. You may also be prescribed antibiotics. If these two steps relieve your symptoms, you may then be prescribed a high-fiber diet. If you still have symptoms, your doctor will discuss further treatment options with you.  For severe symptoms: You may need to be admitted to the hospital. There, you can be given IV antibiotics and fluids. Once symptoms are under control, the above treatments may be tried. If these don t control your condition, your doctor may discuss the option of having surgery with you.  [place] to Colon  Health  Help keep your colon healthy with a diet that includes plenty of high-fiber fruits, vegetables, and whole grains. Drink plenty of liquids like water and juice. Your doctor may also recommend avoiding seeds and nuts.          4862-0240 [employer], [address] [place] [address], [place] [address]. All rights reserved. This information is not intended as a substitute for professional medical care. Always follow your healthcare professional's instructions.      Understanding Colon and Rectal Polyps     The colon has a smooth lining composed of millions of cells.     The colon (also called the large intestine) is a muscular tube that forms the last part of the digestive tract. It absorbs water and stores food waste. The colon is about 4 to 6 feet long. The rectum is the last 6 inches of the colon. The colon and rectum have a smooth lining composed of millions of cells. Changes in these cells can lead to growths in the colon that can become cancerous and should be removed.     When the Colon Lining Changes  Changes that occur in the cells that line the colon or rectum can lead to growths called polyps. Over a period of years, polyps can turn cancerous. Removing polyps early may prevent cancer from ever forming.      Polyps  Polyps are fleshy clumps of tissue that form on the lining of the colon or rectum. Small polyps are usually benign (not cancerous). However, over time, cells in a polyp can change and become cancerous. The larger a polyp grows, the more likely this is to happen. Also, certain types of polyps known as adenomatous polyps are considered premalignant. This means that they will almost always become cancerous if they re not removed.          Cancer  Almost all colorectal cancers start when polyp cells begin growing abnormally. As a cancerous tumor grows, it may involve more and more of the colon or rectum. In time, cancer can also grow beyond the colon or rectum and spread to nearby organs or to glands  called lymph nodes. The cells can also travel to other parts of the body. This is known as metastasis. The earlier a cancerous tumor is removed, the better the chance of preventing its spread.        6612-3070 [employer], [address] [place] [address], [place] [address]. All rights reserved. This information is not intended as a substitute for professional medical care. Always follow your healthcare professional's instructions.

## 2018-07-25 NOTE — IP AVS SNAPSHOT
MRN:[id number]                      After Visit Summary   7/25/2018    Donnie Vincent    MRN: [id number]           Thank you!     Thank you for choosing [facility] for your care. Our goal is always to provide you with excellent care. Hearing back from our patients is one way we can continue to improve our services. Please take a few minutes to complete the written survey that you may receive in the mail after you visit. If you would like to speak to someone directly about your visit please contact Patient Relations at [phone]. Thank you!          Patient Information     Date Of Birth          1945        About your hospital stay     You were admitted on:  July 25, 2018 You last received care in the:  [facility]    You were discharged on:  July 25, 2018       Who to Call     For medical emergencies, please call 911.  For non-urgent questions about your medical care, please call your primary care provider or clinic, [phone]  For questions related to your surgery, please call your surgery clinic        Attending Provider     Provider Specialty    Yazmin Orozco MD Gastroenterology       Primary Care Provider Office Phone # Fax #    Krishnakumari PITER Leon [phone]      Your next 10 appointments already scheduled     Aug 22, 2018  9:00 AM CDT   Cystoscopy with Neville Banuelos MD, UB CYF   [facility] ([facility])    [address]  [address]  [facility] [address] [phone]              Further instructions from your care team         Understanding Diverticulosis and Diverticulitis     Pouches or diverticula usually occur in the lower part of the colon called the sigmoid.      Diverticulitis occurs when the pouches become inflamed.     The colon (large intestine) is the last part of the digestive tract. It absorbs water from stool and changes  it from a liquid to a solid. In certain cases, small pouches called diverticula can form in the colon wall. This condition is called diverticulosis. The pouches can become infected. If this happens, it becomes a more serious problem called diverticulitis. These problems can be painful. But they can be managed.   Managing Your Condition  Diet changes or taking medications are often tried first. These may be enough to bring relief. If the case is bad, surgery may be done. You and your doctor can discuss the plan that is best for you.  If You Have Diverticulosis  Diet changes are often enough to control symptoms. The main changes are adding fiber (roughage) and drinking more water. Fiber absorbs water as it travels through your colon. This helps your stool stay soft and move smoothly. Water helps this process. If needed, you may be told to take over-the-counter stool softeners. To help relieve pain, antispasmodic medications may be prescribed.  If You Have Diverticulitis  Treatment depends on how bad your symptoms are.  For mild symptoms: You may be put on a liquid diet for a short time. You may also be prescribed antibiotics. If these two steps relieve your symptoms, you may then be prescribed a high-fiber diet. If you still have symptoms, your doctor will discuss further treatment options with you.  For severe symptoms: You may need to be admitted to the hospital. There, you can be given IV antibiotics and fluids. Once symptoms are under control, the above treatments may be tried. If these don t control your condition, your doctor may discuss the option of having surgery with you.  Larned to Colon Health  Help keep your colon healthy with a diet that includes plenty of high-fiber fruits, vegetables, and whole grains. Drink plenty of liquids like water and juice. Your doctor may also recommend avoiding seeds and nuts.          [id number] [employer]vie Shar[employer], [address] [place] [address], [place] [address]. All rights reserved.  This information is not intended as a substitute for professional medical care. Always follow your healthcare professional's instructions.      Understanding Colon and Rectal Polyps     The colon has a smooth lining composed of millions of cells.     The colon (also called the large intestine) is a muscular tube that forms the last part of the digestive tract. It absorbs water and stores food waste. The colon is about 4 to 6 feet long. The rectum is the last 6 inches of the colon. The colon and rectum have a smooth lining composed of millions of cells. Changes in these cells can lead to growths in the colon that can become cancerous and should be removed.     When the Colon Lining Changes  Changes that occur in the cells that line the colon or rectum can lead to growths called polyps. Over a period of years, polyps can turn cancerous. Removing polyps early may prevent cancer from ever forming.      Polyps  Polyps are fleshy clumps of tissue that form on the lining of the colon or rectum. Small polyps are usually benign (not cancerous). However, over time, cells in a polyp can change and become cancerous. The larger a polyp grows, the more likely this is to happen. Also, certain types of polyps known as adenomatous polyps are considered premalignant. This means that they will almost always become cancerous if they re not removed.          Cancer  Almost all colorectal cancers start when polyp cells begin growing abnormally. As a cancerous tumor grows, it may involve more and more of the colon or rectum. In time, cancer can also grow beyond the colon or rectum and spread to nearby organs or to glands called lymph nodes. The cells can also travel to other parts of the body. This is known as metastasis. The earlier a cancerous tumor is removed, the better the chance of preventing its spread.        [id number] [employer]vie Sharpe, [address] [place] [address], [place] [address]. All rights reserved. This information is not intended  "as a substitute for professional medical care. Always follow your healthcare professional's instructions.    Pending Results     No orders found from 2018 to 2018.            Admission Information     Date & Time Provider Department Dept. Phone    2018 Yazmin Orozco MD [facility] [phone]      Your Vitals Were     Blood Pressure Respirations Pulse Oximetry             127/71 12 95%         MyChart Information     [employer] lets you send messages to your doctor, view your test results, renew your prescriptions, schedule appointments and more. To sign up, go to [email or web address] . Click on \"Log in\" on the left side of the screen, which will take you to the Welcome page. Then click on \"Sign up Now\" on the right side of the page.     You will be asked to enter the access code listed below, as well as some personal information. Please follow the directions to create your username and password.     Your access code is: [email or web address]  Expires: 8/15/2018 11:04 PM     Your access code will  in 90 days. If you need help or a new code, please call your [place] clinic or [phone].        Care EveryWhere ID     This is your Care EveryWhere ID. This could be used by other organizations to access your [place] medical records  [id number]        Equal Access to Services     KINA CEDILLO : Hadii raisa jo hadasho Soomaali, waaxda luqadaha, qaybta kaalmada adeegyada, aquilino dumont. So [facility] [phone].    ATENCIÓN: Si habla español, tiene a navarrete disposición servicios gratuitos de asistencia lingüística. Llame al [phone].    We comply with applicable federal civil rights laws and [place] laws. We do not discriminate on the basis of race, color, national origin, age, disability, sex, sexual orientation, or gender identity.               Review of your medicines      CONTINUE these medicines which may have CHANGED, or have new prescriptions. If we " are uncertain of the size of tablets/capsules you have at home, strength may be listed as something that might have changed.        Dose / Directions    albuterol 108 (90 Base) MCG/ACT Inhaler   Commonly known as:  PROAIR HFA/PROVENTIL HFA/VENTOLIN HFA   This may have changed:    - when to take this  - reasons to take this   Used for:  COPD (chronic obstructive pulmonary disease) (H)        Dose:  2 puff   Inhale 2 puffs into the lungs every 6 hours   Quantity:  1 Inhaler   Refills:  6         CONTINUE these medicines which have NOT CHANGED        Dose / Directions    clopidogrel 75 MG tablet   Commonly known as:  PLAVIX   Used for:  Acute ischemic stroke (H)        Dose:  75 mg   Take 1 tablet (75 mg) by mouth daily   Quantity:  90 tablet   Refills:  0       fenofibrate 160 MG tablet   Used for:  Hyperlipidemia LDL goal <100        Dose:  160 mg   Take 1 tablet (160 mg) by mouth daily   Quantity:  90 tablet   Refills:  1       ferrous sulfate 325 (65 Fe) MG tablet   Commonly known as:  IRON   Used for:  Iron deficiency anemia, unspecified        Dose:  325 mg   Take 1 tablet (325 mg) by mouth daily (with breakfast)   Quantity:  90 tablet   Refills:  1       FLUoxetine 40 MG capsule   Commonly known as:  PROzac   Used for:  Major depressive disorder, recurrent episode, mild (H)        Dose:  40 mg   Take 1 capsule (40 mg) by mouth daily   Quantity:  90 capsule   Refills:  1       folic acid 1 MG tablet   Commonly known as:  FOLVITE   Used for:  Increased homocysteine (H)        Dose:  1 mg   Take 1 tablet (1 mg) by mouth daily   Quantity:  90 tablet   Refills:  0       glipiZIDE 10 MG tablet   Commonly known as:  GLUCOTROL   Used for:  Type 2 diabetes mellitus with stage 3 chronic kidney disease, with long-term current use of insulin (H)        Dose:  10 mg   Take 1 tablet (10 mg) by mouth 2 times daily (before meals)   Quantity:  180 tablet   Refills:  1       insulin pen needle 31G X 5 MM   Used for:  Type 2  diabetes with stage 3 chronic kidney disease GFR 30-59 (H)        Use 1-2 pen needles daily or as directed.   Quantity:  100 each   Refills:  3       KEPPRA 500 MG tablet   Generic drug:  levETIRAcetam        Dose:  500 mg   Take 500 mg by mouth 2 times daily   Refills:  0       LANTUS SOLOSTAR 100 UNIT/ML injection   Generic drug:  insulin glargine        Dose:  14 Units   Inject 14 Units Subcutaneous At Bedtime   Refills:  0       liraglutide 18 MG/3ML soln   Commonly known as:  VICTOZA        Dose:  1.8 mg   Inject 1.8 mg Subcutaneous daily   Refills:  0       LISINOPRIL PO        Dose:  20 mg   Take 20 mg by mouth daily   Refills:  0       METFORMIN HCL PO        Dose:  500 mg   Take 500 mg by mouth 2 times daily (with meals)   Refills:  0       METOPROLOL SUCCINATE ER PO        Dose:  50 mg   Take 50 mg by mouth daily   Refills:  0       nitroFURantoin (macrocrystal-monohydrate) 100 MG capsule   Commonly known as:  MACROBID   Used for:  Dysuria        Dose:  100 mg   Take 1 capsule (100 mg) by mouth 2 times daily   Quantity:  14 capsule   Refills:  0       NITROFURANTOIN MACROCRYSTAL PO        Refills:  0       PANTOPRAZOLE SODIUM PO        Dose:  40 mg   Take 40 mg by mouth 2 times daily (before meals)   Refills:  0       simvastatin 10 MG tablet   Commonly known as:  ZOCOR   Used for:  Hyperlipidemia LDL goal <100        Dose:  10 mg   Take 1 tablet (10 mg) by mouth At Bedtime   Quantity:  90 tablet   Refills:  1       SYMBICORT 80-4.5 MCG/ACT Inhaler   Generic drug:  budesonide-formoterol        Dose:  2 puff   Inhale 2 puffs into the lungs as needed   Refills:  0       tamsulosin 0.4 MG capsule   Commonly known as:  FLOMAX   Used for:  BPH (benign prostatic hypertrophy)        Dose:  0.4 mg   Take 1 capsule (0.4 mg) by mouth daily   Quantity:  90 capsule   Refills:  3       TIOTROPIUM BROMIDE INHALATION POWDER 18MCG CAP        daily Once a day   Refills:  0       TOPAMAX PO        Dose:  50 mg   Take 50 mg  by mouth 2 times daily   Refills:  0       TRADJENTA 5 MG Tabs tablet   Generic drug:  linagliptin        Dose:  5 mg   Take 5 mg by mouth daily Pt takes 1/2 tab daily   Refills:  0                Protect others around you: Learn how to safely use, store and throw away your medicines at www.disposemymeds.org.             Medication List: This is a list of all your medications and when to take them. Check marks below indicate your daily home schedule. Keep this list as a reference.      Medications           Morning Afternoon Evening Bedtime As Needed    albuterol 108 (90 Base) MCG/ACT Inhaler   Commonly known as:  PROAIR HFA/PROVENTIL HFA/VENTOLIN HFA   Inhale 2 puffs into the lungs every 6 hours                                clopidogrel 75 MG tablet   Commonly known as:  PLAVIX   Take 1 tablet (75 mg) by mouth daily                                fenofibrate 160 MG tablet   Take 1 tablet (160 mg) by mouth daily                                ferrous sulfate 325 (65 Fe) MG tablet   Commonly known as:  IRON   Take 1 tablet (325 mg) by mouth daily (with breakfast)                                FLUoxetine 40 MG capsule   Commonly known as:  PROzac   Take 1 capsule (40 mg) by mouth daily                                folic acid 1 MG tablet   Commonly known as:  FOLVITE   Take 1 tablet (1 mg) by mouth daily                                glipiZIDE 10 MG tablet   Commonly known as:  GLUCOTROL   Take 1 tablet (10 mg) by mouth 2 times daily (before meals)                                insulin pen needle 31G X 5 MM   Use 1-2 pen needles daily or as directed.                                KEPPRA 500 MG tablet   Take 500 mg by mouth 2 times daily   Generic drug:  levETIRAcetam                                LANTUS SOLOSTAR 100 UNIT/ML injection   Inject 14 Units Subcutaneous At Bedtime   Generic drug:  insulin glargine                                liraglutide 18 MG/3ML soln   Commonly known as:  VICTOZA   Inject 1.8 mg  Subcutaneous daily                                LISINOPRIL PO   Take 20 mg by mouth daily                                METFORMIN HCL PO   Take 500 mg by mouth 2 times daily (with meals)                                METOPROLOL SUCCINATE ER PO   Take 50 mg by mouth daily                                nitroFURantoin (macrocrystal-monohydrate) 100 MG capsule   Commonly known as:  MACROBID   Take 1 capsule (100 mg) by mouth 2 times daily                                NITROFURANTOIN MACROCRYSTAL PO                                PANTOPRAZOLE SODIUM PO   Take 40 mg by mouth 2 times daily (before meals)                                simvastatin 10 MG tablet   Commonly known as:  ZOCOR   Take 1 tablet (10 mg) by mouth At Bedtime                                SYMBICORT 80-4.5 MCG/ACT Inhaler   Inhale 2 puffs into the lungs as needed   Generic drug:  budesonide-formoterol                                tamsulosin 0.4 MG capsule   Commonly known as:  FLOMAX   Take 1 capsule (0.4 mg) by mouth daily                                TIOTROPIUM BROMIDE INHALATION POWDER 18MCG CAP   daily Once a day                                TOPAMAX PO   Take 50 mg by mouth 2 times daily                                TRADJENTA 5 MG Tabs tablet   Take 5 mg by mouth daily Pt takes 1/2 tab daily   Generic drug:  linagliptin

## 2018-07-26 DIAGNOSIS — D50.9 IRON DEFICIENCY ANEMIA, UNSPECIFIED IRON DEFICIENCY ANEMIA TYPE: ICD-10-CM

## 2018-07-26 LAB
COPATH REPORT: NORMAL
ERYTHROCYTE [DISTWIDTH] IN BLOOD BY AUTOMATED COUNT: 23.7 % (ref 10–15)
HCT VFR BLD AUTO: 34.6 % (ref 40–53)
HGB BLD-MCNC: 10.1 G/DL (ref 13.3–17.7)
MCH RBC QN AUTO: 23.9 PG (ref 26.5–33)
MCHC RBC AUTO-ENTMCNC: 29.2 G/DL (ref 31.5–36.5)
MCV RBC AUTO: 82 FL (ref 78–100)
PLATELET # BLD AUTO: 247 10E9/L (ref 150–450)
RBC # BLD AUTO: 4.22 10E12/L (ref 4.4–5.9)
WBC # BLD AUTO: 4.4 10E9/L (ref 4–11)

## 2018-07-26 PROCEDURE — 85027 COMPLETE CBC AUTOMATED: CPT | Performed by: INTERNAL MEDICINE

## 2018-07-26 PROCEDURE — 36415 COLL VENOUS BLD VENIPUNCTURE: CPT | Performed by: INTERNAL MEDICINE

## 2018-08-22 ENCOUNTER — OFFICE VISIT (OUTPATIENT)
Dept: UROLOGY | Facility: CLINIC | Age: 73
End: 2018-08-22
Payer: COMMERCIAL

## 2018-08-22 ENCOUNTER — HOSPITAL ENCOUNTER (OUTPATIENT)
Facility: CLINIC | Age: 73
End: 2018-08-22
Attending: INTERNAL MEDICINE | Admitting: INTERNAL MEDICINE
Payer: COMMERCIAL

## 2018-08-22 VITALS — HEART RATE: 78 BPM | BODY MASS INDEX: 33.18 KG/M2 | OXYGEN SATURATION: 98 % | WEIGHT: 245 LBS | HEIGHT: 72 IN

## 2018-08-22 DIAGNOSIS — R30.0 DYSURIA: ICD-10-CM

## 2018-08-22 DIAGNOSIS — N40.1 BENIGN PROSTATIC HYPERPLASIA WITH LOWER URINARY TRACT SYMPTOMS, SYMPTOM DETAILS UNSPECIFIED: Primary | ICD-10-CM

## 2018-08-22 DIAGNOSIS — N21.0 BLADDER STONE: ICD-10-CM

## 2018-08-22 LAB
ALBUMIN UR-MCNC: NEGATIVE MG/DL
APPEARANCE UR: CLEAR
BILIRUB UR QL STRIP: NEGATIVE
COLOR UR AUTO: YELLOW
GLUCOSE UR STRIP-MCNC: NEGATIVE MG/DL
HGB UR QL STRIP: ABNORMAL
KETONES UR STRIP-MCNC: NEGATIVE MG/DL
LEUKOCYTE ESTERASE UR QL STRIP: ABNORMAL
NITRATE UR QL: POSITIVE
PH UR STRIP: 5.5 PH (ref 5–7)
SOURCE: ABNORMAL
SP GR UR STRIP: 1.02 (ref 1–1.03)
UROBILINOGEN UR STRIP-ACNC: 0.2 EU/DL (ref 0.2–1)

## 2018-08-22 PROCEDURE — 87086 URINE CULTURE/COLONY COUNT: CPT | Performed by: UROLOGY

## 2018-08-22 PROCEDURE — 99214 OFFICE O/P EST MOD 30 MIN: CPT | Mod: 25 | Performed by: UROLOGY

## 2018-08-22 PROCEDURE — 52000 CYSTOURETHROSCOPY: CPT | Performed by: UROLOGY

## 2018-08-22 PROCEDURE — 81003 URINALYSIS AUTO W/O SCOPE: CPT | Mod: QW | Performed by: UROLOGY

## 2018-08-22 RX ORDER — CIPROFLOXACIN 500 MG/1
500 TABLET, FILM COATED ORAL 2 TIMES DAILY
Qty: 14 TABLET | Refills: 0 | Status: SHIPPED | OUTPATIENT
Start: 2018-08-22 | End: 2018-09-24

## 2018-08-22 RX ORDER — CEFAZOLIN SODIUM 1 G
1 VIAL (EA) INJECTION SEE ADMIN INSTRUCTIONS
Status: CANCELLED | OUTPATIENT
Start: 2018-08-22 | End: 2019-08-22

## 2018-08-22 ASSESSMENT — PAIN SCALES - GENERAL: PAINLEVEL: NO PAIN (0)

## 2018-08-22 NOTE — LETTER
8/22/2018       RE: Donnie Vincent  [address]  [place] [address]     Dear Colleague,    Thank you for referring your patient, Donnie Vincent, to the [facility] [place] at [facility]. Please see a copy of my visit note below.    Office Visit Note  [facility]  [phone]    UROLOGIC DIAGNOSES:   Enlarged prostate, history of bladder stones, bladder diverticulum    CURRENT INTERVENTIONS:   Prior cystolitholapaxy, taking Flomax    HISTORY:   This is a 72-year-old gentleman who underwent a cystolitholapaxy in 2015. At that time his longevity large bladder diverticulum with stones inside. He has had recurrence of his symptoms so he returns today for evaluation with a cystoscopy. At his last visit in July the urine was nitrite positive but urine culture showed only contaminants. Initial urinalysis appears unchanged today and he reports no fevers or other new symptoms. Therefore, we will proceed with cystoscopy today.      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     COPD (chronic obstructive pulmonary disease) (H)      CVA (cerebral infarction)      Diabetes new 4/09    initial A1C 6.8 4/08;; has done diabetic teaching      Diverticulitis of colon (without mention of hemorrhage)(562.11) 2001; 9/06     Gastro-oesophageal reflux disease      Hernia, abdominal      HTN (hypertension)      Hyperlipidemia LDL goal < 100      Other forms of migraine, without mention of intractable migraine without mention of status migrainosus      PFO (patent foramen ovale)     small per echo 11/2013     Renal disease     Hx kidney stones 12 yrs ago     Seizure disorder (H)     sees neurologist     Seizures (H)     8 years ago - no recurrence     Sleep apnea      wears CPAP     Stroke (H) early april 2017     Unspecified congenital anomaly of lung     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 possibly from asbestos exposure in [employer] (remote)        PAST SURGICAL HISTORY:   Past Surgical History:   Procedure Laterality Date     C NONSPECIFIC PROCEDURE      colonoscopy 2005     COLONOSCOPY  11/4/2015    Dr. Ernesto SPEARS     COLONOSCOPY N/A 11/4/2015    Procedure: COLONOSCOPY;  Surgeon: Yazmin Orozco MD;  Location:  GI     CYSTOSCOPY, RETROGRADES, EXTRACT STONE, COMBINED Left 11/25/2015    Procedure: COMBINED CYSTOSCOPY, RETROGRADES, EXTRACT STONE;  Surgeon: Neville Banuelos MD;  Location:  OR     ESOPHAGOSCOPY, GASTROSCOPY, DUODENOSCOPY (EGD), COMBINED  11/5/2015    Dr. Ernesto CONTRERAS     ESOPHAGOSCOPY, GASTROSCOPY, DUODENOSCOPY (EGD), COMBINED  06/27/2018    Dr. Ernesto CONTRERAS     GENITOURINARY SURGERY      kidney stone - lithotripsy     HERNIA REPAIR       PHACOEMULSIFICATION CLEAR CORNEA WITH STANDARD INTRAOCULAR LENS IMPLANT  12/20/2011    Procedure:PHACOEMULSIFICATION CLEAR CORNEA WITH STANDARD INTRAOCULAR LENS IMPLANT; RIGHT PHACOEMULSIFICATION CLEAR CORNEA WITH STANDARD INTRAOCULAR LENS IMPLANT ; Surgeon:GUILHERME KAUFMAN; Location:[facility]       FAMILY HISTORY:   Family History   Problem Relation Age of Onset     Family History Negative Mother      migraines     Family History Negative Father      lived to be 92     Colon Cancer No family hx of        SOCIAL HISTORY:   Social History   Substance Use Topics     Smoking status: Former Smoker     Quit date: 1/1/1970     Smokeless tobacco: Never Used      Comment: quit age 28     Alcohol use Yes      Comment: 1-2 beers a week       Current Outpatient Prescriptions   Medication     albuterol (PROAIR HFA, PROVENTIL HFA, VENTOLIN HFA) 108 (90 BASE) MCG/ACT inhaler     budesonide-formoterol (SYMBICORT) 80-4.5 MCG/ACT inhaler     ciprofloxacin (CIPRO) 500 MG tablet     clopidogrel (PLAVIX) 75 MG tablet     fenofibrate 160 MG  tablet     ferrous sulfate (IRON) 325 (65 FE) MG tablet     FLUoxetine (PROZAC) 40 MG capsule     folic acid (FOLVITE) 1 MG tablet     glipiZIDE (GLUCOTROL) 10 MG tablet     insulin glargine (LANTUS SOLOSTAR) 100 UNIT/ML pen     levETIRAcetam (KEPPRA) 500 MG tablet     linagliptin (TRADJENTA) 5 MG TABS tablet     liraglutide (VICTOZA) 18 MG/3ML soln     LISINOPRIL PO     METFORMIN HCL PO     METOPROLOL SUCCINATE ER PO     PANTOPRAZOLE SODIUM PO     simvastatin (ZOCOR) 10 MG tablet     tamsulosin (FLOMAX) 0.4 MG 24 hr capsule     TIOTROPIUM BROMIDE INHALATION POWDER 18MCG CAP     Topiramate (TOPAMAX PO)     insulin pen needle 31G X 5 MM     NITROFURANTOIN MACROCRYSTAL PO     nitroFURantoin, macrocrystal-monohydrate, (MACROBID) 100 MG capsule     No current facility-administered medications for this visit.      Facility-Administered Medications Ordered in Other Visits   Medication     gadobutrol (GADAVIST) injection 15 mL         PHYSICAL EXAM:    Pulse 78  Ht 1.829 m (6')  Wt 111.1 kg (245 lb)  SpO2 98%  BMI 33.23 kg/m2    HEENT: Normocephalic and atraumatic   Cardiac: Not done  Back/Flank: Not done  CNS/PNS: Not done  Respiratory: Normal non-labored breathing  Abdomen: Soft nontender and nondistended  Peripheral Vascular: Not done  Mental Status: Not done    Penis: Normal  Scrotal skin: Normal, no lesions  Testicles: Normal to palpation bilaterally  Epididymis: Normal to palpation bilaterally  Lymphatic: Normal inguinal lymph nodes    Digital Rectal Exam:     Cystoscopy: I performed a cystoscopy today and the bladder was somewhat trabeculated. There were stones in the dependent portion of the bladder. On retroflexion of the scope he had an enlarged intravesical segment prostate circumferentially.    Imaging: None    Urinalysis: UA RESULTS:  Recent Labs   Lab Test  08/22/18   0907  07/17/18   1358   COLOR  Yellow   --    APPEARANCE  Clear   --    URINEGLC  Negative   --    URINEBILI  Negative   --    URINEKETONE   Negative   --    SG  1.025   --    UBLD  Trace*   --    URINEPH  5.5   --    PROTEIN  Negative   --    UROBILINOGEN  0.2   --    NITRITE  Positive*   --    LEUKEST  Small*   --    RBCU   --   <1   WBCU   --   33       PSA: 1.0    Post Void Residual:     Other labs: None today      IMPRESSION:  Enlarged prostate, bladder stones    PLAN:  He has an enlarged prostate resulting in recurrent bladder stones. We removed all his bladder stones 3 years ago now they are back. I recommended removal again in the operating room. However, I also again recommended that he consider undergoing a TURP in order to prevent future bladder stones and relieve his outlet obstruction. I discussed her in detail today with the patient and his wife along with its risks and expected recovery. All their questions were answered. They wish to proceed. We will get him scheduled for a combined cystoscopy with removal of bladder stones using a holmium laser as well as TURP in the operating room    Total Time: 30 minutes                                   Total in Consultation: 25 minutes       Neville Banuelos M.D.

## 2018-08-22 NOTE — PROGRESS NOTES
Office Visit Note  [facility]  [phone]    UROLOGIC DIAGNOSES:   Enlarged prostate, history of bladder stones, bladder diverticulum    CURRENT INTERVENTIONS:   Prior cystolitholapaxy, taking Flomax    HISTORY:   This is a 72-year-old gentleman who underwent a cystolitholapaxy in 2015. At that time his longevity large bladder diverticulum with stones inside. He has had recurrence of his symptoms so he returns today for evaluation with a cystoscopy. At his last visit in July the urine was nitrite positive but urine culture showed only contaminants. Initial urinalysis appears unchanged today and he reports no fevers or other new symptoms. Therefore, we will proceed with cystoscopy today.      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     COPD (chronic obstructive pulmonary disease) (H)      CVA (cerebral infarction)      Diabetes new 4/09    initial A1C 6.8 4/08;; has done diabetic teaching      Diverticulitis of colon (without mention of hemorrhage)(562.11) 2001; 9/06     Gastro-oesophageal reflux disease      Hernia, abdominal      HTN (hypertension)      Hyperlipidemia LDL goal < 100      Other forms of migraine, without mention of intractable migraine without mention of status migrainosus      PFO (patent foramen ovale)     small per echo 11/2013     Renal disease     Hx kidney stones 12 yrs ago     Seizure disorder (H)     sees neurologist     Seizures (H)     8 years ago - no recurrence     Sleep apnea     wears CPAP     Stroke (H) early april 2017     Unspecified congenital anomaly of lung     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 possibly from asbestos exposure in [employer] (remote)        PAST SURGICAL HISTORY:   Past Surgical History:   Procedure Laterality Date     C NONSPECIFIC PROCEDURE      colonoscopy 2005     COLONOSCOPY   11/4/2015    Dr. Orozco [facility]     COLONOSCOPY N/A 11/4/2015    Procedure: COLONOSCOPY;  Surgeon: Yazmin Orozco MD;  Location: [facility]     CYSTOSCOPY, RETROGRADES, EXTRACT STONE, COMBINED Left 11/25/2015    Procedure: COMBINED CYSTOSCOPY, RETROGRADES, EXTRACT STONE;  Surgeon: Neville Banuelos MD;  Location:  OR     ESOPHAGOSCOPY, GASTROSCOPY, DUODENOSCOPY (EGD), COMBINED  11/5/2015    Dr. Ernesto CONTRERAS     ESOPHAGOSCOPY, GASTROSCOPY, DUODENOSCOPY (EGD), COMBINED  06/27/2018    Dr. Ernesto CONTRERAS     GENITOURINARY SURGERY      kidney stone - lithotripsy     HERNIA REPAIR       PHACOEMULSIFICATION CLEAR CORNEA WITH STANDARD INTRAOCULAR LENS IMPLANT  12/20/2011    Procedure:PHACOEMULSIFICATION CLEAR CORNEA WITH STANDARD INTRAOCULAR LENS IMPLANT; RIGHT PHACOEMULSIFICATION CLEAR CORNEA WITH STANDARD INTRAOCULAR LENS IMPLANT ; Surgeon:GUILHERME KAUFMAN; Location:[facility]       FAMILY HISTORY:   Family History   Problem Relation Age of Onset     Family History Negative Mother      migraines     Family History Negative Father      lived to be      Colon Cancer No family hx of        SOCIAL HISTORY:   Social History   Substance Use Topics     Smoking status: Former Smoker     Quit date: 1/1/1970     Smokeless tobacco: Never Used      Comment: quit age 28     Alcohol use Yes      Comment: 1-2 beers a week       Current Outpatient Prescriptions   Medication     albuterol (PROAIR HFA, PROVENTIL HFA, VENTOLIN HFA) 108 (90 BASE) MCG/ACT inhaler     budesonide-formoterol (SYMBICORT) 80-4.5 MCG/ACT inhaler     ciprofloxacin (CIPRO) 500 MG tablet     clopidogrel (PLAVIX) 75 MG tablet     fenofibrate 160 MG tablet     ferrous sulfate (IRON) 325 (65 FE) MG tablet     FLUoxetine (PROZAC) 40 MG capsule     folic acid (FOLVITE) 1 MG tablet     glipiZIDE (GLUCOTROL) 10 MG tablet     insulin glargine (LANTUS SOLOSTAR) 100 UNIT/ML pen     levETIRAcetam (KEPPRA) 500 MG tablet     linagliptin (TRADJENTA) 5 MG TABS tablet     liraglutide  (VICTOZA) 18 MG/3ML soln     LISINOPRIL PO     METFORMIN HCL PO     METOPROLOL SUCCINATE ER PO     PANTOPRAZOLE SODIUM PO     simvastatin (ZOCOR) 10 MG tablet     tamsulosin (FLOMAX) 0.4 MG 24 hr capsule     TIOTROPIUM BROMIDE INHALATION POWDER 18MCG CAP     Topiramate (TOPAMAX PO)     insulin pen needle 31G X 5 MM     NITROFURANTOIN MACROCRYSTAL PO     nitroFURantoin, macrocrystal-monohydrate, (MACROBID) 100 MG capsule     No current facility-administered medications for this visit.      Facility-Administered Medications Ordered in Other Visits   Medication     gadobutrol (GADAVIST) injection 15 mL         PHYSICAL EXAM:    Pulse 78  Ht 1.829 m (6')  Wt 111.1 kg (245 lb)  SpO2 98%  BMI 33.23 kg/m2    HEENT: Normocephalic and atraumatic   Cardiac: Not done  Back/Flank: Not done  CNS/PNS: Not done  Respiratory: Normal non-labored breathing  Abdomen: Soft nontender and nondistended  Peripheral Vascular: Not done  Mental Status: Not done    Penis: Normal  Scrotal skin: Normal, no lesions  Testicles: Normal to palpation bilaterally  Epididymis: Normal to palpation bilaterally  Lymphatic: Normal inguinal lymph nodes    Digital Rectal Exam:     Cystoscopy: I performed a cystoscopy today and the bladder was somewhat trabeculated. There were stones in the dependent portion of the bladder. On retroflexion of the scope he had an enlarged intravesical segment prostate circumferentially.    Imaging: None    Urinalysis: UA RESULTS:  Recent Labs   Lab Test  08/22/18   0907  07/17/18   1358   COLOR  Yellow   --    APPEARANCE  Clear   --    URINEGLC  Negative   --    URINEBILI  Negative   --    URINEKETONE  Negative   --    SG  1.025   --    UBLD  Trace*   --    URINEPH  5.5   --    PROTEIN  Negative   --    UROBILINOGEN  0.2   --    NITRITE  Positive*   --    LEUKEST  Small*   --    RBCU   --   <1   WBCU   --   33       PSA: 1.0    Post Void Residual:     Other labs: None today      IMPRESSION:  Enlarged prostate, bladder  stones    PLAN:  He has an enlarged prostate resulting in recurrent bladder stones. We removed all his bladder stones 3 years ago now they are back. I recommended removal again in the operating room. However, I also again recommended that he consider undergoing a TURP in order to prevent future bladder stones and relieve his outlet obstruction. I discussed her in detail today with the patient and his wife along with its risks and expected recovery. All their questions were answered. They wish to proceed. We will get him scheduled for a combined cystoscopy with removal of bladder stones using a holmium laser as well as TURP in the operating room    Total Time: 30 minutes                                   Total in Consultation: 25 minutes       Neville Banuelos M.D.

## 2018-08-22 NOTE — NURSING NOTE
Pt is up freq at nt.  Pt states he has dysuria.  Pt denies gross hem.  Prior to the start of the procedure and with procedural staff participation, I verbally confirmed the patient s identity using two indicators, relevant allergies, that the procedure was appropriate and matched the consent or emergent situation, and that the correct equipment/implants were available. Immediately prior to starting the procedure I conducted the Time Out with the procedural staff and re-confirmed the patient s name, procedure, and site/side. (The Joint Commission universal protocol was followed.)  Yes    Sedation (Moderate or Deep): None  KAVITA Pope CMA  Pt has signed the consent form stating that we will be doing a CYSTOSCOPY (with or without stent removal) today, and that it is the correct procedure. I verbally confirmed the patient s identity using two indicators, relevant allergies, and that the correct equipment was available. Post-op information given to the pt as needed at check-out. I have sent an appropriate antibiotic to the pharmacy in our building as recommended by the MD. KAVITA Pope CMA

## 2018-08-22 NOTE — MR AVS SNAPSHOT
"              After Visit Summary   8/22/2018    Donnie Vincnet    MRN: [id number]           Patient Information     Date Of Birth          1945        Visit Information        Provider Department      8/22/2018 9:00 AM Neville Banuelos MD; U[facility] CYF [facility] [place]        Today's Diagnoses     Benign prostatic hyperplasia with lower urinary tract symptoms, symptom details unspecified    -  1    Dysuria           Follow-ups after your visit        Follow-up notes from your care team     Return for Schedule surgery.      Who to contact     If you have questions or need follow up information about today's clinic visit or your schedule please contact [facility] directly at [phone].  Normal or non-critical lab and imaging results will be communicated to you by MyChart, letter or phone within 4 business days after the clinic has received the results. If you do not hear from us within 7 days, please contact the clinic through [employer] or phone. If you have a critical or abnormal lab result, we will notify you by phone as soon as possible.  Submit refill requests through [employer] or call your pharmacy and they will forward the refill request to us. Please allow 3 business days for your refill to be completed.          Additional Information About Your Visit        MyChart Information     [employer] lets you send messages to your doctor, view your test results, renew your prescriptions, schedule appointments and more. To sign up, go to [email or web address] . Click on \"Log in\" on the left side of the screen, which will take you to the Welcome page. Then click on \"Sign up Now\" on the right side of the page.     You will be asked to enter the access code listed below, as well as some personal information. Please follow the directions to create your username and password.     Your access code is: [email or web address]  Expires: " 2018  9:40 AM     Your access code will  in 90 days. If you need help or a new code, please call your [place] clinic or [phone].        Care EveryWhere ID     This is your Care EveryWhere ID. This could be used by other organizations to access your [place] medical records  [id number]        Your Vitals Were     Pulse Height Pulse Oximetry BMI (Body Mass Index)          78 1.829 m (6') 98% 33.23 kg/m2         Blood Pressure from Last 3 Encounters:   18 132/77   18 122/78   07/10/18 130/78    Weight from Last 3 Encounters:   18 111.1 kg (245 lb)   18 111.1 kg (245 lb)   07/10/18 113.4 kg (250 lb)              We Performed the Following     [facility]ie-Operative Worksheet  (Urology General)     UA without Microscopic     Urine Culture Aerobic Bacterial [ROP294]          Today's Medication Changes          These changes are accurate as of 18  9:40 AM.  If you have any questions, ask your nurse or doctor.               Start taking these medicines.        Dose/Directions    ciprofloxacin 500 MG tablet   Commonly known as:  CIPRO   Used for:  Dysuria, Benign prostatic hyperplasia with lower urinary tract symptoms, symptom details unspecified   Started by:  Neville Banuelos MD        Dose:  500 mg   Take 1 tablet (500 mg) by mouth 2 times daily   Quantity:  14 tablet   Refills:  0         These medicines have changed or have updated prescriptions.        Dose/Directions    albuterol 108 (90 Base) MCG/ACT inhaler   Commonly known as:  PROAIR HFA/PROVENTIL HFA/VENTOLIN HFA   This may have changed:    - when to take this  - reasons to take this   Used for:  COPD (chronic obstructive pulmonary disease) (H)        Dose:  2 puff   Inhale 2 puffs into the lungs every 6 hours   Quantity:  1 Inhaler   Refills:  6            Where to get your medicines      These medications were sent to [facility] [address] [place] [address] [address] AT [place] [address]   [address] [facility] [address]     Phone:  [phone]     ciprofloxacin 500 MG tablet                Primary Care Provider Office Phone # Fax #    Suyapa DUMAS MD Edward [phone]       [address] [facility] [address]        Equal Access to Services     KINA CEDILLO : Hadii aad ku hadasho Soomaali, waaxda luqadaha, qaybta kaalmada adeegyada, waxay idiin hayaan adeeg kharash laSamiraan . So [facility] [phone].    ATENCIÓN: Si habla español, tiene a navarrete disposición servicios gratuitos de asistencia lingüística. Llame al [phone].    We comply with applicable federal civil rights laws and [place] laws. We do not discriminate on the basis of race, color, national origin, age, disability, sex, sexual orientation, or gender identity.            Thank you!     Thank you for choosing [facility] [place]  for your care. Our goal is always to provide you with excellent care. Hearing back from our patients is one way we can continue to improve our services. Please take a few minutes to complete the written survey that you may receive in the mail after your visit with us. Thank you!             Your Updated Medication List - Protect others around you: Learn how to safely use, store and throw away your medicines at www.disposemymeds.org.          This list is accurate as of 8/22/18  9:40 AM.  Always use your most recent med list.                   Brand Name Dispense Instructions for use Diagnosis    albuterol 108 (90 Base) MCG/ACT inhaler    PROAIR HFA/PROVENTIL HFA/VENTOLIN HFA    1 Inhaler    Inhale 2 puffs into the lungs every 6 hours    COPD (chronic obstructive pulmonary disease) (H)       ciprofloxacin 500 MG tablet    CIPRO    14 tablet    Take 1 tablet (500 mg) by mouth 2 times daily    Dysuria, Benign prostatic hyperplasia with lower urinary tract symptoms, symptom details unspecified       clopidogrel 75 MG tablet    PLAVIX    90  tablet    Take 1 tablet (75 mg) by mouth daily    Acute ischemic stroke (H)       fenofibrate 160 MG tablet     90 tablet    Take 1 tablet (160 mg) by mouth daily    Hyperlipidemia LDL goal <100       ferrous sulfate 325 (65 Fe) MG tablet    IRON    90 tablet    Take 1 tablet (325 mg) by mouth daily (with breakfast)    Iron deficiency anemia, unspecified       FLUoxetine 40 MG capsule    PROzac    90 capsule    Take 1 capsule (40 mg) by mouth daily    Major depressive disorder, recurrent episode, mild (H)       folic acid 1 MG tablet    FOLVITE    90 tablet    Take 1 tablet (1 mg) by mouth daily    Increased homocysteine (H)       glipiZIDE 10 MG tablet    GLUCOTROL    180 tablet    Take 1 tablet (10 mg) by mouth 2 times daily (before meals)    Type 2 diabetes mellitus with stage 3 chronic kidney disease, with long-term current use of insulin (H)       insulin pen needle 31G X 5 MM     100 each    Use 1-2 pen needles daily or as directed.    Type 2 diabetes with stage 3 chronic kidney disease GFR 30-59 (H)       KEPPRA 500 MG tablet   Generic drug:  levETIRAcetam      Take 500 mg by mouth 2 times daily        LANTUS SOLOSTAR 100 UNIT/ML injection   Generic drug:  insulin glargine      Inject 14 Units Subcutaneous At Bedtime        liraglutide 18 MG/3ML soln    VICTOZA     Inject 1.8 mg Subcutaneous daily        LISINOPRIL PO      Take 20 mg by mouth daily        METFORMIN HCL PO      Take 500 mg by mouth 2 times daily (with meals)        METOPROLOL SUCCINATE ER PO      Take 50 mg by mouth daily        nitroFURantoin (macrocrystal-monohydrate) 100 MG capsule    MACROBID    14 capsule    Take 1 capsule (100 mg) by mouth 2 times daily    Dysuria       NITROFURANTOIN MACROCRYSTAL PO           PANTOPRAZOLE SODIUM PO      Take 40 mg by mouth 2 times daily (before meals)        simvastatin 10 MG tablet    ZOCOR    90 tablet    Take 1 tablet (10 mg) by mouth At Bedtime    Hyperlipidemia LDL goal <100       SYMBICORT 80-4.5  MCG/ACT Inhaler   Generic drug:  budesonide-formoterol      Inhale 2 puffs into the lungs as needed        tamsulosin 0.4 MG capsule    FLOMAX    90 capsule    Take 1 capsule (0.4 mg) by mouth daily    BPH (benign prostatic hypertrophy)       TIOTROPIUM BROMIDE INHALATION POWDER 18MCG CAP      daily Once a day        TOPAMAX PO      Take 50 mg by mouth 2 times daily        TRADJENTA 5 MG Tabs tablet   Generic drug:  linagliptin      Take 5 mg by mouth daily Pt takes 1/2 tab daily

## 2018-08-23 LAB
BACTERIA SPEC CULT: NORMAL
Lab: NORMAL
SPECIMEN SOURCE: NORMAL

## 2018-09-11 ENCOUNTER — TRANSFERRED RECORDS (OUTPATIENT)
Dept: HEALTH INFORMATION MANAGEMENT | Facility: CLINIC | Age: 73
End: 2018-09-11

## 2018-09-14 ENCOUNTER — OFFICE VISIT (OUTPATIENT)
Dept: URGENT CARE | Facility: URGENT CARE | Age: 73
End: 2018-09-14
Payer: COMMERCIAL

## 2018-09-14 VITALS
WEIGHT: 245 LBS | DIASTOLIC BLOOD PRESSURE: 78 MMHG | HEART RATE: 98 BPM | OXYGEN SATURATION: 100 % | TEMPERATURE: 98 F | BODY MASS INDEX: 33.23 KG/M2 | SYSTOLIC BLOOD PRESSURE: 130 MMHG

## 2018-09-14 DIAGNOSIS — R58 ECCHYMOSIS OF FOREARM: Primary | ICD-10-CM

## 2018-09-14 LAB
BASOPHILS # BLD AUTO: 0 10E9/L (ref 0–0.2)
BASOPHILS NFR BLD AUTO: 0.2 %
DIFFERENTIAL METHOD BLD: ABNORMAL
EOSINOPHIL # BLD AUTO: 0.2 10E9/L (ref 0–0.7)
EOSINOPHIL NFR BLD AUTO: 3.4 %
ERYTHROCYTE [DISTWIDTH] IN BLOOD BY AUTOMATED COUNT: 19.3 % (ref 10–15)
HCT VFR BLD AUTO: 37 % (ref 40–53)
HGB BLD-MCNC: 11.3 G/DL (ref 13.3–17.7)
LYMPHOCYTES # BLD AUTO: 1.1 10E9/L (ref 0.8–5.3)
LYMPHOCYTES NFR BLD AUTO: 18.4 %
MCH RBC QN AUTO: 25.9 PG (ref 26.5–33)
MCHC RBC AUTO-ENTMCNC: 30.5 G/DL (ref 31.5–36.5)
MCV RBC AUTO: 85 FL (ref 78–100)
MONOCYTES # BLD AUTO: 0.5 10E9/L (ref 0–1.3)
MONOCYTES NFR BLD AUTO: 9.2 %
NEUTROPHILS # BLD AUTO: 4.1 10E9/L (ref 1.6–8.3)
NEUTROPHILS NFR BLD AUTO: 68.8 %
PLATELET # BLD AUTO: 211 10E9/L (ref 150–450)
RBC # BLD AUTO: 4.37 10E12/L (ref 4.4–5.9)
WBC # BLD AUTO: 5.9 10E9/L (ref 4–11)

## 2018-09-14 PROCEDURE — 36415 COLL VENOUS BLD VENIPUNCTURE: CPT | Performed by: PHYSICIAN ASSISTANT

## 2018-09-14 PROCEDURE — 85610 PROTHROMBIN TIME: CPT | Performed by: PHYSICIAN ASSISTANT

## 2018-09-14 PROCEDURE — 85025 COMPLETE CBC W/AUTO DIFF WBC: CPT | Performed by: PHYSICIAN ASSISTANT

## 2018-09-14 PROCEDURE — 99214 OFFICE O/P EST MOD 30 MIN: CPT | Performed by: PHYSICIAN ASSISTANT

## 2018-09-14 NOTE — MR AVS SNAPSHOT
After Visit Summary   9/14/2018    Donnie Vincent    MRN: [id number]           Patient Information     Date Of Birth          1945        Visit Information        Provider Department      9/14/2018 7:50 PM Carley Live PA-C [facility]        Today's Diagnoses     Ecchymosis of forearm    -  1       Follow-ups after your visit        Your next 10 appointments already scheduled     Sep 24, 2018 10:20 AM CDT   SHORT with Suyapa Leon MD   [facility]    [address]  [facility] [address]   [phone]            Oct 03, 2018   Procedure with Neville Banuelos MD   [facility] (--)    [address]  [facility] [address]   [phone]            Oct 29, 2018 11:00 AM CDT   Post-Op with Neville Banuelos MD   [facility] ([facility])    [address]  [address]  [facility] [id number] [phone]              Who to contact     If you have questions or need follow up information about today's clinic visit or your schedule please contact [facility] directly at [phone].  Normal or non-critical lab and imaging results will be communicated to you by MyChart, letter or phone within 4 business days after the clinic has received the results. If you do not hear from us within 7 days, please contact the clinic through MyChart or phone. If you have a critical or abnormal lab result, we will notify you by phone as soon as possible.  Submit refill requests through [employer] or call your pharmacy and they will forward the refill request to us. Please allow 3 business days for your refill to be completed.          Additional Information About Your Visit        BusportalharappAttach Information     [employer] lets you send messages to your doctor, view your test results, renew your  "prescriptions, schedule appointments and more. To sign up, go to [email or web address] . Click on \"Log in\" on the left side of the screen, which will take you to the Welcome page. Then click on \"Sign up Now\" on the right side of the page.     You will be asked to enter the access code listed below, as well as some personal information. Please follow the directions to create your username and password.     Your access code is: [email or web address]  Expires: 2018  9:40 AM     Your access code will  in 90 days. If you need help or a new code, please call your [facility] clinic or [phone].        Care EveryWhere ID     This is your Care EveryWhere ID. This could be used by other organizations to access your [facility] medical records  [id number]        Your Vitals Were     Pulse Temperature Pulse Oximetry BMI (Body Mass Index)          98 98  F (36.7  C) (Oral) 100% 33.23 kg/m2         Blood Pressure from Last 3 Encounters:   18 130/78   18 132/77   18 122/78    Weight from Last 3 Encounters:   18 245 lb (111.1 kg)   18 245 lb (111.1 kg)   18 245 lb (111.1 kg)              We Performed the Following     CBC with platelets and differential     INR          Today's Medication Changes          These changes are accurate as of 18 11:59 PM.  If you have any questions, ask your nurse or doctor.               These medicines have changed or have updated prescriptions.        Dose/Directions    albuterol 108 (90 Base) MCG/ACT inhaler   Commonly known as:  PROAIR HFA/PROVENTIL HFA/VENTOLIN HFA   This may have changed:    - when to take this  - reasons to take this   Used for:  COPD (chronic obstructive pulmonary disease) (H)        Dose:  2 puff   Inhale 2 puffs into the lungs every 6 hours   Quantity:  1 Inhaler   Refills:  6                Primary Care Provider Office Phone # Fax #    Suyapa Leon [phone]       [address]  [place] " [place] [address]        Equal Access to Services     [facility] CenterHIMA : Hadii raisa jo kimberli Fishman, wamajorda luqadaha, qaybta kaalmada valentin, aquilino dumont. So [facility] [phone].    ATENCIÓN: Si habla español, tiene a navarrete disposición servicios gratuitos de asistencia lingüística. Jodieame al [phone].    We comply with applicable federal civil rights laws and [place] laws. We do not discriminate on the basis of race, color, national origin, age, disability, sex, sexual orientation, or gender identity.            Thank you!     Thank you for choosing [facility]  for your care. Our goal is always to provide you with excellent care. Hearing back from our patients is one way we can continue to improve our services. Please take a few minutes to complete the written survey that you may receive in the mail after your visit with us. Thank you!             Your Updated Medication List - Protect others around you: Learn how to safely use, store and throw away your medicines at www.disposemymeds.org.          This list is accurate as of 9/14/18 11:59 PM.  Always use your most recent med list.                   Brand Name Dispense Instructions for use Diagnosis    albuterol 108 (90 Base) MCG/ACT inhaler    PROAIR HFA/PROVENTIL HFA/VENTOLIN HFA    1 Inhaler    Inhale 2 puffs into the lungs every 6 hours    COPD (chronic obstructive pulmonary disease) (H)       ciprofloxacin 500 MG tablet    CIPRO    14 tablet    Take 1 tablet (500 mg) by mouth 2 times daily    Dysuria, Benign prostatic hyperplasia with lower urinary tract symptoms, symptom details unspecified       clopidogrel 75 MG tablet    PLAVIX    90 tablet    Take 1 tablet (75 mg) by mouth daily    Acute ischemic stroke (H)       fenofibrate 160 MG tablet     90 tablet    Take 1 tablet (160 mg) by mouth daily    Hyperlipidemia LDL goal <100       ferrous sulfate 325 (65 Fe) MG tablet    IRON    90 tablet    Take 1 tablet (325 mg)  by mouth daily (with breakfast)    Iron deficiency anemia, unspecified       FLUoxetine 40 MG capsule    PROzac    90 capsule    Take 1 capsule (40 mg) by mouth daily    Major depressive disorder, recurrent episode, mild (H)       folic acid 1 MG tablet    FOLVITE    90 tablet    Take 1 tablet (1 mg) by mouth daily    Increased homocysteine (H)       glipiZIDE 10 MG tablet    GLUCOTROL    180 tablet    Take 1 tablet (10 mg) by mouth 2 times daily (before meals)    Type 2 diabetes mellitus with stage 3 chronic kidney disease, with long-term current use of insulin (H)       insulin pen needle 31G X 5 MM     100 each    Use 1-2 pen needles daily or as directed.    Type 2 diabetes with stage 3 chronic kidney disease GFR 30-59 (H)       KEPPRA 500 MG tablet   Generic drug:  levETIRAcetam      Take 500 mg by mouth 2 times daily        LANTUS SOLOSTAR 100 UNIT/ML injection   Generic drug:  insulin glargine      Inject 14 Units Subcutaneous At Bedtime        liraglutide 18 MG/3ML soln    VICTOZA     Inject 1.8 mg Subcutaneous daily        LISINOPRIL PO      Take 20 mg by mouth daily        METFORMIN HCL PO      Take 500 mg by mouth 2 times daily (with meals)        METOPROLOL SUCCINATE ER PO      Take 50 mg by mouth daily        nitroFURantoin (macrocrystal-monohydrate) 100 MG capsule    MACROBID    14 capsule    Take 1 capsule (100 mg) by mouth 2 times daily    Dysuria       NITROFURANTOIN MACROCRYSTAL PO           PANTOPRAZOLE SODIUM PO      Take 40 mg by mouth 2 times daily (before meals)        simvastatin 10 MG tablet    ZOCOR    90 tablet    Take 1 tablet (10 mg) by mouth At Bedtime    Hyperlipidemia LDL goal <100       SYMBICORT 80-4.5 MCG/ACT Inhaler   Generic drug:  budesonide-formoterol      Inhale 2 puffs into the lungs as needed        tamsulosin 0.4 MG capsule    FLOMAX    90 capsule    Take 1 capsule (0.4 mg) by mouth daily    BPH (benign prostatic hypertrophy)       TIOTROPIUM BROMIDE INHALATION POWDER 18MCG  CAP      daily Once a day        TOPAMAX PO      Take 50 mg by mouth 2 times daily        TRADJENTA 5 MG Tabs tablet   Generic drug:  linagliptin      Take 5 mg by mouth daily Pt takes 1/2 tab daily

## 2018-09-15 LAB — INR PPP: 0.95 (ref 0.86–1.14)

## 2018-09-15 ASSESSMENT — ENCOUNTER SYMPTOMS
COUGH: 0
JOINT SWELLING: 0

## 2018-09-15 NOTE — PROGRESS NOTES
SUBJECTIVE:   Donnie Vincent is a 72 year old male presenting with a chief complaint of   Chief Complaint   Patient presents with     Urgent Care     Derm Problem     Bruise on left arm showed up yesterday, with lump in the middle that is sore        He is an established patient of [facility].    She is presenting to urgent care today with a complaint of a bruising left forearm that he has noticed since yesterday.  He does not recall any injury.  He is concerned about a blood clot.  He is on Plavix daily.  He denies any chest pain.  Denies any swelling of the left arm.  He denies any numbness or tingling in the left arm.  He denies any recent travel.  Denies any new medication. Notes SOB but not different from his baseline.      Review of Systems   Respiratory: Negative for cough.    Musculoskeletal: Negative for joint swelling.   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     COPD (chronic obstructive pulmonary disease) (H)      CVA (cerebral infarction)      Diabetes new 4/09    initial A1C 6.8 4/08;; has done diabetic teaching      Diverticulitis of colon (without mention of hemorrhage)(562.11) 2001; 9/06     Gastro-oesophageal reflux disease      Hernia, abdominal      HTN (hypertension)      Hyperlipidemia LDL goal < 100      Other forms of migraine, without mention of intractable migraine without mention of status migrainosus      PFO (patent foramen ovale)     small per echo 11/2013     Renal disease     Hx kidney stones 12 yrs ago     Seizure disorder (H)     sees neurologist     Seizures (H)     8 years ago - no recurrence     Sleep apnea     wears CPAP     Stroke (H) early april 2017     Unspecified congenital anomaly of lung     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 possibly from asbestos exposure in [employer] [place]       Family History   Problem Relation Age of Onset     Family History Negative Mother      migraines     Family History Negative Father      lived to be 92     Colon Cancer No family hx of      Current Outpatient Prescriptions   Medication Sig Dispense Refill     albuterol (PROAIR HFA, PROVENTIL HFA, VENTOLIN HFA) 108 (90 BASE) MCG/ACT inhaler Inhale 2 puffs into the lungs every 6 hours (Patient taking differently: Inhale 2 puffs into the lungs every 6 hours as needed ) 1 Inhaler 6     budesonide-formoterol (SYMBICORT) 80-4.5 MCG/ACT inhaler Inhale 2 puffs into the lungs as needed       ciprofloxacin (CIPRO) 500 MG tablet Take 1 tablet (500 mg) by mouth 2 times daily 14 tablet 0     clopidogrel (PLAVIX) 75 MG tablet Take 1 tablet (75 mg) by mouth daily 90 tablet 0     fenofibrate 160 MG tablet Take 1 tablet (160 mg) by mouth daily 90 tablet 1     ferrous sulfate (IRON) 325 (65 FE) MG tablet Take 1 tablet (325 mg) by mouth daily (with breakfast) 90 tablet 1     FLUoxetine (PROZAC) 40 MG capsule Take 1 capsule (40 mg) by mouth daily 90 capsule 1     folic acid (FOLVITE) 1 MG tablet Take 1 tablet (1 mg) by mouth daily 90 tablet 0     glipiZIDE (GLUCOTROL) 10 MG tablet Take 1 tablet (10 mg) by mouth 2 times daily (before meals) 180 tablet 1     insulin glargine (LANTUS SOLOSTAR) 100 UNIT/ML pen Inject 14 Units Subcutaneous At Bedtime       insulin pen needle 31G X 5 MM Use 1-2 pen needles daily or as directed. (Patient not taking: Reported on 7/17/2018) 100 each 3     levETIRAcetam (KEPPRA) 500 MG tablet Take 500 mg by mouth 2 times daily       linagliptin (TRADJENTA) 5 MG TABS tablet Take 5 mg by mouth daily Pt takes 1/2 tab daily       liraglutide (VICTOZA) 18 MG/3ML soln Inject 1.8 mg Subcutaneous daily       LISINOPRIL PO Take 20 mg by mouth daily       METFORMIN HCL PO Take 500 mg by mouth 2 times daily (with meals)       METOPROLOL SUCCINATE ER PO Take 50 mg by mouth daily       NITROFURANTOIN MACROCRYSTAL PO         nitroFURantoin, macrocrystal-monohydrate, (MACROBID) 100 MG capsule Take 1 capsule (100 mg) by mouth 2 times daily (Patient not taking: Reported on 8/22/2018) 14 capsule 0     PANTOPRAZOLE SODIUM PO Take 40 mg by mouth 2 times daily (before meals)       simvastatin (ZOCOR) 10 MG tablet Take 1 tablet (10 mg) by mouth At Bedtime 90 tablet 1     tamsulosin (FLOMAX) 0.4 MG 24 hr capsule Take 1 capsule (0.4 mg) by mouth daily 90 capsule 3     TIOTROPIUM BROMIDE INHALATION POWDER 18MCG CAP daily Once a day       Topiramate (TOPAMAX PO) Take 50 mg by mouth 2 times daily       Social History   Substance Use Topics     Smoking status: Former Smoker     Quit date: 1/1/1970     Smokeless tobacco: Never Used      Comment: quit age 28     Alcohol use Yes      Comment: 1-2 beers a week       OBJECTIVE  /78  Pulse 98  Temp 98  F (36.7  C) (Oral)  Wt 245 lb (111.1 kg)  SpO2 100%  BMI 33.23 kg/m2    Physical Exam   Constitutional: He appears well-developed and well-nourished. No distress.   HENT:   Head: Normocephalic and atraumatic.   Right Ear: External ear normal.   Left Ear: External ear normal.   Mouth/Throat: Oropharynx is clear and moist.   Eyes: Conjunctivae and EOM are normal.   Neck: Normal range of motion. Neck supple.   Cardiovascular: Regular rhythm and normal heart sounds.    Pulmonary/Chest: Effort normal and breath sounds normal. No respiratory distress.   Neurological: He is alert.   Skin: Skin is warm and dry. He is not diaphoretic.   Ecchymosis noted left mid forearm volar surface, tender to palpation, slight induration noted at the center of the ecchymosed area.   Psychiatric: He has a normal mood and affect.   Nursing note and vitals reviewed.      Labs:  Results for orders placed or performed in visit on 09/14/18 (from the past 24 hour(s))   CBC with platelets and differential   Result Value Ref Range    WBC 5.9 4.0 - 11.0 10e9/L    RBC Count 4.37 (L) 4.4 - 5.9 10e12/L    Hemoglobin 11.3 (L) 13.3  - 17.7 g/dL    Hematocrit 37.0 (L) 40.0 - 53.0 %    MCV 85 78 - 100 fl    MCH 25.9 (L) 26.5 - 33.0 pg    MCHC 30.5 (L) 31.5 - 36.5 g/dL    RDW 19.3 (H) 10.0 - 15.0 %    Platelet Count 211 150 - 450 10e9/L    Diff Method Automated Method     % Neutrophils 68.8 %    % Lymphocytes 18.4 %    % Monocytes 9.2 %    % Eosinophils 3.4 %    % Basophils 0.2 %    Absolute Neutrophil 4.1 1.6 - 8.3 10e9/L    Absolute Lymphocytes 1.1 0.8 - 5.3 10e9/L    Absolute Monocytes 0.5 0.0 - 1.3 10e9/L    Absolute Eosinophils 0.2 0.0 - 0.7 10e9/L    Absolute Basophils 0.0 0.0 - 0.2 10e9/L       X-Ray was not done.    ASSESSMENT:      ICD-10-CM    1. Ecchymosis of forearm R58 CBC with platelets and differential     INR        Medical Decision Making:    Differential Diagnosis:  Contusion, ecchymosis, superficial thrombophlebitis,  etc....    Serious Comorbid Conditions:  Adult:  COPD, diabetes    PLAN:    Ecchymosis of left forearm: CBC is stable, Platelet count tonight is within normal limit, INR is pending.  Low suspicion for DVT.  He does have moderate bruising to the area.  Discussed our limited diagnostic imaging capacity in urgent care. Keep monitoring symptoms.  He will follow-up with his PCP for recheck next week.  Follow-up sooner in ED if any worsening symptoms.  He agrees with the plan.    Followup:    If not improving or if condition worsens, follow up with your Primary Care Provider

## 2018-09-24 ENCOUNTER — OFFICE VISIT (OUTPATIENT)
Dept: INTERNAL MEDICINE | Facility: CLINIC | Age: 73
End: 2018-09-24
Payer: COMMERCIAL

## 2018-09-24 VITALS
HEIGHT: 72 IN | OXYGEN SATURATION: 99 % | TEMPERATURE: 97.7 F | SYSTOLIC BLOOD PRESSURE: 109 MMHG | HEART RATE: 105 BPM | WEIGHT: 245.4 LBS | DIASTOLIC BLOOD PRESSURE: 69 MMHG | RESPIRATION RATE: 16 BRPM | BODY MASS INDEX: 33.24 KG/M2

## 2018-09-24 DIAGNOSIS — L98.9 LESION OF ALA OF NOSE: ICD-10-CM

## 2018-09-24 DIAGNOSIS — D50.9 IRON DEFICIENCY ANEMIA, UNSPECIFIED IRON DEFICIENCY ANEMIA TYPE: ICD-10-CM

## 2018-09-24 DIAGNOSIS — H61.22 IMPACTED CERUMEN OF LEFT EAR: ICD-10-CM

## 2018-09-24 DIAGNOSIS — E11.22 TYPE 2 DIABETES MELLITUS WITH STAGE 3 CHRONIC KIDNEY DISEASE, WITH LONG-TERM CURRENT USE OF INSULIN (H): ICD-10-CM

## 2018-09-24 DIAGNOSIS — N21.0 BLADDER STONES: ICD-10-CM

## 2018-09-24 DIAGNOSIS — N40.0 ENLARGED PROSTATE: ICD-10-CM

## 2018-09-24 DIAGNOSIS — N18.30 TYPE 2 DIABETES MELLITUS WITH STAGE 3 CHRONIC KIDNEY DISEASE, WITH LONG-TERM CURRENT USE OF INSULIN (H): ICD-10-CM

## 2018-09-24 DIAGNOSIS — Z01.818 PREOP GENERAL PHYSICAL EXAM: Primary | ICD-10-CM

## 2018-09-24 DIAGNOSIS — I10 ESSENTIAL HYPERTENSION: ICD-10-CM

## 2018-09-24 DIAGNOSIS — Z79.4 TYPE 2 DIABETES MELLITUS WITH STAGE 3 CHRONIC KIDNEY DISEASE, WITH LONG-TERM CURRENT USE OF INSULIN (H): ICD-10-CM

## 2018-09-24 LAB
HBA1C MFR BLD: 5.2 % (ref 0–5.6)
VIT B12 SERPL-MCNC: 368 PG/ML (ref 193–986)

## 2018-09-24 PROCEDURE — 93000 ELECTROCARDIOGRAM COMPLETE: CPT | Mod: 59 | Performed by: INTERNAL MEDICINE

## 2018-09-24 PROCEDURE — 99214 OFFICE O/P EST MOD 30 MIN: CPT | Mod: 25 | Performed by: INTERNAL MEDICINE

## 2018-09-24 PROCEDURE — 69210 REMOVE IMPACTED EAR WAX UNI: CPT | Performed by: INTERNAL MEDICINE

## 2018-09-24 PROCEDURE — 36415 COLL VENOUS BLD VENIPUNCTURE: CPT | Performed by: INTERNAL MEDICINE

## 2018-09-24 PROCEDURE — 82607 VITAMIN B-12: CPT | Performed by: INTERNAL MEDICINE

## 2018-09-24 PROCEDURE — 80048 BASIC METABOLIC PNL TOTAL CA: CPT | Performed by: INTERNAL MEDICINE

## 2018-09-24 PROCEDURE — 83036 HEMOGLOBIN GLYCOSYLATED A1C: CPT | Performed by: INTERNAL MEDICINE

## 2018-09-24 NOTE — PROGRESS NOTES
[place] [address]  [facility] [address]  [phone]  Dept: [phone]    PRE-OP EVALUATION:  Today's date: 2018    Donnie Vincent (: 1945) presents for pre-operative evaluation assessment as requested by Dr. Neville Banuelos.  He requires evaluation and anesthesia risk assessment prior to undergoing surgery/procedure for  Enlarged prostate, history of bladder stones, bladder diverticulum -  combined cystoscopy, transurethral resection prostate, laser holmium lithotripsy bladder .    Proposed Surgery/ Procedure:combined cystoscopy, transurethral resection prostate, laser holmium lithotripsy bladder .  Date of Surgery/ Procedure: 10/3/18  Time of Surgery/ Procedure: 2:50 PM  Hospital/Surgical Facility: [facility]   Fax number for surgical facility:   Primary Physician: Suyapa Leon  Type of Anesthesia Anticipated: General    Patient has a Health Care Directive or Living Will:  YES     1. YES - DO YOU HAVE A HISTORY OF HEART ATTACK, STROKE, STENT, BYPASS OR SURGERY ON AN ARTERY IN THE HEAD, NECK, HEART OR LEG?stroke   2. NO - Do you ever have any pain or discomfort in your chest?  3. NO - Do you have a history of  Heart Failure?  4. YES- Are you troubled by shortness of breath when: walking on the level, up a slight hill or at night? occ sob   5. NO - Do you currently have a cold, bronchitis or other respiratory infection?  6. YES - Do you have a cough, shortness of breath or wheezing?  7. NO - Do you sometimes get pains in the calves of your legs when you walk?  8. NO - Do you or anyone in your family have previous history of blood clots?  9. NO - Do you or does anyone in your family have a serious bleeding problem such as prolonged bleeding following surgeries or cuts?  10. YES- Have you ever had problems with anemia or been told to take iron pills? Anemia   11. YES - Have you had any abnormal blood loss such as black, tarry or bloody  stools, or abnormal vaginal bleeding?   12. YES - Have you ever had a blood transfusion?  13. NO - Have you or any of your relatives ever had problems with anesthesia?  14. NO - Do you have sleep apnea, excessive snoring or daytime drowsiness?  15. NO - Do you have any prosthetic heart valves?  16. NO - Do you have prosthetic joints?  17. NO - Is there any chance that you may be pregnant?      HPI:     ANEMIA - Patient has a recent history of moderate-severe anemia, which has not been symptomatic.  Treatment has been iron supplements 325 mg  bid .                                                                                                                                            .  COPD - Patient has a longstanding history of moderate-severe COPD . Patient has been doing well overall noting NO SYMPTOMS and continues on medications without adverse reactions or side effects.                                                                                                      .  DEPRESSION - Patient has a long history of Depression of moderate severity requiring medication for control with recent symptoms being stable..Current symptoms of depression include none.                                                                                                                                                                                    .  DIABETES - Patient has a longstanding history of DiabetesType Type II . Patient is being treated with oral agents and insulin injections and denies significant side effects. Control has been good. Complicating factors include but are not limited to: hypertension and hyperlipidemia.                                                                                                                              .  HYPERLIPIDEMIA - Patient has a long history of  Hyperlipidemia requiring medication for treatment with recent good control. Patient reports no problems or side effects with  the medication.                                                                                                                                                       .  HYPERTENSION - Patient has longstanding history of HTN , currently denies any symptoms referable to elevated blood pressure. Specifically denies chest pain, palpitations, dyspnea, orthopnea, PND or peripheral edema. Blood pressure readings have been in normal range. Current medication regimen is as listed below. Patient denies any side effects of medication.                                                                                                                                                                                          .  Stroke ;stable.      MEDICAL HISTORY:     Patient Active Problem List    Diagnosis Date Noted     Meningioma (H) 12/13/2011     Priority: High     Iron deficiency anemia, unspecified iron deficiency anemia type 07/19/2018     Priority: Medium     Long-term (current) use of anticoagulants [Z79.01] 07/26/2016     Priority: Medium     Thoracic aortic aneurysm without rupture (H) 06/21/2016     Priority: Medium     Renal failure 06/21/2016     Priority: Medium     SOB (shortness of breath) 06/21/2016     Priority: Medium     Gastrointestinal hemorrhage, unspecified gastrointestinal hemorrhage type 05/13/2016     Priority: Medium     Essential hypertension 05/13/2016     Priority: Medium     Major depressive disorder, recurrent episode, mild (H) 11/01/2015     Priority: Medium     Type 2 diabetes with stage 3 chronic kidney disease GFR 30-59 (H) 10/22/2015     Priority: Medium     Morbid obesity (H) 10/22/2015     Priority: Medium     PFO (patent foramen ovale)      Priority: Medium     small per echo 11/2013       Cerebral infarction (H) 11/18/2014     Priority: Medium     Diagnosis updated by automated process. Provider to review and confirm.       Benign prostatic hyperplasia with lower urinary tract symptoms       Priority: Medium     sees urologist in [place]       Increased homocysteine (H) 06/24/2014     Priority: Medium     Acute ischemic stroke (H) 11/22/2013     Priority: Medium     Diagnosis updated by automated process. Provider to review and confirm.       GERD (gastroesophageal reflux disease) 07/01/2013     Priority: Medium     Thoracic aortic aneurysm (H) 07/01/2013     Priority: Medium     Bilateral renal cysts 07/01/2013     Priority: Medium     COPD (chronic obstructive pulmonary disease) (H) 10/04/2012     Priority: Medium     Advanced directives, counseling/discussion 05/10/2012     Priority: Medium     Discussed advance care planning with patient; information given to patient to review. 5/10/2012          Obstructive sleep apnea 04/11/2011     Priority: Medium     HYPERLIPIDEMIA LDL GOAL <100 10/31/2010     Priority: Medium     Mixed hyperlipidemia 09/15/2010     Priority: Medium     Seizure disorder (H)      Priority: Medium     sees neurologist       Diverticulitis      Priority: Medium     episodic        Other type of migraine      Priority: Medium     formerly weekly;; now quiet on topamax  Diagnosis updated by automated process. Provider to review and confirm.        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     COPD (chronic obstructive pulmonary disease) (H)      CVA (cerebral infarction)      Diabetes new 4/09    initial A1C 6.8 4/08;; has done diabetic teaching      Diverticulitis of colon (without mention of hemorrhage)(562.11) 2001; 9/06     Gastro-oesophageal reflux disease      Hernia, abdominal      HTN (hypertension)      Hyperlipidemia LDL goal < 100      Other forms of migraine, without mention of intractable migraine without mention of status migrainosus      PFO (patent foramen ovale)     small per echo 11/2013     Renal disease     Hx  kidney stones 12 yrs ago     Seizure disorder (H)     sees neurologist     Seizures (H)     8 years ago - no recurrence     Sleep apnea     wears CPAP     Stroke (H) early april 2017     Unspecified congenital anomaly of lung     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 possibly from asbestos exposure in [employer] (remote)      Past Surgical History:   Procedure Laterality Date     C NONSPECIFIC PROCEDURE      colonoscopy 2005     COLONOSCOPY  11/4/2015    Dr. Ernesto CONTRERAS     COLONOSCOPY N/A 11/4/2015    Procedure: COLONOSCOPY;  Surgeon: Yazmin Orozco MD;  Location:  GI     CYSTOSCOPY, RETROGRADES, EXTRACT STONE, COMBINED Left 11/25/2015    Procedure: COMBINED CYSTOSCOPY, RETROGRADES, EXTRACT STONE;  Surgeon: Neville Banuelos MD;  Location:  OR     ESOPHAGOSCOPY, GASTROSCOPY, DUODENOSCOPY (EGD), COMBINED  11/5/2015    Dr. Ernesto CONTRERAS     ESOPHAGOSCOPY, GASTROSCOPY, DUODENOSCOPY (EGD), COMBINED  06/27/2018    Dr. Ernesto CONTRERAS     GENITOURINARY SURGERY      kidney stone - lithotripsy     HERNIA REPAIR       PHACOEMULSIFICATION CLEAR CORNEA WITH STANDARD INTRAOCULAR LENS IMPLANT  12/20/2011    Procedure:PHACOEMULSIFICATION CLEAR CORNEA WITH STANDARD INTRAOCULAR LENS IMPLANT; RIGHT PHACOEMULSIFICATION CLEAR CORNEA WITH STANDARD INTRAOCULAR LENS IMPLANT ; Surgeon:GUILHERME KAUFMAN; Location[facility]     Current Outpatient Prescriptions   Medication Sig Dispense Refill     albuterol (PROAIR HFA, PROVENTIL HFA, VENTOLIN HFA) 108 (90 BASE) MCG/ACT inhaler Inhale 2 puffs into the lungs every 6 hours (Patient taking differently: Inhale 2 puffs into the lungs every 6 hours as needed ) 1 Inhaler 6     budesonide-formoterol (SYMBICORT) 80-4.5 MCG/ACT inhaler Inhale 2 puffs into the lungs as needed       ciprofloxacin (CIPRO) 500 MG tablet Take 1 tablet (500 mg) by mouth 2 times daily 14 tablet 0     clopidogrel (PLAVIX) 75 MG tablet Take 1 tablet (75 mg) by mouth daily 90 tablet 0     fenofibrate 160 MG tablet Take 1 tablet  (160 mg) by mouth daily 90 tablet 1     ferrous sulfate (IRON) 325 (65 FE) MG tablet Take 1 tablet (325 mg) by mouth daily (with breakfast) 90 tablet 1     FLUoxetine (PROZAC) 40 MG capsule Take 1 capsule (40 mg) by mouth daily 90 capsule 1     folic acid (FOLVITE) 1 MG tablet Take 1 tablet (1 mg) by mouth daily 90 tablet 0     glipiZIDE (GLUCOTROL) 10 MG tablet Take 1 tablet (10 mg) by mouth 2 times daily (before meals) 180 tablet 1     insulin glargine (LANTUS SOLOSTAR) 100 UNIT/ML pen Inject 14 Units Subcutaneous At Bedtime       insulin pen needle 31G X 5 MM Use 1-2 pen needles daily or as directed. 100 each 3     levETIRAcetam (KEPPRA) 500 MG tablet Take 500 mg by mouth 2 times daily       linagliptin (TRADJENTA) 5 MG TABS tablet Take 5 mg by mouth daily Pt takes 1/2 tab daily       liraglutide (VICTOZA) 18 MG/3ML soln Inject 1.8 mg Subcutaneous daily       LISINOPRIL PO Take 20 mg by mouth daily       METFORMIN HCL PO Take 500 mg by mouth 2 times daily (with meals)       METOPROLOL SUCCINATE ER PO Take 50 mg by mouth daily       NITROFURANTOIN MACROCRYSTAL PO        nitroFURantoin, macrocrystal-monohydrate, (MACROBID) 100 MG capsule Take 1 capsule (100 mg) by mouth 2 times daily 14 capsule 0     PANTOPRAZOLE SODIUM PO Take 40 mg by mouth 2 times daily (before meals)       simvastatin (ZOCOR) 10 MG tablet Take 1 tablet (10 mg) by mouth At Bedtime 90 tablet 1     tamsulosin (FLOMAX) 0.4 MG 24 hr capsule Take 1 capsule (0.4 mg) by mouth daily 90 capsule 3     TIOTROPIUM BROMIDE INHALATION POWDER 18MCG CAP daily Once a day       Topiramate (TOPAMAX PO) Take 50 mg by mouth 2 times daily       OTC products: None, except as noted above    Allergies   Allergen Reactions     Penicillins      Sore joints and he had to be hospitalized for it      Latex Allergy: NO    Social History   Substance Use Topics     Smoking status: Former Smoker     Quit date: 1/1/1970     Smokeless tobacco: Never Used      Comment: quit age 28      Alcohol use Yes      Comment: 1-2 beers a week     History   Drug Use No       REVIEW OF SYSTEMS:   CONSTITUTIONAL: NEGATIVE for fever, chills, change in weight  INTEGUMENTARY/SKIN: NEGATIVE for worrisome rashes, moles or lesions  EYES: NEGATIVE for vision changes or irritation  ENT/MOUTH: NEGATIVE for ear, mouth and throat problems  RESP: NEGATIVE for significant cough or SOB  CV: NEGATIVE for chest pain, palpitations or peripheral edema  GI: NEGATIVE for nausea, abdominal pain, heartburn, or change in bowel habits  : enlarged prostate,bladder stone  MUSCULOSKELETAL: NEGATIVE for significant arthralgias or myalgia  NEURO: NEGATIVE for weakness, dizziness or paresthesias  ENDOCRINE: NEGATIVE for temperature intolerance, skin/hair changes  HEME: NEGATIVE for bleeding problems  PSYCHIATRIC: NEGATIVE for changes in mood or affect    EXAM:   /69 (BP Location: Right arm, Patient Position: Sitting, Cuff Size: Adult Regular)  Pulse 105  Temp 97.7  F (36.5  C) (Oral)  Resp 16  Ht 6' (1.829 m)  Wt 245 lb 6.4 oz (111.3 kg)  SpO2 99%  BMI 33.28 kg/m2    GENERAL APPEARANCE: healthy, alert and no distress     EYES: EOMI,  PERRL     HENT: Lt ear canal- cerumen notes  and Rt ear canal and TM normal and nose and mouth without ulcers or lesions     NECK: no adenopathy, no asymmetry, masses, or scars and thyroid normal to palpation     RESP: lungs clear to auscultation - no rales, rhonchi or wheezes     CV: regular rates and rhythm,       ABDOMEN:  soft, nontender, no HSM or masses and bowel sounds normal     MS: extremities normal- no gross deformities noted, no evidence of inflammation in joints,  .     SKIN:  Scabbing noted lt ala of the nose     NEURO: Normal strength and tone, sensory exam grossly normal, mentation intact and speech normal     PSYCH: mentation appears normal. and affect normal/bright     LYMPHATICS: No cervical adenopathy    DIAGNOSTICS:   EKG: sinus rhythm, lt axis, old infarct   Hemoglobin  11.3  Serum Potassium pending   Hemoglobin A1C 5.2    Recent Labs   Lab Test  09/14/18 2022 07/26/18   1046   05/15/18   1145  05/22/17   1413  05/09/17   1413 03/16/17   HGB  11.3*  10.1*   < >  8.2*   --   12.7*   --    PLT  211  247   < >  291   --   240   --    INR  0.95   --    --    --    --    --   1.0   NA   --    --    --   142   --   139   --    POTASSIUM   --    --    --   4.5   --   3.6  3.6   CR   --    --    --   1.25   --   1.42*  1.36   A1C   --    --    --   6.0*  7.8*   --    --     < > = values in this interval not displayed.        IMPRESSION:      (Z01.818) Preop general physical exam  (primary encounter diagnosis)  -combined cystoscopy, transurethral resection prostate, laser holmium lithotripsy bladder .  Plan: EKG 12-lead complete w/read - Clinics, Vitamin         B12, Basic metabolic panel, Hemoglobin A1c             (N40.0) Enlarged prostate  (N21.0) Bladder stones  Plan: combined cystoscopy, transurethral resection prostate, laser holmium lithotripsy bladder .      (E11.22,  N18.3,  Z79.4) Type 2 diabetes mellitus with stage 3 chronic kidney disease, with long-term current use of insulin (H)  Plan: check A1c, instructions given on diabetic meds before surgery       (D50.9) Iron deficiency anemia, unspecified iron deficiency anemia type  Plan: recent Hgb 11.3, continue iron supplements as directed.      (I10) Essential hypertension  Plan: BP well controlled     (L98.9) Lesion of ala of nose  Plan: DERMATOLOGY REFERRAL    (H61.22) Impacted cerumen of left ear  Plan: using curette part of the cerumen removed from lt ear and rest ear irrigation dome, lt ear canal and Lt TM clear after irrigation       The proposed surgical procedure is considered INTERMEDIATE risk.    REVISED CARDIAC RISK INDEX  The patient has the following serious cardiovascular risks for perioperative complications such as (MI, PE, VFib and 3  AV Block):  Cerebrovascular Disease (TIA or CVA)  Diabetes Mellitus (on  Insulin)  INTERPRETATION: 2 risks: Class III (moderate risk - 6.6% complication rate)    The patient has the following additional risks for perioperative complications:  COPD      ICD-10-CM    1. Preop general physical exam Z01.818 EKG 12-lead complete w/read - Clinics     Vitamin B12     Basic metabolic panel     Hemoglobin A1c   2. Enlarged prostate N40.0    3. Bladder stones N21.0    4. Type 2 diabetes mellitus with stage 3 chronic kidney disease, with long-term current use of insulin (H) E11.22     N18.3     Z79.4    5. Iron deficiency anemia, unspecified iron deficiency anemia type D50.9    6. Essential hypertension I10    7. Lesion of ala of nose L98.9 DERMATOLOGY REFERRAL   8. Impacted cerumen of left ear H61.22 REMOVE IMPACTED CERUMEN       RECOMMENDATIONS:       Obstructive Sleep Apnea (or suspected sleep apnea)  Patient is to bring their home CPAP with them on the day of surgery    Anemia  Anemia -continue ferrous sulphate  325 mg bid.   Monitor Hemoglobin postoperatively.    Diabetes Medication Use  -----Hold usual oral and non-insulin diabetic meds (e.g. Metformin, Actos, Glipizide,Trejenta) while NPO.   -----Take 80% of long acting insulin (e.g. Lantus, NPH) while NPO (fasting)  -----Use usual sliding scale correction of insulin while NPO (fasting)    Anticoagulant or Antiplatelet Medication Use  PLAVIX: pt will be checking with his neurologist regarding if he can stop plavix 1 wk before surgery   NSAIDS:   Stop  3 days prior to surgery      ACE Inhibitor or Angiotensin Receptor Blocker (ARB) Use  Ace inhibitor or Angiotensin Receptor Blocker (ARB) and should HOLD this medication for the 24 hours prior to surgery.    Advised to continue Metoprolol on the morning of surgery      Written instructions given on meds.       APPROVAL GIVEN to proceed with proposed procedure, without further diagnostic evaluation       Signed Electronically by: Suyapa Leon MD    Copy of this evaluation report is  provided to requesting physician.    [place] Preop Guidelines    Revised Cardiac Risk Index

## 2018-09-24 NOTE — MR AVS SNAPSHOT
After Visit Summary   9/24/2018    Donnie Vincent    MRN: [id number]           Patient Information     Date Of Birth          1945        Visit Information        Provider Department      9/24/2018 10:20 AM Suyapa Leon MD [facility]        Today's Diagnoses     Preop general physical exam    -  1      Care Instructions      Before Your Surgery      Call your surgeon if there is any change in your health. This includes signs of a cold or flu (such as a sore throat, runny nose, cough, rash or fever).    Do not smoke, drink alcohol or take over the counter medicine (unless your surgeon or primary care doctor tells you to) for the 24 hours before and after surgery.    If you take prescribed drugs: Follow your doctor s orders about which medicines to take and which to stop until after surgery.    Eating and drinking prior to surgery: follow the instructions from your surgeon    Take a shower or bath the night before surgery. Use the soap your surgeon gave you to gently clean your skin. If you do not have soap from your surgeon, use your regular soap. Do not shave or scrub the surgery site.  Wear clean pajamas and have clean sheets on your bed.     Stop metformin the night before surgery and mornign of surgery   Stop glipizide the night  before and morning of surgery.  Take 1/ 2 th dose of victoza the night before surgery .  Take 10 units of Lantus  the night before surgery   Stop taking Trajenta on the morning of surgery   Stop taking all medications on the morning of surgery except metoprolol   Check with your neurologist if you can stop Plavix 7 days before surgery           Follow-ups after your visit        Your next 10 appointments already scheduled     Oct 03, 2018   Procedure with Neville Banuelos MD   [facility] (--)    [address] [facility] [address]   [phone]            Oct 29, 2018 11:00 AM CDT   Post-Op with  Neville Banuelos MD   [facility] ([facility])    [address] [facility]  [address]  [facility] [address] [phone]              Who to contact     If you have questions or need follow up information about today's clinic visit or your schedule please contact [facility] directly at [phone].  Normal or non-critical lab and imaging results will be communicated to you by MyChart, letter or phone within 4 business days after the clinic has received the results. If you do not hear from us within 7 days, please contact the clinic through MyChart or phone. If you have a critical or abnormal lab result, we will notify you by phone as soon as possible.  Submit refill requests through [employer] or call your pharmacy and they will forward the refill request to us. Please allow 3 business days for your refill to be completed.          Additional Information About Your Visit        Care EveryWhere ID     This is your Care EveryWhere ID. This could be used by other organizations to access your [facility] medical records  [id number]        Your Vitals Were     Pulse Temperature Respirations Height Pulse Oximetry BMI (Body Mass Index)    105 97.7  F (36.5  C) (Oral) 16 6' (1.829 m) 99% 33.28 kg/m2       Blood Pressure from Last 3 Encounters:   09/24/18 109/69   09/14/18 130/78   07/25/18 132/77    Weight from Last 3 Encounters:   09/24/18 245 lb 6.4 oz (111.3 kg)   09/14/18 245 lb (111.1 kg)   08/22/18 245 lb (111.1 kg)              We Performed the Following     EKG 12-lead complete w/read - Clinics          Today's Medication Changes          These changes are accurate as of 9/24/18 11:01 AM.  If you have any questions, ask your nurse or doctor.               These medicines have changed or have updated prescriptions.        Dose/Directions    albuterol 108 (90 Base) MCG/ACT inhaler   Commonly known as:  PROAIR HFA/PROVENTIL  HFA/VENTOLIN HFA   This may have changed:    - when to take this  - reasons to take this   Used for:  COPD (chronic obstructive pulmonary disease) (H)        Dose:  2 puff   Inhale 2 puffs into the lungs every 6 hours   Quantity:  1 Inhaler   Refills:  6                Primary Care Provider Office Phone # Fax #    Suyapa DUMAS MD [facility] [phone]       [address] [facility] [address]        Equal Access to Services     KINA CEDILLO : Hadii aad ku hadasho Soomaali, waaxda luqadaha, qaybta kaalmada adeegyada, waxay idiin hayaan adeeg kharash lavaibhav . So [facility] [phone].    ATENCIÓN: Si habla español, tiene a navarrete disposición servicios gratuitos de asistencia lingüística. [facility] [phone].    We comply with applicable federal civil rights laws and [place] laws. We do not discriminate on the basis of race, color, national origin, age, disability, sex, sexual orientation, or gender identity.            Thank you!     Thank you for choosing [facility]  for your care. Our goal is always to provide you with excellent care. Hearing back from our patients is one way we can continue to improve our services. Please take a few minutes to complete the written survey that you may receive in the mail after your visit with us. Thank you!             Your Updated Medication List - Protect others around you: Learn how to safely use, store and throw away your medicines at www.disposemymeds.org.          This list is accurate as of 9/24/18 11:01 AM.  Always use your most recent med list.                   Brand Name Dispense Instructions for use Diagnosis    albuterol 108 (90 Base) MCG/ACT inhaler    PROAIR HFA/PROVENTIL HFA/VENTOLIN HFA    1 Inhaler    Inhale 2 puffs into the lungs every 6 hours    COPD (chronic obstructive pulmonary disease) (H)       clopidogrel 75 MG tablet    PLAVIX    90 tablet    Take 1 tablet (75 mg) by mouth daily    Acute ischemic stroke (H)        fenofibrate 160 MG tablet     90 tablet    Take 1 tablet (160 mg) by mouth daily    Hyperlipidemia LDL goal <100       ferrous sulfate 325 (65 Fe) MG tablet    IRON    90 tablet    Take 1 tablet (325 mg) by mouth daily (with breakfast)    Iron deficiency anemia, unspecified       FLUoxetine 40 MG capsule    PROzac    90 capsule    Take 1 capsule (40 mg) by mouth daily    Major depressive disorder, recurrent episode, mild (H)       folic acid 1 MG tablet    FOLVITE    90 tablet    Take 1 tablet (1 mg) by mouth daily    Increased homocysteine (H)       glipiZIDE 10 MG tablet    GLUCOTROL    180 tablet    Take 1 tablet (10 mg) by mouth 2 times daily (before meals)    Type 2 diabetes mellitus with stage 3 chronic kidney disease, with long-term current use of insulin (H)       insulin pen needle 31G X 5 MM     100 each    Use 1-2 pen needles daily or as directed.    Type 2 diabetes with stage 3 chronic kidney disease GFR 30-59 (H)       KEPPRA 500 MG tablet   Generic drug:  levETIRAcetam      Take 500 mg by mouth 2 times daily        LANTUS SOLOSTAR 100 UNIT/ML injection   Generic drug:  insulin glargine      Inject 14 Units Subcutaneous At Bedtime        liraglutide 18 MG/3ML soln    VICTOZA     Inject 1.8 mg Subcutaneous daily        LISINOPRIL PO      Take 20 mg by mouth daily        METFORMIN HCL PO      Take 500 mg by mouth 2 times daily (with meals)        METOPROLOL SUCCINATE ER PO      Take 50 mg by mouth daily        nitroFURantoin (macrocrystal-monohydrate) 100 MG capsule    MACROBID    14 capsule    Take 1 capsule (100 mg) by mouth 2 times daily    Dysuria       NITROFURANTOIN MACROCRYSTAL PO           PANTOPRAZOLE SODIUM PO      Take 40 mg by mouth 2 times daily (before meals)        simvastatin 10 MG tablet    ZOCOR    90 tablet    Take 1 tablet (10 mg) by mouth At Bedtime    Hyperlipidemia LDL goal <100       SYMBICORT 80-4.5 MCG/ACT Inhaler   Generic drug:  budesonide-formoterol      Inhale 2 puffs into  the lungs as needed        tamsulosin 0.4 MG capsule    FLOMAX    90 capsule    Take 1 capsule (0.4 mg) by mouth daily    BPH (benign prostatic hypertrophy)       TIOTROPIUM BROMIDE INHALATION POWDER 18MCG CAP      daily Once a day        TOPAMAX PO      Take 50 mg by mouth 2 times daily        TRADJENTA 5 MG Tabs tablet   Generic drug:  linagliptin      Take 5 mg by mouth daily Pt takes 1/2 tab daily

## 2018-09-24 NOTE — PATIENT INSTRUCTIONS
Before Your Surgery      Call your surgeon if there is any change in your health. This includes signs of a cold or flu (such as a sore throat, runny nose, cough, rash or fever).    Do not smoke, drink alcohol or take over the counter medicine (unless your surgeon or primary care doctor tells you to) for the 24 hours before and after surgery.    If you take prescribed drugs: Follow your doctor s orders about which medicines to take and which to stop until after surgery.    Eating and drinking prior to surgery: follow the instructions from your surgeon    Take a shower or bath the night before surgery. Use the soap your surgeon gave you to gently clean your skin. If you do not have soap from your surgeon, use your regular soap. Do not shave or scrub the surgery site.  Wear clean pajamas and have clean sheets on your bed.     Stop metformin the night before surgery and mornign of surgery   Stop glipizide the night  before and morning of surgery.  Take 1/ 2 th dose of victoza the night before surgery .  Take 10 units of Lantus  the night before surgery   Stop taking Trajenta on the morning of surgery   Stop taking all medications on the morning of surgery except metoprolol   Check with your neurologist if you can stop Plavix 7 days before surgery

## 2018-09-25 LAB
ANION GAP SERPL CALCULATED.3IONS-SCNC: 7 MMOL/L (ref 3–14)
BUN SERPL-MCNC: 34 MG/DL (ref 7–30)
CALCIUM SERPL-MCNC: 8.7 MG/DL (ref 8.5–10.1)
CHLORIDE SERPL-SCNC: 110 MMOL/L (ref 94–109)
CO2 SERPL-SCNC: 23 MMOL/L (ref 20–32)
CREAT SERPL-MCNC: 1.49 MG/DL (ref 0.66–1.25)
GFR SERPL CREATININE-BSD FRML MDRD: 46 ML/MIN/1.7M2
GLUCOSE SERPL-MCNC: 123 MG/DL (ref 70–99)
POTASSIUM SERPL-SCNC: 4.4 MMOL/L (ref 3.4–5.3)
SODIUM SERPL-SCNC: 140 MMOL/L (ref 133–144)

## 2018-10-02 NOTE — H&P (VIEW-ONLY)
[place] [address]  [facility] [address]  [phone]  Dept: [phone]    PRE-OP EVALUATION:  Today's date: 2018    Donnie Vincent (: 1945) presents for pre-operative evaluation assessment as requested by Dr. Neville Banuelos.  He requires evaluation and anesthesia risk assessment prior to undergoing surgery/procedure for  Enlarged prostate, history of bladder stones, bladder diverticulum -  combined cystoscopy, transurethral resection prostate, laser holmium lithotripsy bladder .    Proposed Surgery/ Procedure:combined cystoscopy, transurethral resection prostate, laser holmium lithotripsy bladder .  Date of Surgery/ Procedure: 10/3/18  Time of Surgery/ Procedure: 2:50 PM  Hospital/Surgical Facility: [facility]   Fax number for surgical facility:   Primary Physician: Suyapa Leon  Type of Anesthesia Anticipated: General    Patient has a Health Care Directive or Living Will:  YES     1. YES - DO YOU HAVE A HISTORY OF HEART ATTACK, STROKE, STENT, BYPASS OR SURGERY ON AN ARTERY IN THE HEAD, NECK, HEART OR LEG?stroke   2. NO - Do you ever have any pain or discomfort in your chest?  3. NO - Do you have a history of  Heart Failure?  4. YES- Are you troubled by shortness of breath when: walking on the level, up a slight hill or at night? occ sob   5. NO - Do you currently have a cold, bronchitis or other respiratory infection?  6. YES - Do you have a cough, shortness of breath or wheezing?  7. NO - Do you sometimes get pains in the calves of your legs when you walk?  8. NO - Do you or anyone in your family have previous history of blood clots?  9. NO - Do you or does anyone in your family have a serious bleeding problem such as prolonged bleeding following surgeries or cuts?  10. YES- Have you ever had problems with anemia or been told to take iron pills? Anemia   11. YES - Have you had any abnormal blood loss such as black, tarry or bloody  stools, or abnormal vaginal bleeding?   12. YES - Have you ever had a blood transfusion?  13. NO - Have you or any of your relatives ever had problems with anesthesia?  14. NO - Do you have sleep apnea, excessive snoring or daytime drowsiness?  15. NO - Do you have any prosthetic heart valves?  16. NO - Do you have prosthetic joints?  17. NO - Is there any chance that you may be pregnant?      HPI:     ANEMIA - Patient has a recent history of moderate-severe anemia, which has not been symptomatic.  Treatment has been iron supplements 325 mg  bid .                                                                                                                                            .  COPD - Patient has a longstanding history of moderate-severe COPD . Patient has been doing well overall noting NO SYMPTOMS and continues on medications without adverse reactions or side effects.                                                                                                      .  DEPRESSION - Patient has a long history of Depression of moderate severity requiring medication for control with recent symptoms being stable..Current symptoms of depression include none.                                                                                                                                                                                    .  DIABETES - Patient has a longstanding history of DiabetesType Type II . Patient is being treated with oral agents and insulin injections and denies significant side effects. Control has been good. Complicating factors include but are not limited to: hypertension and hyperlipidemia.                                                                                                                              .  HYPERLIPIDEMIA - Patient has a long history of  Hyperlipidemia requiring medication for treatment with recent good control. Patient reports no problems or side effects with  the medication.                                                                                                                                                       .  HYPERTENSION - Patient has longstanding history of HTN , currently denies any symptoms referable to elevated blood pressure. Specifically denies chest pain, palpitations, dyspnea, orthopnea, PND or peripheral edema. Blood pressure readings have been in normal range. Current medication regimen is as listed below. Patient denies any side effects of medication.                                                                                                                                                                                          .  Stroke ;stable.      MEDICAL HISTORY:     Patient Active Problem List    Diagnosis Date Noted     Meningioma (H) 12/13/2011     Priority: High     Iron deficiency anemia, unspecified iron deficiency anemia type 07/19/2018     Priority: Medium     Long-term (current) use of anticoagulants [Z79.01] 07/26/2016     Priority: Medium     Thoracic aortic aneurysm without rupture (H) 06/21/2016     Priority: Medium     Renal failure 06/21/2016     Priority: Medium     SOB (shortness of breath) 06/21/2016     Priority: Medium     Gastrointestinal hemorrhage, unspecified gastrointestinal hemorrhage type 05/13/2016     Priority: Medium     Essential hypertension 05/13/2016     Priority: Medium     Major depressive disorder, recurrent episode, mild (H) 11/01/2015     Priority: Medium     Type 2 diabetes with stage 3 chronic kidney disease GFR 30-59 (H) 10/22/2015     Priority: Medium     Morbid obesity (H) 10/22/2015     Priority: Medium     PFO (patent foramen ovale)      Priority: Medium     small per echo 11/2013       Cerebral infarction (H) 11/18/2014     Priority: Medium     Diagnosis updated by automated process. Provider to review and confirm.       Benign prostatic hyperplasia with lower urinary tract symptoms       Priority: Medium     sees urologist in [place]       Increased homocysteine (H) 06/24/2014     Priority: Medium     Acute ischemic stroke (H) 11/22/2013     Priority: Medium     Diagnosis updated by automated process. Provider to review and confirm.       GERD (gastroesophageal reflux disease) 07/01/2013     Priority: Medium     Thoracic aortic aneurysm (H) 07/01/2013     Priority: Medium     Bilateral renal cysts 07/01/2013     Priority: Medium     COPD (chronic obstructive pulmonary disease) (H) 10/04/2012     Priority: Medium     Advanced directives, counseling/discussion 05/10/2012     Priority: Medium     Discussed advance care planning with patient; information given to patient to review. 5/10/2012          Obstructive sleep apnea 04/11/2011     Priority: Medium     HYPERLIPIDEMIA LDL GOAL <100 10/31/2010     Priority: Medium     Mixed hyperlipidemia 09/15/2010     Priority: Medium     Seizure disorder (H)      Priority: Medium     sees neurologist       Diverticulitis      Priority: Medium     episodic        Other type of migraine      Priority: Medium     formerly weekly;; now quiet on topamax  Diagnosis updated by automated process. Provider to review and confirm.        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     COPD (chronic obstructive pulmonary disease) (H)      CVA (cerebral infarction)      Diabetes new 4/09    initial A1C 6.8 4/08;; has done diabetic teaching      Diverticulitis of colon (without mention of hemorrhage)(562.11) 2001; 9/06     Gastro-oesophageal reflux disease      Hernia, abdominal      HTN (hypertension)      Hyperlipidemia LDL goal < 100      Other forms of migraine, without mention of intractable migraine without mention of status migrainosus      PFO (patent foramen ovale)     small per echo 11/2013     Renal disease     Hx  kidney stones 12 yrs ago     Seizure disorder (H)     sees neurologist     Seizures (H)     8 years ago - no recurrence     Sleep apnea     wears CPAP     Stroke (H) early april 2017     Unspecified congenital anomaly of lung     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 possibly from asbestos exposure in [employer] (remote)      Past Surgical History:   Procedure Laterality Date     C NONSPECIFIC PROCEDURE      colonoscopy 2005     COLONOSCOPY  11/4/2015    Dr. Ernesto CONTRERAS     COLONOSCOPY N/A 11/4/2015    Procedure: COLONOSCOPY;  Surgeon: Yazmin Orozco MD;  Location:  GI     CYSTOSCOPY, RETROGRADES, EXTRACT STONE, COMBINED Left 11/25/2015    Procedure: COMBINED CYSTOSCOPY, RETROGRADES, EXTRACT STONE;  Surgeon: Neville Banuelos MD;  Location:  OR     ESOPHAGOSCOPY, GASTROSCOPY, DUODENOSCOPY (EGD), COMBINED  11/5/2015    Dr. Ernesto CONTRERAS     ESOPHAGOSCOPY, GASTROSCOPY, DUODENOSCOPY (EGD), COMBINED  06/27/2018    Dr. Ernesto CONTRERAS     GENITOURINARY SURGERY      kidney stone - lithotripsy     HERNIA REPAIR       PHACOEMULSIFICATION CLEAR CORNEA WITH STANDARD INTRAOCULAR LENS IMPLANT  12/20/2011    Procedure:PHACOEMULSIFICATION CLEAR CORNEA WITH STANDARD INTRAOCULAR LENS IMPLANT; RIGHT PHACOEMULSIFICATION CLEAR CORNEA WITH STANDARD INTRAOCULAR LENS IMPLANT ; Surgeon:GUILHERME KAUFMAN; Location:[facility]     Current Outpatient Prescriptions   Medication Sig Dispense Refill     albuterol (PROAIR HFA, PROVENTIL HFA, VENTOLIN HFA) 108 (90 BASE) MCG/ACT inhaler Inhale 2 puffs into the lungs every 6 hours (Patient taking differently: Inhale 2 puffs into the lungs every 6 hours as needed ) 1 Inhaler 6     budesonide-formoterol (SYMBICORT) 80-4.5 MCG/ACT inhaler Inhale 2 puffs into the lungs as needed       ciprofloxacin (CIPRO) 500 MG tablet Take 1 tablet (500 mg) by mouth 2 times daily 14 tablet 0     clopidogrel (PLAVIX) 75 MG tablet Take 1 tablet (75 mg) by mouth daily 90 tablet 0     fenofibrate 160 MG tablet Take 1 tablet  (160 mg) by mouth daily 90 tablet 1     ferrous sulfate (IRON) 325 (65 FE) MG tablet Take 1 tablet (325 mg) by mouth daily (with breakfast) 90 tablet 1     FLUoxetine (PROZAC) 40 MG capsule Take 1 capsule (40 mg) by mouth daily 90 capsule 1     folic acid (FOLVITE) 1 MG tablet Take 1 tablet (1 mg) by mouth daily 90 tablet 0     glipiZIDE (GLUCOTROL) 10 MG tablet Take 1 tablet (10 mg) by mouth 2 times daily (before meals) 180 tablet 1     insulin glargine (LANTUS SOLOSTAR) 100 UNIT/ML pen Inject 14 Units Subcutaneous At Bedtime       insulin pen needle 31G X 5 MM Use 1-2 pen needles daily or as directed. 100 each 3     levETIRAcetam (KEPPRA) 500 MG tablet Take 500 mg by mouth 2 times daily       linagliptin (TRADJENTA) 5 MG TABS tablet Take 5 mg by mouth daily Pt takes 1/2 tab daily       liraglutide (VICTOZA) 18 MG/3ML soln Inject 1.8 mg Subcutaneous daily       LISINOPRIL PO Take 20 mg by mouth daily       METFORMIN HCL PO Take 500 mg by mouth 2 times daily (with meals)       METOPROLOL SUCCINATE ER PO Take 50 mg by mouth daily       NITROFURANTOIN MACROCRYSTAL PO        nitroFURantoin, macrocrystal-monohydrate, (MACROBID) 100 MG capsule Take 1 capsule (100 mg) by mouth 2 times daily 14 capsule 0     PANTOPRAZOLE SODIUM PO Take 40 mg by mouth 2 times daily (before meals)       simvastatin (ZOCOR) 10 MG tablet Take 1 tablet (10 mg) by mouth At Bedtime 90 tablet 1     tamsulosin (FLOMAX) 0.4 MG 24 hr capsule Take 1 capsule (0.4 mg) by mouth daily 90 capsule 3     TIOTROPIUM BROMIDE INHALATION POWDER 18MCG CAP daily Once a day       Topiramate (TOPAMAX PO) Take 50 mg by mouth 2 times daily       OTC products: None, except as noted above    Allergies   Allergen Reactions     Penicillins      Sore joints and he had to be hospitalized for it      Latex Allergy: NO    Social History   Substance Use Topics     Smoking status: Former Smoker     Quit date: 1/1/1970     Smokeless tobacco: Never Used      Comment: quit age 28      Alcohol use Yes      Comment: 1-2 beers a week     History   Drug Use No       REVIEW OF SYSTEMS:   CONSTITUTIONAL: NEGATIVE for fever, chills, change in weight  INTEGUMENTARY/SKIN: NEGATIVE for worrisome rashes, moles or lesions  EYES: NEGATIVE for vision changes or irritation  ENT/MOUTH: NEGATIVE for ear, mouth and throat problems  RESP: NEGATIVE for significant cough or SOB  CV: NEGATIVE for chest pain, palpitations or peripheral edema  GI: NEGATIVE for nausea, abdominal pain, heartburn, or change in bowel habits  : enlarged prostate,bladder stone  MUSCULOSKELETAL: NEGATIVE for significant arthralgias or myalgia  NEURO: NEGATIVE for weakness, dizziness or paresthesias  ENDOCRINE: NEGATIVE for temperature intolerance, skin/hair changes  HEME: NEGATIVE for bleeding problems  PSYCHIATRIC: NEGATIVE for changes in mood or affect    EXAM:   /69 (BP Location: Right arm, Patient Position: Sitting, Cuff Size: Adult Regular)  Pulse 105  Temp 97.7  F (36.5  C) (Oral)  Resp 16  Ht 6' (1.829 m)  Wt 245 lb 6.4 oz (111.3 kg)  SpO2 99%  BMI 33.28 kg/m2    GENERAL APPEARANCE: healthy, alert and no distress     EYES: EOMI,  PERRL     HENT: Lt ear canal- cerumen notes  and Rt ear canal and TM normal and nose and mouth without ulcers or lesions     NECK: no adenopathy, no asymmetry, masses, or scars and thyroid normal to palpation     RESP: lungs clear to auscultation - no rales, rhonchi or wheezes     CV: regular rates and rhythm,       ABDOMEN:  soft, nontender, no HSM or masses and bowel sounds normal     MS: extremities normal- no gross deformities noted, no evidence of inflammation in joints,  .     SKIN:  Scabbing noted lt ala of the nose     NEURO: Normal strength and tone, sensory exam grossly normal, mentation intact and speech normal     PSYCH: mentation appears normal. and affect normal/bright     LYMPHATICS: No cervical adenopathy    DIAGNOSTICS:   EKG: sinus rhythm, lt axis, old infarct   Hemoglobin  11.3  Serum Potassium pending   Hemoglobin A1C 5.2    Recent Labs   Lab Test  09/14/18 2022 07/26/18   1046   05/15/18   1145  05/22/17   1413  05/09/17   1413 03/16/17   HGB  11.3*  10.1*   < >  8.2*   --   12.7*   --    PLT  211  247   < >  291   --   240   --    INR  0.95   --    --    --    --    --   1.0   NA   --    --    --   142   --   139   --    POTASSIUM   --    --    --   4.5   --   3.6  3.6   CR   --    --    --   1.25   --   1.42*  1.36   A1C   --    --    --   6.0*  7.8*   --    --     < > = values in this interval not displayed.        IMPRESSION:      (Z01.818) Preop general physical exam  (primary encounter diagnosis)  -combined cystoscopy, transurethral resection prostate, laser holmium lithotripsy bladder .  Plan: EKG 12-lead complete w/read - Clinics, Vitamin         B12, Basic metabolic panel, Hemoglobin A1c             (N40.0) Enlarged prostate  (N21.0) Bladder stones  Plan: combined cystoscopy, transurethral resection prostate, laser holmium lithotripsy bladder .      (E11.22,  N18.3,  Z79.4) Type 2 diabetes mellitus with stage 3 chronic kidney disease, with long-term current use of insulin (H)  Plan: check A1c, instructions given on diabetic meds before surgery       (D50.9) Iron deficiency anemia, unspecified iron deficiency anemia type  Plan: recent Hgb 11.3, continue iron supplements as directed.      (I10) Essential hypertension  Plan: BP well controlled     (L98.9) Lesion of ala of nose  Plan: DERMATOLOGY REFERRAL    (H61.22) Impacted cerumen of left ear  Plan: using curette part of the cerumen removed from lt ear and rest ear irrigation dome, lt ear canal and Lt TM clear after irrigation       The proposed surgical procedure is considered INTERMEDIATE risk.    REVISED CARDIAC RISK INDEX  The patient has the following serious cardiovascular risks for perioperative complications such as (MI, PE, VFib and 3  AV Block):  Cerebrovascular Disease (TIA or CVA)  Diabetes Mellitus (on  Insulin)  INTERPRETATION: 2 risks: Class III (moderate risk - 6.6% complication rate)    The patient has the following additional risks for perioperative complications:  COPD      ICD-10-CM    1. Preop general physical exam Z01.818 EKG 12-lead complete w/read - Clinics     Vitamin B12     Basic metabolic panel     Hemoglobin A1c   2. Enlarged prostate N40.0    3. Bladder stones N21.0    4. Type 2 diabetes mellitus with stage 3 chronic kidney disease, with long-term current use of insulin (H) E11.22     N18.3     Z79.4    5. Iron deficiency anemia, unspecified iron deficiency anemia type D50.9    6. Essential hypertension I10    7. Lesion of ala of nose L98.9 DERMATOLOGY REFERRAL   8. Impacted cerumen of left ear H61.22 REMOVE IMPACTED CERUMEN       RECOMMENDATIONS:       Obstructive Sleep Apnea (or suspected sleep apnea)  Patient is to bring their home CPAP with them on the day of surgery    Anemia  Anemia -continue ferrous sulphate  325 mg bid.   Monitor Hemoglobin postoperatively.    Diabetes Medication Use  -----Hold usual oral and non-insulin diabetic meds (e.g. Metformin, Actos, Glipizide,Trejenta) while NPO.   -----Take 80% of long acting insulin (e.g. Lantus, NPH) while NPO (fasting)  -----Use usual sliding scale correction of insulin while NPO (fasting)    Anticoagulant or Antiplatelet Medication Use  PLAVIX: pt will be checking with his neurologist regarding if he can stop plavix 1 wk before surgery   NSAIDS:   Stop  3 days prior to surgery      ACE Inhibitor or Angiotensin Receptor Blocker (ARB) Use  Ace inhibitor or Angiotensin Receptor Blocker (ARB) and should HOLD this medication for the 24 hours prior to surgery.    Advised to continue Metoprolol on the morning of surgery      Written instructions given on meds.       APPROVAL GIVEN to proceed with proposed procedure, without further diagnostic evaluation       Signed Electronically by: Suyapa Leon MD    Copy of this evaluation report is  provided to requesting physician.    [facility] Preop Guidelines    Revised Cardiac Risk Index

## 2018-10-03 ENCOUNTER — ANESTHESIA (OUTPATIENT)
Dept: SURGERY | Facility: CLINIC | Age: 73
DRG: 987 | End: 2018-10-03
Payer: COMMERCIAL

## 2018-10-03 ENCOUNTER — ANESTHESIA EVENT (OUTPATIENT)
Dept: SURGERY | Facility: CLINIC | Age: 73
DRG: 987 | End: 2018-10-03
Payer: COMMERCIAL

## 2018-10-03 ENCOUNTER — HOSPITAL ENCOUNTER (INPATIENT)
Facility: CLINIC | Age: 73
LOS: 4 days | Discharge: HOME HEALTH | DRG: 987 | End: 2018-10-09
Attending: UROLOGY | Admitting: UROLOGY
Payer: COMMERCIAL

## 2018-10-03 DIAGNOSIS — N17.0 ACUTE RENAL FAILURE WITH TUBULAR NECROSIS (H): ICD-10-CM

## 2018-10-03 DIAGNOSIS — D50.9 IRON DEFICIENCY ANEMIA, UNSPECIFIED IRON DEFICIENCY ANEMIA TYPE: Primary | ICD-10-CM

## 2018-10-03 PROBLEM — N40.0 ENLARGED PROSTATE: Status: ACTIVE | Noted: 2018-10-03

## 2018-10-03 LAB
BASOPHILS # BLD AUTO: 0.1 10E9/L (ref 0–0.2)
BASOPHILS NFR BLD AUTO: 0.4 %
DIFFERENTIAL METHOD BLD: ABNORMAL
EOSINOPHIL # BLD AUTO: 0 10E9/L (ref 0–0.7)
EOSINOPHIL NFR BLD AUTO: 0.2 %
ERYTHROCYTE [DISTWIDTH] IN BLOOD BY AUTOMATED COUNT: 17.6 % (ref 10–15)
GLUCOSE BLDC GLUCOMTR-MCNC: 108 MG/DL (ref 70–99)
GLUCOSE BLDC GLUCOMTR-MCNC: 197 MG/DL (ref 70–99)
GLUCOSE BLDC GLUCOMTR-MCNC: 81 MG/DL (ref 70–99)
HCT VFR BLD AUTO: 32.1 % (ref 40–53)
HGB BLD-MCNC: 9.7 G/DL (ref 13.3–17.7)
HGB BLD-MCNC: NORMAL G/DL (ref 13.3–17.7)
IMM GRANULOCYTES # BLD: 0.1 10E9/L (ref 0–0.4)
IMM GRANULOCYTES NFR BLD: 0.7 %
LYMPHOCYTES # BLD AUTO: 1.7 10E9/L (ref 0.8–5.3)
LYMPHOCYTES NFR BLD AUTO: 13.2 %
MCH RBC QN AUTO: 27.1 PG (ref 26.5–33)
MCHC RBC AUTO-ENTMCNC: 29.6 G/DL (ref 31.5–36.5)
MCV RBC AUTO: 92 FL (ref 78–100)
MONOCYTES # BLD AUTO: 0.2 10E9/L (ref 0–1.3)
MONOCYTES NFR BLD AUTO: 1.9 %
NEUTROPHILS # BLD AUTO: 10.8 10E9/L (ref 1.6–8.3)
NEUTROPHILS NFR BLD AUTO: 83.6 %
NRBC # BLD AUTO: 0 10*3/UL
NRBC BLD AUTO-RTO: 0 /100
PLATELET # BLD AUTO: 319 10E9/L (ref 150–450)
RBC # BLD AUTO: 3.5 10E12/L (ref 4.4–5.9)
WBC # BLD AUTO: 12.9 10E9/L (ref 4–11)

## 2018-10-03 PROCEDURE — 85025 COMPLETE CBC W/AUTO DIFF WBC: CPT | Performed by: INTERNAL MEDICINE

## 2018-10-03 PROCEDURE — 25000128 H RX IP 250 OP 636: Performed by: UROLOGY

## 2018-10-03 PROCEDURE — 25000125 ZZHC RX 250: Performed by: ANESTHESIOLOGY

## 2018-10-03 PROCEDURE — 52318 REMOVE BLADDER STONE: CPT | Mod: 51 | Performed by: UROLOGY

## 2018-10-03 PROCEDURE — 88305 TISSUE EXAM BY PATHOLOGIST: CPT | Performed by: UROLOGY

## 2018-10-03 PROCEDURE — 86850 RBC ANTIBODY SCREEN: CPT | Performed by: INTERNAL MEDICINE

## 2018-10-03 PROCEDURE — 36000065 ZZH SURGERY LEVEL 4 W FLUORO 1ST 30 MIN: Performed by: UROLOGY

## 2018-10-03 PROCEDURE — 25000566 ZZH SEVOFLURANE, EA 15 MIN: Performed by: UROLOGY

## 2018-10-03 PROCEDURE — 99291 CRITICAL CARE FIRST HOUR: CPT | Performed by: INTERNAL MEDICINE

## 2018-10-03 PROCEDURE — 25000131 ZZH RX MED GY IP 250 OP 636 PS 637: Performed by: UROLOGY

## 2018-10-03 PROCEDURE — 40000306 ZZH STATISTIC PRE PROC ASSESS II: Performed by: UROLOGY

## 2018-10-03 PROCEDURE — 0VB08ZZ EXCISION OF PROSTATE, VIA NATURAL OR ARTIFICIAL OPENING ENDOSCOPIC: ICD-10-PCS | Performed by: UROLOGY

## 2018-10-03 PROCEDURE — 88300 SURGICAL PATH GROSS: CPT | Performed by: UROLOGY

## 2018-10-03 PROCEDURE — 52601 PROSTATECTOMY (TURP): CPT | Performed by: UROLOGY

## 2018-10-03 PROCEDURE — 37000009 ZZH ANESTHESIA TECHNICAL FEE, EACH ADDTL 15 MIN: Performed by: UROLOGY

## 2018-10-03 PROCEDURE — 86900 BLOOD TYPING SEROLOGIC ABO: CPT | Performed by: INTERNAL MEDICINE

## 2018-10-03 PROCEDURE — 0TCB8ZZ EXTIRPATION OF MATTER FROM BLADDER, VIA NATURAL OR ARTIFICIAL OPENING ENDOSCOPIC: ICD-10-PCS | Performed by: UROLOGY

## 2018-10-03 PROCEDURE — 71000012 ZZH RECOVERY PHASE 1 LEVEL 1 FIRST HR: Performed by: UROLOGY

## 2018-10-03 PROCEDURE — 25000132 ZZH RX MED GY IP 250 OP 250 PS 637: Performed by: UROLOGY

## 2018-10-03 PROCEDURE — 27210794 ZZH OR GENERAL SUPPLY STERILE: Performed by: UROLOGY

## 2018-10-03 PROCEDURE — 88300 SURGICAL PATH GROSS: CPT | Mod: 26 | Performed by: UROLOGY

## 2018-10-03 PROCEDURE — 25000128 H RX IP 250 OP 636: Performed by: NURSE ANESTHETIST, CERTIFIED REGISTERED

## 2018-10-03 PROCEDURE — 25000128 H RX IP 250 OP 636: Performed by: INTERNAL MEDICINE

## 2018-10-03 PROCEDURE — 25000128 H RX IP 250 OP 636: Performed by: ANESTHESIOLOGY

## 2018-10-03 PROCEDURE — 25000125 ZZHC RX 250: Performed by: INTERNAL MEDICINE

## 2018-10-03 PROCEDURE — 71000013 ZZH RECOVERY PHASE 1 LEVEL 1 EA ADDTL HR: Performed by: UROLOGY

## 2018-10-03 PROCEDURE — 86901 BLOOD TYPING SEROLOGIC RH(D): CPT | Performed by: INTERNAL MEDICINE

## 2018-10-03 PROCEDURE — 25000125 ZZHC RX 250: Performed by: NURSE ANESTHETIST, CERTIFIED REGISTERED

## 2018-10-03 PROCEDURE — 25800025 ZZH RX 258: Performed by: UROLOGY

## 2018-10-03 PROCEDURE — 82365 CALCULUS SPECTROSCOPY: CPT | Performed by: UROLOGY

## 2018-10-03 PROCEDURE — 36000063 ZZH SURGERY LEVEL 4 EA 15 ADDTL MIN: Performed by: UROLOGY

## 2018-10-03 PROCEDURE — 82962 GLUCOSE BLOOD TEST: CPT

## 2018-10-03 PROCEDURE — 88305 TISSUE EXAM BY PATHOLOGIST: CPT | Mod: 26 | Performed by: UROLOGY

## 2018-10-03 PROCEDURE — 86923 COMPATIBILITY TEST ELECTRIC: CPT | Performed by: INTERNAL MEDICINE

## 2018-10-03 PROCEDURE — 37000008 ZZH ANESTHESIA TECHNICAL FEE, 1ST 30 MIN: Performed by: UROLOGY

## 2018-10-03 PROCEDURE — 25000128 H RX IP 250 OP 636: Performed by: PHYSICIAN ASSISTANT

## 2018-10-03 RX ORDER — LISINOPRIL 20 MG/1
20 TABLET ORAL DAILY
Status: DISCONTINUED | OUTPATIENT
Start: 2018-10-03 | End: 2018-10-05

## 2018-10-03 RX ORDER — HYDROCODONE BITARTRATE AND ACETAMINOPHEN 5; 325 MG/1; MG/1
1 TABLET ORAL EVERY 6 HOURS PRN
Status: DISCONTINUED | OUTPATIENT
Start: 2018-10-03 | End: 2018-10-09 | Stop reason: HOSPADM

## 2018-10-03 RX ORDER — FOLIC ACID 1 MG/1
1 TABLET ORAL DAILY
Status: DISCONTINUED | OUTPATIENT
Start: 2018-10-03 | End: 2018-10-09 | Stop reason: HOSPADM

## 2018-10-03 RX ORDER — CIPROFLOXACIN 2 MG/ML
400 INJECTION, SOLUTION INTRAVENOUS ONCE
Status: COMPLETED | OUTPATIENT
Start: 2018-10-03 | End: 2018-10-03

## 2018-10-03 RX ORDER — SODIUM CHLORIDE, SODIUM LACTATE, POTASSIUM CHLORIDE, CALCIUM CHLORIDE 600; 310; 30; 20 MG/100ML; MG/100ML; MG/100ML; MG/100ML
INJECTION, SOLUTION INTRAVENOUS CONTINUOUS
Status: DISCONTINUED | OUTPATIENT
Start: 2018-10-03 | End: 2018-10-03 | Stop reason: HOSPADM

## 2018-10-03 RX ORDER — PANTOPRAZOLE SODIUM 40 MG/1
40 TABLET, DELAYED RELEASE ORAL
Status: DISCONTINUED | OUTPATIENT
Start: 2018-10-03 | End: 2018-10-09 | Stop reason: HOSPADM

## 2018-10-03 RX ORDER — KETOROLAC TROMETHAMINE 30 MG/ML
INJECTION, SOLUTION INTRAMUSCULAR; INTRAVENOUS PRN
Status: DISCONTINUED | OUTPATIENT
Start: 2018-10-03 | End: 2018-10-03

## 2018-10-03 RX ORDER — ONDANSETRON 4 MG/1
4 TABLET, ORALLY DISINTEGRATING ORAL EVERY 30 MIN PRN
Status: DISCONTINUED | OUTPATIENT
Start: 2018-10-03 | End: 2018-10-03 | Stop reason: HOSPADM

## 2018-10-03 RX ORDER — LEVETIRACETAM 500 MG/1
500 TABLET ORAL 2 TIMES DAILY
Status: DISCONTINUED | OUTPATIENT
Start: 2018-10-03 | End: 2018-10-09 | Stop reason: HOSPADM

## 2018-10-03 RX ORDER — ONDANSETRON 2 MG/ML
4 INJECTION INTRAMUSCULAR; INTRAVENOUS EVERY 30 MIN PRN
Status: DISCONTINUED | OUTPATIENT
Start: 2018-10-03 | End: 2018-10-03 | Stop reason: HOSPADM

## 2018-10-03 RX ORDER — CEFAZOLIN SODIUM 1 G/3ML
1 INJECTION, POWDER, FOR SOLUTION INTRAMUSCULAR; INTRAVENOUS SEE ADMIN INSTRUCTIONS
Status: DISCONTINUED | OUTPATIENT
Start: 2018-10-03 | End: 2018-10-03

## 2018-10-03 RX ORDER — LIDOCAINE 40 MG/G
CREAM TOPICAL
Status: DISCONTINUED | OUTPATIENT
Start: 2018-10-03 | End: 2018-10-03 | Stop reason: HOSPADM

## 2018-10-03 RX ORDER — HYDROMORPHONE HYDROCHLORIDE 1 MG/ML
.3-.5 INJECTION, SOLUTION INTRAMUSCULAR; INTRAVENOUS; SUBCUTANEOUS
Status: DISCONTINUED | OUTPATIENT
Start: 2018-10-03 | End: 2018-10-09 | Stop reason: HOSPADM

## 2018-10-03 RX ORDER — DIAZEPAM 10 MG/2ML
2.5 INJECTION, SOLUTION INTRAMUSCULAR; INTRAVENOUS
Status: DISCONTINUED | OUTPATIENT
Start: 2018-10-03 | End: 2018-10-03 | Stop reason: HOSPADM

## 2018-10-03 RX ORDER — CEFAZOLIN SODIUM 2 G/100ML
2 INJECTION, SOLUTION INTRAVENOUS
Status: DISCONTINUED | OUTPATIENT
Start: 2018-10-03 | End: 2018-10-03

## 2018-10-03 RX ORDER — ALBUTEROL SULFATE 0.83 MG/ML
2.5 SOLUTION RESPIRATORY (INHALATION) EVERY 4 HOURS PRN
Status: DISCONTINUED | OUTPATIENT
Start: 2018-10-03 | End: 2018-10-03 | Stop reason: HOSPADM

## 2018-10-03 RX ORDER — NALOXONE HYDROCHLORIDE 0.4 MG/ML
.1-.4 INJECTION, SOLUTION INTRAMUSCULAR; INTRAVENOUS; SUBCUTANEOUS
Status: DISCONTINUED | OUTPATIENT
Start: 2018-10-03 | End: 2018-10-03

## 2018-10-03 RX ORDER — TOPIRAMATE 25 MG/1
50 TABLET, FILM COATED ORAL 2 TIMES DAILY
Status: DISCONTINUED | OUTPATIENT
Start: 2018-10-03 | End: 2018-10-09 | Stop reason: HOSPADM

## 2018-10-03 RX ORDER — DOCUSATE SODIUM 100 MG/1
100 CAPSULE, LIQUID FILLED ORAL 2 TIMES DAILY
Status: DISCONTINUED | OUTPATIENT
Start: 2018-10-03 | End: 2018-10-09 | Stop reason: HOSPADM

## 2018-10-03 RX ORDER — NICOTINE POLACRILEX 4 MG
15-30 LOZENGE BUCCAL
Status: DISCONTINUED | OUTPATIENT
Start: 2018-10-03 | End: 2018-10-05

## 2018-10-03 RX ORDER — FENTANYL CITRATE 50 UG/ML
25-50 INJECTION, SOLUTION INTRAMUSCULAR; INTRAVENOUS
Status: DISCONTINUED | OUTPATIENT
Start: 2018-10-03 | End: 2018-10-03 | Stop reason: HOSPADM

## 2018-10-03 RX ORDER — FENOFIBRATE 160 MG/1
160 TABLET ORAL DAILY
Status: DISCONTINUED | OUTPATIENT
Start: 2018-10-03 | End: 2018-10-06

## 2018-10-03 RX ORDER — ALBUTEROL SULFATE 90 UG/1
2 AEROSOL, METERED RESPIRATORY (INHALATION) EVERY 6 HOURS PRN
Status: DISCONTINUED | OUTPATIENT
Start: 2018-10-03 | End: 2018-10-09 | Stop reason: HOSPADM

## 2018-10-03 RX ORDER — PROPOFOL 10 MG/ML
INJECTION, EMULSION INTRAVENOUS PRN
Status: DISCONTINUED | OUTPATIENT
Start: 2018-10-03 | End: 2018-10-03

## 2018-10-03 RX ORDER — BUDESONIDE AND FORMOTEROL FUMARATE DIHYDRATE 80; 4.5 UG/1; UG/1
2 AEROSOL RESPIRATORY (INHALATION) 2 TIMES DAILY
Status: DISCONTINUED | OUTPATIENT
Start: 2018-10-03 | End: 2018-10-09 | Stop reason: HOSPADM

## 2018-10-03 RX ORDER — FERROUS SULFATE 325(65) MG
325 TABLET ORAL
Status: DISCONTINUED | OUTPATIENT
Start: 2018-10-04 | End: 2018-10-09 | Stop reason: HOSPADM

## 2018-10-03 RX ORDER — LIRAGLUTIDE 6 MG/ML
1.8 INJECTION SUBCUTANEOUS AT BEDTIME
Status: DISCONTINUED | OUTPATIENT
Start: 2018-10-03 | End: 2018-10-07

## 2018-10-03 RX ORDER — FENTANYL CITRATE 50 UG/ML
INJECTION, SOLUTION INTRAMUSCULAR; INTRAVENOUS PRN
Status: DISCONTINUED | OUTPATIENT
Start: 2018-10-03 | End: 2018-10-03

## 2018-10-03 RX ORDER — DEXTROSE MONOHYDRATE 25 G/50ML
25-50 INJECTION, SOLUTION INTRAVENOUS
Status: DISCONTINUED | OUTPATIENT
Start: 2018-10-03 | End: 2018-10-05

## 2018-10-03 RX ORDER — NALOXONE HYDROCHLORIDE 0.4 MG/ML
.1-.4 INJECTION, SOLUTION INTRAMUSCULAR; INTRAVENOUS; SUBCUTANEOUS
Status: DISCONTINUED | OUTPATIENT
Start: 2018-10-03 | End: 2018-10-09 | Stop reason: HOSPADM

## 2018-10-03 RX ORDER — MEPERIDINE HYDROCHLORIDE 25 MG/ML
12.5 INJECTION INTRAMUSCULAR; INTRAVENOUS; SUBCUTANEOUS EVERY 5 MIN PRN
Status: DISCONTINUED | OUTPATIENT
Start: 2018-10-03 | End: 2018-10-03 | Stop reason: HOSPADM

## 2018-10-03 RX ORDER — METOPROLOL SUCCINATE 50 MG/1
50 TABLET, EXTENDED RELEASE ORAL DAILY
Status: DISCONTINUED | OUTPATIENT
Start: 2018-10-04 | End: 2018-10-09 | Stop reason: HOSPADM

## 2018-10-03 RX ORDER — LABETALOL HYDROCHLORIDE 5 MG/ML
10 INJECTION, SOLUTION INTRAVENOUS
Status: DISCONTINUED | OUTPATIENT
Start: 2018-10-03 | End: 2018-10-03 | Stop reason: HOSPADM

## 2018-10-03 RX ORDER — OXYBUTYNIN CHLORIDE 5 MG/1
5 TABLET ORAL 3 TIMES DAILY PRN
Status: DISCONTINUED | OUTPATIENT
Start: 2018-10-03 | End: 2018-10-09 | Stop reason: HOSPADM

## 2018-10-03 RX ORDER — ONDANSETRON 2 MG/ML
4 INJECTION INTRAMUSCULAR; INTRAVENOUS EVERY 6 HOURS PRN
Status: DISCONTINUED | OUTPATIENT
Start: 2018-10-03 | End: 2018-10-09 | Stop reason: HOSPADM

## 2018-10-03 RX ORDER — GLIPIZIDE 10 MG/1
10 TABLET ORAL
Status: DISCONTINUED | OUTPATIENT
Start: 2018-10-03 | End: 2018-10-06

## 2018-10-03 RX ORDER — DIMENHYDRINATE 50 MG/ML
25 INJECTION, SOLUTION INTRAMUSCULAR; INTRAVENOUS
Status: DISCONTINUED | OUTPATIENT
Start: 2018-10-03 | End: 2018-10-03 | Stop reason: HOSPADM

## 2018-10-03 RX ORDER — ONDANSETRON 2 MG/ML
INJECTION INTRAMUSCULAR; INTRAVENOUS PRN
Status: DISCONTINUED | OUTPATIENT
Start: 2018-10-03 | End: 2018-10-03

## 2018-10-03 RX ORDER — DEXAMETHASONE SODIUM PHOSPHATE 4 MG/ML
INJECTION, SOLUTION INTRA-ARTICULAR; INTRALESIONAL; INTRAMUSCULAR; INTRAVENOUS; SOFT TISSUE PRN
Status: DISCONTINUED | OUTPATIENT
Start: 2018-10-03 | End: 2018-10-03

## 2018-10-03 RX ORDER — SIMVASTATIN 10 MG
10 TABLET ORAL AT BEDTIME
Status: DISCONTINUED | OUTPATIENT
Start: 2018-10-03 | End: 2018-10-09 | Stop reason: HOSPADM

## 2018-10-03 RX ADMIN — SODIUM CHLORIDE, POTASSIUM CHLORIDE, SODIUM LACTATE AND CALCIUM CHLORIDE: 600; 310; 30; 20 INJECTION, SOLUTION INTRAVENOUS at 14:19

## 2018-10-03 RX ADMIN — LISINOPRIL 20 MG: 20 TABLET ORAL at 18:28

## 2018-10-03 RX ADMIN — ROCURONIUM BROMIDE 10 MG: 10 INJECTION INTRAVENOUS at 14:53

## 2018-10-03 RX ADMIN — PROPOFOL 50 MG: 10 INJECTION, EMULSION INTRAVENOUS at 14:53

## 2018-10-03 RX ADMIN — HYDROMORPHONE HYDROCHLORIDE 1 MG: 1 INJECTION, SOLUTION INTRAMUSCULAR; INTRAVENOUS; SUBCUTANEOUS at 14:24

## 2018-10-03 RX ADMIN — DOCUSATE SODIUM 100 MG: 100 CAPSULE, LIQUID FILLED ORAL at 20:15

## 2018-10-03 RX ADMIN — PHENYLEPHRINE HYDROCHLORIDE 100 MCG: 10 INJECTION, SOLUTION INTRAMUSCULAR; INTRAVENOUS; SUBCUTANEOUS at 15:28

## 2018-10-03 RX ADMIN — FENOFIBRATE 160 MG: 160 TABLET ORAL at 22:14

## 2018-10-03 RX ADMIN — LIDOCAINE HYDROCHLORIDE 50 MG: 10 INJECTION, SOLUTION EPIDURAL; INFILTRATION; INTRACAUDAL; PERINEURAL at 14:24

## 2018-10-03 RX ADMIN — PANTOPRAZOLE SODIUM 40 MG: 40 TABLET, DELAYED RELEASE ORAL at 18:28

## 2018-10-03 RX ADMIN — SODIUM CHLORIDE 1000 ML: 9 INJECTION, SOLUTION INTRAVENOUS at 19:00

## 2018-10-03 RX ADMIN — FOLIC ACID 1 MG: 1 TABLET ORAL at 18:28

## 2018-10-03 RX ADMIN — ONDANSETRON 4 MG: 2 INJECTION INTRAMUSCULAR; INTRAVENOUS at 22:14

## 2018-10-03 RX ADMIN — KETOROLAC TROMETHAMINE 15 MG: 30 INJECTION, SOLUTION INTRAMUSCULAR at 14:24

## 2018-10-03 RX ADMIN — SODIUM CHLORIDE, POTASSIUM CHLORIDE, SODIUM LACTATE AND CALCIUM CHLORIDE: 600; 310; 30; 20 INJECTION, SOLUTION INTRAVENOUS at 14:54

## 2018-10-03 RX ADMIN — GLIPIZIDE 10 MG: 10 TABLET ORAL at 18:29

## 2018-10-03 RX ADMIN — PROPOFOL 50 MG: 10 INJECTION, EMULSION INTRAVENOUS at 15:07

## 2018-10-03 RX ADMIN — LEVETIRACETAM 500 MG: 500 TABLET, FILM COATED ORAL at 20:15

## 2018-10-03 RX ADMIN — PHENYLEPHRINE HYDROCHLORIDE 100 MCG: 10 INJECTION, SOLUTION INTRAMUSCULAR; INTRAVENOUS; SUBCUTANEOUS at 14:48

## 2018-10-03 RX ADMIN — PROPOFOL 200 MG: 10 INJECTION, EMULSION INTRAVENOUS at 14:24

## 2018-10-03 RX ADMIN — PROPOFOL 50 MG: 10 INJECTION, EMULSION INTRAVENOUS at 15:20

## 2018-10-03 RX ADMIN — CIPROFLOXACIN 400 MG: 2 INJECTION, SOLUTION INTRAVENOUS at 14:19

## 2018-10-03 RX ADMIN — HYDROCODONE BITARTRATE AND ACETAMINOPHEN 1 TABLET: 5; 325 TABLET ORAL at 18:28

## 2018-10-03 RX ADMIN — ONDANSETRON 4 MG: 2 INJECTION INTRAMUSCULAR; INTRAVENOUS at 14:24

## 2018-10-03 RX ADMIN — OXYBUTYNIN CHLORIDE 5 MG: 5 TABLET ORAL at 22:14

## 2018-10-03 RX ADMIN — TOPIRAMATE 50 MG: 25 TABLET, FILM COATED ORAL at 20:15

## 2018-10-03 RX ADMIN — DEXAMETHASONE SODIUM PHOSPHATE 8 MG: 4 INJECTION, SOLUTION INTRA-ARTICULAR; INTRALESIONAL; INTRAMUSCULAR; INTRAVENOUS; SOFT TISSUE at 14:24

## 2018-10-03 RX ADMIN — PHENYLEPHRINE HYDROCHLORIDE 100 MCG: 10 INJECTION, SOLUTION INTRAMUSCULAR; INTRAVENOUS; SUBCUTANEOUS at 15:37

## 2018-10-03 RX ADMIN — Medication 0.3 MG: at 22:37

## 2018-10-03 RX ADMIN — ATROPA BELLADONNA AND OPIUM 1 SUPPOSITORY: 16.2; 3 SUPPOSITORY RECTAL at 19:37

## 2018-10-03 RX ADMIN — SIMVASTATIN 10 MG: 10 TABLET, FILM COATED ORAL at 22:14

## 2018-10-03 RX ADMIN — FENTANYL CITRATE 100 MCG: 50 INJECTION, SOLUTION INTRAMUSCULAR; INTRAVENOUS at 14:24

## 2018-10-03 RX ADMIN — INSULIN GLARGINE 7 UNITS: 100 INJECTION, SOLUTION SUBCUTANEOUS at 22:32

## 2018-10-03 ASSESSMENT — COPD QUESTIONNAIRES
CAT_SEVERITY: MODERATE
COPD: 1

## 2018-10-03 NOTE — PLAN OF CARE
Problem: Patient Care Overview  Goal: Plan of Care/Patient Progress Review  ROOM # 207     Living Situation (if not independent, order SW consult):   Facility name:  : [place]- significant other    Activity level at baseline: Ind with cane  Activity level on admit: x1      Patient registered to observation; given Patient Bill of Rights; given the opportunity to ask questions about observation status and their plan of care.  Patient has been oriented to the observation room, bathroom and call light is in place.    Discussed discharge goals and expectations with patient/family.

## 2018-10-03 NOTE — IP AVS SNAPSHOT
[facility] Observation Department    [address]    [facility] [address]    Phone:  [phone]                                       After Visit Summary   10/3/2018    Donnie Vincent    MRN: [id number]           After Visit Summary Signature Page     I have received my discharge instructions, and my questions have been answered. I have discussed any challenges I see with this plan with the nurse or doctor.    ..........................................................................................................................................  Patient/Patient Representative Signature      ..........................................................................................................................................  Patient Representative Print Name and Relationship to Patient    ..................................................               ................................................  Date                                   Time    ..........................................................................................................................................  Reviewed by Signature/Title    ...................................................              ..............................................  Date                                               Time          22EPIC Rev 08/18

## 2018-10-03 NOTE — IP AVS SNAPSHOT
MRN:[id number]                      After Visit Summary   10/3/2018    Donnie Vincent    MRN: [id number]           Thank you!     Thank you for choosing [facility] for your care. Our goal is always to provide you with excellent care. Hearing back from our patients is one way we can continue to improve our services. Please take a few minutes to complete the written survey that you may receive in the mail after you visit. If you would like to speak to someone directly about your visit please contact Patient Relations at [phone]. Thank you!          Patient Information     Date Of Birth          1945        Designated Caregiver       Most Recent Value    Caregiver    Will someone help with your care after discharge? yes    Name of designated caregiver Maico wife    Phone number of caregiver [phone]    Caregiver address same as pt      About your hospital stay     You were admitted on:  October 3, 2018 You last received care in the:  [facility] Observation Department    You were discharged on:  October 9, 2018        Reason for your hospital stay       TURP (trans urethral resection of prostate), cystoscopy, stone removal complicated by urinary retention, hematuria (blood in urine) causing anemia and renal failure due to low blood pressure                  Who to Call     For medical emergencies, please call 911.  For non-urgent questions about your medical care, please call your primary care provider or clinic, [phone]  For questions related to your surgery, please call your surgery clinic        Attending Provider     Provider Specialty    Neville Banuelos MD Urology       Primary Care Provider Office Phone # Fax #    Suyapa Leon [phone]      After Care Instructions     Activity       Your activity upon discharge: activity as tolerated            Diet       Follow this diet upon discharge: Regular             Discharge Instructions       There have been multiple medication changes for you:  1. Stop lisinopril until your kidney function returns to normal (follow-up with Dr. Leon, who will re-start this when appropriate).  2. Stop metformin and glipizide and Victoza (Dr. Leon may or may not re-start these based on your kidney function and glucose levels)  3. Your lantus has been decreased to 10 units for now. Dr. Leon will adjust this if necessary.            Monitor and record       blood glucose twice per day                  Follow-up Appointments     Follow-up and recommended labs and tests        Follow up with primary care provider, Suyapa Leon, on Friday, October 12 at 11:20am. You will need follow-up labs including a CBC and Chem 7 to check your kidney function and hemoglobin. These have been ordered and you can get them drawn at the lab anytime on Thursday. You will need to review your blood sugars and medications with Dr. Leon. Follow-up with Urology (Dr Banuelos) for a voiding trial to remove your daniels catheter (Monday)                  Your next 10 appointments already scheduled     Oct 12, 2018 11:20 AM CDT   [facility]    [address]  [facility] [address]   [phone]            Oct 15, 2018 10:00 AM CDT   Nurse Visit with UB NURSE   [facility] ([facility])    [address]  [address]  [facility] [id number] [phone]            Oct 29, 2018 11:00 AM CDT   Post-Op with Neville Banuelos MD   [facility] [place] ([facility])    [address]  [address]  [facility] [id number] [phone]              Additional Services     Home care nursing referral       RN extended hours visit. RN to assess vital signs and weight,  "hydration, nutrition and bowel status and home safety.    Your provider has ordered home care nursing services. If you have not been contacted within 2 days of your discharge please call the inpatient department phone number at [phone] .                             Future tests that were ordered for you     Basic metabolic panel           CBC with platelets       Last Lab Result: Hemoglobin (g/dL)       Date                     Value                 10/09/2018               8.0 (L)          ----------                  Further instructions from your care team       Your home care referral was sent to [facility].  If you haven't heard from them within the next 24-48 hours,  Please call them at [phone]      Pending Results     Date and Time Order Name Status Description    10/5/2018 0216 Blood culture Preliminary     10/3/2018 1452 Stone analysis In process             Statement of Approval     Ordered          10/09/18 1241  I have reviewed and agree with all the recommendations and orders detailed in this document.  EFFECTIVE NOW     Approved and electronically signed by:  Rodrigo Stewart MD           10/09/18 1057  I have reviewed and agree with all the recommendations and orders detailed in this document.  EFFECTIVE NOW     Approved and electronically signed by:  Rodrigo Stewart MD             Admission Information     Date & Time Provider Department Dept. Phone    10/3/2018 Neville Banuelos MD [facility] Observation Department [phone]      Your Vitals Were     Blood Pressure Pulse Temperature Respirations Height Weight    118/62 (BP Location: Right arm) 76 97.6  F (36.4  C) (Oral) 18 1.803 m (5' 11\") 117.5 kg (259 lb 1.6 oz)    Pulse Oximetry BMI (Body Mass Index)                96% 36.14 kg/m2          Care EveryWhere ID     This is your Care EveryWhere ID. This could be used by other organizations to access your [place] medical records  [id number]   "      Equal Access to Services     [facility] : Hadii raisa Fishman, wamajorda luqadaha, qaybta yolandaalaquilino linn. So [facility] [phone].    ATENCIÓN: Si habla español, tiene a navarrete disposición servicios gratuitos de asistencia lingüística. Llame al [phone].    We comply with applicable federal civil rights laws and [place] laws. We do not discriminate on the basis of race, color, national origin, age, disability, sex, sexual orientation, or gender identity.               Review of your medicines      CONTINUE these medicines which may have CHANGED, or have new prescriptions. If we are uncertain of the size of tablets/capsules you have at home, strength may be listed as something that might have changed.        Dose / Directions    albuterol 108 (90 Base) MCG/ACT inhaler   Commonly known as:  PROAIR HFA/PROVENTIL HFA/VENTOLIN HFA   This may have changed:    - when to take this  - reasons to take this   Used for:  COPD (chronic obstructive pulmonary disease) (H)        Dose:  2 puff   Inhale 2 puffs into the lungs every 6 hours   Quantity:  1 Inhaler   Refills:  6       LANTUS SOLOSTAR 100 UNIT/ML injection   This may have changed:  how much to take   Generic drug:  insulin glargine        Dose:  10 Units   Inject 10 Units Subcutaneous At Bedtime   Refills:  0         CONTINUE these medicines which have NOT CHANGED        Dose / Directions    clopidogrel 75 MG tablet   Commonly known as:  PLAVIX   Used for:  Acute ischemic stroke (H)        Dose:  75 mg   Take 1 tablet (75 mg) by mouth daily   Quantity:  90 tablet   Refills:  0       fenofibrate 160 MG tablet   Used for:  Hyperlipidemia LDL goal <100        Dose:  160 mg   Take 1 tablet (160 mg) by mouth daily   Quantity:  90 tablet   Refills:  1       ferrous sulfate 325 (65 Fe) MG tablet   Commonly known as:  IRON   Used for:  Iron deficiency anemia, unspecified        Dose:  325 mg   Take 1 tablet (325 mg) by  mouth daily (with breakfast)   Quantity:  90 tablet   Refills:  1       FLUoxetine 40 MG capsule   Commonly known as:  PROzac   Used for:  Major depressive disorder, recurrent episode, mild (H)        Dose:  40 mg   Take 1 capsule (40 mg) by mouth daily   Quantity:  90 capsule   Refills:  1       folic acid 1 MG tablet   Commonly known as:  FOLVITE   Used for:  Increased homocysteine (H)        Dose:  1 mg   Take 1 tablet (1 mg) by mouth daily   Quantity:  90 tablet   Refills:  0       insulin pen needle 31G X 5 MM   Used for:  Type 2 diabetes with stage 3 chronic kidney disease GFR 30-59 (H)        Use 1-2 pen needles daily or as directed.   Quantity:  100 each   Refills:  3       KEPPRA 500 MG tablet   Generic drug:  levETIRAcetam        Dose:  500 mg   Take 500 mg by mouth 2 times daily   Refills:  0       METOPROLOL SUCCINATE ER PO        Dose:  50 mg   Take 50 mg by mouth daily   Refills:  0       PANTOPRAZOLE SODIUM PO        Dose:  40 mg   Take 40 mg by mouth 2 times daily (before meals)   Refills:  0       simvastatin 10 MG tablet   Commonly known as:  ZOCOR   Used for:  Hyperlipidemia LDL goal <100        Dose:  10 mg   Take 1 tablet (10 mg) by mouth At Bedtime   Quantity:  90 tablet   Refills:  1       SYMBICORT 80-4.5 MCG/ACT Inhaler   Generic drug:  budesonide-formoterol        Dose:  2 puff   Inhale 2 puffs into the lungs 2 times daily   Refills:  0       tamsulosin 0.4 MG capsule   Commonly known as:  FLOMAX   Used for:  BPH (benign prostatic hypertrophy)        Dose:  0.4 mg   Take 1 capsule (0.4 mg) by mouth daily   Quantity:  90 capsule   Refills:  3       TOPAMAX PO        Dose:  50 mg   Take 50 mg by mouth 2 times daily   Refills:  0       TRADJENTA 5 MG Tabs tablet   Generic drug:  linagliptin        Dose:  5 mg   Take 5 mg by mouth daily Pt takes 1/2 tab daily   Refills:  0         STOP taking     glipiZIDE 10 MG tablet   Commonly known as:  GLUCOTROL           liraglutide 18 MG/3ML soln    Commonly known as:  VICTOZA           LISINOPRIL PO           METFORMIN HCL PO                    Protect others around you: Learn how to safely use, store and throw away your medicines at www.disposemymeds.org.             Medication List: This is a list of all your medications and when to take them. Check marks below indicate your daily home schedule. Keep this list as a reference.      Medications           Morning Afternoon Evening Bedtime As Needed    albuterol 108 (90 Base) MCG/ACT inhaler   Commonly known as:  PROAIR HFA/PROVENTIL HFA/VENTOLIN HFA   Inhale 2 puffs into the lungs every 6 hours                                clopidogrel 75 MG tablet   Commonly known as:  PLAVIX   Take 1 tablet (75 mg) by mouth daily   Last time this was given:  75 mg on 10/9/2018  8:49 AM                                fenofibrate 160 MG tablet   Take 1 tablet (160 mg) by mouth daily   Last time this was given:  160 mg on 10/5/2018  9:16 PM                                ferrous sulfate 325 (65 Fe) MG tablet   Commonly known as:  IRON   Take 1 tablet (325 mg) by mouth daily (with breakfast)   Last time this was given:  325 mg on 10/9/2018  8:49 AM                                FLUoxetine 40 MG capsule   Commonly known as:  PROzac   Take 1 capsule (40 mg) by mouth daily   Last time this was given:  40 mg on 10/9/2018  8:49 AM                                folic acid 1 MG tablet   Commonly known as:  FOLVITE   Take 1 tablet (1 mg) by mouth daily   Last time this was given:  1 mg on 10/9/2018  8:49 AM                                insulin pen needle 31G X 5 MM   Use 1-2 pen needles daily or as directed.                                KEPPRA 500 MG tablet   Take 500 mg by mouth 2 times daily   Last time this was given:  500 mg on 10/9/2018  8:49 AM   Generic drug:  levETIRAcetam                                LANTUS SOLOSTAR 100 UNIT/ML injection   Inject 10 Units Subcutaneous At Bedtime   Generic drug:  insulin glargine                                 METOPROLOL SUCCINATE ER PO   Take 50 mg by mouth daily   Last time this was given:  50 mg on 10/9/2018  8:49 AM                                PANTOPRAZOLE SODIUM PO   Take 40 mg by mouth 2 times daily (before meals)   Last time this was given:  40 mg on 10/9/2018  5:58 AM                                simvastatin 10 MG tablet   Commonly known as:  ZOCOR   Take 1 tablet (10 mg) by mouth At Bedtime   Last time this was given:  10 mg on 10/8/2018  9:04 PM                                SYMBICORT 80-4.5 MCG/ACT Inhaler   Inhale 2 puffs into the lungs 2 times daily   Last time this was given:  2 puffs on 10/9/2018  7:50 AM   Generic drug:  budesonide-formoterol                                tamsulosin 0.4 MG capsule   Commonly known as:  FLOMAX   Take 1 capsule (0.4 mg) by mouth daily   Last time this was given:  0.4 mg on 10/9/2018  8:49 AM                                TOPAMAX PO   Take 50 mg by mouth 2 times daily   Last time this was given:  50 mg on 10/9/2018  8:49 AM                                TRADJENTA 5 MG Tabs tablet   Take 5 mg by mouth daily Pt takes 1/2 tab daily   Last time this was given:  5 mg on 10/6/2018  8:19 AM   Generic drug:  linagliptin

## 2018-10-03 NOTE — ANESTHESIA CARE TRANSFER NOTE
Patient: Donnie Vincent    Procedure(s):  cystoscopy Transuretheral Resection Prostate olympus bipolar electrode, removal of bladder stones with holmium laser - Wound Class: II-Clean Contaminated   - Wound Class: II-Clean Contaminated    Diagnosis: Hematuria  Diagnosis Additional Information: No value filed.    Anesthesia Type:   General, LMA     Note:    Patient transferred to:PACU  Comments: Pt spont resps LMA removed to PACU VSS Report to RNHandoff Report: Identifed the Patient, Identified the Reponsible Provider, Reviewed the pertinent medical history, Discussed the surgical course, Reviewed Intra-OP anesthesia mangement and issues during anesthesia, Set expectations for post-procedure period and Allowed opportunity for questions and acknowledgement of understanding      Vitals: (Last set prior to Anesthesia Care Transfer)    CRNA VITALS  10/3/2018 1516 - 10/3/2018 1551      10/3/2018             Pulse: 77    SpO2: 100 %                Electronically Signed By: CODY Eid CRNA  October 3, 2018  3:51 PM

## 2018-10-03 NOTE — PHARMACY-ADMISSION MEDICATION HISTORY
PTA meds completed by pre-admitting nurse ( Albania Villasenor RN ). No further clarifications required by pharmacy.    Prior to Admission medications    Medication Sig Last Dose Taking? Auth Provider   albuterol (PROAIR HFA, PROVENTIL HFA, VENTOLIN HFA) 108 (90 BASE) MCG/ACT inhaler Inhale 2 puffs into the lungs every 6 hours  Patient taking differently: Inhale 2 puffs into the lungs every 6 hours as needed  Past Week at Unknown time Yes Suyapa Leon MD   budesonide-formoterol (SYMBICORT) 80-4.5 MCG/ACT inhaler Inhale 2 puffs into the lungs 2 times daily  10/2/2018 at Unknown time Yes Reported, Patient   clopidogrel (PLAVIX) 75 MG tablet Take 1 tablet (75 mg) by mouth daily 9/30/2018 Yes Suyapa Leon MD   fenofibrate 160 MG tablet Take 1 tablet (160 mg) by mouth daily 10/2/2018 at Unknown time Yes Suyapa Leon MD   ferrous sulfate (IRON) 325 (65 FE) MG tablet Take 1 tablet (325 mg) by mouth daily (with breakfast) 10/2/2018 at Unknown time Yes Suypaa Leon MD   FLUoxetine (PROZAC) 40 MG capsule Take 1 capsule (40 mg) by mouth daily 10/2/2018 at Unknown time Yes Suyapa Leon MD   folic acid (FOLVITE) 1 MG tablet Take 1 tablet (1 mg) by mouth daily 10/2/2018 at Unknown time Yes Suyapa Leon MD   glipiZIDE (GLUCOTROL) 10 MG tablet Take 1 tablet (10 mg) by mouth 2 times daily (before meals) 10/2/2018 at Unknown time Yes Suyapa Leon MD   insulin glargine (LANTUS SOLOSTAR) 100 UNIT/ML pen Inject 14 Units Subcutaneous At Bedtime 10/2/2018 at Unknown time Yes Suyapa Leon MD   levETIRAcetam (KEPPRA) 500 MG tablet Take 500 mg by mouth 2 times daily 10/2/2018 at Unknown time Yes Reported, Patient   linagliptin (TRADJENTA) 5 MG TABS tablet Take 5 mg by mouth daily Pt takes 1/2 tab daily 10/2/2018 at Unknown time Yes Reported, Patient   liraglutide (VICTOZA) 18 MG/3ML soln Inject 1.8 mg Subcutaneous At Bedtime   10/2/2018 at Unknown time Yes Reported, Patient   LISINOPRIL PO Take 20 mg by mouth daily 10/2/2018 at Unknown time Yes Reported, Patient   METFORMIN HCL PO Take 500 mg by mouth 2 times daily (with meals) Past Week at Unknown time Yes Reported, Patient   METOPROLOL SUCCINATE ER PO Take 50 mg by mouth daily 10/2/2018 at Unknown time Yes Reported, Patient   PANTOPRAZOLE SODIUM PO Take 40 mg by mouth 2 times daily (before meals) 10/2/2018 at Unknown time Yes Reported, Patient   simvastatin (ZOCOR) 10 MG tablet Take 1 tablet (10 mg) by mouth At Bedtime 10/2/2018 at Unknown time Yes Suyapa Leon MD   tamsulosin (FLOMAX) 0.4 MG 24 hr capsule Take 1 capsule (0.4 mg) by mouth daily 10/2/2018 at Unknown time Yes Suyapa Leon MD   Topiramate (TOPAMAX PO) Take 50 mg by mouth 2 times daily 10/2/2018 at Unknown time Yes Reported, Patient   insulin pen needle 31G X 5 MM Use 1-2 pen needles daily or as directed.   Suyapa Leon MD

## 2018-10-03 NOTE — ANESTHESIA PREPROCEDURE EVALUATION
PAC NOTE:       ANESTHESIA PRE EVALUATION:  Anesthesia Evaluation     . Pt has had prior anesthetic.            ROS/MED HX    ENT/Pulmonary:     (+)sleep apnea, moderate COPD, doesn't use CPAP , . .    Neurologic:     (+)CVA date: 2013 without deficits    Cardiovascular:     (+) Dyslipidemia, hypertension-Peripheral Vascular Disease-- Other and Non Symptomatic, --. : . . . :. . Previous cardiac testing Echodate:2017results:EF 50-55% grade 1 DD  PFOdate: results: date: results: date: results:          METS/Exercise Tolerance:     Hematologic:  - neg hematologic  ROS       Musculoskeletal:   (+) arthritis, , , -       GI/Hepatic:     (+) GERD Asymptomatic on medication,       Renal/Genitourinary:     (+) chronic renal disease, type: CRI, Pt does not require dialysis, Pt has no history of transplant, BPH,       Endo: Comment: Class 1 obesity    (+) type II DM Last HgA1c: 6.51 - not using insulin pump Diabetic complications: nephropathy cardiac problems, Obesity, .      Psychiatric:  - neg psychiatric ROS       Infectious Disease:  - neg infectious disease ROS       Malignancy:      - no malignancy   Other:    - neg other ROS                 Physical Exam  Normal systems: cardiovascular, pulmonary and dental    Airway   Mallampati: II  TM distance: >3 FB  Neck ROM: limited    Dental     Cardiovascular   Rhythm and rate: regular and normal      Pulmonary    breath sounds clear to auscultation    Other findings: Lab Test        09/14/18 07/26/18 06/13/18      --          03/16/17      --          11/25/15                       2022          1046          1015           --                              --           1305          WBC          5.9          4.4          6.2            < >         --            < >         --           HGB          11.3*        10.1*        8.2*           < >         --            < >         --           MCV          85           82           75*            < >         --            <  >         --           PLT          211          247          270            < >         --            < >         --           INR          0.95          --           --           --          1.0           --          0.98           < > = values in this interval not displayed.                  Lab Test        09/24/18     05/15/18     05/09/17                       1114          1145          1413          NA           140          142          139           POTASSIUM    4.4          4.5          3.6           CHLORIDE     110*         113*         108           CO2          23           21           24            BUN          34*          17           22            CR           1.49*        1.25         1.42*         ANIONGAP     7            8            7             ZAYRA          8.7          8.8          8.8           GLC          123*         179*         255*                  ECG  Sinus  Rhythm   -Left axis -anterior fascicular block.    -Old anteroseptal infarct.     ABNORMAL          Anesthesia Plan      History & Physical Review  History and physical reviewed and following examination; no interval change.    ASA Status:  3 .    NPO Status:  > 8 hours    Plan for General and LMA with Intravenous induction. Maintenance will be Balanced.    PONV prophylaxis:  Ondansetron (or other 5HT-3) and Dexamethasone or Solumedrol       Postoperative Care  Postoperative pain management:  IV analgesics, Oral pain medications and Multi-modal analgesia.      Consents  Anesthetic plan, risks, benefits and alternatives discussed with:  Patient.  Use of blood products discussed: No .   .                            .

## 2018-10-03 NOTE — PHARMACY-ADMISSION MEDICATION HISTORY
Gilson Swan, [facility] Pharmacist Signed Pharmacy Pharmacy-Admission Medication History   Date of Service: 10/3/2018  6:31 PM Creation Time: 10/3/2018  6:31 PM         []Hide copied text  PTA meds completed by pre-admitting nurse ( Albania Villasenor, RN ). No further clarifications required by pharmacy.            Prior to Admission medications    Medication Sig Last Dose Taking? Auth Provider   albuterol (PROAIR HFA, PROVENTIL HFA, VENTOLIN HFA) 108 (90 BASE) MCG/ACT inhaler Inhale 2 puffs into the lungs every 6 hours  Patient taking differently: Inhale 2 puffs into the lungs every 6 hours as needed  Past Week at Unknown time Yes Suyapa Leon MD   budesonide-formoterol (SYMBICORT) 80-4.5 MCG/ACT inhaler Inhale 2 puffs into the lungs 2 times daily  10/2/2018 at Unknown time Yes Reported, Patient   clopidogrel (PLAVIX) 75 MG tablet Take 1 tablet (75 mg) by mouth daily 9/30/2018 Yes Suyapa Leon MD   fenofibrate 160 MG tablet Take 1 tablet (160 mg) by mouth daily 10/2/2018 at Unknown time Yes Suyapa Leon MD   ferrous sulfate (IRON) 325 (65 FE) MG tablet Take 1 tablet (325 mg) by mouth daily (with breakfast) 10/2/2018 at Unknown time Yes Suyapa Leon MD   FLUoxetine (PROZAC) 40 MG capsule Take 1 capsule (40 mg) by mouth daily 10/2/2018 at Unknown time Yes Suyapa Leon MD   folic acid (FOLVITE) 1 MG tablet Take 1 tablet (1 mg) by mouth daily 10/2/2018 at Unknown time Yes Suyapa Leon MD   glipiZIDE (GLUCOTROL) 10 MG tablet Take 1 tablet (10 mg) by mouth 2 times daily (before meals) 10/2/2018 at Unknown time Yes Suyapa Leon MD   insulin glargine (LANTUS SOLOSTAR) 100 UNIT/ML pen Inject 14 Units Subcutaneous At Bedtime 10/2/2018 at Unknown time Yes Suyapa Leon, MD   levETIRAcetam (KEPPRA) 500 MG tablet Take 500 mg by mouth 2 times daily 10/2/2018 at Unknown time Yes Reported, Patient   linagliptin (TRADJENTA) 5 MG TABS  tablet Take 5 mg by mouth daily Pt takes 1/2 tab daily 10/2/2018 at Unknown time Yes Reported, Patient   liraglutide (VICTOZA) 18 MG/3ML soln Inject 1.8 mg Subcutaneous At Bedtime  10/2/2018 at Unknown time Yes Reported, Patient   LISINOPRIL PO Take 20 mg by mouth daily 10/2/2018 at Unknown time Yes Reported, Patient   METFORMIN HCL PO Take 500 mg by mouth 2 times daily (with meals) Past Week at Unknown time Yes Reported, Patient   METOPROLOL SUCCINATE ER PO Take 50 mg by mouth daily 10/2/2018 at Unknown time Yes Reported, Patient   PANTOPRAZOLE SODIUM PO Take 40 mg by mouth 2 times daily (before meals) 10/2/2018 at Unknown time Yes Reported, Patient   simvastatin (ZOCOR) 10 MG tablet Take 1 tablet (10 mg) by mouth At Bedtime 10/2/2018 at Unknown time Yes Suyapa Leon MD   tamsulosin (FLOMAX) 0.4 MG 24 hr capsule Take 1 capsule (0.4 mg) by mouth daily 10/2/2018 at Unknown time Yes Suyaap Leon MD   Topiramate (TOPAMAX PO) Take 50 mg by mouth 2 times daily 10/2/2018 at Unknown time Yes Reported, Patient   insulin pen needle 31G X 5 MM Use 1-2 pen needles daily or as directed.     Suyapa Leon MD

## 2018-10-03 NOTE — ANESTHESIA POSTPROCEDURE EVALUATION
Patient: Donnie Vincent    Procedure(s):  cystoscopy Transuretheral Resection Prostate olympus bipolar electrode, removal of bladder stones with holmium laser - Wound Class: II-Clean Contaminated   - Wound Class: II-Clean Contaminated    Diagnosis:Hematuria  Diagnosis Additional Information: BPH and renal lithiasis  Dr. Banuelos    Anesthesia Type:  General, LMA    Note:  Anesthesia Post Evaluation    Patient location during evaluation: PACU  Patient participation: Able to fully participate in evaluation  Level of consciousness: awake  Pain management: adequate  Airway patency: patent  Cardiovascular status: acceptable  Respiratory status: acceptable  Hydration status: acceptable  PONV: none             Last vitals:  Vitals:    10/03/18 1610 10/03/18 1615 10/03/18 1630   BP: 121/70 125/84 113/66   Resp: 11 10 9   Temp:   97.2  F (36.2  C)   SpO2: 91% 92% 94%         Electronically Signed By: Sai Hansen MD  October 3, 2018  4:42 PM

## 2018-10-04 LAB
ANION GAP SERPL CALCULATED.3IONS-SCNC: 11 MMOL/L (ref 3–14)
ANION GAP SERPL CALCULATED.3IONS-SCNC: 12 MMOL/L (ref 3–14)
BUN SERPL-MCNC: 34 MG/DL (ref 7–30)
BUN SERPL-MCNC: 39 MG/DL (ref 7–30)
CALCIUM SERPL-MCNC: 7.1 MG/DL (ref 8.5–10.1)
CALCIUM SERPL-MCNC: 7.3 MG/DL (ref 8.5–10.1)
CHLORIDE SERPL-SCNC: 112 MMOL/L (ref 94–109)
CHLORIDE SERPL-SCNC: 113 MMOL/L (ref 94–109)
CO2 SERPL-SCNC: 16 MMOL/L (ref 20–32)
CO2 SERPL-SCNC: 17 MMOL/L (ref 20–32)
CREAT SERPL-MCNC: 2.47 MG/DL (ref 0.66–1.25)
CREAT SERPL-MCNC: 2.55 MG/DL (ref 0.66–1.25)
ERYTHROCYTE [DISTWIDTH] IN BLOOD BY AUTOMATED COUNT: 17.6 % (ref 10–15)
ERYTHROCYTE [DISTWIDTH] IN BLOOD BY AUTOMATED COUNT: 18.1 % (ref 10–15)
GFR SERPL CREATININE-BSD FRML MDRD: 25 ML/MIN/1.7M2
GFR SERPL CREATININE-BSD FRML MDRD: 26 ML/MIN/1.7M2
GLUCOSE BLDC GLUCOMTR-MCNC: 163 MG/DL (ref 70–99)
GLUCOSE BLDC GLUCOMTR-MCNC: 195 MG/DL (ref 70–99)
GLUCOSE SERPL-MCNC: 185 MG/DL (ref 70–99)
GLUCOSE SERPL-MCNC: 249 MG/DL (ref 70–99)
HCT VFR BLD AUTO: 26.4 % (ref 40–53)
HCT VFR BLD AUTO: 30.1 % (ref 40–53)
HGB BLD-MCNC: 7.5 G/DL (ref 13.3–17.7)
HGB BLD-MCNC: 8.7 G/DL (ref 13.3–17.7)
HGB BLD-MCNC: 9.2 G/DL (ref 13.3–17.7)
LACTATE BLD-SCNC: 2.4 MMOL/L (ref 0.7–2)
MCH RBC QN AUTO: 26.9 PG (ref 26.5–33)
MCH RBC QN AUTO: 27.6 PG (ref 26.5–33)
MCHC RBC AUTO-ENTMCNC: 28.4 G/DL (ref 31.5–36.5)
MCHC RBC AUTO-ENTMCNC: 28.9 G/DL (ref 31.5–36.5)
MCV RBC AUTO: 93 FL (ref 78–100)
MCV RBC AUTO: 97 FL (ref 78–100)
PLATELET # BLD AUTO: 221 10E9/L (ref 150–450)
PLATELET # BLD AUTO: 289 10E9/L (ref 150–450)
POTASSIUM SERPL-SCNC: 4.6 MMOL/L (ref 3.4–5.3)
POTASSIUM SERPL-SCNC: 5.5 MMOL/L (ref 3.4–5.3)
RBC # BLD AUTO: 2.72 10E12/L (ref 4.4–5.9)
RBC # BLD AUTO: 3.24 10E12/L (ref 4.4–5.9)
SODIUM SERPL-SCNC: 140 MMOL/L (ref 133–144)
SODIUM SERPL-SCNC: 141 MMOL/L (ref 133–144)
WBC # BLD AUTO: 14.7 10E9/L (ref 4–11)
WBC # BLD AUTO: 19.8 10E9/L (ref 4–11)

## 2018-10-04 PROCEDURE — 36415 COLL VENOUS BLD VENIPUNCTURE: CPT | Performed by: INTERNAL MEDICINE

## 2018-10-04 PROCEDURE — 85018 HEMOGLOBIN: CPT | Performed by: INTERNAL MEDICINE

## 2018-10-04 PROCEDURE — 80048 BASIC METABOLIC PNL TOTAL CA: CPT | Performed by: INTERNAL MEDICINE

## 2018-10-04 PROCEDURE — 85027 COMPLETE CBC AUTOMATED: CPT | Performed by: UROLOGY

## 2018-10-04 PROCEDURE — 82962 GLUCOSE BLOOD TEST: CPT

## 2018-10-04 PROCEDURE — 25000128 H RX IP 250 OP 636: Performed by: UROLOGY

## 2018-10-04 PROCEDURE — 25000131 ZZH RX MED GY IP 250 OP 636 PS 637: Performed by: UROLOGY

## 2018-10-04 PROCEDURE — 25000125 ZZHC RX 250: Performed by: INTERNAL MEDICINE

## 2018-10-04 PROCEDURE — 83605 ASSAY OF LACTIC ACID: CPT | Performed by: UROLOGY

## 2018-10-04 PROCEDURE — 80048 BASIC METABOLIC PNL TOTAL CA: CPT | Performed by: UROLOGY

## 2018-10-04 PROCEDURE — 25000132 ZZH RX MED GY IP 250 OP 250 PS 637: Performed by: UROLOGY

## 2018-10-04 PROCEDURE — 36415 COLL VENOUS BLD VENIPUNCTURE: CPT | Performed by: UROLOGY

## 2018-10-04 PROCEDURE — 85027 COMPLETE CBC AUTOMATED: CPT | Performed by: INTERNAL MEDICINE

## 2018-10-04 PROCEDURE — 25000128 H RX IP 250 OP 636: Performed by: INTERNAL MEDICINE

## 2018-10-04 RX ORDER — CIPROFLOXACIN 2 MG/ML
400 INJECTION, SOLUTION INTRAVENOUS EVERY 12 HOURS
Status: DISCONTINUED | OUTPATIENT
Start: 2018-10-04 | End: 2018-10-06

## 2018-10-04 RX ADMIN — TOPIRAMATE 50 MG: 25 TABLET, FILM COATED ORAL at 09:11

## 2018-10-04 RX ADMIN — METFORMIN HYDROCHLORIDE 500 MG: 500 TABLET, FILM COATED ORAL at 09:14

## 2018-10-04 RX ADMIN — CIPROFLOXACIN 400 MG: 2 INJECTION, SOLUTION INTRAVENOUS at 22:26

## 2018-10-04 RX ADMIN — GLIPIZIDE 10 MG: 10 TABLET ORAL at 17:31

## 2018-10-04 RX ADMIN — INSULIN GLARGINE 7 UNITS: 100 INJECTION, SOLUTION SUBCUTANEOUS at 21:23

## 2018-10-04 RX ADMIN — FOLIC ACID 1 MG: 1 TABLET ORAL at 09:15

## 2018-10-04 RX ADMIN — OXYBUTYNIN CHLORIDE 5 MG: 5 TABLET ORAL at 05:29

## 2018-10-04 RX ADMIN — LEVETIRACETAM 500 MG: 500 TABLET, FILM COATED ORAL at 09:14

## 2018-10-04 RX ADMIN — PANTOPRAZOLE SODIUM 40 MG: 40 TABLET, DELAYED RELEASE ORAL at 17:31

## 2018-10-04 RX ADMIN — FENOFIBRATE 160 MG: 160 TABLET ORAL at 21:14

## 2018-10-04 RX ADMIN — SODIUM CHLORIDE 1000 ML: 9 INJECTION, SOLUTION INTRAVENOUS at 09:55

## 2018-10-04 RX ADMIN — DOCUSATE SODIUM 100 MG: 100 CAPSULE, LIQUID FILLED ORAL at 09:12

## 2018-10-04 RX ADMIN — FLUOXETINE 40 MG: 20 CAPSULE ORAL at 09:15

## 2018-10-04 RX ADMIN — SIMVASTATIN 10 MG: 10 TABLET, FILM COATED ORAL at 21:14

## 2018-10-04 RX ADMIN — OXYBUTYNIN CHLORIDE 5 MG: 5 TABLET ORAL at 13:29

## 2018-10-04 RX ADMIN — TOPIRAMATE 50 MG: 25 TABLET, FILM COATED ORAL at 21:01

## 2018-10-04 RX ADMIN — FERROUS SULFATE TAB 325 MG (65 MG ELEMENTAL FE) 325 MG: 325 (65 FE) TAB at 09:14

## 2018-10-04 RX ADMIN — PANTOPRAZOLE SODIUM 40 MG: 40 TABLET, DELAYED RELEASE ORAL at 09:14

## 2018-10-04 RX ADMIN — GLIPIZIDE 10 MG: 10 TABLET ORAL at 09:11

## 2018-10-04 RX ADMIN — HYDROCODONE BITARTRATE AND ACETAMINOPHEN 1 TABLET: 5; 325 TABLET ORAL at 21:01

## 2018-10-04 RX ADMIN — Medication 0.5 MG: at 04:17

## 2018-10-04 RX ADMIN — ONDANSETRON 4 MG: 2 INJECTION INTRAMUSCULAR; INTRAVENOUS at 23:24

## 2018-10-04 RX ADMIN — ATROPA BELLADONNA AND OPIUM 1 SUPPOSITORY: 16.2; 3 SUPPOSITORY RECTAL at 04:38

## 2018-10-04 RX ADMIN — LEVETIRACETAM 500 MG: 500 TABLET, FILM COATED ORAL at 21:01

## 2018-10-04 RX ADMIN — LINAGLIPTIN 5 MG: 5 TABLET, FILM COATED ORAL at 09:12

## 2018-10-04 RX ADMIN — Medication 0.5 MG: at 23:24

## 2018-10-04 NOTE — PLAN OF CARE
"Problem: Patient Care Overview  Goal: Plan of Care/Patient Progress Review  Outcome: No Change  PRIMARY DIAGNOSIS: TURP, BLADDER STONE REMOVAL  OUTPATIENT/OBSERVATION GOALS TO BE MET BEFORE DISCHARGE:  1. Stable vital signs Yes  2. Tolerating diet: Yes   3. Pain controlled with oral pain medications: Yes  4. Positive bowel sounds:  Yes  5. Voiding without difficulty:  Daniels catheter/CBI in place.  6. Able to ambulate: Yes    7. Provider specific discharge goals met:  No     Bolus given for low BP and heart rate in 100's, feels \"dry.\" BP re-check 111/56. Intermittent bladder spasm when passing clots, but resolved within a few minutes without intervention. Was too early to given PRN Ditropan, offered PRN B&O suppository and patient declined. Intermittent small clots passing through tubing, but otherwise urine has remained clear, no bleeding noted. Ambulated in hallway with Ax1 and walker, became short of breath which patient reports is not a new issue for him. CBI continues to run at a fast rate. Continue to monitor daniels/CBI output. Patient instructed to let nursing know if bladder spasm occurs.      Discharge Planner Nurse   Safe discharge environment identified: Yes  Barriers to discharge: Yes       Entered by: Selin Byrd 10/04/2018       Please review provider order for any additional goals.   Nurse to notify provider when observation goals have been met and patient is ready for discharge.         "

## 2018-10-04 NOTE — PROGRESS NOTES
Urology paged as bladder scan showed 21mL in bladder and there is more output in bag. Dr. Duff is okay with increasing rate of CBI slowly. Rate turned up but still at fairly slow rate. Will continue to assess.

## 2018-10-04 NOTE — PLAN OF CARE
Problem: Patient Care Overview  Goal: Plan of Care/Patient Progress Review  Dr. Duff assessed patient and did not change urethral catheter.  He restarted bladder irrigation at slow rate with scant bloody drainage.   Pain meds given.  Patient continues to have pain 7/10.  Received ditroban for continued bladder spasms.  Ongoing assessment.

## 2018-10-04 NOTE — PLAN OF CARE
Problem: Patient Care Overview  Goal: Plan of Care/Patient Progress Review  Outcome: No Change  PRIMARY DIAGNOSIS: TURP, BLADDER STONE REMOVAL  OUTPATIENT/OBSERVATION GOALS TO BE MET BEFORE DISCHARGE:  1. Stable vital signs Yes  2. Tolerating diet: Yes - Has only had clear liquids, will be ordering regular diet for breakfast.  3. Pain controlled with oral pain medications:  N/A - Denies pain.  4. Positive bowel sounds:  Yes  5. Voiding without difficulty:  Sanabria catheter/CBI in place.  6. Able to ambulate:  Has not been out of bed yet.   7. Provider specific discharge goals met:  No    BP soft this AM. Reports minor discomfort from bladder irrigation earlier this AM. Urine is clear at this time, few small clots present in tubing/drainage bag. Denies bladder fullness/spasm sensation. CBI running at a fast rate at this time.     Discharge Planner Nurse   Safe discharge environment identified: Yes  Barriers to discharge: Yes       Entered by: Selin Byrd 10/04/2018      Please review provider order for any additional goals.   Nurse to notify provider when observation goals have been met and patient is ready for discharge.

## 2018-10-04 NOTE — PLAN OF CARE
Problem: Patient Care Overview  Goal: Plan of Care/Patient Progress Review  On call Urologist paged- Blood 82/49. Patient dizzy and symptomatic.

## 2018-10-04 NOTE — PLAN OF CARE
Problem: Patient Care Overview  Goal: Plan of Care/Patient Progress Review  PRIMARY DIAGNOSIS: TURP  OUTPATIENT/OBSERVATION GOALS TO BE MET BEFORE DISCHARGE:  1. Stable vital signs Yes  2. Tolerating diet:Yes  3. Pain controlled with oral pain medications:  Yes  4. Positive bowel sounds:  Yes  5. Voiding without difficulty:  No- CBI going   6. Able to ambulate:  Yes  7. Provider specific discharge goals met:  No    Discharge Planner Nurse   Safe discharge environment identified: Yes  Barriers to discharge: Yes- CBI       Entered by: Ange Zepeda 10/04/2018 5:29 PM     Please review provider order for any additional goals.   Nurse to notify provider when observation goals have been met and patient is ready for discharge.    Patient alert and oriented. CBI running- moderate rate. Clear urine but does have a couple clots each time cath is emptied. No pain. On RA. Has not hand irrigated my shift yet. Patient partially incontinent when getting up to bathroom of stool. Regular diet ordered. SL.

## 2018-10-04 NOTE — PROGRESS NOTES
"Bladder irrigation:    D: Patient called nurse to state he was having pain in bladder, \"feels full.\" CBI not flowing at this time.  A: Hand irrigated x3 with moderate amount of dime size clots removed each time. Tolerated well.  R: CBI resumed, urine clear with small amount of residual clots noted in tubing. PRN Ditropan given. Will continue to monitor. Patient understands to call nursing if spasm/fullness returns. Wife updated at bedside.   "

## 2018-10-04 NOTE — PROGRESS NOTES
Blood pressure:    D: Checked blood pressure prior to giving morning BP meds. Blood pressure 95/61, re-check 90/59; tachycardic also with heart rate in 100's. Denies dizziness at rest.  A: Paged Dr. Das to update.  R: Received order to hold morning BP meds (Metoprolol and Lisinopril). IV fluid bolus ordered to be given over 2 hours. Stated patient may take Metoprolol later today if blood pressure improves.

## 2018-10-04 NOTE — PROGRESS NOTES
Urology Note    Clots in bladder and difficulty with CBI overnight. Multiple clots irrigated from bladder by nursing this AM and CBI now flowing freely with clear urine. Patient currently without pain.    Continue Sanabria and CBI  Hand irrigate prn  Regular diet  Ambulate ad sammi  Anticipate discharge 1-2 days pending improvement in hematuria.    Renato Das MD  Urology  [facility]  Clinic Phone [phone]

## 2018-10-04 NOTE — PLAN OF CARE
Problem: Patient Care Overview  Goal: Plan of Care/Patient Progress Review  PRIMARY DIAGNOSIS: TURP  OUTPATIENT/OBSERVATION GOALS TO BE MET BEFORE DISCHARGE:  1. Stable vital signs Yes, Temp: 96.3  F (35.7  C) Temp src: Oral BP: 107/66  Heart Rate: 89 Resp: 18 SpO2: 98 % O2 Device: BiPAP/CPAP  Soft BPs since previous shift   2. Tolerating diet:Yes  3. Pain controlled with oral pain medications:  Yes  4. Positive bowel sounds:  Hypoactive  5. Voiding without difficulty:  CBI running slowly   6. Able to ambulate:  Not yet   7. Provider specific discharge goals met:  No    Pt is A&Ox4. VSS with soft BPs. Pt continues with mild pain at 4/10. Declines intervention at this time. CBI remains running very slow per urology. Will call urology with further recommendations now that CBI is running better. Output is still bright red. Will continue to monitor.     Discharge Planner Nurse   Safe discharge environment identified: Yes  Barriers to discharge: Yes       Entered by: Gisela Rhoades 10/04/2018 2:48 AM     Please review provider order for any additional goals.   Nurse to notify provider when observation goals have been met and patient is ready for discharge.

## 2018-10-04 NOTE — PLAN OF CARE
Problem: Patient Care Overview  Goal: Plan of Care/Patient Progress Review  PRIMARY DIAGNOSIS: TURP  OUTPATIENT/OBSERVATION GOALS TO BE MET BEFORE DISCHARGE:  1. Stable vital signs Yes, Temp: 96.3  F (35.7  C) Temp src: Oral BP: 107/66  Heart Rate: 89 Resp: 18 SpO2: 98 % O2 Device: BiPAP/CPAP  Soft BPs since previous shift   2. Tolerating diet:Yes  3. Pain controlled with oral pain medications:  Yes  4. Positive bowel sounds:  Hypoactive  5. Voiding without difficulty:  CBI running slowly   6. Able to ambulate:  Not yet   7. Provider specific discharge goals met:  No     Pt is A&Ox4. VSS with soft BPs. Pt continues with mild pain at 4/10. Declines intervention at this time. CBI remains running slow per urology. Output is still bright red. Will continue to monitor.      Discharge Planner Nurse   Safe discharge environment identified: Yes  Barriers to discharge: Yes       Entered by: Gisela Rhoades 10/04/2018 2:48 AM  Please review provider order for any additional goals.   Nurse to notify provider when observation goals have been met and patient is ready for discharge.

## 2018-10-04 NOTE — PROGRESS NOTES
SPIRITUAL HEALTH SERVICES Progress Note  [facility] Obs 6     Pt alert and comfortable with spouse, Dominick, at the bedside. Pt shared context of current admission. Pt is Faith and active member at [facility] in [place]. Feels well supported by wife (of nearly 50 yrs) and theo community.  Looking forward to recovery in order to head to [place] for winter months. Introduced pt/family to Spiritual Health Services.     Plan:  No further spiritual health needs at this time.[facility] remains available for any further needs or requests.    Jesse Ayala MA  Staff   Pager: [phone]  Phone: [phone]

## 2018-10-04 NOTE — PROGRESS NOTES
"I responded to code rapid response. This was called for hypotension (80's/50's).  Chart reviewed.  Patient had cysto for stones and TURP this afternoon.  Upon arrival to the floor there was 3 way catheter in place with grossly bloody urine.  Patient felt dizzy and lightheaded with the hypotension but no CP or SOB.  He had pain in penis and bladder.  Urine stopped draining from catheter.  We are attempting to irrigate catheter manually.  I discussed with Urology on call.  We tried applying traction to catheter.  B&O supp ordered. IV Dilaudid ordered.  Urology is coming to see patient.  With NS bolus BP has come up.  Stat CBC showed HGB of 9.7(was 11.3 on 9/14 at time of preop).  Donnie has been typed and screened. HGB ordered for midnight and CBC and BMP for morning.     BP (P) 104/63 (BP Location: Right arm)  Pulse 98  Temp 96.6  F (35.9  C) (Oral)  Resp (P) 23  Ht 1.803 m (5' 11\")  Wt 114.3 kg (252 lb)  SpO2 (P) 97%  BMI 35.15 kg/m2  GENERAL:  Uncomfortable. Cooperative.  PSYCH: pleasant, oriented, Moderate to severe acute distress due to pain.  EYES: PERRLA, Normal conjunctiva.  HEART:  Regular rate and rhythm. No JVD. Pulses normal. No edema.  LUNGS:  Clear to auscultation, normal Respiratory effort.  ABDOMEN:  Soft, no hepatosplenomegaly, normal bowel sounds. Suprapubic tenderness.  EXTREMETIES: No clubbing, cyanosis or ischemia  SKIN:  Dry to touch, No rash.    Assessment and plan:    1.  Hypotension after cystoscopy.  Suspect related to pain and possibly acute blood loss. Monitor serial HGB.  Transfusion for HGB < 8.     2.  Acute blood loss anemia.    3.  Bladder clot.  Urology coming to assess and treat.       45 minutes of critical care time spent with patient and discussing with Urology.    "

## 2018-10-04 NOTE — PROGRESS NOTES
SPIRITUAL HEALTH SERVICES Progress Note  [facility]    Provided pt and spouse with information regarding Health Care Directive. Pt would like to review and will request any further assistance to complete.    Plan:  [facility] remains available for any further needs or requests.      Jesse Ayala MA  Staff   Pager: [phone]  Phone: [phone]

## 2018-10-04 NOTE — PLAN OF CARE
Problem: Patient Care Overview  Goal: Plan of Care/Patient Progress Review  PRIMARY DIAGNOSIS: Cystoscopy Transuretheral Resection Prostate  OUTPATIENT/OBSERVATION GOALS TO BE MET BEFORE DISCHARGE:  1. Stable vital signs Yes  2. Tolerating diet:No  3. Pain controlled with oral pain medications:  No  4. Positive bowel sounds:  hypoactive  5. Voiding without difficulty:  three way irrigation  6. Able to ambulate:  has not been up since surgery  7. Provider specific discharge goals met:  No    Discharge Planner Nurse   Safe discharge environment identified: Yes  Barriers to discharge: Yes       Entered by: Corona Franco 10/03/2018 10:58 PM     BP stable after 1000 ml bolus.  A/O .  C/O bladder spasms and  Received suppository.  Awaiting Dr. Duff to assessed urethral catheter.  Pt states he is uncomfortable in lower abdomen and penis.   States he has no appetite.  Ongoing assessment.     Please review provider order for any additional goals.   Nurse to notify provider when observation goals have been met and patient is ready for discharge.

## 2018-10-04 NOTE — PROGRESS NOTES
Patient was complaining of increased pain/pressure in abdomen. and CBI did not have any output flowing. IV dilaudid given for pain and attempted to irrigate. After about a 30 minutes of attempting, many clots were finally irrigated from pt's bladder. CBI starting running smoothly. Has needed to be irrigated a few times since. CBI running wide open with bright red output still. Will continue to monitor.

## 2018-10-04 NOTE — OP NOTE
Procedure Date: 10/03/2018      DATE OF PROCEDURE: 10/03/2018      SURGEON: Neville Banuelos MD      PREOPERATIVE DIAGNOSIS: Bladder stones and enlarged prostate.      POSTOPERATIVE DIAGNOSIS: Bladder stones and enlarged prostate.      PROCEDURE PERFORMED: Cystoscopy, removal of bladder stones with holmium laser, transurethral resection of prostate using Olympus bipolar electrode.      ANESTHESIA: General.      COMPLICATIONS: None.      SPECIMENS: Bladder stones and prostate chips.      INDICATION FOR PROCEDURE: Donnie Vincent is a 72-year-old gentleman with an enlarged prostate and bladder stones who now presents for removal of his bladder stones as well as a transurethral resection of prostate.      DETAILS OF PROCEDURE: The risks and benefits of the procedure were explained in detail to the patient and informed consent was obtained. The patient was brought to the operating room and placed supine on the operating table where he underwent general endotracheal anesthetic. He was then moved down to the dorsal lithotomy position where he was prepped and draped in standard sterile fashion.       The procedure began by introducing a 25-Malay rigid cystoscope through the urethra into the bladder and performing cystoscopy. There were multiple stones in the dependent portion of the bladder. I was able to flush out most of these stones, but a few of the larger stones needed breaking up using the 500 micron holmium laser fiber. Once these were broken up adequately, they were flushed out and I performed complete cystoscopy to make certain all stones had been removed. I removed the cystoscope and then used the visual obturator to introduce the resectoscope sheath. I identified ureteral orifices and identified verumontanum. I resected first the median lobe of the prostate, followed by left lateral lobe tissue, then right lateral lobe tissue and anterior tissue. Throughout my resection, I was careful not to resect the ureteral  orifices and not to resect distal to the level of the verumontanum. I used the WisdomTreeik  multiple times to irrigate out all prostate chips. I used the button electrode on the cautery function to achieve hemostasis. I removed the scope and drained the bladder with a 24-Amharic 3-way Sanabria catheter. I inflated the balloon with 30 mL of sterile water, held the catheter on traction and irrigated the catheter until output was clear. This was placed to continuous irrigation and the procedure was concluded.       The patient tolerated the procedure without complication. He was transferred to the post-anesthetic care unit in good condition. He will go to the hospital for further monitoring from there.         PREMA CASTRO MD             D: 10/03/2018   T: 10/04/2018   MT:       Name:     ANA WEATHERS   MRN:      [id number]        Account:        [id number]   :      1945           Procedure Date: 10/03/2018      Document: [id number]

## 2018-10-04 NOTE — PLAN OF CARE
Problem: Patient Care Overview  Goal: Plan of Care/Patient Progress Review  RRT called when patient blood pressure 70/56. Urology paged 1 minute prior to RRT for blood pressure 82/49. Patient complained of dizziness, prior to RRT. Blood sugar checked and 197. Wife at bedside. During RRT, CBI was running but then clotted off during RRT. Attempted many times to hand irrigate and unsuccessful.  Traction placed during RRT. CBI then stopped since unsuccessful hand irrigation. Patient had to be hand irrigated at arrival to floor right from PACU prior. Patient was running wide open with very blood urine with small clots prior to RRT. Very thick bloody urine. Cath bag had to be changed prior to RRT due to be unable to empty catheter bag clamp from bag. Urology repaged during RRT.  talked with urologist() while at the bedside.  Dr. Reese from Urology  to come and access patient.

## 2018-10-05 PROBLEM — R31.9 HEMATURIA: Status: ACTIVE | Noted: 2018-10-05

## 2018-10-05 LAB
ANION GAP SERPL CALCULATED.3IONS-SCNC: 11 MMOL/L (ref 3–14)
BLD PROD TYP BPU: NORMAL
BLD PROD TYP BPU: NORMAL
BLD UNIT ID BPU: 0
BLD UNIT ID BPU: 0
BLOOD PRODUCT CODE: NORMAL
BLOOD PRODUCT CODE: NORMAL
BPU ID: NORMAL
BPU ID: NORMAL
BUN SERPL-MCNC: 39 MG/DL (ref 7–30)
CALCIUM SERPL-MCNC: 7.2 MG/DL (ref 8.5–10.1)
CHLORIDE SERPL-SCNC: 114 MMOL/L (ref 94–109)
CO2 SERPL-SCNC: 14 MMOL/L (ref 20–32)
COPATH REPORT: NORMAL
CREAT SERPL-MCNC: 3.09 MG/DL (ref 0.66–1.25)
GFR SERPL CREATININE-BSD FRML MDRD: 20 ML/MIN/1.7M2
GLUCOSE BLDC GLUCOMTR-MCNC: 130 MG/DL (ref 70–99)
GLUCOSE BLDC GLUCOMTR-MCNC: 164 MG/DL (ref 70–99)
GLUCOSE BLDC GLUCOMTR-MCNC: 167 MG/DL (ref 70–99)
GLUCOSE BLDC GLUCOMTR-MCNC: 176 MG/DL (ref 70–99)
GLUCOSE BLDC GLUCOMTR-MCNC: 186 MG/DL (ref 70–99)
GLUCOSE SERPL-MCNC: 188 MG/DL (ref 70–99)
HGB BLD-MCNC: 7.6 G/DL (ref 13.3–17.7)
HGB BLD-MCNC: 8.7 G/DL (ref 13.3–17.7)
LACTATE BLD-SCNC: 1.2 MMOL/L (ref 0.7–2)
POTASSIUM SERPL-SCNC: 4.5 MMOL/L (ref 3.4–5.3)
SODIUM SERPL-SCNC: 139 MMOL/L (ref 133–144)
TRANSFUSION STATUS PATIENT QL: NORMAL

## 2018-10-05 PROCEDURE — 87040 BLOOD CULTURE FOR BACTERIA: CPT | Performed by: INTERNAL MEDICINE

## 2018-10-05 PROCEDURE — 85018 HEMOGLOBIN: CPT | Performed by: UROLOGY

## 2018-10-05 PROCEDURE — 25000132 ZZH RX MED GY IP 250 OP 250 PS 637: Performed by: UROLOGY

## 2018-10-05 PROCEDURE — 85018 HEMOGLOBIN: CPT | Performed by: INTERNAL MEDICINE

## 2018-10-05 PROCEDURE — 25800025 ZZH RX 258: Performed by: INTERNAL MEDICINE

## 2018-10-05 PROCEDURE — 25000125 ZZHC RX 250: Performed by: UROLOGY

## 2018-10-05 PROCEDURE — 25000128 H RX IP 250 OP 636: Performed by: UROLOGY

## 2018-10-05 PROCEDURE — 25800025 ZZH RX 258: Performed by: UROLOGY

## 2018-10-05 PROCEDURE — 36415 COLL VENOUS BLD VENIPUNCTURE: CPT | Performed by: INTERNAL MEDICINE

## 2018-10-05 PROCEDURE — 99207 ZZC CONSULT E&M CHANGED TO INITIAL LEVEL: CPT | Performed by: INTERNAL MEDICINE

## 2018-10-05 PROCEDURE — 25000131 ZZH RX MED GY IP 250 OP 636 PS 637: Performed by: UROLOGY

## 2018-10-05 PROCEDURE — 12000000 ZZH R&B MED SURG/OB

## 2018-10-05 PROCEDURE — 80048 BASIC METABOLIC PNL TOTAL CA: CPT | Performed by: INTERNAL MEDICINE

## 2018-10-05 PROCEDURE — P9016 RBC LEUKOCYTES REDUCED: HCPCS | Performed by: INTERNAL MEDICINE

## 2018-10-05 PROCEDURE — 83605 ASSAY OF LACTIC ACID: CPT | Performed by: INTERNAL MEDICINE

## 2018-10-05 PROCEDURE — 00000146 ZZHCL STATISTIC GLUCOSE BY METER IP

## 2018-10-05 PROCEDURE — 99222 1ST HOSP IP/OBS MODERATE 55: CPT | Performed by: INTERNAL MEDICINE

## 2018-10-05 PROCEDURE — 25000131 ZZH RX MED GY IP 250 OP 636 PS 637: Performed by: INTERNAL MEDICINE

## 2018-10-05 RX ORDER — NICOTINE POLACRILEX 4 MG
15-30 LOZENGE BUCCAL
Status: DISCONTINUED | OUTPATIENT
Start: 2018-10-05 | End: 2018-10-09 | Stop reason: HOSPADM

## 2018-10-05 RX ORDER — DEXTROSE MONOHYDRATE 25 G/50ML
25-50 INJECTION, SOLUTION INTRAVENOUS
Status: DISCONTINUED | OUTPATIENT
Start: 2018-10-05 | End: 2018-10-09 | Stop reason: HOSPADM

## 2018-10-05 RX ADMIN — GLIPIZIDE 10 MG: 10 TABLET ORAL at 16:41

## 2018-10-05 RX ADMIN — GLIPIZIDE 10 MG: 10 TABLET ORAL at 08:06

## 2018-10-05 RX ADMIN — LEVETIRACETAM 500 MG: 500 TABLET, FILM COATED ORAL at 08:06

## 2018-10-05 RX ADMIN — FLUOXETINE 40 MG: 20 CAPSULE ORAL at 08:04

## 2018-10-05 RX ADMIN — HYDROCODONE BITARTRATE AND ACETAMINOPHEN 1 TABLET: 5; 325 TABLET ORAL at 14:04

## 2018-10-05 RX ADMIN — HYDROCODONE BITARTRATE AND ACETAMINOPHEN 1 TABLET: 5; 325 TABLET ORAL at 19:53

## 2018-10-05 RX ADMIN — METOPROLOL SUCCINATE 50 MG: 50 TABLET, EXTENDED RELEASE ORAL at 08:04

## 2018-10-05 RX ADMIN — DOCUSATE SODIUM 100 MG: 100 CAPSULE, LIQUID FILLED ORAL at 19:53

## 2018-10-05 RX ADMIN — CIPROFLOXACIN 400 MG: 2 INJECTION, SOLUTION INTRAVENOUS at 21:17

## 2018-10-05 RX ADMIN — BUDESONIDE AND FORMOTEROL FUMARATE DIHYDRATE 2 PUFF: 80; 4.5 AEROSOL RESPIRATORY (INHALATION) at 11:39

## 2018-10-05 RX ADMIN — INSULIN GLARGINE 7 UNITS: 100 INJECTION, SOLUTION SUBCUTANEOUS at 22:07

## 2018-10-05 RX ADMIN — BUDESONIDE AND FORMOTEROL FUMARATE DIHYDRATE 2 PUFF: 80; 4.5 AEROSOL RESPIRATORY (INHALATION) at 19:56

## 2018-10-05 RX ADMIN — TOPIRAMATE 50 MG: 25 TABLET, FILM COATED ORAL at 08:04

## 2018-10-05 RX ADMIN — SODIUM CHLORIDE 1000 ML: 9 INJECTION, SOLUTION INTRAVENOUS at 00:15

## 2018-10-05 RX ADMIN — SIMVASTATIN 10 MG: 10 TABLET, FILM COATED ORAL at 21:16

## 2018-10-05 RX ADMIN — INSULIN ASPART 1 UNITS: 100 INJECTION, SOLUTION INTRAVENOUS; SUBCUTANEOUS at 12:19

## 2018-10-05 RX ADMIN — FENOFIBRATE 160 MG: 160 TABLET ORAL at 21:16

## 2018-10-05 RX ADMIN — PANTOPRAZOLE SODIUM 40 MG: 40 TABLET, DELAYED RELEASE ORAL at 06:07

## 2018-10-05 RX ADMIN — LIRAGLUTIDE 1.8 MG: 6 INJECTION SUBCUTANEOUS at 21:26

## 2018-10-05 RX ADMIN — DEXTROSE AND SODIUM CHLORIDE: 5; 450 INJECTION, SOLUTION INTRAVENOUS at 02:18

## 2018-10-05 RX ADMIN — PANTOPRAZOLE SODIUM 40 MG: 40 TABLET, DELAYED RELEASE ORAL at 16:41

## 2018-10-05 RX ADMIN — FERROUS SULFATE TAB 325 MG (65 MG ELEMENTAL FE) 325 MG: 325 (65 FE) TAB at 08:04

## 2018-10-05 RX ADMIN — LEVETIRACETAM 500 MG: 500 TABLET, FILM COATED ORAL at 19:53

## 2018-10-05 RX ADMIN — TOPIRAMATE 50 MG: 25 TABLET, FILM COATED ORAL at 19:53

## 2018-10-05 RX ADMIN — ONDANSETRON 4 MG: 2 INJECTION INTRAMUSCULAR; INTRAVENOUS at 14:04

## 2018-10-05 RX ADMIN — FOLIC ACID 1 MG: 1 TABLET ORAL at 08:05

## 2018-10-05 RX ADMIN — LINAGLIPTIN 5 MG: 5 TABLET, FILM COATED ORAL at 08:04

## 2018-10-05 RX ADMIN — DEXTROSE AND SODIUM CHLORIDE: 5; 450 INJECTION, SOLUTION INTRAVENOUS at 21:17

## 2018-10-05 RX ADMIN — CIPROFLOXACIN 400 MG: 2 INJECTION, SOLUTION INTRAVENOUS at 11:45

## 2018-10-05 NOTE — PROGRESS NOTES
Progress Note Update    Consult completed earlier this morning.  The patient is feeling ok today.  He denies pain.  Does feel very tired.  Feels achy from lying in bed for a few days.  Hematuria had resolved, when he got up to the bathroom had slight haematuria again but this has now resolved.  Eating small amounts, no nausea or emesis.  Has chronic SOB but it is stable.  No CP.     Received 1 unit PRBC yesterday, now receiving a second.     He remains on antibiotics for possible UTI.  Lactate has now normalized.  Hgb after transfusion today (his second so far) improved to 8.7.  Repeat BMP showing rising creatinine, now 3.09.  He had been on Lisinopril and Metformin, these were discontinued.  Will check FENA.  Continue IVF.  Avoid nephrotoxic agents.    Agree with A/P as outlined by initial consult note.    Nasreen Lewis MD

## 2018-10-05 NOTE — UTILIZATION REVIEW
"  Concomitant stay Status; Secondary Review Determination     Admission Date: 10/3/2018 12:41 PM      Under the authority of the Utilization Management Committee, the utilization review process indicated a secondary review on the above patient.  The review outcome is based on review of the medical records, discussions with staff, and applying clinical experience noted on the date of the review.        (x)      Inpatient Status Appropriate - This patient's medical care is consistent with medical management for inpatient care and reasonable inpatient medical practice.      () Observation Status Appropriate - This patient does not meet hospital inpatient criteria and is placed in observation status. If this patient's primary payer is Medicare and was admitted as an inpatient, Condition Code 44 should be used and patient status changed to \"observation\".   () Admission Status NOT Appropriate - This patient's medical care is not consistent with medical management for Inpatient or Observation Status.          RATIONALE FOR DETERMINATION   Patient is a 71 yo male who underwent TURP on 10/3.  He was initially placed in the hospital on outpatient status for expected routine post operative care.  Unfortunately, he developed sepsis last night.  He was is now requiring IV ciprofloxacin.  He did require a transfusion of packed red blood cells for anemia.  He is on continuous IV fluids.  Due to this patient's advanced age, sepsis requiring IV Cipro, anemia requiring transfusion of PRBC's, and need for continuous IV fluids, it is appropriate to admit this patient to the hospital as an inpatient.      The severity of illness, intensity of service provided, expected LOS and risk for adverse outcome make the care complex, high risk and appropriate for hospital admission.        The information on this document is developed by the utilization review team in order for the business office to ensure compliance.  This only denotes the " appropriateness of proper admission status and does not reflect the quality of care rendered.         The definitions of Inpatient Status and Observation Status used in making the determination above are those provided in the CMS Coverage Manual, Chapter 1 and Chapter 6, section 70.4.      Sincerely,     Jose Gonsales D.O.  Utilization Review/ Case Management  [facility].

## 2018-10-05 NOTE — PLAN OF CARE
Problem: Patient Care Overview  Goal: Plan of Care/Patient Progress Review  Outcome: No Change  Patient is alert and orientated x4. Up with assist of 1 and cane or walker. VSS through out the day. Patient received 1 unit of blood this AM due to hgb at 7.6. Hgb increased to 8.7 after unit of blood. This AM patient was up out of bed and urine became red in the CBI. RN increased drip of CBI, urine became pink. Urine is now light pink and CBI is still flowing. Patient has not complained of abdominal pain or nausea today. Cdiff sample needed, has not had any loose stools today. Blood glucose checks 186 at lunch, 1 unit given.

## 2018-10-05 NOTE — PLAN OF CARE
Problem: Patient Care Overview  Goal: Plan of Care/Patient Progress Review  Outcome: No Change  PRIMARY DIAGNOSIS: TURP & Cystoscopy  OUTPATIENT/OBSERVATION GOALS TO BE MET BEFORE DISCHARGE:  1. Stable vital signs Yes  2. Tolerating diet:Yes  3. Pain controlled with oral pain medications:  No  4. Positive bowel sounds:  Yes  5. Voiding without difficulty:  No  6. Able to ambulate:  Yes  7. Provider specific discharge goals met:  No    Discharge Planner Nurse   Safe discharge environment identified: Yes  Barriers to discharge: Yes       Entered by: Rosa Sanders 10/05/2018 1:56 AM     Please review provider order for any additional goals.   Nurse to notify provider when observation goals have been met and patient is ready for discharge.    A&Ox4. New PIV placed, infusing blood, no transfusion reaction so far, 1L bolus running. AVSS, CPAP in place. RUQ pain & nausea, PRN IV dilaudid & zofran. CBI w/clear, pale urine, slowed rate. Repeat Hgb & lactic in am. Wife at bedside. Urology & hospitalist following. Will continue to monitor.

## 2018-10-05 NOTE — PROGRESS NOTES
Urology    Sepsis protocol initiated last night - elevated lactic acid and creatinine, hgb 7.5  Started IVFluids, transfused 1 unit, lactic acid now resolved, hgb with minimal response  hematuria resloved by yesterday afternoon - currently clear urine  Having loose stools    Abd S/NT/ND  Urine clear and yellow  hgb 7.6    A/P: Transfuse 1 more unit this AM  Check stools for CDif  Continuing to monitor with daniels in place  Cipro 400 IV bid

## 2018-10-05 NOTE — CONSULTS
[facility]    Hospitalist Consultation    Date of Admission:  10/3/2018  Date of Consult (When I saw the patient): 10/05/18    Assessment & Plan   Donnie Vincent is a 72 year old male patient with past medical history of BPH, COPD, CVA, seizure disorder, diabetes mellitus, GERD, hypertension, hyperlipidemia, was admitted for prostate surgery.  He underwent cystoscopy, removal of bladder stones, transurethral resection of prostate.  He was started on IV ciprofloxacin 400 mg twice daily, and also IV fluid.  Lactic acid elevated at 2.4.  Creatinine 2.55.  Bladder irrigation started.    1.  Status post cystoscopy, removal of bladder stones, transurethral resection of prostate, postop day #0  -Continue bladder irrigation as per urology  --We will continue IV fluid and IV ciprofloxacin    2.  Tachycardia, leukocytosis, lactic acidosis r/o early sepsis likely urinary source  S/P cystoscopy, TURP  --Mental status stable  --On bladder irrigation. UA not available. Already started on IV ciprofloxacin. Will give IV fluid.  Will recheck lactic acid level.    3.  Postop blood loss anemia: Hemoglobin 7.5.  1 unit of PRBC ordered by urology.  Will follow hemoglobin.    3.  CKD: Baseline creatinine around 1.5.  Creatinine 2.55 today.  Currently on IV fluid.  Will continue to monitor renal function.    4.  Hyperlipidemia: Will continue fenofibrate    5.  Seizure disorder: Will continue Keppra    6.  Diabetes mellitus type 2, insulin requiring:   --Resumed on Lantus insulin 7 units at bedtime, glipizide 10 mg p.o. twice daily, Victoza 1.8 mg subcu at bedtime, linagliptin 5 mg oral daily, metformin 500 mg p.o. daily  --Will monitor blood sugar.  --Will add insulin sliding scale    DVT Prophylaxis: Pneumatic Compression Devices  Code Status: Prior    Disposition: Expected discharge per     Pippa Vann MD    Reason for Consult   Reason for consult: I was asked by Dr. Banuelos for elevated lactic acid, postop medical  management    Primary Care Physician   Suyapa Leon    Chief Complaint   Postop medical management and to address elevated lactic acid level    History is obtained from the patient    History of Present Illness   Donnie Vincent is a 72 year old male patient with past medical history of BPH, COPD, CVA, seizure disorder, diabetes mellitus, GERD, hypertension, hyperlipidemia, was admitted for prostate surgery.  Patient underwent cystoscopy, removal of bladder stones, transurethral resection of prostate.  Bladder irrigation was started.  Hospitalist team consulted for elevated lactic acid at 2.4.  Patient denies fever.  He has no cough or shortness of breath.  He also denies chest pain.  No nausea or vomiting.  Also denies abdominal pain.  Laboratory workup earlier this evening showed creatinine 2.55, lactic acid 2.4, blood glucose 185, WBC 14.7, hemoglobin 7.5. 1 unit of PRBC was ordered by urology.  Patient was placed on IV ciprofloxacin 400 mg every 12 hours, and IV fluid.  Hospitalist group was consulted for postop medical management and to address elevated lactic acid level.    Past Medical History    I have reviewed this patient's medical history and updated it with pertinent information if needed.   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     COPD (chronic obstructive pulmonary disease) (H)      CVA (cerebral infarction)      Diabetes new 4/09    initial A1C 6.8 4/08;; has done diabetic teaching      Diverticulitis of colon (without mention of hemorrhage)(562.11) 2001; 9/06     Gastro-oesophageal reflux disease      Hernia, abdominal      History of blood transfusion      HTN (hypertension)      Hyperlipidemia LDL goal < 100      Other forms of migraine, without mention of intractable migraine without mention of status migrainosus       PFO (patent foramen ovale)     small per echo 11/2013     Renal disease     Hx kidney stones 12 yrs ago     Seizure disorder (H)     sees neurologist     Seizures (H)     8 years ago - no recurrence     Sleep apnea     wears CPAP     Stroke (H) early april 2017     Unspecified congenital anomaly of lung     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 possibly from asbestos exposure in [employer] (remote)        Past Surgical History   I have reviewed this patient's surgical history and updated it with pertinent information if needed.  Past Surgical History:   Procedure Laterality Date     C NONSPECIFIC PROCEDURE      colonoscopy 2005     COLONOSCOPY  11/4/2015    Dr. Ernesto SPEARS     COLONOSCOPY N/A 11/4/2015    Procedure: COLONOSCOPY;  Surgeon: Yazmin Orozco MD;  Location: [facility]     CYSTOSCOPY, RETROGRADES, EXTRACT STONE, COMBINED Left 11/25/2015    Procedure: COMBINED CYSTOSCOPY, RETROGRADES, EXTRACT STONE;  Surgeon: Neville Banuelos MD;  Location: [facility]     CYSTOSCOPY, TRANSURETHRAL RESECTION (TUR) PROSTATE, COMBINED N/A 10/3/2018    Procedure: COMBINED CYSTOSCOPY, TRANSURETHRAL RESECTION (TUR) PROSTATE;  Cystoscopy, removal of bladder stones with holmium laser, transurethral resection of prostate using Olympus bipolar electrode;  Surgeon: Neville Banuelos MD;  Location: [facility]     ESOPHAGOSCOPY, GASTROSCOPY, DUODENOSCOPY (EGD), COMBINED  11/5/2015    Dr. Ernesto CONTRERAS     ESOPHAGOSCOPY, GASTROSCOPY, DUODENOSCOPY (EGD), COMBINED  06/27/2018    Dr. Ernesto CONTRERAS     GENITOURINARY SURGERY      kidney stone - lithotripsy     HERNIA REPAIR       LASER HOLMIUM LITHOTRIPSY BLADDER N/A 10/3/2018    Procedure: LASER HOLMIUM LITHOTRIPSY BLADDER;;  Surgeon: Neville Banuelos MD;  Location: [facility]     PHACOEMULSIFICATION CLEAR CORNEA WITH STANDARD INTRAOCULAR LENS IMPLANT  12/20/2011    Procedure:PHACOEMULSIFICATION CLEAR CORNEA WITH STANDARD INTRAOCULAR LENS IMPLANT; RIGHT PHACOEMULSIFICATION CLEAR CORNEA WITH STANDARD  INTRAOCULAR LENS IMPLANT ; Surgeon:GUILHERME KAUFMAN; Location:[facility]       Prior to Admission Medications   Prior to Admission Medications   Prescriptions Last Dose Informant Patient Reported? Taking?   FLUoxetine (PROZAC) 40 MG capsule 10/2/2018 at Unknown time  No Yes   Sig: Take 1 capsule (40 mg) by mouth daily   LISINOPRIL PO 10/2/2018 at Unknown time  Yes Yes   Sig: Take 20 mg by mouth daily   METFORMIN HCL PO Past Week at Unknown time  Yes Yes   Sig: Take 500 mg by mouth 2 times daily (with meals)   METOPROLOL SUCCINATE ER PO 10/2/2018 at Unknown time  Yes Yes   Sig: Take 50 mg by mouth daily   PANTOPRAZOLE SODIUM PO 10/2/2018 at Unknown time  Yes Yes   Sig: Take 40 mg by mouth 2 times daily (before meals)   Topiramate (TOPAMAX PO) 10/2/2018 at Unknown time  Yes Yes   Sig: Take 50 mg by mouth 2 times daily   albuterol (PROAIR HFA, PROVENTIL HFA, VENTOLIN HFA) 108 (90 BASE) MCG/ACT inhaler Past Week at Unknown time  No Yes   Sig: Inhale 2 puffs into the lungs every 6 hours   Patient taking differently: Inhale 2 puffs into the lungs every 6 hours as needed    budesonide-formoterol (SYMBICORT) 80-4.5 MCG/ACT inhaler 10/2/2018 at Unknown time  Yes Yes   Sig: Inhale 2 puffs into the lungs 2 times daily    clopidogrel (PLAVIX) 75 MG tablet 9/30/2018  No Yes   Sig: Take 1 tablet (75 mg) by mouth daily   fenofibrate 160 MG tablet 10/2/2018 at Unknown time  No Yes   Sig: Take 1 tablet (160 mg) by mouth daily   ferrous sulfate (IRON) 325 (65 FE) MG tablet 10/2/2018 at Unknown time  No Yes   Sig: Take 1 tablet (325 mg) by mouth daily (with breakfast)   folic acid (FOLVITE) 1 MG tablet 10/2/2018 at Unknown time  No Yes   Sig: Take 1 tablet (1 mg) by mouth daily   glipiZIDE (GLUCOTROL) 10 MG tablet 10/2/2018 at Unknown time  No Yes   Sig: Take 1 tablet (10 mg) by mouth 2 times daily (before meals)   insulin glargine (LANTUS SOLOSTAR) 100 UNIT/ML pen 10/2/2018 at Unknown time  Yes Yes   Sig: Inject 14 Units Subcutaneous  At Bedtime   insulin pen needle 31G X 5 MM   No No   Sig: Use 1-2 pen needles daily or as directed.   levETIRAcetam (KEPPRA) 500 MG tablet 10/2/2018 at Unknown time  Yes Yes   Sig: Take 500 mg by mouth 2 times daily   linagliptin (TRADJENTA) 5 MG TABS tablet 10/2/2018 at Unknown time  Yes Yes   Sig: Take 5 mg by mouth daily Pt takes 1/2 tab daily   liraglutide (VICTOZA) 18 MG/3ML soln 10/2/2018 at Unknown time  Yes Yes   Sig: Inject 1.8 mg Subcutaneous At Bedtime    simvastatin (ZOCOR) 10 MG tablet 10/2/2018 at Unknown time  No Yes   Sig: Take 1 tablet (10 mg) by mouth At Bedtime   tamsulosin (FLOMAX) 0.4 MG 24 hr capsule 10/2/2018 at Unknown time  No Yes   Sig: Take 1 capsule (0.4 mg) by mouth daily      Facility-Administered Medications: None     Allergies   Allergies   Allergen Reactions     Penicillins      Sore joints and he had to be hospitalized for it       Social History   I have reviewed this patient's social history and updated it with pertinent information if needed. Donnie Vincent  reports that he quit smoking about 48 years ago. He has never used smokeless tobacco. He reports that he drinks alcohol. He reports that he does not use illicit drugs.    Family History   I have reviewed this patient's family history and updated it with pertinent information if needed.   Family History   Problem Relation Age of Onset     Family History Negative Mother      migraines     Family History Negative Father      lived to be 92     Colon Cancer No family hx of        Review of Systems   The 10 point Review of Systems is negative other than noted in the HPI or here. Consulted for elevated lactic acid    Physical Exam   Temp: 97.1  F (36.2  C) Temp src: Oral BP: 143/64   Heart Rate: 108 Resp: 20 SpO2: 98 % O2 Device: None (Room air)    Vital Signs with Ranges  Temp:  [96.7  F (35.9  C)-98.1  F (36.7  C)] 97.1  F (36.2  C)  Heart Rate:  [] 108  Resp:  [18-20] 20  BP: ()/(48-64) 143/64  SpO2:  [96 %-98 %]  98 %  252 lbs 0 oz    GEN:  Alert, oriented x 3, appears comfortable, NAD.  HEENT:  Normocephalic/atraumatic, no scleral icterus, no nasal discharge, mouth moist.  CV:  Regular rate and rhythm, no murmur or JVD.  S1 + S2 noted, no S3 or S4.  LUNGS:  Clear to auscultation bilaterally without rales/rhonchi/wheezing/retractions.  Symmetric chest rise on inhalation noted.  ABD:  Active bowel sounds, soft, non-tender/non-distended.  No rebound/guarding/rigidity.  EXT:  No edema or cyanosis.  Hands/feet warm to touch with good signs of peripheral perfusion.  No joint synovitis noted.  SKIN:  Dry to touch, no exanthems noted in the visualized areas.  NEURO:  Symmetric muscle strength, sensation to touch grossly intact.  No new focal deficits appreciated.    Data   -Data reviewed today: All pertinent laboratory and imaging results from this encounter were reviewed. I personally reviewed      Recent Labs  Lab 10/04/18  2215 10/04/18  0550 10/04/18  0005 10/03/18  1905   WBC 14.7* 19.8*  --  12.9*   HGB 7.5* 8.7* 9.2* 9.7*  Canceled, Test credited   MCV 97 93  --  92    289  --  319    141  --   --    POTASSIUM 4.6 5.5*  --   --    CHLORIDE 112* 113*  --   --    CO2 17* 16*  --   --    BUN 39* 34*  --   --    CR 2.55* 2.47*  --   --    ANIONGAP 11 12  --   --    ZAYRA 7.3* 7.1*  --   --    * 249*  --   --        No results found for this or any previous visit (from the past 24 hour(s)).

## 2018-10-05 NOTE — PROGRESS NOTES
Infection Prevention:    Patient placed on Enteric precautions for possible Cdiff. Please contact Infection Prevention with any questions/concerns at *[phone].    Elisha Padilla, ICP

## 2018-10-05 NOTE — PROGRESS NOTES
Pt triggered sepsis protocol, lactic acid 2.4. Dr Mckeon from urology notified by phone. Telephone orders with read back initiated- D51/2 NS, CBC, BMP, Cipro 400mg IV Q12h. Pt and family updated on new plan   Pt VSS, Afebrile. Reports mild right abdominal pain, norco given with relief. CBI Running without any clots. Pt denies any pressure to maria l area.  Pt is alert and oriented, makes needs known. Getting up to the bathroom with staff assist. Incontinent of bowel. Reports soft to loose stools x3 this shift, staff will continue to monitor for stool consistency.

## 2018-10-05 NOTE — PLAN OF CARE
Problem: Patient Care Overview  Goal: Plan of Care/Patient Progress Review  Outcome: No Change  PRIMARY DIAGNOSIS: TURP & Cystoscopy  OUTPATIENT/OBSERVATION GOALS TO BE MET BEFORE DISCHARGE:  1. Stable vital signs Yes  2. Tolerating diet:Yes  3. Pain controlled with oral pain medications:  Yes  4. Positive bowel sounds:  Yes  5. Voiding without difficulty:  No, daniels in place  6. Able to ambulate:  Yes  7. Provider specific discharge goals met:  No    Discharge Planner Nurse   Safe discharge environment identified: Yes  Barriers to discharge: Yes       Entered by: Rosa Sanders 10/05/2018 4:28 AM     Please review provider order for any additional goals.   Nurse to notify provider when observation goals have been met and patient is ready for discharge.    A&Ox4. AVSS, CPAP in place. 1 unit PRBCs completed, PIV infusing D5 1/2NS. Denies pain or nausea. , no coverage needed. CBI w/clear, pale urine, slowed rate. Repeat Hgb & lactic in am, blood cultures drawn & pending. Urology & hospitalist following. Will continue to monitor.

## 2018-10-05 NOTE — PROVIDER NOTIFICATION
2300: Dr. Banuelos (urology) paged back with results of BMP & CBC. Pt c/o of new onset RUQ abd pain & nausea. Had 2 loose stool tonight. Belly round & obese, cannot fully assess if distended or not. Telephone with readback orders- 1L bolus, NPO for bowel rest, C.diff sample, and 1 unit PRBCs. No AXR at this time. Will continue to monitor.     Addendum 0000: Dr. Vann (hospitalist) ordered 500cc bolus. Paged back wondering about needing another bolus. Dr. Vann called back and stated will come see pt. Hold 500cc bolus for now.     Addeundum 0030: Dr. Vann to see pt. Continue with current plan of care.

## 2018-10-06 ENCOUNTER — APPOINTMENT (OUTPATIENT)
Dept: ULTRASOUND IMAGING | Facility: CLINIC | Age: 73
DRG: 987 | End: 2018-10-06
Attending: INTERNAL MEDICINE
Payer: COMMERCIAL

## 2018-10-06 LAB
ABO + RH BLD: NORMAL
ABO + RH BLD: NORMAL
ANION GAP SERPL CALCULATED.3IONS-SCNC: 10 MMOL/L (ref 3–14)
BLD GP AB SCN SERPL QL: NORMAL
BLD PROD TYP BPU: NORMAL
BLD PROD TYP BPU: NORMAL
BLD UNIT ID BPU: 0
BLOOD BANK CMNT PATIENT-IMP: NORMAL
BLOOD PRODUCT CODE: NORMAL
BPU ID: NORMAL
BUN SERPL-MCNC: 37 MG/DL (ref 7–30)
CALCIUM SERPL-MCNC: 7.1 MG/DL (ref 8.5–10.1)
CHLORIDE SERPL-SCNC: 115 MMOL/L (ref 94–109)
CO2 SERPL-SCNC: 17 MMOL/L (ref 20–32)
CREAT SERPL-MCNC: 3.81 MG/DL (ref 0.66–1.25)
CREAT UR-MCNC: 66 MG/DL
ERYTHROCYTE [DISTWIDTH] IN BLOOD BY AUTOMATED COUNT: 17 % (ref 10–15)
FRACT EXCRET NA UR+SERPL-RTO: 3.4 %
GFR SERPL CREATININE-BSD FRML MDRD: 16 ML/MIN/1.7M2
GLUCOSE BLDC GLUCOMTR-MCNC: 142 MG/DL (ref 70–99)
GLUCOSE BLDC GLUCOMTR-MCNC: 178 MG/DL (ref 70–99)
GLUCOSE BLDC GLUCOMTR-MCNC: 57 MG/DL (ref 70–99)
GLUCOSE BLDC GLUCOMTR-MCNC: 59 MG/DL (ref 70–99)
GLUCOSE BLDC GLUCOMTR-MCNC: 80 MG/DL (ref 70–99)
GLUCOSE BLDC GLUCOMTR-MCNC: 98 MG/DL (ref 70–99)
GLUCOSE SERPL-MCNC: 124 MG/DL (ref 70–99)
HCT VFR BLD AUTO: 23.5 % (ref 40–53)
HGB BLD-MCNC: 7.2 G/DL (ref 13.3–17.7)
HGB BLD-MCNC: 8.2 G/DL (ref 13.3–17.7)
MCH RBC QN AUTO: 28.1 PG (ref 26.5–33)
MCHC RBC AUTO-ENTMCNC: 30.6 G/DL (ref 31.5–36.5)
MCV RBC AUTO: 92 FL (ref 78–100)
NUM BPU REQUESTED: 3
PLATELET # BLD AUTO: 154 10E9/L (ref 150–450)
POTASSIUM SERPL-SCNC: 3.9 MMOL/L (ref 3.4–5.3)
RBC # BLD AUTO: 2.56 10E12/L (ref 4.4–5.9)
SODIUM SERPL-SCNC: 142 MMOL/L (ref 133–144)
SODIUM UR-SCNC: 85 MMOL/L
SPECIMEN EXP DATE BLD: NORMAL
TRANSFUSION STATUS PATIENT QL: NORMAL
TRANSFUSION STATUS PATIENT QL: NORMAL
WBC # BLD AUTO: 10.1 10E9/L (ref 4–11)

## 2018-10-06 PROCEDURE — 82570 ASSAY OF URINE CREATININE: CPT | Performed by: INTERNAL MEDICINE

## 2018-10-06 PROCEDURE — 25000132 ZZH RX MED GY IP 250 OP 250 PS 637: Performed by: INTERNAL MEDICINE

## 2018-10-06 PROCEDURE — 25000132 ZZH RX MED GY IP 250 OP 250 PS 637: Performed by: UROLOGY

## 2018-10-06 PROCEDURE — 84300 ASSAY OF URINE SODIUM: CPT | Performed by: INTERNAL MEDICINE

## 2018-10-06 PROCEDURE — 25000125 ZZHC RX 250: Performed by: UROLOGY

## 2018-10-06 PROCEDURE — 80048 BASIC METABOLIC PNL TOTAL CA: CPT | Performed by: UROLOGY

## 2018-10-06 PROCEDURE — 99233 SBSQ HOSP IP/OBS HIGH 50: CPT | Performed by: INTERNAL MEDICINE

## 2018-10-06 PROCEDURE — 40000556 ZZH STATISTIC PERIPHERAL IV START W US GUIDANCE

## 2018-10-06 PROCEDURE — 36415 COLL VENOUS BLD VENIPUNCTURE: CPT | Performed by: UROLOGY

## 2018-10-06 PROCEDURE — 76770 US EXAM ABDO BACK WALL COMP: CPT

## 2018-10-06 PROCEDURE — 85027 COMPLETE CBC AUTOMATED: CPT | Performed by: UROLOGY

## 2018-10-06 PROCEDURE — 25000128 H RX IP 250 OP 636: Performed by: UROLOGY

## 2018-10-06 PROCEDURE — 85018 HEMOGLOBIN: CPT | Performed by: INTERNAL MEDICINE

## 2018-10-06 PROCEDURE — 00000146 ZZHCL STATISTIC GLUCOSE BY METER IP

## 2018-10-06 PROCEDURE — 25000128 H RX IP 250 OP 636: Performed by: INTERNAL MEDICINE

## 2018-10-06 PROCEDURE — 36415 COLL VENOUS BLD VENIPUNCTURE: CPT | Performed by: INTERNAL MEDICINE

## 2018-10-06 PROCEDURE — 25800025 ZZH RX 258: Performed by: INTERNAL MEDICINE

## 2018-10-06 PROCEDURE — P9016 RBC LEUKOCYTES REDUCED: HCPCS | Performed by: INTERNAL MEDICINE

## 2018-10-06 PROCEDURE — 12000000 ZZH R&B MED SURG/OB

## 2018-10-06 RX ORDER — CIPROFLOXACIN 2 MG/ML
400 INJECTION, SOLUTION INTRAVENOUS EVERY 24 HOURS
Status: DISCONTINUED | OUTPATIENT
Start: 2018-10-06 | End: 2018-10-08

## 2018-10-06 RX ORDER — SODIUM CHLORIDE 9 MG/ML
INJECTION, SOLUTION INTRAVENOUS CONTINUOUS
Status: DISCONTINUED | OUTPATIENT
Start: 2018-10-06 | End: 2018-10-08 | Stop reason: ALTCHOICE

## 2018-10-06 RX ORDER — POLYETHYLENE GLYCOL 3350 17 G/17G
17 POWDER, FOR SOLUTION ORAL DAILY
Status: DISCONTINUED | OUTPATIENT
Start: 2018-10-06 | End: 2018-10-09 | Stop reason: HOSPADM

## 2018-10-06 RX ADMIN — TOPIRAMATE 50 MG: 25 TABLET, FILM COATED ORAL at 08:19

## 2018-10-06 RX ADMIN — HYDROCODONE BITARTRATE AND ACETAMINOPHEN 1 TABLET: 5; 325 TABLET ORAL at 02:11

## 2018-10-06 RX ADMIN — SODIUM CHLORIDE: 9 INJECTION, SOLUTION INTRAVENOUS at 11:43

## 2018-10-06 RX ADMIN — LIDOCAINE HYDROCHLORIDE 10 ML: 20 JELLY TOPICAL at 19:59

## 2018-10-06 RX ADMIN — FOLIC ACID 1 MG: 1 TABLET ORAL at 08:24

## 2018-10-06 RX ADMIN — GLIPIZIDE 10 MG: 10 TABLET ORAL at 08:21

## 2018-10-06 RX ADMIN — POLYETHYLENE GLYCOL 3350 17 G: 17 POWDER, FOR SOLUTION ORAL at 11:04

## 2018-10-06 RX ADMIN — LEVETIRACETAM 500 MG: 500 TABLET, FILM COATED ORAL at 19:48

## 2018-10-06 RX ADMIN — HYDROCODONE BITARTRATE AND ACETAMINOPHEN 1 TABLET: 5; 325 TABLET ORAL at 08:21

## 2018-10-06 RX ADMIN — FERROUS SULFATE TAB 325 MG (65 MG ELEMENTAL FE) 325 MG: 325 (65 FE) TAB at 08:20

## 2018-10-06 RX ADMIN — PANTOPRAZOLE SODIUM 40 MG: 40 TABLET, DELAYED RELEASE ORAL at 16:04

## 2018-10-06 RX ADMIN — BUDESONIDE AND FORMOTEROL FUMARATE DIHYDRATE 2 PUFF: 80; 4.5 AEROSOL RESPIRATORY (INHALATION) at 20:18

## 2018-10-06 RX ADMIN — DEXTROSE 15 G: 15 GEL ORAL at 21:58

## 2018-10-06 RX ADMIN — DEXTROSE AND SODIUM CHLORIDE: 5; 450 INJECTION, SOLUTION INTRAVENOUS at 06:22

## 2018-10-06 RX ADMIN — TOPIRAMATE 50 MG: 25 TABLET, FILM COATED ORAL at 19:59

## 2018-10-06 RX ADMIN — DOCUSATE SODIUM 100 MG: 100 CAPSULE, LIQUID FILLED ORAL at 08:21

## 2018-10-06 RX ADMIN — FLUOXETINE 40 MG: 20 CAPSULE ORAL at 08:19

## 2018-10-06 RX ADMIN — DOCUSATE SODIUM 100 MG: 100 CAPSULE, LIQUID FILLED ORAL at 19:48

## 2018-10-06 RX ADMIN — PANTOPRAZOLE SODIUM 40 MG: 40 TABLET, DELAYED RELEASE ORAL at 06:22

## 2018-10-06 RX ADMIN — CIPROFLOXACIN 400 MG: 2 INJECTION, SOLUTION INTRAVENOUS at 19:48

## 2018-10-06 RX ADMIN — HYDROCODONE BITARTRATE AND ACETAMINOPHEN 1 TABLET: 5; 325 TABLET ORAL at 16:15

## 2018-10-06 RX ADMIN — LEVETIRACETAM 500 MG: 500 TABLET, FILM COATED ORAL at 08:20

## 2018-10-06 RX ADMIN — LINAGLIPTIN 5 MG: 5 TABLET, FILM COATED ORAL at 08:19

## 2018-10-06 RX ADMIN — SIMVASTATIN 10 MG: 10 TABLET, FILM COATED ORAL at 21:58

## 2018-10-06 ASSESSMENT — PAIN DESCRIPTION - DESCRIPTORS: DESCRIPTORS: ACHING

## 2018-10-06 NOTE — PLAN OF CARE
Problem: Patient Care Overview  Goal: Plan of Care/Patient Progress Review  Vitals: VSS. Temp: 97.2  F (36.2  C) Temp src: Oral BP: 124/57  Heart Rate: 100 Resp: 16 SpO2: 96 % O2 Device: None (Room air)   HR slightly elevated.   Labs: Hgb now stable at 8.7, Creat: 3.09 recheck in AM   Neuro: WDL  GI/: Pt continues with daniels. Output is lamonte with light pink tinge. Three way daniels in place but bladder irrigant is clamped at this time. Daniels will remain in place until urology sees pt.   Skin: WDL  Activity: Ax1 with walker and gait belt.   Diet: Regular  Pain: Pt did need NORCO overnight at 2AM for lower abdominal pain. Effective.    Plan: IVF. IV cipro. Enteric precautions for loose stools. Pt needing stool specimen collected for c diff testing. Bed alarm on as pt has transferred without assistance previously. Will continue to monitor.

## 2018-10-06 NOTE — PROGRESS NOTES
[facility]  Hospitalist Progress Note  Nasreen Lewis MD 10/06/18  Text Page  Pager: [phone] (7am-6pm)    Reason for Stay (Diagnosis): Medical management after TURP/cystoscopy/bladder stone removal         Assessment and Plan:      Summary of Stay: Donnie Vincent is a 72 year old male with past medical history of ISAEL on CPAP, BPH, COPD, prior CVA, seizure disorder, IDDM2, HTN and HLD who was admitted on 10/3/2018 after cystoscopy, removal of bladder stones and TURP.  Hospital course complicated by acute on chronic kidney disease and anemia requiring PRBC transfusion.    Problem List/Assessment and Plan:     1. S/p cystoscopy/removal of bladder stones/TURP: Continues on CBI per Urology.  Defer management to Urology.    2. ABLA: Prior to surgery Hgb was 9.7, declined to 7.5 after surgery.  Has received 2 units PRBC so far, today Hgb again declined to 7.2, will transfuse another unit.  Blood loss likely from bladder surgery, has continued to intermittently have pink urine.    - Repeat Hgb after 1 unit PRBC today    3. Acute on Chronic Kidney Disease: Baseline creatinine ~1.3-1.5.  Creatinine was 1.49 on 9/24, on admission was elevated to 2.47.  After surgery creatinine has continued to rise.  He does have a daniels catheter which is draining urine so this should not be obstructive.  He had been on Lisinopril and Metformin but these have been discontinued.  No imaging with contrast.  FENA is elevated to 3.4.  Given rising creatinine will consult Nephrology.  - Nephrology consult  - Avoid Nephrotoxic agents  - Stop Fenofibrate    4. HTN: PTA the patient was on Metoprolol 50 mg BID and Lisinopril 20 mg every day.  Had these ordered after surgery but Lisinopril on hold due to HARSHAL.  Hold parameters for Metoprolol, soft blood pressure this morning.    5. Seizure Disorder: Continue PTA Topiramate and Keppra.  No recent seizure activity.    6. ISAEL: CPAP per home settings.    7. Prior CVA:  "Antihypertensives as above.  Hold Plavix given continued anemia requiring PRBC transfusions.  Continue statin.    8. COPD: Reports chronic SOB.  No different than baseline.  Continue PTA Symbicort and PRN Albuterol.    9. IDDM2: PTA on Glipizide 10 mg every day, Lantus 14 units QHS, Victoza 1.8 mg QHS and Metformin.  Given his HARSHAL Metformin has been held, will also hold Glipizide.    - Continue Lantus  - Medium sliding scale insulin  - Continue Victoza but hold Glipizide and Metformin    10. HLD: Hold Fenofibrate given HARSHAL.    11. SIRS: On 10/5 patient developed tachycardia, leukocytosis and lactic acidosis.  He was started on Ciprofloxacin for possible UTI and remains on this.  No evidence of sepsis.    DVT Prophylaxis: Pneumatic Compression Devices  Code Status: Full Code  Discharge Dispo: Home  Estimated Disch Date / # of Days until Disch: suspect a few more days given persistent anemia requiring PRBC transfusion and rising creatinine        Interval History (Subjective):      Patient was seen with his bedside nurse this morning.  He states he feels generally tired and wants to go home.  We discussed his Hgb and creatinine in detail.  He states he is passing gas but has not had a BM in 2 days.  Trying to eat but poor appetite.  Has chronic SOB, no different than baseline.  At times he has a dry cough.  No CP, nausea or emesis.  Did have some pink urine last night but currently it is clear.                  Physical Exam:      Last Vital Signs:  /47 (BP Location: Right arm)  Pulse 98  Temp 98.5  F (36.9  C) (Oral)  Resp 16  Ht 1.803 m (5' 11\")  Wt 114.3 kg (252 lb)  SpO2 97%  BMI 35.15 kg/m2    General: Alert, awake, no acute distress, appears weak and fatigued  HEENT: Normocephalic and atraumatic, pale, eyes anicteric and without scleral injection, EOMI, face symmetric, MMM.  Cardiac: RRR, normal S1, S2. No m/g/r, no LE edema.  Pulmonary: Normal chest rise, normal work of breathing.  Lungs CTAB " without crackles or wheezing.  Abdomen: soft, non-tender, non-distended.  Normoactive bowel sounds, no guarding or rebound tenderness.  Extremities: no deformities.  Warm, well perfused.  Skin: no rashes or lesions.  Warm and Dry.  Neuro: No focal deficits.  Speech clear.  Moving all extremities in bed.  Psych: Alert and oriented x3. Appropriate affect.         Medications:      All current medications were reviewed with changes reflected in problem list.         Data:      All new lab and imaging data was reviewed.   Labs:    Recent Labs  Lab 10/06/18  0621 10/05/18  1302 10/04/18  2215    139 140   POTASSIUM 3.9 4.5 4.6   CHLORIDE 115* 114* 112*   CO2 17* 14* 17*   ANIONGAP 10 11 11   * 188* 185*   BUN 37* 39* 39*   CR 3.81* 3.09* 2.55*   GFRESTIMATED 16* 20* 25*   GFRESTBLACK 19* 24* 30*   ZAYRA 7.1* 7.2* 7.3*       Recent Labs  Lab 10/06/18  0621 10/05/18  1302 10/05/18  0707 10/04/18  2215 10/04/18  0550   WBC 10.1  --   --  14.7* 19.8*   HGB 7.2* 8.7* 7.6* 7.5* 8.7*   HCT 23.5*  --   --  26.4* 30.1*   MCV 92  --   --  97 93     --   --  221 289      Imaging:   No results found for this or any previous visit (from the past 24 hour(s)).    Nasreen Lewis MD.

## 2018-10-06 NOTE — PROGRESS NOTES
Patient still unable to void after daniels removal. Bladder scanned for 433. Urology paged. Per MD, give patient a little more time. If bladder scanned for 600 mL or greater, insert 20 [place] coude catheter with urojet. Will continue to monitor and assess.

## 2018-10-06 NOTE — PLAN OF CARE
Problem: Patient Care Overview  Goal: Plan of Care/Patient Progress Review  Outcome: Improving  Assumed care of patient at 1300. Has been resting comfortably most of this time. Denies pain. IVF infusing. Due to void from daniels catheter removal at noon; bladder scan at 14:45 for 171 mL. Denies urge to void at this time. Hemoglobin re-check 8.2 (7.2 previous). Awaiting renal ultrasound.

## 2018-10-06 NOTE — PLAN OF CARE
Problem: Anemia (Adult)  Goal: Identify Related Risk Factors and Signs and Symptoms  Related risk factors and signs and symptoms are identified upon initiation of Human Response Clinical Practice Guideline (CPG).   Outcome: No Change  Ao, vss. Denies pain. Sanabria removed-DTV. Transfused one unit-pt states feels better, recheck hgb at 1300. Up SBA. IVF infusing

## 2018-10-06 NOTE — PROGRESS NOTES
Urology progress note    Seen and examined.  Urine clear off CBI  Hg 7.2 this AM - receiving additional PRBC  SCr up to 3.81 - nephrology being consulted, renal U/S today, daniels removal today    Abd: soft, non-tender, non-distended  Daniels: urine clear    A/P:  71 y/o man s/p cystolitholapaxy and TURP    - Urine clear, removed daniels this AM  - Rising SCr - agree with nephrology consult, Renal U/S today. Possible that daniels balloon could cause mechanical obstruction to UOs - will assess SCr following daniels removal tomorrow  - Hg 7.2 today - no sign of active or ongoing bleeding - agree with PRBC    Lenny Duff M.D.   P - Urology

## 2018-10-06 NOTE — PLAN OF CARE
"Problem: Patient Care Overview  Goal: Plan of Care/Patient Progress Review  /64 (BP Location: Right arm)  Pulse 98  Temp 98.7  F (37.1  C) (Oral)  Resp 28  Ht 1.803 m (5' 11\")  Wt 114.3 kg (252 lb)  SpO2 96%  BMI 35.15 kg/m2    A/O:x4  VS:stable  O2:room air  Tele:n/a  LS:clear  Cardiac:wdl  GI:abdomen distended, patient complains of gas pains. Patient flushed stool on day shift - but reports it was formed. Regular diet. Glucose checks and coverage. Supper tray receipt removed so carb counts not done.  :wdl, CBI clamped by urologist, draining yellow with sediment. No complaints.  Skin:wdl  Lines/IV:.45 NS and D5 running at 125 ml/hr.  Pain:relieved by PRN oxycodone - pain is in abdomen  Plan:Monitor overnight. Supportive cares. Patient seems to be doing better but is still unsteady on feet. Bed alarm on. Enteric precautions maintained.        "

## 2018-10-07 LAB
ALBUMIN SERPL-MCNC: 2.5 G/DL (ref 3.4–5)
ANION GAP SERPL CALCULATED.3IONS-SCNC: 7 MMOL/L (ref 3–14)
BUN SERPL-MCNC: 31 MG/DL (ref 7–30)
CALCIUM SERPL-MCNC: 7.5 MG/DL (ref 8.5–10.1)
CHLORIDE SERPL-SCNC: 117 MMOL/L (ref 94–109)
CO2 SERPL-SCNC: 18 MMOL/L (ref 20–32)
CREAT SERPL-MCNC: 3.2 MG/DL (ref 0.66–1.25)
ERYTHROCYTE [DISTWIDTH] IN BLOOD BY AUTOMATED COUNT: 16.8 % (ref 10–15)
GFR SERPL CREATININE-BSD FRML MDRD: 19 ML/MIN/1.7M2
GLUCOSE BLDC GLUCOMTR-MCNC: 118 MG/DL (ref 70–99)
GLUCOSE BLDC GLUCOMTR-MCNC: 119 MG/DL (ref 70–99)
GLUCOSE BLDC GLUCOMTR-MCNC: 153 MG/DL (ref 70–99)
GLUCOSE BLDC GLUCOMTR-MCNC: 76 MG/DL (ref 70–99)
GLUCOSE BLDC GLUCOMTR-MCNC: 96 MG/DL (ref 70–99)
GLUCOSE SERPL-MCNC: 79 MG/DL (ref 70–99)
HCT VFR BLD AUTO: 25.7 % (ref 40–53)
HGB BLD-MCNC: 8.1 G/DL (ref 13.3–17.7)
MCH RBC QN AUTO: 28.4 PG (ref 26.5–33)
MCHC RBC AUTO-ENTMCNC: 31.5 G/DL (ref 31.5–36.5)
MCV RBC AUTO: 90 FL (ref 78–100)
PHOSPHATE SERPL-MCNC: 2.8 MG/DL (ref 2.5–4.5)
PLATELET # BLD AUTO: 182 10E9/L (ref 150–450)
POTASSIUM SERPL-SCNC: 3.9 MMOL/L (ref 3.4–5.3)
RBC # BLD AUTO: 2.85 10E12/L (ref 4.4–5.9)
SODIUM SERPL-SCNC: 142 MMOL/L (ref 133–144)
WBC # BLD AUTO: 8 10E9/L (ref 4–11)

## 2018-10-07 PROCEDURE — 99232 SBSQ HOSP IP/OBS MODERATE 35: CPT | Performed by: INTERNAL MEDICINE

## 2018-10-07 PROCEDURE — 25000128 H RX IP 250 OP 636: Performed by: UROLOGY

## 2018-10-07 PROCEDURE — 25000132 ZZH RX MED GY IP 250 OP 250 PS 637: Performed by: INTERNAL MEDICINE

## 2018-10-07 PROCEDURE — 00000146 ZZHCL STATISTIC GLUCOSE BY METER IP

## 2018-10-07 PROCEDURE — 80069 RENAL FUNCTION PANEL: CPT | Performed by: INTERNAL MEDICINE

## 2018-10-07 PROCEDURE — 25000131 ZZH RX MED GY IP 250 OP 636 PS 637: Performed by: UROLOGY

## 2018-10-07 PROCEDURE — 12000000 ZZH R&B MED SURG/OB

## 2018-10-07 PROCEDURE — 25000128 H RX IP 250 OP 636: Performed by: INTERNAL MEDICINE

## 2018-10-07 PROCEDURE — 36415 COLL VENOUS BLD VENIPUNCTURE: CPT | Performed by: INTERNAL MEDICINE

## 2018-10-07 PROCEDURE — 85027 COMPLETE CBC AUTOMATED: CPT | Performed by: INTERNAL MEDICINE

## 2018-10-07 PROCEDURE — 99207 ZZC CDG-MDM COMPONENT: MEETS LOW - DOWN CODED: CPT | Performed by: INTERNAL MEDICINE

## 2018-10-07 PROCEDURE — 25000132 ZZH RX MED GY IP 250 OP 250 PS 637: Performed by: UROLOGY

## 2018-10-07 RX ADMIN — TOPIRAMATE 50 MG: 25 TABLET, FILM COATED ORAL at 08:42

## 2018-10-07 RX ADMIN — LEVETIRACETAM 500 MG: 500 TABLET, FILM COATED ORAL at 08:43

## 2018-10-07 RX ADMIN — INSULIN GLARGINE 7 UNITS: 100 INJECTION, SOLUTION SUBCUTANEOUS at 22:58

## 2018-10-07 RX ADMIN — OXYBUTYNIN CHLORIDE 5 MG: 5 TABLET ORAL at 10:51

## 2018-10-07 RX ADMIN — POLYETHYLENE GLYCOL 3350 17 G: 17 POWDER, FOR SOLUTION ORAL at 08:56

## 2018-10-07 RX ADMIN — BUDESONIDE AND FORMOTEROL FUMARATE DIHYDRATE 2 PUFF: 80; 4.5 AEROSOL RESPIRATORY (INHALATION) at 08:43

## 2018-10-07 RX ADMIN — PANTOPRAZOLE SODIUM 40 MG: 40 TABLET, DELAYED RELEASE ORAL at 06:00

## 2018-10-07 RX ADMIN — FERROUS SULFATE TAB 325 MG (65 MG ELEMENTAL FE) 325 MG: 325 (65 FE) TAB at 08:42

## 2018-10-07 RX ADMIN — TOPIRAMATE 50 MG: 25 TABLET, FILM COATED ORAL at 20:48

## 2018-10-07 RX ADMIN — FOLIC ACID 1 MG: 1 TABLET ORAL at 08:56

## 2018-10-07 RX ADMIN — DOCUSATE SODIUM 100 MG: 100 CAPSULE, LIQUID FILLED ORAL at 20:47

## 2018-10-07 RX ADMIN — DOCUSATE SODIUM 100 MG: 100 CAPSULE, LIQUID FILLED ORAL at 08:43

## 2018-10-07 RX ADMIN — PANTOPRAZOLE SODIUM 40 MG: 40 TABLET, DELAYED RELEASE ORAL at 16:43

## 2018-10-07 RX ADMIN — LEVETIRACETAM 500 MG: 500 TABLET, FILM COATED ORAL at 20:47

## 2018-10-07 RX ADMIN — BUDESONIDE AND FORMOTEROL FUMARATE DIHYDRATE 2 PUFF: 80; 4.5 AEROSOL RESPIRATORY (INHALATION) at 20:49

## 2018-10-07 RX ADMIN — FLUOXETINE 40 MG: 20 CAPSULE ORAL at 08:42

## 2018-10-07 RX ADMIN — METOPROLOL SUCCINATE 50 MG: 50 TABLET, EXTENDED RELEASE ORAL at 08:42

## 2018-10-07 RX ADMIN — SIMVASTATIN 10 MG: 10 TABLET, FILM COATED ORAL at 20:48

## 2018-10-07 RX ADMIN — SODIUM CHLORIDE: 9 INJECTION, SOLUTION INTRAVENOUS at 15:17

## 2018-10-07 RX ADMIN — CIPROFLOXACIN 400 MG: 2 INJECTION, SOLUTION INTRAVENOUS at 22:57

## 2018-10-07 NOTE — PLAN OF CARE
Problem: Patient Care Overview  Goal: Plan of Care/Patient Progress Review  Outcome: No Change  Vitals: Temp: 96.9  F (36.1  C) Temp src: Oral BP: 135/57 Pulse: 76 Heart Rate: 78 Resp: 20 SpO2: 98 % O2 Device: BiPAP/CPAP    Labs:Hgb:8.2 Lactic: 1.2 Creat:3.81 WBC:10.1, B  Neuro:A&Ox4  Lungs:Pt reports SOB upon exerction, on CPAP overnight, Infrequent non productive cough  Cardiac:WDL, tachy at times  GI/:Sanabria in place- good output, red/lamonte with sedement, BM 10/6  Skin:WDL  Diet:Reg  Pain:Denies  Activity:1 ast w/. walker  Plan:Nephrology to see, urology following, Possible PT to assess ability to return home, cipro, IV protonix

## 2018-10-07 NOTE — PLAN OF CARE
"Problem: Anemia (Adult)  Goal: Identify Related Risk Factors and Signs and Symptoms  Related risk factors and signs and symptoms are identified upon initiation of Human Response Clinical Practice Guideline (CPG).   Outcome: Improving  /56 (BP Location: Right arm)  Pulse 76  Temp 97.4  F (36.3  C) (Oral)  Resp 20  Ht 1.803 m (5' 11\")  Wt 117.5 kg (259 lb 1.6 oz)  SpO2 98%  BMI 36.14 kg/m2     A/O x 4  VS: stable - BS 79 this AM. Improved after apple juice. 118 this afternoon.   LS: Clear  Cardiac: WDL  GI: Abdomen rounded - reports abdominal fullness. Colace & Miralax given this AM --> no results yet. Tolerated regular breakfast; did not want lunch. Ate snacks wife brought from home. Regular diet, ate 50% of meal.  : Sanabria intact, draining clear, yellow urine with two tiny clots noted. Adequate output. Cath care done.   Skin: WDL  Lines/IV: NS @ 75mL/hr through R lower forearm. R upper forearm SL.   Pain: Oxybutynin given x 1 for bladder spasms with relief.       Plan: Monitor overnight w/ IVF --> recheck Cr in AM to ensure improvement is noted. Per urology, voiding trial in AM.          "

## 2018-10-07 NOTE — PROGRESS NOTES
Patient still unable to void, not feeling uncomfortable. Bladder scanned for 622. Per verbal order from MD (see previous note), will insert coude catheter.

## 2018-10-07 NOTE — PROGRESS NOTES
Nursing assistant reported a blood sugar of 59 from patient. Orange juice (15 grams of carbs) given. Recheck in 15 minutes was 57. Glucose gel (15 grams of carbs) given. Recheck at 2215. Provider updated, verbal to hold lantus and victoza this evening.     Update:recheck was 80, boxed lunch given. Will continue to assess.

## 2018-10-07 NOTE — PROGRESS NOTES
[facility]  Hospitalist Progress Note  Nasreen Lewis MD 10/06/18  Text Page  Pager: [phone] (7am-6pm)    Reason for Stay (Diagnosis): Medical management after TURP/cystoscopy/bladder stone removal         Assessment and Plan:      Summary of Stay: Donnie Vincent is a 72 year old male with past medical history of ISAEL on CPAP, BPH, COPD, prior CVA, seizure disorder, IDDM2, HTN and HLD who was admitted on 10/3/2018 after cystoscopy, removal of bladder stones and TURP.  Hospital course complicated by acute on chronic kidney disease and anemia requiring PRBC transfusion.      Problem List/Assessment and Plan:     1. S/p cystoscopy/removal of bladder stones/TURP - now with Urinary Retention: Had been on CBI, daniels removed on 10/6 but overnight had urinary retention and catheter replaced.  Defer management to Urology.    2. ABLA: Prior to surgery Hgb was 9.7, declined to 7.5 after surgery.  Has received 3 units PRBC this admission, last on 10/6.  Hgb stable today at 8.1.    - Hgb tomorrow AM    3. Acute on Chronic Kidney Disease: Baseline creatinine ~1.3-1.5.  Creatinine was 1.49 on 9/24, on admission was elevated to 2.47.  After surgery creatinine continued to increase, has now peaked at 3.81, now down to 3.2.  He had been on Lisinopril and Metformin but these were discontinued earlier in the admission.  No imaging with contrast.  FENA is elevated to 3.4.  This is likely due to ATN.  - Will cancel nephrology consult as creatinine now down trending  - Avoid Nephrotoxic agents  - Fenofibrate, Lisinopril, Metformin all stopped    4. HTN: PTA the patient was on Metoprolol 50 mg BID and Lisinopril 20 mg every day.  Had these ordered after surgery but Lisinopril on hold due to HARSHAL.  Hold parameters for Metoprolol.    5. Seizure Disorder: Continue PTA Topiramate and Keppra.  No recent seizure activity.    6. ISAEL: CPAP per home settings.    7. Prior CVA: Antihypertensives as above.  Hold Plavix given  "continued anemia requiring PRBC transfusions.  Continue statin.    8. COPD: Reports chronic SOB.  No different than baseline.  Continue PTA Symbicort and PRN Albuterol.    9. IDDM2: PTA on Glipizide 10 mg every day, Lantus 14 units QHS, Victoza 1.8 mg QHS and Metformin.  Given his HARSHAL Metformin has been held.  He had hypoglycemia last night.  Lantus dose decreased.  Holding Victoza and Glipizide as well.    - Decrease Lantus to 7 units QHS  - Medium sliding scale insulin  - Discontinue Victoza and continue to hold Glipizide and Metformin    10. HLD: Hold Fenofibrate given HARSHAL.    11. SIRS: On 10/5 patient developed tachycardia, leukocytosis and lactic acidosis. He has been on Ciprofloxacin since 10/3, continue.    12. Constipation: Bowel regimen ordered.    DVT Prophylaxis: Pneumatic Compression Devices  Code Status: Full Code  Discharge Dispo: Home  Estimated Disch Date / # of Days until Disch: suspect 1-2 more days pending improvement of renal function, stable glucose and stable Hgb        Interval History (Subjective):      Patient was seen with his wife this morning.  He states overall he is feeling better.  Was able to eat a full breakfast (first good meal since admission).  Abdomen feels less distended and no longer has any pain.  Has not had a BM but is having lots of gas.  Has chronic SOB but no change.  Denies CP, nausea or emesis.      Last night had urinary retention so daniels was replaced.  He was also hypoglycemic, treated with juice.                  Physical Exam:      Last Vital Signs:  /68 (BP Location: Right arm)  Pulse 76  Temp 96.3  F (35.7  C) (Oral)  Resp 20  Ht 1.803 m (5' 11\")  Wt 117.5 kg (259 lb 1.6 oz)  SpO2 98%  BMI 36.14 kg/m2    General: Alert, awake, no acute distress, much brighter affect today  HEENT: Normocephalic and atraumatic, eyes anicteric and without scleral injection, EOMI, face symmetric, MMM.  Cardiac: RRR, normal S1, S2. No m/g/r, no LE edema.  Pulmonary: " Normal chest rise, normal work of breathing.  Lungs CTAB without crackles or wheezing.  Abdomen: soft, non-tender, non-distended.  Normoactive bowel sounds, no guarding or rebound tenderness.  Extremities: no deformities.  Warm, well perfused.  Skin: no rashes or lesions.  Warm and Dry.  Neuro: No focal deficits.  Speech clear.  Moving all extremities in bed.  Psych: Alert and oriented x3. Appropriate affect.         Medications:      All current medications were reviewed with changes reflected in problem list.         Data:      All new lab and imaging data was reviewed.   Labs:    Recent Labs  Lab 10/07/18  0609 10/06/18  0621 10/05/18  1302    142 139   POTASSIUM 3.9 3.9 4.5   CHLORIDE 117* 115* 114*   CO2 18* 17* 14*   ANIONGAP 7 10 11   GLC 79 124* 188*   BUN 31* 37* 39*   CR 3.20* 3.81* 3.09*   GFRESTIMATED 19* 16* 20*   GFRESTBLACK 23* 19* 24*   ZAYRA 7.5* 7.1* 7.2*       Recent Labs  Lab 10/07/18  0609 10/06/18  1303 10/06/18  0621  10/04/18  2215   WBC 8.0  --  10.1  --  14.7*   HGB 8.1* 8.2* 7.2*  < > 7.5*   HCT 25.7*  --  23.5*  --  26.4*   MCV 90  --  92  --  97     --  154  --  221   < > = values in this interval not displayed.   Imaging:   Recent Results (from the past 24 hour(s))   US Renal Complete    Narrative    ULTRASOUND RETROPERITONEAL COMPLETE  10/6/2018 6:35 PM     HISTORY: Acute kidney injury.    COMPARISON: None.    FINDINGS: The bilateral renal parenchyma are unremarkable in  echogenicity without evidence for shadowing stone or mass. Bilateral  renal cysts measuring up to 9.8 cm on the right and 7.1 cm on the  left. No hydronephrosis. Right kidney measures 12.4 x 6.4 x 7.6 cm and  the left measures 21.1 x 6.5 x 10.3 cm. Cortical thickness is 1.5 cm  on the right and 2.5 cm on the left. Bladder is unremarkable given its  level of distention.      Impression    IMPRESSION: No obstruction demonstrated.    MD Nasreen SHARMA MD.

## 2018-10-07 NOTE — PROGRESS NOTES
Urology progress note     Seen and examined.  Urine clear with 20fr coude inserted yesterday due to acute urinary retention   Feeling much better today  In good spirits      Abd: soft, non-tender, non-distended  Sanabria: urine clear     A/P:  73 y/o man s/p cystolitholapaxy and TURP     - Urine clear, plan for Trial of void tomorrow prior to discharge  - SCr down to 3.2 today, agree with monitoring another day. Renal U/S yesterday w/o hydronephrosis  - Hg stable at 8.1     Lenny Duff M.D.   [facility] - Urology

## 2018-10-07 NOTE — PLAN OF CARE
"Problem: Patient Care Overview  Goal: Plan of Care/Patient Progress Review  /78 (BP Location: Right arm)  Pulse 76  Temp 97.7  F (36.5  C) (Oral)  Resp 22  Ht 1.803 m (5' 11\")  Wt 114.3 kg (252 lb)  SpO2 97%  BMI 35.15 kg/m2    A/O:x4  VS:stable, blood sugar low.   O2:room air  Tele:n/a  LS:clear  Cardiac:wdl  GI:abdomen distended, reports of constipation, scheduled colace given. Regular diet, ate 50% of meal. Blood sugar checks. Bedtime sugar low, (see previous note).   :retention this evening, scanned for 433, urologist paged , verbal order to insert catheter when greater than 600 mL in bladder (see previous note). Coude catheter inserted, 700 mL returned. Running well. Patient states his abdomen feels better.   Skin:wdl  Lines/IV:right lower forearm running normal saline at 75 mL/hr.  Pain:Denies at this time. Requested one PRN norco at beginning of shift.    Plan:Monitor overnight. Supportive cares. AM labs. Sanabria in place. Will continue to monitor and assess.         "

## 2018-10-07 NOTE — PLAN OF CARE
"Problem: Patient Care Overview  Goal: Plan of Care/Patient Progress Review  /56 (BP Location: Right arm)  Pulse 76  Temp 97.4  F (36.3  C) (Oral)  Resp 20  Ht 1.803 m (5' 11\")  Wt 117.5 kg (259 lb 1.6 oz)  SpO2 98%  BMI 36.14 kg/m2    A/O:x4  VS:stable   O2:room air  Tele:n/a  LS:clear  Cardiac:wdl  GI:wdl, last BM today. Regular diet, sugars stable without insulin.   :daniels in place, draining well - cloudy urine. No complaints of bladder spasms on evenings.  Skin:wdl  Lines/IV:normal saline running at 75 mL per hour.   Pain:rates a 2/10 in abdomen but declines any pain medication at this time. PRN norco available.    Plan:Monitor overnight, supportive cares. AM labs for kidney function, voiding trial tomorrow. Anticipate discharge. Will continue to monitor and assess.         "

## 2018-10-08 LAB
ANION GAP SERPL CALCULATED.3IONS-SCNC: 7 MMOL/L (ref 3–14)
BUN SERPL-MCNC: 25 MG/DL (ref 7–30)
CALCIUM SERPL-MCNC: 7.6 MG/DL (ref 8.5–10.1)
CHLORIDE SERPL-SCNC: 116 MMOL/L (ref 94–109)
CO2 SERPL-SCNC: 20 MMOL/L (ref 20–32)
CREAT SERPL-MCNC: 2.19 MG/DL (ref 0.66–1.25)
ERYTHROCYTE [DISTWIDTH] IN BLOOD BY AUTOMATED COUNT: 16.5 % (ref 10–15)
GFR SERPL CREATININE-BSD FRML MDRD: 29 ML/MIN/1.7M2
GLUCOSE BLDC GLUCOMTR-MCNC: 119 MG/DL (ref 70–99)
GLUCOSE BLDC GLUCOMTR-MCNC: 120 MG/DL (ref 70–99)
GLUCOSE BLDC GLUCOMTR-MCNC: 121 MG/DL (ref 70–99)
GLUCOSE BLDC GLUCOMTR-MCNC: 165 MG/DL (ref 70–99)
GLUCOSE BLDC GLUCOMTR-MCNC: 192 MG/DL (ref 70–99)
GLUCOSE BLDC GLUCOMTR-MCNC: 74 MG/DL (ref 70–99)
GLUCOSE SERPL-MCNC: 119 MG/DL (ref 70–99)
HCT VFR BLD AUTO: 25.4 % (ref 40–53)
HGB BLD-MCNC: 7.9 G/DL (ref 13.3–17.7)
MCH RBC QN AUTO: 28.5 PG (ref 26.5–33)
MCHC RBC AUTO-ENTMCNC: 31.1 G/DL (ref 31.5–36.5)
MCV RBC AUTO: 92 FL (ref 78–100)
PLATELET # BLD AUTO: 201 10E9/L (ref 150–450)
POTASSIUM SERPL-SCNC: 3.9 MMOL/L (ref 3.4–5.3)
RBC # BLD AUTO: 2.77 10E12/L (ref 4.4–5.9)
SODIUM SERPL-SCNC: 143 MMOL/L (ref 133–144)
WBC # BLD AUTO: 6.3 10E9/L (ref 4–11)

## 2018-10-08 PROCEDURE — 00000146 ZZHCL STATISTIC GLUCOSE BY METER IP

## 2018-10-08 PROCEDURE — 80048 BASIC METABOLIC PNL TOTAL CA: CPT | Performed by: INTERNAL MEDICINE

## 2018-10-08 PROCEDURE — 25000128 H RX IP 250 OP 636: Performed by: HOSPITALIST

## 2018-10-08 PROCEDURE — 36415 COLL VENOUS BLD VENIPUNCTURE: CPT | Performed by: INTERNAL MEDICINE

## 2018-10-08 PROCEDURE — 25000128 H RX IP 250 OP 636: Performed by: INTERNAL MEDICINE

## 2018-10-08 PROCEDURE — 25000132 ZZH RX MED GY IP 250 OP 250 PS 637: Performed by: INTERNAL MEDICINE

## 2018-10-08 PROCEDURE — 99207 ZZC CDG-MDM COMPONENT: MEETS LOW - DOWN CODED: CPT | Performed by: HOSPITALIST

## 2018-10-08 PROCEDURE — 85027 COMPLETE CBC AUTOMATED: CPT | Performed by: INTERNAL MEDICINE

## 2018-10-08 PROCEDURE — 12000000 ZZH R&B MED SURG/OB

## 2018-10-08 PROCEDURE — 25000125 ZZHC RX 250: Performed by: UROLOGY

## 2018-10-08 PROCEDURE — 25000132 ZZH RX MED GY IP 250 OP 250 PS 637: Performed by: UROLOGY

## 2018-10-08 PROCEDURE — 25000132 ZZH RX MED GY IP 250 OP 250 PS 637: Performed by: HOSPITALIST

## 2018-10-08 PROCEDURE — 25000131 ZZH RX MED GY IP 250 OP 636 PS 637: Performed by: UROLOGY

## 2018-10-08 PROCEDURE — 99232 SBSQ HOSP IP/OBS MODERATE 35: CPT | Performed by: HOSPITALIST

## 2018-10-08 RX ORDER — SODIUM CHLORIDE, SODIUM LACTATE, POTASSIUM CHLORIDE, CALCIUM CHLORIDE 600; 310; 30; 20 MG/100ML; MG/100ML; MG/100ML; MG/100ML
INJECTION, SOLUTION INTRAVENOUS CONTINUOUS
Status: DISCONTINUED | OUTPATIENT
Start: 2018-10-08 | End: 2018-10-09 | Stop reason: HOSPADM

## 2018-10-08 RX ORDER — CLOPIDOGREL BISULFATE 75 MG/1
75 TABLET ORAL DAILY
Status: DISCONTINUED | OUTPATIENT
Start: 2018-10-08 | End: 2018-10-09 | Stop reason: HOSPADM

## 2018-10-08 RX ORDER — TAMSULOSIN HYDROCHLORIDE 0.4 MG/1
0.4 CAPSULE ORAL DAILY
Status: DISCONTINUED | OUTPATIENT
Start: 2018-10-08 | End: 2018-10-09 | Stop reason: HOSPADM

## 2018-10-08 RX ADMIN — FERROUS SULFATE TAB 325 MG (65 MG ELEMENTAL FE) 325 MG: 325 (65 FE) TAB at 09:01

## 2018-10-08 RX ADMIN — TAMSULOSIN HYDROCHLORIDE 0.4 MG: 0.4 CAPSULE ORAL at 12:20

## 2018-10-08 RX ADMIN — CLOPIDOGREL 75 MG: 75 TABLET, FILM COATED ORAL at 12:21

## 2018-10-08 RX ADMIN — LIDOCAINE HYDROCHLORIDE 10 ML: 20 JELLY TOPICAL at 15:53

## 2018-10-08 RX ADMIN — FLUOXETINE 40 MG: 20 CAPSULE ORAL at 09:01

## 2018-10-08 RX ADMIN — SIMVASTATIN 10 MG: 10 TABLET, FILM COATED ORAL at 21:04

## 2018-10-08 RX ADMIN — BUDESONIDE AND FORMOTEROL FUMARATE DIHYDRATE 2 PUFF: 80; 4.5 AEROSOL RESPIRATORY (INHALATION) at 21:05

## 2018-10-08 RX ADMIN — TOPIRAMATE 50 MG: 25 TABLET, FILM COATED ORAL at 09:01

## 2018-10-08 RX ADMIN — LEVETIRACETAM 500 MG: 500 TABLET, FILM COATED ORAL at 09:01

## 2018-10-08 RX ADMIN — SODIUM CHLORIDE, POTASSIUM CHLORIDE, SODIUM LACTATE AND CALCIUM CHLORIDE: 600; 310; 30; 20 INJECTION, SOLUTION INTRAVENOUS at 21:12

## 2018-10-08 RX ADMIN — HYDROCODONE BITARTRATE AND ACETAMINOPHEN 1 TABLET: 5; 325 TABLET ORAL at 22:08

## 2018-10-08 RX ADMIN — METOPROLOL SUCCINATE 50 MG: 50 TABLET, EXTENDED RELEASE ORAL at 09:01

## 2018-10-08 RX ADMIN — INSULIN GLARGINE 7 UNITS: 100 INJECTION, SOLUTION SUBCUTANEOUS at 21:04

## 2018-10-08 RX ADMIN — DOCUSATE SODIUM 100 MG: 100 CAPSULE, LIQUID FILLED ORAL at 21:04

## 2018-10-08 RX ADMIN — BUDESONIDE AND FORMOTEROL FUMARATE DIHYDRATE 2 PUFF: 80; 4.5 AEROSOL RESPIRATORY (INHALATION) at 09:03

## 2018-10-08 RX ADMIN — DOCUSATE SODIUM 100 MG: 100 CAPSULE, LIQUID FILLED ORAL at 09:01

## 2018-10-08 RX ADMIN — INSULIN ASPART 1 UNITS: 100 INJECTION, SOLUTION INTRAVENOUS; SUBCUTANEOUS at 17:39

## 2018-10-08 RX ADMIN — SODIUM CHLORIDE: 9 INJECTION, SOLUTION INTRAVENOUS at 06:47

## 2018-10-08 RX ADMIN — LEVETIRACETAM 500 MG: 500 TABLET, FILM COATED ORAL at 21:04

## 2018-10-08 RX ADMIN — PANTOPRAZOLE SODIUM 40 MG: 40 TABLET, DELAYED RELEASE ORAL at 06:34

## 2018-10-08 RX ADMIN — HYDROCODONE BITARTRATE AND ACETAMINOPHEN 1 TABLET: 5; 325 TABLET ORAL at 15:39

## 2018-10-08 RX ADMIN — FOLIC ACID 1 MG: 1 TABLET ORAL at 09:01

## 2018-10-08 RX ADMIN — PANTOPRAZOLE SODIUM 40 MG: 40 TABLET, DELAYED RELEASE ORAL at 17:39

## 2018-10-08 RX ADMIN — SODIUM CHLORIDE, POTASSIUM CHLORIDE, SODIUM LACTATE AND CALCIUM CHLORIDE: 600; 310; 30; 20 INJECTION, SOLUTION INTRAVENOUS at 12:20

## 2018-10-08 RX ADMIN — TOPIRAMATE 50 MG: 25 TABLET, FILM COATED ORAL at 21:04

## 2018-10-08 RX ADMIN — POLYETHYLENE GLYCOL 3350 17 G: 17 POWDER, FOR SOLUTION ORAL at 09:00

## 2018-10-08 NOTE — PROGRESS NOTES
Urology progress note     Seen and examined.  Urine clear with 20fr coude inserted Sat due to acute urinary retention   Feeling much better today, tolerating PO     Abd: soft, non-tender, non-distended  Sanabria: urine clear     A/P:  71 y/o man s/p cystolitholapaxy and TURP  - ABLA, hgb stable this AM, has receive 3U PRBCs this admission   - Urine clear, plan for Trial of void today prior to discharge  - SCr down to 2.19 today. Renal U/S w/o hydronephrosis    Sadia Hawthorne PA-C  [facility]  [phone]    I have seen and examined the patient and I agree with the above findings and plan  Sanabria out this AM and trial of void before discharge home

## 2018-10-08 NOTE — PROGRESS NOTES
"Pt ambulated in the hallway SBA with gait belt and walker. Pt stated feeling 'a little SOB\", denies dizziness. RN notified  "

## 2018-10-08 NOTE — PLAN OF CARE
"Problem: Patient Care Overview  Goal: Plan of Care/Patient Progress Review  Outcome: Improving  Problem: Patient Care Overview  Goal: Plan of Care/Patient Progress Review  A/O:x4  VS:stable /56 (BP Location: Right arm)  Pulse 76  Temp 97.4  F (36.3  C) (Oral)  Resp 20  Ht 1.803 m (5' 11\")  Wt 117.5 kg (259 lb 1.6 oz)  SpO2 98%  BMI 36.14 kg/m2    O2:room air  Tele:n/a  LS:clear  Cardiac:wdl  GI:wdl, last BM today. Regular diet, sugars stable without insulin.   :daniels in place, draining well - cloudy urine. No complaints of bladder spasms on evenings.  Skin:wdl  Lines/IV:normal saline running at 75 mL per hour.   Pain:rates a 2/10 in abdomen but declines any pain medication at this time. PRN norco available.   Plan:Monitor overnight, supportive cares. AM labs for kidney function, voiding trial tomorrow. Anticipate discharge. Will continue to monitor and assess.                "

## 2018-10-08 NOTE — PLAN OF CARE
"Problem: Anemia (Adult)  Goal: Identify Related Risk Factors and Signs and Symptoms  Related risk factors and signs and symptoms are identified upon initiation of Human Response Clinical Practice Guideline (CPG).   Outcome: No Change  /56 (BP Location: Left arm)  Pulse 76  Temp 97.5  F (36.4  C) (Oral)  Resp 20  Ht 1.803 m (5' 11\")  Wt 117.5 kg (259 lb 1.6 oz)  SpO2 97%  BMI 36.14 kg/m2      A/O x 4  VS: Stable, afebrile.  LS: Clear  Cardiac: WDL  GI: BM yesterday. Tolerated regular breakfast & lunch.  & 74.  : Attempted voiding trial. Removed daniels at 0900. Attempted voiding sitting, standing, with water running with no results. Paged Dr. Banuelos for bladder scan of 609; gave verbal orders to insert daniels and plan for discharge with daniels and return to clinic in one week. Daniels inserted at 1445. Started Flomax today as well.   Skin: WDL  Lines/IV: LR @ 100mL/hr through R lower forearm; IV leaky but functional. R upper forearm SL.   Pain: Norco given x 1 for low ab pain.       Plan: Monitor overnight w/ IVF --> recheck Cr in AM to ensure improvement is noted. Discharge with daniels.       "

## 2018-10-08 NOTE — PROGRESS NOTES
[facility]    Hospitalist Progress Note    Date of Service (when I saw the patient): 10/08/2018    Assessment & Plan   Donnie Vincent is a 72 year old male with CVA, DM, CKD, anemia, HTN, depression, BPH, COPD, ISAEL, seizure disorder, HLP who was admitted on 10/3/2018 for elective cystoscopy, removal or bladder stones with holmium laser, TURP with post-op anemia, leukocytosis, ATN, possible sepsis    Possible sepsis with post-op tachycardia, leukocytosis, elevated lactic acid    - was on Cipro, started on 10/5, now stopped    - infectious work-up was negative (no fever, all cultures negative)    - was likely due to hypotension into the 80's in the PACU    Acute on chronic renal failure    - likely due to ATN due to hypotension    - Cr improved    - hyperchloremia- stop NS and change to LR    - avoiding nephrotoxic drugs    Acute blood loss anemia    - likely due to urinary losses    - s/p 3 units PRBC since admission    - Hb 7.9 today. No transfusion    S/p cystoscopy/removal of bladder stones with holmium laser/TURP on 10/5    - had some urinary retention, now daniels DC'd    - primary urology patient    DM    - cont lantus (on decreased dose due to low blood sugars)    - holding glipizide, victoza and metformin    HTN    - on home metoprolol    - holding lisinopril due to ARF    History CVA    - was holding Plavix due to surgery    - can re-start    COPD    - cont home meds    ISAEL    - on CPAP    Seizure disorder    - cont home meds    HLP    - holding fenofibrate due to HARSHAL    DVT Prophylaxis: Pneumatic Compression Devices (no heparin due to hematuria)  Code Status: Full Code    Disposition: Expected discharge in possibly 1-2 days    Rodrigo Stewart    Interval History   Patient in bed. States he is doing much better today. Ambulated. Has not yet urinated. Eating well. No chest pain, sob, abdo pain, n/v/d. Had  BM so no lo\nger constipated    -Data reviewed today: I reviewed all new labs and imaging  results over the last 24 hours. I personally reviewed no images or EKG's today.    Physical Exam   Temp: 97  F (36.1  C) Temp src: Oral BP: 150/81   Heart Rate: 75 Resp: 20 SpO2: 98 % O2 Device: BiPAP/CPAP    Vitals:    10/03/18 1258 10/03/18 1734 10/07/18 0435   Weight: 110.5 kg (243 lb 9.7 oz) 114.3 kg (252 lb) 117.5 kg (259 lb 1.6 oz)     Vital Signs with Ranges  Temp:  [96.8  F (36  C)-99.1  F (37.3  C)] 97  F (36.1  C)  Heart Rate:  [75-86] 75  Resp:  [16-20] 20  BP: (120-150)/(54-81) 150/81  SpO2:  [98 %] 98 %  I/O last 3 completed shifts:  In: 2642 [P.O.:540; I.V.:2102]  Out: 4300 [Urine:4300]    Constitutional: Awake, alert, cooperative, no apparent distress   Respiratory: Clear to auscultation bilaterally, no crackles or wheezing   Cardiovascular: Regular rate and rhythm, normal S1 and S2, and no murmur noted   Abdomen: Normal bowel sounds, soft, non-distended, non-tender   Skin: No rashes, no cyanosis, dry to touch   Neuro: Alert and oriented x3, no weakness, numbness, memory loss   Extremities: No edema, normal range of motion   Other(s):        All other systems: Negative     Medications     sodium chloride 75 mL/hr at 10/08/18 0647       budesonide-formoterol  2 puff Inhalation BID     docusate sodium  100 mg Oral BID     ferrous sulfate  325 mg Oral Daily with breakfast     FLUoxetine  40 mg Oral Daily     folic acid  1 mg Oral Daily     influenza Vac Split High-Dose  0.5 mL Intramuscular Prior to discharge     insulin aspart  1-7 Units Subcutaneous TID AC     insulin aspart  1-5 Units Subcutaneous At Bedtime     insulin glargine  7 Units Subcutaneous At Bedtime     levETIRAcetam  500 mg Oral BID     metoprolol succinate (TOPROL-XL) 24 hr tablet 50 mg  50 mg Oral Daily     pantoprazole (PROTONIX) EC tablet 40 mg  40 mg Oral BID AC     polyethylene glycol  17 g Oral Daily     simvastatin  10 mg Oral At Bedtime     topiramate (TOPAMAX) tablet 50 mg  50 mg Oral BID       Data     Recent Labs  Lab  10/08/18  0607 10/07/18  0609 10/06/18  1303 10/06/18  0621   WBC 6.3 8.0  --  10.1   HGB 7.9* 8.1* 8.2* 7.2*   MCV 92 90  --  92    182  --  154    142  --  142   POTASSIUM 3.9 3.9  --  3.9   CHLORIDE 116* 117*  --  115*   CO2 20 18*  --  17*   BUN 25 31*  --  37*   CR 2.19* 3.20*  --  3.81*   ANIONGAP 7 7  --  10   ZAYRA 7.6* 7.5*  --  7.1*   * 79  --  124*   ALBUMIN  --  2.5*  --   --        No results found for this or any previous visit (from the past 24 hour(s)).

## 2018-10-08 NOTE — PLAN OF CARE
Problem: Patient Care Overview  Goal: Plan of Care/Patient Progress Review  Outcome: No Change  Vitals: Temp: 97.4  F (36.3  C) Temp src: Oral BP: 120/54   Heart Rate: 77 Resp: 16 SpO2: 98 % O2 Device: BiPAP/CPAP   max temp 99.1   Labs:creat:3.2 Hb.1 B/120  Neuro:A&Ox4   Lungs:WDL on home CPAP  Cardiac:WDL  GI/:Sanabria in place/ BS normoactive, pt denies feeling bloated or bladder distended  Skin:WDL  Diet: Reg  Pain:Denies  Activity:1 ast.   Plan:Voiding trial in AM, monitor BGL, monitor temp.

## 2018-10-09 VITALS
HEIGHT: 71 IN | HEART RATE: 76 BPM | RESPIRATION RATE: 18 BRPM | TEMPERATURE: 97.6 F | SYSTOLIC BLOOD PRESSURE: 118 MMHG | DIASTOLIC BLOOD PRESSURE: 62 MMHG | OXYGEN SATURATION: 96 % | WEIGHT: 259.1 LBS | BODY MASS INDEX: 36.27 KG/M2

## 2018-10-09 LAB
ANION GAP SERPL CALCULATED.3IONS-SCNC: 9 MMOL/L (ref 3–14)
BASOPHILS # BLD AUTO: 0.1 10E9/L (ref 0–0.2)
BASOPHILS NFR BLD AUTO: 0.9 %
BUN SERPL-MCNC: 22 MG/DL (ref 7–30)
CALCIUM SERPL-MCNC: 7.6 MG/DL (ref 8.5–10.1)
CHLORIDE SERPL-SCNC: 114 MMOL/L (ref 94–109)
CO2 SERPL-SCNC: 20 MMOL/L (ref 20–32)
CREAT SERPL-MCNC: 1.76 MG/DL (ref 0.66–1.25)
DIFFERENTIAL METHOD BLD: ABNORMAL
EOSINOPHIL # BLD AUTO: 0.3 10E9/L (ref 0–0.7)
EOSINOPHIL NFR BLD AUTO: 4.8 %
ERYTHROCYTE [DISTWIDTH] IN BLOOD BY AUTOMATED COUNT: 16.1 % (ref 10–15)
GFR SERPL CREATININE-BSD FRML MDRD: 38 ML/MIN/1.7M2
GLUCOSE BLDC GLUCOMTR-MCNC: 131 MG/DL (ref 70–99)
GLUCOSE BLDC GLUCOMTR-MCNC: 142 MG/DL (ref 70–99)
GLUCOSE SERPL-MCNC: 161 MG/DL (ref 70–99)
HCT VFR BLD AUTO: 26.4 % (ref 40–53)
HGB BLD-MCNC: 8 G/DL (ref 13.3–17.7)
IMM GRANULOCYTES # BLD: 0 10E9/L (ref 0–0.4)
IMM GRANULOCYTES NFR BLD: 0.6 %
LYMPHOCYTES # BLD AUTO: 0.6 10E9/L (ref 0.8–5.3)
LYMPHOCYTES NFR BLD AUTO: 10.4 %
MCH RBC QN AUTO: 28.1 PG (ref 26.5–33)
MCHC RBC AUTO-ENTMCNC: 30.3 G/DL (ref 31.5–36.5)
MCV RBC AUTO: 93 FL (ref 78–100)
MONOCYTES # BLD AUTO: 0.5 10E9/L (ref 0–1.3)
MONOCYTES NFR BLD AUTO: 8.7 %
NEUTROPHILS # BLD AUTO: 4 10E9/L (ref 1.6–8.3)
NEUTROPHILS NFR BLD AUTO: 74.6 %
NRBC # BLD AUTO: 0 10*3/UL
NRBC BLD AUTO-RTO: 0 /100
PLATELET # BLD AUTO: 205 10E9/L (ref 150–450)
POTASSIUM SERPL-SCNC: 3.8 MMOL/L (ref 3.4–5.3)
RBC # BLD AUTO: 2.85 10E12/L (ref 4.4–5.9)
SODIUM SERPL-SCNC: 143 MMOL/L (ref 133–144)
WBC # BLD AUTO: 5.4 10E9/L (ref 4–11)

## 2018-10-09 PROCEDURE — 25000128 H RX IP 250 OP 636: Performed by: HOSPITALIST

## 2018-10-09 PROCEDURE — 00000146 ZZHCL STATISTIC GLUCOSE BY METER IP

## 2018-10-09 PROCEDURE — 85025 COMPLETE CBC W/AUTO DIFF WBC: CPT | Performed by: HOSPITALIST

## 2018-10-09 PROCEDURE — 25000132 ZZH RX MED GY IP 250 OP 250 PS 637: Performed by: HOSPITALIST

## 2018-10-09 PROCEDURE — 25000132 ZZH RX MED GY IP 250 OP 250 PS 637: Performed by: INTERNAL MEDICINE

## 2018-10-09 PROCEDURE — 36415 COLL VENOUS BLD VENIPUNCTURE: CPT | Performed by: HOSPITALIST

## 2018-10-09 PROCEDURE — 80048 BASIC METABOLIC PNL TOTAL CA: CPT | Performed by: HOSPITALIST

## 2018-10-09 PROCEDURE — 25000132 ZZH RX MED GY IP 250 OP 250 PS 637: Performed by: UROLOGY

## 2018-10-09 PROCEDURE — 90662 IIV NO PRSV INCREASED AG IM: CPT | Performed by: UROLOGY

## 2018-10-09 PROCEDURE — 99231 SBSQ HOSP IP/OBS SF/LOW 25: CPT | Performed by: HOSPITALIST

## 2018-10-09 PROCEDURE — 25000128 H RX IP 250 OP 636: Performed by: UROLOGY

## 2018-10-09 PROCEDURE — 40000275 ZZH STATISTIC RCP TIME EA 10 MIN

## 2018-10-09 PROCEDURE — 99207 ZZC CDG-CODE CATEGORY CHANGED: CPT | Performed by: HOSPITALIST

## 2018-10-09 RX ADMIN — SODIUM CHLORIDE, POTASSIUM CHLORIDE, SODIUM LACTATE AND CALCIUM CHLORIDE: 600; 310; 30; 20 INJECTION, SOLUTION INTRAVENOUS at 05:58

## 2018-10-09 RX ADMIN — FOLIC ACID 1 MG: 1 TABLET ORAL at 08:49

## 2018-10-09 RX ADMIN — FERROUS SULFATE TAB 325 MG (65 MG ELEMENTAL FE) 325 MG: 325 (65 FE) TAB at 08:49

## 2018-10-09 RX ADMIN — PANTOPRAZOLE SODIUM 40 MG: 40 TABLET, DELAYED RELEASE ORAL at 05:58

## 2018-10-09 RX ADMIN — TOPIRAMATE 50 MG: 25 TABLET, FILM COATED ORAL at 08:49

## 2018-10-09 RX ADMIN — METOPROLOL SUCCINATE 50 MG: 50 TABLET, EXTENDED RELEASE ORAL at 08:49

## 2018-10-09 RX ADMIN — INSULIN ASPART 1 UNITS: 100 INJECTION, SOLUTION INTRAVENOUS; SUBCUTANEOUS at 08:53

## 2018-10-09 RX ADMIN — POLYETHYLENE GLYCOL 3350 17 G: 17 POWDER, FOR SOLUTION ORAL at 08:49

## 2018-10-09 RX ADMIN — BUDESONIDE AND FORMOTEROL FUMARATE DIHYDRATE 2 PUFF: 80; 4.5 AEROSOL RESPIRATORY (INHALATION) at 07:50

## 2018-10-09 RX ADMIN — DOCUSATE SODIUM 100 MG: 100 CAPSULE, LIQUID FILLED ORAL at 08:49

## 2018-10-09 RX ADMIN — LEVETIRACETAM 500 MG: 500 TABLET, FILM COATED ORAL at 08:49

## 2018-10-09 RX ADMIN — CLOPIDOGREL 75 MG: 75 TABLET, FILM COATED ORAL at 08:49

## 2018-10-09 RX ADMIN — TAMSULOSIN HYDROCHLORIDE 0.4 MG: 0.4 CAPSULE ORAL at 08:49

## 2018-10-09 RX ADMIN — FLUOXETINE 40 MG: 20 CAPSULE ORAL at 08:49

## 2018-10-09 RX ADMIN — INFLUENZA A VIRUS A/MICHIGAN/45/2015 X-275 (H1N1) ANTIGEN (FORMALDEHYDE INACTIVATED), INFLUENZA A VIRUS A/SINGAPORE/INFIMH-16-0019/2016 IVR-186 (H3N2) ANTIGEN (FORMALDEHYDE INACTIVATED), AND INFLUENZA B VIRUS B/MARYLAND/15/2016 BX-69A (A B/COLORADO/6/2017-LIKE VIRUS) ANTIGEN (FORMALDEHYDE INACTIVATED) 0.5 ML: 60; 60; 60 INJECTION, SUSPENSION INTRAMUSCULAR at 12:47

## 2018-10-09 NOTE — DISCHARGE INSTRUCTIONS
Your home care referral was sent to [facility].  If you haven't heard from them within the next 24-48 hours,  Please call them at [phone]

## 2018-10-09 NOTE — DISCHARGE SUMMARY
[facility]  Discharge Summary  Name: Donnie Vincent    MRN: [id number]  YOB: 1945    Age: 72 year old  Date of Discharge:  10/9/2018  Date of Admission: 10/3/2018  Primary Care Provider: Suyapa Leon  Discharge Physician:  Rodrigo Stewart MD  Discharging Service:  Hospitalist  Date of Service (when I saw the patient): 10/09/18    Discharge Diagnosis:  S/p TURP, laser removal of bladder stones, hematuria, acute blood loss anemia, acute renal failure due to ATN due to hypotension     Other Diagnosis:  BPH, COPD, CVA, seizure disorder, diabetes mellitus, GERD, hypertension, hyperlipidemia     Discharge Disposition:  Discharged to home     Allergies:  Allergies   Allergen Reactions     Penicillins      Sore joints and he had to be hospitalized for it        Discharge Medications:   Current Discharge Medication List      CONTINUE these medications which have CHANGED    Details   insulin glargine (LANTUS SOLOSTAR) 100 UNIT/ML pen Inject 10 Units Subcutaneous At Bedtime         CONTINUE these medications which have NOT CHANGED    Details   albuterol (PROAIR HFA, PROVENTIL HFA, VENTOLIN HFA) 108 (90 BASE) MCG/ACT inhaler Inhale 2 puffs into the lungs every 6 hours  Qty: 1 Inhaler, Refills: 6    Associated Diagnoses: COPD (chronic obstructive pulmonary disease) (H)      budesonide-formoterol (SYMBICORT) 80-4.5 MCG/ACT inhaler Inhale 2 puffs into the lungs 2 times daily       clopidogrel (PLAVIX) 75 MG tablet Take 1 tablet (75 mg) by mouth daily  Qty: 90 tablet, Refills: 0    Associated Diagnoses: Acute ischemic stroke (H)      fenofibrate 160 MG tablet Take 1 tablet (160 mg) by mouth daily  Qty: 90 tablet, Refills: 1    Associated Diagnoses: Hyperlipidemia LDL goal <100      ferrous sulfate (IRON) 325 (65 FE) MG tablet Take 1 tablet (325 mg) by mouth daily (with breakfast)  Qty: 90 tablet, Refills: 1    Associated Diagnoses: Iron deficiency anemia, unspecified      FLUoxetine  "(PROZAC) 40 MG capsule Take 1 capsule (40 mg) by mouth daily  Qty: 90 capsule, Refills: 1    Associated Diagnoses: Major depressive disorder, recurrent episode, mild (H)      folic acid (FOLVITE) 1 MG tablet Take 1 tablet (1 mg) by mouth daily  Qty: 90 tablet, Refills: 0    Comments: Pt due for May appt-needs to call our office.  Associated Diagnoses: Increased homocysteine (H)      levETIRAcetam (KEPPRA) 500 MG tablet Take 500 mg by mouth 2 times daily      linagliptin (TRADJENTA) 5 MG TABS tablet Take 5 mg by mouth daily Pt takes 1/2 tab daily      METOPROLOL SUCCINATE ER PO Take 50 mg by mouth daily      PANTOPRAZOLE SODIUM PO Take 40 mg by mouth 2 times daily (before meals)      simvastatin (ZOCOR) 10 MG tablet Take 1 tablet (10 mg) by mouth At Bedtime  Qty: 90 tablet, Refills: 1    Associated Diagnoses: Hyperlipidemia LDL goal <100      tamsulosin (FLOMAX) 0.4 MG 24 hr capsule Take 1 capsule (0.4 mg) by mouth daily  Qty: 90 capsule, Refills: 3    Associated Diagnoses: BPH (benign prostatic hypertrophy)      Topiramate (TOPAMAX PO) Take 50 mg by mouth 2 times daily      insulin pen needle 31G X 5 MM Use 1-2 pen needles daily or as directed.  Qty: 100 each, Refills: 3    Associated Diagnoses: Type 2 diabetes with stage 3 chronic kidney disease GFR 30-59 (H)         STOP taking these medications       glipiZIDE (GLUCOTROL) 10 MG tablet Comments:   Reason for Stopping:         liraglutide (VICTOZA) 18 MG/3ML soln Comments:   Reason for Stopping:         LISINOPRIL PO Comments:   Reason for Stopping:         METFORMIN HCL PO Comments:   Reason for Stopping:                Condition on Discharge:  Discharge condition: Stable   Discharge vitals: Blood pressure 139/65, pulse 76, temperature 95.3  F (35.2  C), temperature source Oral, resp. rate 18, height 1.803 m (5' 11\"), weight 117.5 kg (259 lb 1.6 oz), SpO2 97 %.   Code status on discharge: Full Code     History of Illness:  See detailed admission note for full " details.    72 year old male patient with past medical history of BPH, COPD, CVA, seizure disorder, diabetes mellitus, GERD, hypertension, hyperlipidemia, was admitted for prostate surgery.  Patient underwent cystoscopy, removal of bladder stones, transurethral resection of prostate.  Bladder irrigation was started.  Hospitalist team consulted for elevated lactic acid at 2.4.  Patient denies fever.  He has no cough or shortness of breath.  He also denies chest pain.  No nausea or vomiting.  Also denies abdominal pain.  Laboratory workup earlier this evening showed creatinine 2.55, lactic acid 2.4, blood glucose 185, WBC 14.7, hemoglobin 7.5. 1 unit of PRBC was ordered by urology.  Patient was placed on IV ciprofloxacin 400 mg every 12 hours, and IV fluid.  Hospitalist group was consulted for postop medical management and to address elevated lactic acid level.    Significant Physical Exam Findings:  Patient sitting in chair. Wife in room. Patient states he feels well today. No chest pain, sob, abdo pain, n/v/d  s1s2 rrr, lungs clear, abdo +Bs    Procedures:  TURP, cystoscopy     Imaging:  Results for orders placed or performed during the hospital encounter of 10/03/18   US Renal Complete    Narrative    ULTRASOUND RETROPERITONEAL COMPLETE  10/6/2018 6:35 PM     HISTORY: Acute kidney injury.    COMPARISON: None.    FINDINGS: The bilateral renal parenchyma are unremarkable in  echogenicity without evidence for shadowing stone or mass. Bilateral  renal cysts measuring up to 9.8 cm on the right and 7.1 cm on the  left. No hydronephrosis. Right kidney measures 12.4 x 6.4 x 7.6 cm and  the left measures 21.1 x 6.5 x 10.3 cm. Cortical thickness is 1.5 cm  on the right and 2.5 cm on the left. Bladder is unremarkable given its  level of distention.      Impression    IMPRESSION: No obstruction demonstrated.    NEELA GUAMAN MD        Consultations:  No consultations were requested during this admission.     Significant Lab  Results:    Recent Labs  Lab 10/09/18  0532 10/08/18  0607 10/07/18  0609   WBC 5.4 6.3 8.0   HGB 8.0* 7.9* 8.1*   HCT 26.4* 25.4* 25.7*   MCV 93 92 90    201 182          Lab Results   Component Value Date     10/09/2018     10/08/2018     10/07/2018    Lab Results   Component Value Date    CHLORIDE 114 10/09/2018    CHLORIDE 116 10/08/2018    CHLORIDE 117 10/07/2018    Lab Results   Component Value Date    BUN 22 10/09/2018    BUN 25 10/08/2018    BUN 31 10/07/2018      Lab Results   Component Value Date    POTASSIUM 3.8 10/09/2018    POTASSIUM 3.9 10/08/2018    POTASSIUM 3.9 10/07/2018    Lab Results   Component Value Date    CO2 20 10/09/2018    CO2 20 10/08/2018    CO2 18 10/07/2018    Lab Results   Component Value Date    CR 1.76 10/09/2018    CR 2.19 10/08/2018    CR 3.20 10/07/2018        No results for input(s): COLOR, APPEARANCE, URINEGLC, URINEBILI, URINEKETONE, SG, UBLD, URINEPH, PROTEIN, UROBILINOGEN, NITRITE, LEUKEST, RBCU, WBCU in the last 168 hours.     Pending Results:    Unresulted Labs Ordered in the Past 30 Days of this Admission     Date and Time Order Name Status Description    10/5/2018 0216 Blood culture Preliminary     10/3/2018 1452 Stone analysis In process            Discharge Instructions and Follow-Up:   Discharge diet:   Active Diet Order      Regular Diet Adult      Diet   Discharge activity: Activity as tolerated   Discharge follow-up: Follow up with primary care provider in 3 days   Outpatient therapy: None    Home Care agency: None    Other instructions: Cbc, chem 7 in 2 days, voiding trial      Hospital Course:  Patient was here for an elective TURP/cystoscopy. Post-operatively he had acute blood loss anemia due to hematuria and was transfused 3 units total. He had urinary retention and failed 2 voiding trials here.  He was also hypotensive in the PACU which resulted in acute renal failure due to ATN. His Hb is now stable. His Cr has improved. I have  spoken to Dr. Leon (his PCP) regarding follow-up on Friday and med changes. Many of his DM meds are being held ([facility] He will see Dr. Leon on Friday. He has an appt with Dr. Banuelos on Monday for a voiding trial.     Total time spent in face to face contact with the patient and coordinating discharge was:  50 Minutes.    Rodrigo Stewart MD  Pager: [phone]

## 2018-10-09 NOTE — CONSULTS
Discharge Planner   Discharge Plans in progress: Yes  Barriers to discharge plan: Received update from Dr. Setwart that patient will need home care upon discharge. Met with patient and wife Maico at bedside. Patient has chosen [facility]. [facility] brochure given to patient.   Follow up plan: Referral sent to [facility] and updated referral information to discharge AVS.          Entered by: Anayeli Melgar 10/09/2018 10:58 AM

## 2018-10-09 NOTE — PLAN OF CARE
Problem: Patient Care Overview  Goal: Plan of Care/Patient Progress Review  PRIMARY DIAGNOSIS: s/p TURP  OUTPATIENT/OBSERVATION GOALS TO BE MET BEFORE DISCHARGE:  1. Stable vital signs Yes  2. Tolerating diet:Yes. IVF /hr running.   3. Pain controlled with oral pain medications:  Yes, PRN norco given in previous shift for pain relief - pt sleeping this shift, no signs of pain assessed.  4. Positive bowel sounds:  Yes  5. Voiding without difficulty:  No, Sanabria in place - did not tolerate voiding trial yesterday. Urine is light lamonte and clear. Plan to discharge today with Sanabria.  6. Able to ambulate:  Yes  7. Provider specific discharge goals met:  Yes. Plan: recheck labs in AM, continue to monitor.    Discharge Planner Nurse   Safe discharge environment identified: Yes  Barriers to discharge: No       Entered by: Alma Delia Lewis 10/09/2018 4:26 AM     Please review provider order for any additional goals.   Nurse to notify provider when observation goals have been met and patient is ready for discharge.

## 2018-10-09 NOTE — PLAN OF CARE
Problem: Patient Care Overview  Goal: Discharge Needs Assessment  Outcome: Adequate for Discharge Date Met: 10/09/18  Patient's After Visit Summary was reviewed with patient and spouse.   Sanabria cares explained and demonstrated. Pt was able to return demonstration.   Flu shot given.   Patient verbalized understanding of After Visit Summary, recommended follow up and was given an opportunity to ask questions.   Discharge medications sent home with patient/family: no new meds  Discharged with: wife

## 2018-10-09 NOTE — PLAN OF CARE
"Problem: Patient Care Overview  Goal: Plan of Care/Patient Progress Review  /55 (BP Location: Right arm)  Pulse 76  Temp 99.3  F (37.4  C) (Oral)  Resp 16  Ht 1.803 m (5' 11\")  Wt 117.5 kg (259 lb 1.6 oz)  SpO2 98%  BMI 36.14 kg/m2  Neuro: WNL-AOx3  Cardiac: WNL  Lungs: Anterior-clear bilaterally (infrequent cough-Dyspnea on exertion relieved with rest-per patient this is baseline)  GI: WNL-BM this shift  : Sanabria patent and output now light lamonte in color  Pain: 3/10 in abd-PRN norco given-effective  IV: LR @100 in R IV site.  L IV site is saline locked  Meds: Sliding scale insulin-Lantus  Labs/tests: Cr 2.19, Hgb-7.9  Diet: Regular-tolerating  Activity: A1  Plan: Fluids overnight-re-check Cr in am.             "

## 2018-10-10 ENCOUNTER — TELEPHONE (OUTPATIENT)
Dept: INTERNAL MEDICINE | Facility: CLINIC | Age: 73
End: 2018-10-10

## 2018-10-10 LAB
APPEARANCE STONE: NORMAL
COMPN STONE: NORMAL
NUMBER STONE: NORMAL
SIZE STONE: NORMAL MM
WT STONE: NORMAL MG

## 2018-10-10 NOTE — TELEPHONE ENCOUNTER
IP F/U    Date: 10/09/18  Diagnosis: Enlarged Prostate, Hematuria  Is patient active in care coordination? No  Was patient in TCU? No

## 2018-10-11 DIAGNOSIS — D50.9 IRON DEFICIENCY ANEMIA, UNSPECIFIED IRON DEFICIENCY ANEMIA TYPE: ICD-10-CM

## 2018-10-11 DIAGNOSIS — N17.0 ACUTE RENAL FAILURE WITH TUBULAR NECROSIS (H): ICD-10-CM

## 2018-10-11 LAB
BACTERIA SPEC CULT: NO GROWTH
ERYTHROCYTE [DISTWIDTH] IN BLOOD BY AUTOMATED COUNT: 15.7 % (ref 10–15)
HCT VFR BLD AUTO: 29.8 % (ref 40–53)
HGB BLD-MCNC: 9.2 G/DL (ref 13.3–17.7)
Lab: NORMAL
MCH RBC QN AUTO: 28 PG (ref 26.5–33)
MCHC RBC AUTO-ENTMCNC: 30.9 G/DL (ref 31.5–36.5)
MCV RBC AUTO: 91 FL (ref 78–100)
PLATELET # BLD AUTO: 312 10E9/L (ref 150–450)
RBC # BLD AUTO: 3.29 10E12/L (ref 4.4–5.9)
SPECIMEN SOURCE: NORMAL
WBC # BLD AUTO: 7.5 10E9/L (ref 4–11)

## 2018-10-11 PROCEDURE — 85027 COMPLETE CBC AUTOMATED: CPT | Performed by: INTERNAL MEDICINE

## 2018-10-11 PROCEDURE — 80048 BASIC METABOLIC PNL TOTAL CA: CPT | Performed by: INTERNAL MEDICINE

## 2018-10-11 PROCEDURE — 36415 COLL VENOUS BLD VENIPUNCTURE: CPT | Performed by: INTERNAL MEDICINE

## 2018-10-12 ENCOUNTER — DOCUMENTATION ONLY (OUTPATIENT)
Dept: CARE COORDINATION | Facility: CLINIC | Age: 73
End: 2018-10-12

## 2018-10-12 ENCOUNTER — OFFICE VISIT (OUTPATIENT)
Dept: INTERNAL MEDICINE | Facility: CLINIC | Age: 73
End: 2018-10-12
Payer: COMMERCIAL

## 2018-10-12 VITALS
SYSTOLIC BLOOD PRESSURE: 132 MMHG | DIASTOLIC BLOOD PRESSURE: 80 MMHG | RESPIRATION RATE: 19 BRPM | WEIGHT: 246.5 LBS | OXYGEN SATURATION: 98 % | HEART RATE: 103 BPM | BODY MASS INDEX: 34.38 KG/M2 | TEMPERATURE: 98.1 F

## 2018-10-12 DIAGNOSIS — E11.22 TYPE 2 DIABETES MELLITUS WITH STAGE 3 CHRONIC KIDNEY DISEASE, WITH LONG-TERM CURRENT USE OF INSULIN (H): ICD-10-CM

## 2018-10-12 DIAGNOSIS — I10 ESSENTIAL HYPERTENSION: ICD-10-CM

## 2018-10-12 DIAGNOSIS — N17.9 ACUTE RENAL FAILURE, UNSPECIFIED ACUTE RENAL FAILURE TYPE (H): Primary | ICD-10-CM

## 2018-10-12 DIAGNOSIS — N18.30 TYPE 2 DIABETES MELLITUS WITH STAGE 3 CHRONIC KIDNEY DISEASE, WITH LONG-TERM CURRENT USE OF INSULIN (H): ICD-10-CM

## 2018-10-12 DIAGNOSIS — E78.5 HYPERLIPIDEMIA LDL GOAL <100: ICD-10-CM

## 2018-10-12 DIAGNOSIS — D62 ANEMIA DUE TO BLOOD LOSS, ACUTE: ICD-10-CM

## 2018-10-12 DIAGNOSIS — Z79.4 TYPE 2 DIABETES MELLITUS WITH STAGE 3 CHRONIC KIDNEY DISEASE, WITH LONG-TERM CURRENT USE OF INSULIN (H): ICD-10-CM

## 2018-10-12 DIAGNOSIS — I63.9 ACUTE ISCHEMIC STROKE (H): ICD-10-CM

## 2018-10-12 DIAGNOSIS — F33.0 MAJOR DEPRESSIVE DISORDER, RECURRENT EPISODE, MILD (H): ICD-10-CM

## 2018-10-12 LAB
ANION GAP SERPL CALCULATED.3IONS-SCNC: 9 MMOL/L (ref 3–14)
BUN SERPL-MCNC: 15 MG/DL (ref 7–30)
CALCIUM SERPL-MCNC: 8.5 MG/DL (ref 8.5–10.1)
CHLORIDE SERPL-SCNC: 113 MMOL/L (ref 94–109)
CO2 SERPL-SCNC: 20 MMOL/L (ref 20–32)
CREAT SERPL-MCNC: 1.51 MG/DL (ref 0.66–1.25)
GFR SERPL CREATININE-BSD FRML MDRD: 45 ML/MIN/1.7M2
GLUCOSE SERPL-MCNC: 133 MG/DL (ref 70–99)
POTASSIUM SERPL-SCNC: 3.6 MMOL/L (ref 3.4–5.3)
SODIUM SERPL-SCNC: 142 MMOL/L (ref 133–144)

## 2018-10-12 PROCEDURE — 99214 OFFICE O/P EST MOD 30 MIN: CPT | Performed by: INTERNAL MEDICINE

## 2018-10-12 RX ORDER — TAMSULOSIN HYDROCHLORIDE 0.4 MG/1
0.4 CAPSULE ORAL DAILY
Qty: 90 CAPSULE | Refills: 3 | Status: CANCELLED | OUTPATIENT
Start: 2018-10-12

## 2018-10-12 RX ORDER — CLOPIDOGREL BISULFATE 75 MG/1
75 TABLET ORAL DAILY
Qty: 90 TABLET | Refills: 1 | Status: ON HOLD | OUTPATIENT
Start: 2018-10-12 | End: 2018-10-20

## 2018-10-12 RX ORDER — FENOFIBRATE 160 MG/1
160 TABLET ORAL DAILY
Qty: 90 TABLET | Refills: 1 | Status: SHIPPED | OUTPATIENT
Start: 2018-10-12 | End: 2019-10-17

## 2018-10-12 RX ORDER — FLUOXETINE 40 MG/1
40 CAPSULE ORAL DAILY
Qty: 90 CAPSULE | Refills: 1 | Status: SHIPPED | OUTPATIENT
Start: 2018-10-12 | End: 2019-09-30 | Stop reason: ALTCHOICE

## 2018-10-12 RX ORDER — SIMVASTATIN 10 MG
10 TABLET ORAL AT BEDTIME
Qty: 90 TABLET | Refills: 1 | Status: SHIPPED | OUTPATIENT
Start: 2018-10-12 | End: 2020-06-21

## 2018-10-12 NOTE — PROGRESS NOTES
SUBJECTIVE:   Donnie Vincent is a 72 year old male who presents to clinic today for the following health issues:        Hospital Follow-up Visit:    Hospital/Nursing Home/IP Rehab Facility: [facility]  Date of Admission: 10/3/2018  Date of Discharge: 10/9/18  Reason(s) for Admission: Surgery: removal of bladder stones, TURP. Had Acute kidney failure, acute blood loss. S/p blood transfusion             Problems taking medications regularly:  None       Medication changes since discharge: None       Problems adhering to non-medication therapy:  None    Summary of hospitalization:  [facility] discharge summary reviewed  Diagnostic Tests/Treatments reviewed.  Follow up needed: CBC, BMP  Other Healthcare Providers Involved in Patient s Care:         None  Update since discharge: improved.     Post Discharge Medication Reconciliation: discharge medications reconciled, continue medications without change.  Plan of care communicated with patient and family     Coding guidelines for this visit:  Type of Medical   Decision Making Face-to-Face Visit       within 7 Days of discharge Face-to-Face Visit        within 14 days of discharge   Moderate Complexity [id number]   High Complexity [id number]          Brief hospital summary   72 year old male patient with past medical history of BPH, COPD, CVA, seizure disorder, diabetes mellitus, GERD, hypertension, hyperlipidemia, was admitted for prostate surgery.  Patient underwent cystoscopy, removal of bladder stones, transurethral resection of prostate.  Bladder irrigation was started.  Hospitalist team consulted for elevated lactic acid at 2.4.  Patient denies fever.  He has no cough or shortness of breath.  He also denies chest pain.  No nausea or vomiting.  Also denies abdominal pain.  Laboratory workup  showed creatinine 2.55, lactic acid 2.4, blood glucose 185, WBC 14.7, hemoglobin 7.5. 1 unit of PRBC was ordered by urology.  Patient was placed on IV  ciprofloxacin 400 mg every 12 hours, and IV fluid.  Hospitalist group was consulted for postop medical management and to address elevated lactic acid level  Patient Post-operatively he had acute blood loss anemia due to hematuria and was transfused 3 units total. He had urinary retention and failed 2 voiding trials here.  He was also hypotensive in the PACU which resulted in acute renal failure due to ATN. His Hb is now stable. His Cr has improved.  Many of his DM meds are being held ([facility]).  . He has an appt with Dr. Banueols on Monday for a voiding trial.    No patient states that he is feeling well better, has not noticed any blood in the urine, no shortness of breath.  His blood sugars nonfasting was 160.  Patient has been advised to check his blood sugars at least 3 times a day.  His metformin, glipizide, Victoza were held during the hospital stay.  Currently on Lantus 10 units and Tradjenta 5 mg half a tablet daily.  Patient's lisinopril was also held due to acute renal failure, currently on metoprolol for blood pressure control.         Patient Active Problem List   Diagnosis     Other type of migraine     Diverticulitis     Seizure disorder (H)     Mixed hyperlipidemia     HYPERLIPIDEMIA LDL GOAL <100     Obstructive sleep apnea     Meningioma (H)     Advanced directives, counseling/discussion     COPD (chronic obstructive pulmonary disease) (H)     GERD (gastroesophageal reflux disease)     Thoracic aortic aneurysm (H)     Bilateral renal cysts     Acute ischemic stroke (H)     Increased homocysteine (H)     Benign prostatic hyperplasia with lower urinary tract symptoms     Cerebral infarction (H)     PFO (patent foramen ovale)     Type 2 diabetes with stage 3 chronic kidney disease GFR 30-59 (H)     Morbid obesity (H)     Major depressive disorder, recurrent episode, mild (H)     Gastrointestinal hemorrhage, unspecified gastrointestinal hemorrhage type     Essential  hypertension     Thoracic aortic aneurysm without rupture (H)     Renal failure     SOB (shortness of breath)     Long-term (current) use of anticoagulants [Z79.01]     Iron deficiency anemia, unspecified iron deficiency anemia type     Enlarged prostate     Hematuria     Past Surgical History:   Procedure Laterality Date     C NONSPECIFIC PROCEDURE      colonoscopy 2005     COLONOSCOPY  11/4/2015    Dr. Ernesto SPEARS     COLONOSCOPY N/A 11/4/2015    Procedure: COLONOSCOPY;  Surgeon: Yazmin Orozco MD;  Location:  GI     CYSTOSCOPY, RETROGRADES, EXTRACT STONE, COMBINED Left 11/25/2015    Procedure: COMBINED CYSTOSCOPY, RETROGRADES, EXTRACT STONE;  Surgeon: Neville Banuelos MD;  Location:  OR     CYSTOSCOPY, TRANSURETHRAL RESECTION (TUR) PROSTATE, COMBINED N/A 10/3/2018    Procedure: COMBINED CYSTOSCOPY, TRANSURETHRAL RESECTION (TUR) PROSTATE;  Cystoscopy, removal of bladder stones with holmium laser, transurethral resection of prostate using Olympus bipolar electrode;  Surgeon: Neville Banuelos MD;  Location:  OR     ESOPHAGOSCOPY, GASTROSCOPY, DUODENOSCOPY (EGD), COMBINED  11/5/2015    Dr. Ernesto CONTRERAS     ESOPHAGOSCOPY, GASTROSCOPY, DUODENOSCOPY (EGD), COMBINED  06/27/2018    Dr. Ernesto CONTRERAS     GENITOURINARY SURGERY      kidney stone - lithotripsy     HERNIA REPAIR       LASER HOLMIUM LITHOTRIPSY BLADDER N/A 10/3/2018    Procedure: LASER HOLMIUM LITHOTRIPSY BLADDER;;  Surgeon: Neville Banuelos MD;  Location:  OR     PHACOEMULSIFICATION CLEAR CORNEA WITH STANDARD INTRAOCULAR LENS IMPLANT  12/20/2011    Procedure:PHACOEMULSIFICATION CLEAR CORNEA WITH STANDARD INTRAOCULAR LENS IMPLANT; RIGHT PHACOEMULSIFICATION CLEAR CORNEA WITH STANDARD INTRAOCULAR LENS IMPLANT ; Surgeon:GUILHERME KAUFMAN; Location:[facility]       Social History   Substance Use Topics     Smoking status: Former Smoker     Quit date: 1/1/1970     Smokeless tobacco: Never Used      Comment: quit age 28     Alcohol use Yes       Comment: 1 beer/week     Family History   Problem Relation Age of Onset     Family History Negative Mother      migraines     Family History Negative Father      lived to be 92     Colon Cancer No family hx of          Current Outpatient Prescriptions   Medication Sig Dispense Refill     albuterol (PROAIR HFA, PROVENTIL HFA, VENTOLIN HFA) 108 (90 BASE) MCG/ACT inhaler Inhale 2 puffs into the lungs every 6 hours (Patient taking differently: Inhale 2 puffs into the lungs every 6 hours as needed ) 1 Inhaler 6     budesonide-formoterol (SYMBICORT) 80-4.5 MCG/ACT inhaler Inhale 2 puffs into the lungs 2 times daily        clopidogrel (PLAVIX) 75 MG tablet Take 1 tablet (75 mg) by mouth daily 90 tablet 0     fenofibrate 160 MG tablet Take 1 tablet (160 mg) by mouth daily 90 tablet 1     ferrous sulfate (IRON) 325 (65 FE) MG tablet Take 1 tablet (325 mg) by mouth daily (with breakfast) 90 tablet 1     FLUoxetine (PROZAC) 40 MG capsule Take 1 capsule (40 mg) by mouth daily 90 capsule 1     folic acid (FOLVITE) 1 MG tablet Take 1 tablet (1 mg) by mouth daily 90 tablet 0     insulin glargine (LANTUS SOLOSTAR) 100 UNIT/ML pen Inject 10 Units Subcutaneous At Bedtime       insulin pen needle 31G X 5 MM Use 1-2 pen needles daily or as directed. 100 each 3     levETIRAcetam (KEPPRA) 500 MG tablet Take 500 mg by mouth 2 times daily       linagliptin (TRADJENTA) 5 MG TABS tablet Take 5 mg by mouth daily Pt takes 1/2 tab daily       METOPROLOL SUCCINATE ER PO Take 50 mg by mouth daily       PANTOPRAZOLE SODIUM PO Take 40 mg by mouth 2 times daily (before meals)       simvastatin (ZOCOR) 10 MG tablet Take 1 tablet (10 mg) by mouth At Bedtime 90 tablet 1     tamsulosin (FLOMAX) 0.4 MG 24 hr capsule Take 1 capsule (0.4 mg) by mouth daily 90 capsule 3     Topiramate (TOPAMAX PO) Take 50 mg by mouth 2 times daily         Reviewed and updated as needed this visit by clinical staff  Tobacco  Allergies  Meds  Med Hx  Surg Hx  Fam Hx   Soc Hx      Reviewed and updated as needed this visit by Provider         ROS:  CONSTITUTIONAL: NEGATIVE for fever, chills, change in weight  EYES: NEGATIVE for vision changes or irritation  ENT/MOUTH: NEGATIVE for ear, mouth and throat problems  RESP: NEGATIVE for significant cough or SOB  CV: NEGATIVE for chest pain, palpitations or peripheral edema  : negative for dysuria, hematuria,   NEURO: NEGATIVE for weakness, dizziness or paresthesias  HEME/ALLERGY/IMMUNE: anemia  PSYCHIATRIC: NEGATIVE for changes in mood or affect  ROS otherwise negative    OBJECTIVE:                                                    /80  Pulse 103  Temp 98.1  F (36.7  C) (Oral)  Resp 19  Wt 246 lb 8 oz (111.8 kg)  SpO2 98%  BMI 34.38 kg/m2  Body mass index is 34.38 kg/(m^2).   GENERAL: healthy, alert, well nourished, well hydrated, no distress  EYES: Eyes grossly normal to inspection, extraocular movements - intact, and PERRL  HENT:  Mouth- no ulcers, no lesions  NECK: no tenderness, no adenopathy, no asymmetry, no masses, no stiffness   RESP: lungs clear to auscultation - no rales, no rhonchi, no wheezes  CV: regular rates and rhythm,    ABDOMEN: soft, no tenderness, no  hepatosplenomegaly, no masses, normal bowel sounds  MS: extremities- no gross deformities noted, no edema, no calf tenderness  NEURO: strength and tone- normal, sensory exam- grossly normal, mentation- intact, speech- normal, reflexes- symmetric       ASSESSMENT/PLAN:                                                         (N17.9) Acute renal failure, unspecified acute renal failure type (H)  (primary encounter diagnosis)  Plan: check BMP.  Lisinopril on hold, advised to avoid nephrotoxins, Metformin discontinued , creatinine improving 1.51 today.     (E11.22,  N18.3,  Z79.4) Type 2 diabetes mellitus with stage 3 chronic kidney disease, with long-term current use of insulin (H)  Plan: Metformin, glipizide, Victoza discontinued.  Currently on insulin  glargine (LANTUS SOLOSTAR) 100 UNIT/ML pen 10 units daily and Tradjenta 2.5 mg daily, patient was advised to check his blood sugars at least 3 times a day and recorded and if her fasting blood sugar is more than 150 consistently he was advised to increase Lantus by 2 units.  Advised to follow ADA diet regular exercise.      (I63.9) Acute ischemic stroke (H)  Comment: Stable  Plan: clopidogrel (PLAVIX) 75 MG tablet refilled as directed.explained clearly about the medication,insructions and side effects.     (D62) Anemia due to blood loss, acute  Plan: Status post 3 units of blood transfusion, recheck hemoglobin today slightly improved 9.2, repeat CBC in 2 weeks      (I10) Essential hypertension  Plan: Patients lisinopril on hold due to due to renal functions, continue metoprolol blood pressure stable at this time, continue to follow low-sodium diet regular exercise         (E78.5) Hyperlipidemia LDL goal <100  Plan: fenofibrate 160 MG tablet, simvastatin (ZOCOR)         10 MG tablet refilled as directed.explained clearly about the medication,insructions and side effects.            (F33.0) Major depressive disorder, recurrent episode, mild (H)  Plan: FLUoxetine (PROZAC) 40 MG capsule refilled.explained clearly about the medication,insructions and side effects.             Suyapa Leon MD  [facility]

## 2018-10-12 NOTE — MR AVS SNAPSHOT
After Visit Summary   10/12/2018    Donnie Vincent    MRN: [id number]           Patient Information     Date Of Birth          1945        Visit Information        Provider Department      10/12/2018 11:20 AM Suyapa Leon MD [facility]        Today's Diagnoses     Acute renal failure, unspecified acute renal failure type (H)    -  1    Type 2 diabetes mellitus with stage 3 chronic kidney disease, with long-term current use of insulin (H)        Acute ischemic stroke (H)        Hyperlipidemia LDL goal <100        Major depressive disorder, recurrent episode, mild (H)        Essential hypertension        Anemia due to blood loss, acute           Follow-ups after your visit        Your next 10 appointments already scheduled     Oct 15, 2018 10:00 AM CDT   Nurse Visit with UB NURSE   [facility]    [address] [facility]  [address] [place] [address] [phone]            Oct 29, 2018 11:00 AM CDT   Post-Op with Neville Banuelos MD   [facility] ([facility])    [address] [facility]  [address] [place] [address] [phone]              Who to contact     If you have questions or need follow up information about today's clinic visit or your schedule please contact [facility] directly at [phone].  Normal or non-critical lab and imaging results will be communicated to you by MyChart, letter or phone within 4 business days after the clinic has received the results. If you do not hear from us within 7 days, please contact the clinic through MyChart or phone. If you have a critical or abnormal lab result, we will notify you by phone as soon as possible.  Submit refill requests through [employer] or call your pharmacy and they will forward the refill request to us. Please allow 3 business  days for your refill to be completed.          Additional Information About Your Visit        Care EveryWhere ID     This is your Care EveryWhere ID. This could be used by other organizations to access your [place] medical records  [id number]        Your Vitals Were     Pulse Temperature Respirations Pulse Oximetry BMI (Body Mass Index)       103 98.1  F (36.7  C) (Oral) 19 98% 34.38 kg/m2        Blood Pressure from Last 3 Encounters:   10/12/18 132/80   10/09/18 118/62   09/24/18 109/69    Weight from Last 3 Encounters:   10/12/18 246 lb 8 oz (111.8 kg)   10/07/18 259 lb 1.6 oz (117.5 kg)   09/24/18 245 lb 6.4 oz (111.3 kg)              Today, you had the following     No orders found for display         Today's Medication Changes          These changes are accurate as of 10/12/18 12:19 PM.  If you have any questions, ask your nurse or doctor.               These medicines have changed or have updated prescriptions.        Dose/Directions    albuterol 108 (90 Base) MCG/ACT inhaler   Commonly known as:  PROAIR HFA/PROVENTIL HFA/VENTOLIN HFA   This may have changed:    - when to take this  - reasons to take this   Used for:  COPD (chronic obstructive pulmonary disease) (H)        Dose:  2 puff   Inhale 2 puffs into the lungs every 6 hours   Quantity:  1 Inhaler   Refills:  6            Where to get your medicines      Some of these will need a paper prescription and others can be bought over the counter.  Ask your nurse if you have questions.     Bring a paper prescription for each of these medications     clopidogrel 75 MG tablet    fenofibrate 160 MG tablet    FLUoxetine 40 MG capsule    insulin glargine 100 UNIT/ML injection    simvastatin 10 MG tablet                Primary Care Provider Office Phone # Fax #    Richkumari PITER Leon [phone]       [address]  [facility] [address]        Equal Access to Services     KINA CEDILLO AH: yvonne Hauser,  chavez vega janisaquilino walker ah. So [facility] [phone].    ATENCIÓN: Si dorys burr, tiene a navarrete disposición servicios gratuitos de asistencia lingüística. Terrence al [phone].    We comply with applicable federal civil rights laws and [place] laws. We do not discriminate on the basis of race, color, national origin, age, disability, sex, sexual orientation, or gender identity.            Thank you!     Thank you for choosing [facility]  for your care. Our goal is always to provide you with excellent care. Hearing back from our patients is one way we can continue to improve our services. Please take a few minutes to complete the written survey that you may receive in the mail after your visit with us. Thank you!             Your Updated Medication List - Protect others around you: Learn how to safely use, store and throw away your medicines at www.disposemymeds.org.          This list is accurate as of 10/12/18 12:19 PM.  Always use your most recent med list.                   Brand Name Dispense Instructions for use Diagnosis    albuterol 108 (90 Base) MCG/ACT inhaler    PROAIR HFA/PROVENTIL HFA/VENTOLIN HFA    1 Inhaler    Inhale 2 puffs into the lungs every 6 hours    COPD (chronic obstructive pulmonary disease) (H)       clopidogrel 75 MG tablet    PLAVIX    90 tablet    Take 1 tablet (75 mg) by mouth daily    Acute ischemic stroke (H)       fenofibrate 160 MG tablet     90 tablet    Take 1 tablet (160 mg) by mouth daily    Hyperlipidemia LDL goal <100       ferrous sulfate 325 (65 Fe) MG tablet    IRON    90 tablet    Take 1 tablet (325 mg) by mouth daily (with breakfast)    Iron deficiency anemia, unspecified       FLUoxetine 40 MG capsule    PROzac    90 capsule    Take 1 capsule (40 mg) by mouth daily    Major depressive disorder, recurrent episode, mild (H)       folic acid 1 MG tablet    FOLVITE    90 tablet    Take 1 tablet (1 mg) by mouth daily     Increased homocysteine (H)       insulin glargine 100 UNIT/ML injection    LANTUS SOLOSTAR    15 mL    Inject 10 Units Subcutaneous At Bedtime    Type 2 diabetes mellitus with stage 3 chronic kidney disease, with long-term current use of insulin (H)       insulin pen needle 31G X 5 MM     100 each    Use 1-2 pen needles daily or as directed.    Type 2 diabetes with stage 3 chronic kidney disease GFR 30-59 (H)       KEPPRA 500 MG tablet   Generic drug:  levETIRAcetam      Take 500 mg by mouth 2 times daily        METOPROLOL SUCCINATE ER PO      Take 50 mg by mouth daily        PANTOPRAZOLE SODIUM PO      Take 40 mg by mouth 2 times daily (before meals)        simvastatin 10 MG tablet    ZOCOR    90 tablet    Take 1 tablet (10 mg) by mouth At Bedtime    Hyperlipidemia LDL goal <100       SYMBICORT 80-4.5 MCG/ACT Inhaler   Generic drug:  budesonide-formoterol      Inhale 2 puffs into the lungs 2 times daily        tamsulosin 0.4 MG capsule    FLOMAX    90 capsule    Take 1 capsule (0.4 mg) by mouth daily    BPH (benign prostatic hypertrophy)       TOPAMAX PO      Take 50 mg by mouth 2 times daily        TRADJENTA 5 MG Tabs tablet   Generic drug:  linagliptin      Take 5 mg by mouth daily Pt takes 1/2 tab daily

## 2018-10-12 NOTE — PROGRESS NOTES
Dr. Leon, we received a referral for homecare from [facility] when patient was discharged on 10\9\18.  This writer has been in contact with Donnie a few times, and he wanted to wait to decide if he would accept any homecare visits until after his clinic apmnt today 10\12\18.  Writer spoke with him again this afternoon, and he does not feel the need to have any homecare visits at this time.  He believes he understands medications, and has what he needs at home for safety, etc.  Thank you for this referral, and patient did say that he might need it in the future, but not now.    Nancy Hernandez RN, BSN   Clinical Coordinator  [facility] [phone]

## 2018-10-15 ENCOUNTER — ALLIED HEALTH/NURSE VISIT (OUTPATIENT)
Dept: UROLOGY | Facility: CLINIC | Age: 73
End: 2018-10-15
Payer: COMMERCIAL

## 2018-10-15 DIAGNOSIS — R33.9 URINARY RETENTION: Primary | ICD-10-CM

## 2018-10-15 PROCEDURE — 51700 IRRIGATION OF BLADDER: CPT | Mod: 58

## 2018-10-15 NOTE — MR AVS SNAPSHOT
After Visit Summary   10/15/2018    Donnie Vincent    MRN: [id number]           Patient Information     Date Of Birth          1945        Visit Information        Provider Department      10/15/2018 10:00 AM UB NURSE [facility]        Today's Diagnoses     Urinary retention    -  1       Follow-ups after your visit        Who to contact     If you have questions or need follow up information about today's clinic visit or your schedule please contact [facility] [place] directly at [phone].  Normal or non-critical lab and imaging results will be communicated to you by MyChart, letter or phone within 4 business days after the clinic has received the results. If you do not hear from us within 7 days, please contact the clinic through MyChart or phone. If you have a critical or abnormal lab result, we will notify you by phone as soon as possible.  Submit refill requests through [employer] or call your pharmacy and they will forward the refill request to us. Please allow 3 business days for your refill to be completed.          Additional Information About Your Visit        Care EveryWhere ID     This is your Care EveryWhere ID. This could be used by other organizations to access your [place] medical records  [id number]         Blood Pressure from Last 3 Encounters:   10/31/18 158/84   10/20/18 130/67   10/12/18 132/80    Weight from Last 3 Encounters:   10/31/18 111.8 kg (246 lb 8 oz)   10/29/18 109.3 kg (241 lb)   10/17/18 109.3 kg (241 lb)              Today, you had the following     No orders found for display       Primary Care Provider Office Phone # Fax #    Oracioi PITER Leon [phone]       [address] [facility] [address]        Equal Access to Services     KINA CEDILLO AH: Ramandeep Fishman, yvonne mosher, aquilino mayer  simon choiaajanny ah. So [facility] [phone].    ATENCIÓN: Si annmariela aminah, tiene a navarrete disposición servicios gratuitos de asistencia lingüística. Terrence al [phone].    We comply with applicable federal civil rights laws and [place] laws. We do not discriminate on the basis of race, color, national origin, age, disability, sex, sexual orientation, or gender identity.            Thank you!     Thank you for choosing [facility] [place]  for your care. Our goal is always to provide you with excellent care. Hearing back from our patients is one way we can continue to improve our services. Please take a few minutes to complete the written survey that you may receive in the mail after your visit with us. Thank you!             Your Updated Medication List - Protect others around you: Learn how to safely use, store and throw away your medicines at www.disposemymeds.org.          This list is accurate as of 10/15/18 11:59 PM.  Always use your most recent med list.                   Brand Name Dispense Instructions for use Diagnosis    fenofibrate 160 MG tablet     90 tablet    Take 1 tablet (160 mg) by mouth daily    Hyperlipidemia LDL goal <100       FLUoxetine 40 MG capsule    PROzac    90 capsule    Take 1 capsule (40 mg) by mouth daily    Major depressive disorder, recurrent episode, mild (H)       folic acid 1 MG tablet    FOLVITE    90 tablet    Take 1 tablet (1 mg) by mouth daily    Increased homocysteine (H)       insulin glargine 100 UNIT/ML injection    LANTUS SOLOSTAR    15 mL    Inject 10 Units Subcutaneous At Bedtime    Type 2 diabetes mellitus with stage 3 chronic kidney disease, with long-term current use of insulin (H)       KEPPRA 500 MG tablet   Generic drug:  levETIRAcetam      Take 500 mg by mouth 2 times daily        PANTOPRAZOLE SODIUM PO      Take 40 mg by mouth 2 times daily (before meals)        simvastatin 10 MG tablet    ZOCOR    90 tablet    Take 1  tablet (10 mg) by mouth At Bedtime    Hyperlipidemia LDL goal <100       SYMBICORT 80-4.5 MCG/ACT Inhaler   Generic drug:  budesonide-formoterol      Inhale 2 puffs into the lungs 2 times daily        tamsulosin 0.4 MG capsule    FLOMAX    90 capsule    Take 1 capsule (0.4 mg) by mouth daily    BPH (benign prostatic hypertrophy)       TOPAMAX PO      Take 50 mg by mouth 2 times daily        TRADJENTA 5 MG Tabs tablet   Generic drug:  linagliptin      Take 2.5 mg by mouth daily

## 2018-10-17 ENCOUNTER — HOSPITAL ENCOUNTER (INPATIENT)
Facility: CLINIC | Age: 73
LOS: 3 days | Discharge: HOME | DRG: 696 | End: 2018-10-20
Attending: EMERGENCY MEDICINE | Admitting: INTERNAL MEDICINE
Payer: COMMERCIAL

## 2018-10-17 DIAGNOSIS — R31.9 HEMATURIA, UNSPECIFIED TYPE: ICD-10-CM

## 2018-10-17 LAB
ABO + RH BLD: NORMAL
ABO + RH BLD: NORMAL
ALBUMIN UR-MCNC: >499 MG/DL
ANION GAP SERPL CALCULATED.3IONS-SCNC: 9 MMOL/L (ref 3–14)
APPEARANCE UR: ABNORMAL
BASOPHILS # BLD AUTO: 0.1 10E9/L (ref 0–0.2)
BASOPHILS NFR BLD AUTO: 1.3 %
BILIRUB UR QL STRIP: NEGATIVE
BLD GP AB SCN SERPL QL: NORMAL
BLOOD BANK CMNT PATIENT-IMP: NORMAL
BUN SERPL-MCNC: 24 MG/DL (ref 7–30)
CALCIUM SERPL-MCNC: 8.2 MG/DL (ref 8.5–10.1)
CHLORIDE SERPL-SCNC: 112 MMOL/L (ref 94–109)
CO2 SERPL-SCNC: 20 MMOL/L (ref 20–32)
COLOR UR AUTO: ABNORMAL
CREAT SERPL-MCNC: 1.47 MG/DL (ref 0.66–1.25)
DIFFERENTIAL METHOD BLD: ABNORMAL
EOSINOPHIL # BLD AUTO: 0.3 10E9/L (ref 0–0.7)
EOSINOPHIL NFR BLD AUTO: 3.2 %
ERYTHROCYTE [DISTWIDTH] IN BLOOD BY AUTOMATED COUNT: 15.5 % (ref 10–15)
GFR SERPL CREATININE-BSD FRML MDRD: 47 ML/MIN/1.7M2
GLUCOSE SERPL-MCNC: 281 MG/DL (ref 70–99)
GLUCOSE UR STRIP-MCNC: 500 MG/DL
HCT VFR BLD AUTO: 33.5 % (ref 40–53)
HGB BLD-MCNC: 10.2 G/DL (ref 13.3–17.7)
HGB BLD-MCNC: 9.4 G/DL (ref 13.3–17.7)
HGB UR QL STRIP: ABNORMAL
IMM GRANULOCYTES # BLD: 0.1 10E9/L (ref 0–0.4)
IMM GRANULOCYTES NFR BLD: 1.4 %
INR PPP: 1 (ref 0.86–1.14)
KETONES UR STRIP-MCNC: NEGATIVE MG/DL
LEUKOCYTE ESTERASE UR QL STRIP: NEGATIVE
LYMPHOCYTES # BLD AUTO: 1.1 10E9/L (ref 0.8–5.3)
LYMPHOCYTES NFR BLD AUTO: 13.1 %
MCH RBC QN AUTO: 27.1 PG (ref 26.5–33)
MCHC RBC AUTO-ENTMCNC: 30.4 G/DL (ref 31.5–36.5)
MCV RBC AUTO: 89 FL (ref 78–100)
MONOCYTES # BLD AUTO: 0.5 10E9/L (ref 0–1.3)
MONOCYTES NFR BLD AUTO: 6.3 %
NEUTROPHILS # BLD AUTO: 6.4 10E9/L (ref 1.6–8.3)
NEUTROPHILS NFR BLD AUTO: 74.7 %
NITRATE UR QL: NEGATIVE
NRBC # BLD AUTO: 0 10*3/UL
NRBC BLD AUTO-RTO: 0 /100
PH UR STRIP: 7 PH (ref 5–7)
PLATELET # BLD AUTO: 497 10E9/L (ref 150–450)
POTASSIUM SERPL-SCNC: 4.2 MMOL/L (ref 3.4–5.3)
RBC # BLD AUTO: 3.76 10E12/L (ref 4.4–5.9)
RBC #/AREA URNS AUTO: >182 /HPF (ref 0–2)
SODIUM SERPL-SCNC: 141 MMOL/L (ref 133–144)
SOURCE: ABNORMAL
SP GR UR STRIP: 1.01 (ref 1–1.03)
SPECIMEN EXP DATE BLD: NORMAL
UROBILINOGEN UR STRIP-MCNC: NEGATIVE MG/DL (ref 0–2)
WBC # BLD AUTO: 8.6 10E9/L (ref 4–11)
WBC #/AREA URNS AUTO: >182 /HPF (ref 0–5)

## 2018-10-17 PROCEDURE — 12000000 ZZH R&B MED SURG/OB

## 2018-10-17 PROCEDURE — 99223 1ST HOSP IP/OBS HIGH 75: CPT | Mod: AI | Performed by: INTERNAL MEDICINE

## 2018-10-17 PROCEDURE — 85610 PROTHROMBIN TIME: CPT | Performed by: EMERGENCY MEDICINE

## 2018-10-17 PROCEDURE — 86850 RBC ANTIBODY SCREEN: CPT | Performed by: EMERGENCY MEDICINE

## 2018-10-17 PROCEDURE — 36415 COLL VENOUS BLD VENIPUNCTURE: CPT | Performed by: EMERGENCY MEDICINE

## 2018-10-17 PROCEDURE — 86900 BLOOD TYPING SEROLOGIC ABO: CPT | Performed by: EMERGENCY MEDICINE

## 2018-10-17 PROCEDURE — 80048 BASIC METABOLIC PNL TOTAL CA: CPT | Performed by: EMERGENCY MEDICINE

## 2018-10-17 PROCEDURE — 25000128 H RX IP 250 OP 636: Performed by: EMERGENCY MEDICINE

## 2018-10-17 PROCEDURE — 85018 HEMOGLOBIN: CPT | Performed by: EMERGENCY MEDICINE

## 2018-10-17 PROCEDURE — 81001 URINALYSIS AUTO W/SCOPE: CPT | Performed by: EMERGENCY MEDICINE

## 2018-10-17 PROCEDURE — 99285 EMERGENCY DEPT VISIT HI MDM: CPT | Mod: 25

## 2018-10-17 PROCEDURE — 96374 THER/PROPH/DIAG INJ IV PUSH: CPT

## 2018-10-17 PROCEDURE — 85025 COMPLETE CBC W/AUTO DIFF WBC: CPT | Performed by: EMERGENCY MEDICINE

## 2018-10-17 PROCEDURE — 96375 TX/PRO/DX INJ NEW DRUG ADDON: CPT

## 2018-10-17 PROCEDURE — 86901 BLOOD TYPING SEROLOGIC RH(D): CPT | Performed by: EMERGENCY MEDICINE

## 2018-10-17 RX ORDER — PANTOPRAZOLE SODIUM 40 MG/1
40 TABLET, DELAYED RELEASE ORAL
Status: DISCONTINUED | OUTPATIENT
Start: 2018-10-18 | End: 2018-10-20 | Stop reason: HOSPADM

## 2018-10-17 RX ORDER — ALBUTEROL SULFATE 90 UG/1
2 AEROSOL, METERED RESPIRATORY (INHALATION) EVERY 6 HOURS PRN
Status: DISCONTINUED | OUTPATIENT
Start: 2018-10-17 | End: 2018-10-20 | Stop reason: HOSPADM

## 2018-10-17 RX ORDER — SIMVASTATIN 10 MG
10 TABLET ORAL AT BEDTIME
Status: DISCONTINUED | OUTPATIENT
Start: 2018-10-18 | End: 2018-10-20 | Stop reason: HOSPADM

## 2018-10-17 RX ORDER — TOPIRAMATE 25 MG/1
50 TABLET, FILM COATED ORAL 2 TIMES DAILY
Status: DISCONTINUED | OUTPATIENT
Start: 2018-10-18 | End: 2018-10-20 | Stop reason: HOSPADM

## 2018-10-17 RX ORDER — METOPROLOL SUCCINATE 50 MG/1
50 TABLET, EXTENDED RELEASE ORAL DAILY
Status: DISCONTINUED | OUTPATIENT
Start: 2018-10-18 | End: 2018-10-20 | Stop reason: HOSPADM

## 2018-10-17 RX ORDER — HYDROMORPHONE HYDROCHLORIDE 1 MG/ML
0.5 INJECTION, SOLUTION INTRAMUSCULAR; INTRAVENOUS; SUBCUTANEOUS
Status: DISCONTINUED | OUTPATIENT
Start: 2018-10-17 | End: 2018-10-18

## 2018-10-17 RX ORDER — LEVETIRACETAM 500 MG/1
500 TABLET ORAL 2 TIMES DAILY
Status: DISCONTINUED | OUTPATIENT
Start: 2018-10-18 | End: 2018-10-20 | Stop reason: HOSPADM

## 2018-10-17 RX ORDER — LORAZEPAM 2 MG/ML
0.5 INJECTION INTRAMUSCULAR ONCE
Status: COMPLETED | OUTPATIENT
Start: 2018-10-17 | End: 2018-10-17

## 2018-10-17 RX ORDER — TAMSULOSIN HYDROCHLORIDE 0.4 MG/1
0.4 CAPSULE ORAL DAILY
Status: DISCONTINUED | OUTPATIENT
Start: 2018-10-18 | End: 2018-10-20 | Stop reason: HOSPADM

## 2018-10-17 RX ORDER — FERROUS SULFATE 325(65) MG
325 TABLET ORAL 2 TIMES DAILY
COMMUNITY
End: 2023-01-01

## 2018-10-17 RX ORDER — FENOFIBRATE 160 MG/1
160 TABLET ORAL DAILY
Status: DISCONTINUED | OUTPATIENT
Start: 2018-10-18 | End: 2018-10-20 | Stop reason: HOSPADM

## 2018-10-17 RX ORDER — ALBUTEROL SULFATE 90 UG/1
2 AEROSOL, METERED RESPIRATORY (INHALATION) EVERY 6 HOURS PRN
COMMUNITY
End: 2023-01-01

## 2018-10-17 RX ORDER — FERROUS SULFATE 325(65) MG
325 TABLET ORAL 2 TIMES DAILY
Status: DISCONTINUED | OUTPATIENT
Start: 2018-10-18 | End: 2018-10-20 | Stop reason: HOSPADM

## 2018-10-17 RX ADMIN — Medication 0.5 MG: at 20:34

## 2018-10-17 RX ADMIN — LORAZEPAM 0.5 MG: 2 INJECTION, SOLUTION INTRAMUSCULAR; INTRAVENOUS at 21:25

## 2018-10-17 NOTE — IP AVS SNAPSHOT
MRN:[id number]                      After Visit Summary   10/17/2018    Donnie Vincent    MRN: [id number]           Thank you!     Thank you for choosing [facility] for your care. Our goal is always to provide you with excellent care. Hearing back from our patients is one way we can continue to improve our services. Please take a few minutes to complete the written survey that you may receive in the mail after you visit. If you would like to speak to someone directly about your visit please contact Patient Relations at [phone]. Thank you!          Patient Information     Date Of Birth          1945        Designated Caregiver       Most Recent Value    Caregiver    Will someone help with your care after discharge? no      About your hospital stay     You were admitted on:  October 17, 2018 You last received care in the:  [place] [address] [facility]    You were discharged on:  October 20, 2018       Who to Call     For medical emergencies, please call 911.  For non-urgent questions about your medical care, please call your primary care provider or clinic, [phone]  For questions related to your surgery, please call your surgery clinic        Attending Provider     Provider Specialty    Vipul Hernandez MD Emergency Medicine    Regina Estrada MD Internal Medicine       Primary Care Provider Office Phone # Fax #    Suyapa Leon [phone]      After Care Instructions     Activity       Your activity upon discharge: activity as tolerated            Diet       Follow this diet upon discharge: Moderate consistent carbohydrate (8974-4668 melissa / 4-6 CHO units per meal)                  Follow-up Appointments     Follow-up and recommended labs and tests        Follow up with primary care provider, Suyapa Leon, within 6 days for hospital follow- up.  The following labs/tests are recommended: CBC and BMP in 6 days.  Follow-up  "with urology as previously scheduled.                  Your next 10 appointments already scheduled     Oct 29, 2018 11:00 AM CDT   Post-Op with Neville Banuelos MD   [facility]    [address] [facility]  [address]  [facility] [phone]              Pending Results     No orders found from 10/15/2018 to 10/18/2018.            Statement of Approval     Ordered          10/20/18 1221  I have reviewed and agree with all the recommendations and orders detailed in this document.  EFFECTIVE NOW     Approved and electronically signed by:  Jose Gonsales DO             Admission Information     Date & Time Provider Department Dept. Phone    10/17/2018 Regina Estrada MD [place] [address] [facility] [phone]      Your Vitals Were     Blood Pressure Pulse Temperature Respirations Height Weight    130/67 (BP Location: Left arm) 66 97.7  F (36.5  C) (Oral) 20 1.803 m (5' 11\") 109.3 kg (241 lb)    Pulse Oximetry BMI (Body Mass Index)                98% 33.61 kg/m2          Care EveryWhere ID     This is your Care EveryWhere ID. This could be used by other organizations to access your [place] medical records  [id number]        Equal Access to Services     KINA CEDILLO AH: Hadii aad ku hadasho Soomaali, waaxda luqadaha, qaybta kaalmada adeegyada, waxay idiin haysekoun simon dumont. So [facility] [phone].    ATENCIÓN: Si habla español, tiene a navarrete disposición servicios gratuitos de asistencia lingüística. Llame al [phone].    We comply with applicable federal civil rights laws and [place] laws. We do not discriminate on the basis of race, color, national origin, age, disability, sex, sexual orientation, or gender identity.               Review of your medicines      CONTINUE these medicines which have NOT CHANGED        Dose / Directions    albuterol 108 (90 Base) MCG/ACT inhaler   Commonly known as:  " PROAIR HFA/PROVENTIL HFA/VENTOLIN HFA        Dose:  2 puff   Inhale 2 puffs into the lungs every 6 hours as needed for shortness of breath / dyspnea or wheezing   Refills:  0       fenofibrate 160 MG tablet   Used for:  Hyperlipidemia LDL goal <100        Dose:  160 mg   Take 1 tablet (160 mg) by mouth daily   Quantity:  90 tablet   Refills:  1       ferrous sulfate 325 (65 Fe) MG tablet   Commonly known as:  IRON        Dose:  325 mg   Take 325 mg by mouth 2 times daily   Refills:  0       FLUoxetine 40 MG capsule   Commonly known as:  PROzac   Used for:  Major depressive disorder, recurrent episode, mild (H)        Dose:  40 mg   Take 1 capsule (40 mg) by mouth daily   Quantity:  90 capsule   Refills:  1       folic acid 1 MG tablet   Commonly known as:  FOLVITE   Used for:  Increased homocysteine (H)        Dose:  1 mg   Take 1 tablet (1 mg) by mouth daily   Quantity:  90 tablet   Refills:  0       insulin glargine 100 UNIT/ML injection   Commonly known as:  LANTUS SOLOSTAR   Used for:  Type 2 diabetes mellitus with stage 3 chronic kidney disease, with long-term current use of insulin (H)        Dose:  10 Units   Inject 10 Units Subcutaneous At Bedtime   Quantity:  15 mL   Refills:  1       KEPPRA 500 MG tablet   Generic drug:  levETIRAcetam        Dose:  500 mg   Take 500 mg by mouth 2 times daily   Refills:  0       METOPROLOL SUCCINATE ER PO        Dose:  50 mg   Take 50 mg by mouth daily   Refills:  0       PANTOPRAZOLE SODIUM PO        Dose:  40 mg   Take 40 mg by mouth 2 times daily (before meals)   Refills:  0       simvastatin 10 MG tablet   Commonly known as:  ZOCOR   Used for:  Hyperlipidemia LDL goal <100        Dose:  10 mg   Take 1 tablet (10 mg) by mouth At Bedtime   Quantity:  90 tablet   Refills:  1       SYMBICORT 80-4.5 MCG/ACT Inhaler   Generic drug:  budesonide-formoterol        Dose:  2 puff   Inhale 2 puffs into the lungs 2 times daily   Refills:  0       tamsulosin 0.4 MG capsule   Commonly  known as:  FLOMAX   Used for:  BPH (benign prostatic hypertrophy)        Dose:  0.4 mg   Take 1 capsule (0.4 mg) by mouth daily   Quantity:  90 capsule   Refills:  3       TOPAMAX PO        Dose:  50 mg   Take 50 mg by mouth 2 times daily   Refills:  0       TRADJENTA 5 MG Tabs tablet   Generic drug:  linagliptin        Dose:  2.5 mg   Take 2.5 mg by mouth daily   Refills:  0         STOP taking     clopidogrel 75 MG tablet   Commonly known as:  PLAVIX                    Protect others around you: Learn how to safely use, store and throw away your medicines at www.disposemymeds.org.             Medication List: This is a list of all your medications and when to take them. Check marks below indicate your daily home schedule. Keep this list as a reference.      Medications           Morning Afternoon Evening Bedtime As Needed    albuterol 108 (90 Base) MCG/ACT inhaler   Commonly known as:  PROAIR HFA/PROVENTIL HFA/VENTOLIN HFA   Inhale 2 puffs into the lungs every 6 hours as needed for shortness of breath / dyspnea or wheezing   Next Dose Due:  Not given during this admission                                    fenofibrate 160 MG tablet   Take 1 tablet (160 mg) by mouth daily   Last time this was given:  160 mg on 10/20/2018  8:19 AM   Next Dose Due:  Resume tomorrow; Sunday October 21, 2018 in the morning.                                    ferrous sulfate 325 (65 Fe) MG tablet   Commonly known as:  IRON   Take 325 mg by mouth 2 times daily   Last time this was given:  325 mg on 10/20/2018  8:19 AM   Next Dose Due:  Resume this evening                                       FLUoxetine 40 MG capsule   Commonly known as:  PROzac   Take 1 capsule (40 mg) by mouth daily   Last time this was given:  40 mg on 10/20/2018  8:19 AM   Next Dose Due:  Resume tomorrow; Sunday October 21, 2018 in the morning.                                    folic acid 1 MG tablet   Commonly known as:  FOLVITE   Take 1 tablet (1 mg) by mouth  daily   Last time this was given:  1 mg on 10/20/2018  8:19 AM   Next Dose Due:  Resume tomorrow; Sunday October 21, 2018 in the morning.                                    insulin glargine 100 UNIT/ML injection   Commonly known as:  LANTUS SOLOSTAR   Inject 10 Units Subcutaneous At Bedtime   Next Dose Due:  Take at bedtime tonight                                    KEPPRA 500 MG tablet   Take 500 mg by mouth 2 times daily   Last time this was given:  500 mg on 10/20/2018  8:19 AM   Generic drug:  levETIRAcetam   Next Dose Due:  Resume this evening                                       METOPROLOL SUCCINATE ER PO   Take 50 mg by mouth daily   Last time this was given:  50 mg on 10/20/2018  8:18 AM   Next Dose Due:  Resume tomorrow; Sunday October 21, 2018 in the morning.                                    PANTOPRAZOLE SODIUM PO   Take 40 mg by mouth 2 times daily (before meals)   Last time this was given:  40 mg on 10/20/2018  8:19 AM   Next Dose Due:  This evening before dinner                                       simvastatin 10 MG tablet   Commonly known as:  ZOCOR   Take 1 tablet (10 mg) by mouth At Bedtime   Last time this was given:  10 mg on 10/19/2018  8:43 PM   Next Dose Due:  Resume tonight at bedtime                                    SYMBICORT 80-4.5 MCG/ACT Inhaler   Inhale 2 puffs into the lungs 2 times daily   Generic drug:  budesonide-formoterol   Next Dose Due:  Resume your medication this evening                                       tamsulosin 0.4 MG capsule   Commonly known as:  FLOMAX   Take 1 capsule (0.4 mg) by mouth daily   Last time this was given:  0.4 mg on 10/20/2018  8:19 AM   Next Dose Due:  Resume tomorrow; Sunday October 21, 2018 in the morning.                                    TOPAMAX PO   Take 50 mg by mouth 2 times daily   Last time this was given:  50 mg on 10/20/2018  8:19 AM   Next Dose Due:  Resume this evening                                       TRADJENTA 5 MG Tabs tablet    Take 2.5 mg by mouth daily   Generic drug:  linagliptin   Next Dose Due:  Resume tomorrow; Sunday October 21, 2018 in the morning.

## 2018-10-17 NOTE — IP AVS SNAPSHOT
[place] [address] [facility]    [address]    [facility] [address]    Phone:  [phone]    Fax:  [phone]                                       After Visit Summary   10/17/2018    Donnie Vincent    MRN: [id number]           After Visit Summary Signature Page     I have received my discharge instructions, and my questions have been answered. I have discussed any challenges I see with this plan with the nurse or doctor.    ..........................................................................................................................................  Patient/Patient Representative Signature      ..........................................................................................................................................  Patient Representative Print Name and Relationship to Patient    ..................................................               ................................................  Date                                   Time    ..........................................................................................................................................  Reviewed by Signature/Title    ...................................................              ..............................................  Date                                               Time          22EPIC Rev 08/18

## 2018-10-18 ENCOUNTER — ANESTHESIA EVENT (OUTPATIENT)
Dept: SURGERY | Facility: CLINIC | Age: 73
End: 2018-10-18

## 2018-10-18 ENCOUNTER — SURGERY (OUTPATIENT)
Age: 73
End: 2018-10-18

## 2018-10-18 ENCOUNTER — ANESTHESIA (OUTPATIENT)
Dept: SURGERY | Facility: CLINIC | Age: 73
End: 2018-10-18

## 2018-10-18 PROBLEM — R31.0 HEMATURIA, GROSS: Status: ACTIVE | Noted: 2018-10-18

## 2018-10-18 LAB
ANION GAP SERPL CALCULATED.3IONS-SCNC: 8 MMOL/L (ref 3–14)
BUN SERPL-MCNC: 23 MG/DL (ref 7–30)
CALCIUM SERPL-MCNC: 8 MG/DL (ref 8.5–10.1)
CHLORIDE SERPL-SCNC: 113 MMOL/L (ref 94–109)
CO2 SERPL-SCNC: 19 MMOL/L (ref 20–32)
CREAT SERPL-MCNC: 1.34 MG/DL (ref 0.66–1.25)
ERYTHROCYTE [DISTWIDTH] IN BLOOD BY AUTOMATED COUNT: 15.8 % (ref 10–15)
GFR SERPL CREATININE-BSD FRML MDRD: 52 ML/MIN/1.7M2
GLUCOSE BLDC GLUCOMTR-MCNC: 127 MG/DL (ref 70–99)
GLUCOSE BLDC GLUCOMTR-MCNC: 139 MG/DL (ref 70–99)
GLUCOSE BLDC GLUCOMTR-MCNC: 153 MG/DL (ref 70–99)
GLUCOSE BLDC GLUCOMTR-MCNC: 185 MG/DL (ref 70–99)
GLUCOSE BLDC GLUCOMTR-MCNC: 203 MG/DL (ref 70–99)
GLUCOSE BLDC GLUCOMTR-MCNC: 233 MG/DL (ref 70–99)
GLUCOSE SERPL-MCNC: 226 MG/DL (ref 70–99)
HBA1C MFR BLD: 5.7 % (ref 0–5.6)
HCT VFR BLD AUTO: 29.7 % (ref 40–53)
HGB BLD-MCNC: 8.9 G/DL (ref 13.3–17.7)
MCH RBC QN AUTO: 27.4 PG (ref 26.5–33)
MCHC RBC AUTO-ENTMCNC: 30 G/DL (ref 31.5–36.5)
MCV RBC AUTO: 91 FL (ref 78–100)
PLATELET # BLD AUTO: 386 10E9/L (ref 150–450)
POTASSIUM SERPL-SCNC: 4.1 MMOL/L (ref 3.4–5.3)
RBC # BLD AUTO: 3.25 10E12/L (ref 4.4–5.9)
SODIUM SERPL-SCNC: 140 MMOL/L (ref 133–144)
WBC # BLD AUTO: 10.8 10E9/L (ref 4–11)

## 2018-10-18 PROCEDURE — 99232 SBSQ HOSP IP/OBS MODERATE 35: CPT | Mod: 24 | Performed by: UROLOGY

## 2018-10-18 PROCEDURE — 83036 HEMOGLOBIN GLYCOSYLATED A1C: CPT | Performed by: INTERNAL MEDICINE

## 2018-10-18 PROCEDURE — 00000146 ZZHCL STATISTIC GLUCOSE BY METER IP

## 2018-10-18 PROCEDURE — 25000131 ZZH RX MED GY IP 250 OP 636 PS 637: Performed by: INTERNAL MEDICINE

## 2018-10-18 PROCEDURE — 25000132 ZZH RX MED GY IP 250 OP 250 PS 637: Performed by: INTERNAL MEDICINE

## 2018-10-18 PROCEDURE — 25000128 H RX IP 250 OP 636: Performed by: INTERNAL MEDICINE

## 2018-10-18 PROCEDURE — 40000882 ZZH CANCELLED SURGERY UP TO 46-60 MINS: Performed by: UROLOGY

## 2018-10-18 PROCEDURE — 40000275 ZZH STATISTIC RCP TIME EA 10 MIN

## 2018-10-18 PROCEDURE — 99232 SBSQ HOSP IP/OBS MODERATE 35: CPT | Performed by: INTERNAL MEDICINE

## 2018-10-18 PROCEDURE — 80048 BASIC METABOLIC PNL TOTAL CA: CPT | Performed by: INTERNAL MEDICINE

## 2018-10-18 PROCEDURE — 25000128 H RX IP 250 OP 636: Performed by: EMERGENCY MEDICINE

## 2018-10-18 PROCEDURE — 25000125 ZZHC RX 250: Performed by: INTERNAL MEDICINE

## 2018-10-18 PROCEDURE — 36415 COLL VENOUS BLD VENIPUNCTURE: CPT | Performed by: INTERNAL MEDICINE

## 2018-10-18 PROCEDURE — 85027 COMPLETE CBC AUTOMATED: CPT | Performed by: INTERNAL MEDICINE

## 2018-10-18 PROCEDURE — 25800025 ZZH RX 258: Performed by: INTERNAL MEDICINE

## 2018-10-18 PROCEDURE — 12000000 ZZH R&B MED SURG/OB

## 2018-10-18 PROCEDURE — 94640 AIRWAY INHALATION TREATMENT: CPT

## 2018-10-18 RX ORDER — DIMENHYDRINATE 50 MG/ML
25 INJECTION, SOLUTION INTRAMUSCULAR; INTRAVENOUS
Status: CANCELLED | OUTPATIENT
Start: 2018-10-18

## 2018-10-18 RX ORDER — NICOTINE POLACRILEX 4 MG
15-30 LOZENGE BUCCAL
Status: DISCONTINUED | OUTPATIENT
Start: 2018-10-18 | End: 2018-10-20 | Stop reason: HOSPADM

## 2018-10-18 RX ORDER — NICOTINE POLACRILEX 4 MG
15-30 LOZENGE BUCCAL
Status: DISCONTINUED | OUTPATIENT
Start: 2018-10-18 | End: 2018-10-18

## 2018-10-18 RX ORDER — BISACODYL 10 MG
10 SUPPOSITORY, RECTAL RECTAL DAILY PRN
Status: DISCONTINUED | OUTPATIENT
Start: 2018-10-18 | End: 2018-10-20 | Stop reason: HOSPADM

## 2018-10-18 RX ORDER — ONDANSETRON 2 MG/ML
4 INJECTION INTRAMUSCULAR; INTRAVENOUS EVERY 6 HOURS PRN
Status: DISCONTINUED | OUTPATIENT
Start: 2018-10-18 | End: 2018-10-20 | Stop reason: HOSPADM

## 2018-10-18 RX ORDER — ACETAMINOPHEN 325 MG/1
650 TABLET ORAL EVERY 4 HOURS PRN
Status: DISCONTINUED | OUTPATIENT
Start: 2018-10-18 | End: 2018-10-20 | Stop reason: HOSPADM

## 2018-10-18 RX ORDER — DEXTROSE MONOHYDRATE 25 G/50ML
25-50 INJECTION, SOLUTION INTRAVENOUS
Status: DISCONTINUED | OUTPATIENT
Start: 2018-10-18 | End: 2018-10-18

## 2018-10-18 RX ORDER — LIDOCAINE 40 MG/G
CREAM TOPICAL
Status: DISCONTINUED | OUTPATIENT
Start: 2018-10-18 | End: 2018-10-20 | Stop reason: HOSPADM

## 2018-10-18 RX ORDER — LABETALOL HYDROCHLORIDE 5 MG/ML
10 INJECTION, SOLUTION INTRAVENOUS
Status: CANCELLED | OUTPATIENT
Start: 2018-10-18

## 2018-10-18 RX ORDER — BISACODYL 5 MG
15 TABLET, DELAYED RELEASE (ENTERIC COATED) ORAL DAILY PRN
Status: DISCONTINUED | OUTPATIENT
Start: 2018-10-18 | End: 2018-10-20 | Stop reason: HOSPADM

## 2018-10-18 RX ORDER — ONDANSETRON 4 MG/1
4 TABLET, ORALLY DISINTEGRATING ORAL EVERY 30 MIN PRN
Status: CANCELLED | OUTPATIENT
Start: 2018-10-18

## 2018-10-18 RX ORDER — HYDROMORPHONE HYDROCHLORIDE 1 MG/ML
.3-.5 INJECTION, SOLUTION INTRAMUSCULAR; INTRAVENOUS; SUBCUTANEOUS EVERY 5 MIN PRN
Status: CANCELLED | OUTPATIENT
Start: 2018-10-18

## 2018-10-18 RX ORDER — DEXTROSE MONOHYDRATE 25 G/50ML
25-50 INJECTION, SOLUTION INTRAVENOUS
Status: DISCONTINUED | OUTPATIENT
Start: 2018-10-18 | End: 2018-10-20 | Stop reason: HOSPADM

## 2018-10-18 RX ORDER — FENTANYL CITRATE 50 UG/ML
25-50 INJECTION, SOLUTION INTRAMUSCULAR; INTRAVENOUS
Status: CANCELLED | OUTPATIENT
Start: 2018-10-18

## 2018-10-18 RX ORDER — NALOXONE HYDROCHLORIDE 0.4 MG/ML
.1-.4 INJECTION, SOLUTION INTRAMUSCULAR; INTRAVENOUS; SUBCUTANEOUS
Status: DISCONTINUED | OUTPATIENT
Start: 2018-10-18 | End: 2018-10-20 | Stop reason: HOSPADM

## 2018-10-18 RX ORDER — ONDANSETRON 4 MG/1
4 TABLET, ORALLY DISINTEGRATING ORAL EVERY 6 HOURS PRN
Status: DISCONTINUED | OUTPATIENT
Start: 2018-10-18 | End: 2018-10-20 | Stop reason: HOSPADM

## 2018-10-18 RX ORDER — ONDANSETRON 2 MG/ML
4 INJECTION INTRAMUSCULAR; INTRAVENOUS EVERY 30 MIN PRN
Status: CANCELLED | OUTPATIENT
Start: 2018-10-18

## 2018-10-18 RX ORDER — SODIUM CHLORIDE 9 MG/ML
INJECTION, SOLUTION INTRAVENOUS CONTINUOUS
Status: DISCONTINUED | OUTPATIENT
Start: 2018-10-18 | End: 2018-10-20 | Stop reason: HOSPADM

## 2018-10-18 RX ORDER — HYDRALAZINE HYDROCHLORIDE 20 MG/ML
2.5-5 INJECTION INTRAMUSCULAR; INTRAVENOUS EVERY 10 MIN PRN
Status: CANCELLED | OUTPATIENT
Start: 2018-10-18

## 2018-10-18 RX ORDER — BISACODYL 5 MG
10 TABLET, DELAYED RELEASE (ENTERIC COATED) ORAL DAILY PRN
Status: DISCONTINUED | OUTPATIENT
Start: 2018-10-18 | End: 2018-10-20 | Stop reason: HOSPADM

## 2018-10-18 RX ORDER — BISACODYL 5 MG
5 TABLET, DELAYED RELEASE (ENTERIC COATED) ORAL DAILY PRN
Status: DISCONTINUED | OUTPATIENT
Start: 2018-10-18 | End: 2018-10-20 | Stop reason: HOSPADM

## 2018-10-18 RX ORDER — SODIUM CHLORIDE, SODIUM LACTATE, POTASSIUM CHLORIDE, CALCIUM CHLORIDE 600; 310; 30; 20 MG/100ML; MG/100ML; MG/100ML; MG/100ML
INJECTION, SOLUTION INTRAVENOUS CONTINUOUS
Status: CANCELLED | OUTPATIENT
Start: 2018-10-18

## 2018-10-18 RX ADMIN — SODIUM CHLORIDE: 900 IRRIGANT IRRIGATION at 01:37

## 2018-10-18 RX ADMIN — FLUTICASONE FUROATE AND VILANTEROL TRIFENATATE 1 PUFF: 100; 25 POWDER RESPIRATORY (INHALATION) at 08:21

## 2018-10-18 RX ADMIN — INSULIN GLARGINE 5 UNITS: 100 INJECTION, SOLUTION SUBCUTANEOUS at 01:35

## 2018-10-18 RX ADMIN — SIMVASTATIN 10 MG: 10 TABLET, FILM COATED ORAL at 00:33

## 2018-10-18 RX ADMIN — Medication 0.5 MG: at 00:36

## 2018-10-18 RX ADMIN — LEVETIRACETAM 500 MG: 500 TABLET, FILM COATED ORAL at 00:33

## 2018-10-18 RX ADMIN — PANTOPRAZOLE SODIUM 40 MG: 40 TABLET, DELAYED RELEASE ORAL at 08:31

## 2018-10-18 RX ADMIN — ATROPA BELLADONNA AND OPIUM 1 SUPPOSITORY: 16.2; 3 SUPPOSITORY RECTAL at 01:50

## 2018-10-18 RX ADMIN — TOPIRAMATE 50 MG: 25 TABLET, FILM COATED ORAL at 08:32

## 2018-10-18 RX ADMIN — TOPIRAMATE 50 MG: 25 TABLET, FILM COATED ORAL at 00:34

## 2018-10-18 RX ADMIN — METOPROLOL SUCCINATE 50 MG: 50 TABLET, EXTENDED RELEASE ORAL at 08:32

## 2018-10-18 RX ADMIN — TOPIRAMATE 50 MG: 25 TABLET, FILM COATED ORAL at 20:58

## 2018-10-18 RX ADMIN — ACETAMINOPHEN 650 MG: 325 TABLET, FILM COATED ORAL at 08:38

## 2018-10-18 RX ADMIN — TAMSULOSIN HYDROCHLORIDE 0.4 MG: 0.4 CAPSULE ORAL at 08:32

## 2018-10-18 RX ADMIN — FERROUS SULFATE TAB 325 MG (65 MG ELEMENTAL FE) 325 MG: 325 (65 FE) TAB at 00:32

## 2018-10-18 RX ADMIN — ATROPA BELLADONNA AND OPIUM 1 SUPPOSITORY: 16.2; 3 SUPPOSITORY RECTAL at 19:17

## 2018-10-18 RX ADMIN — LEVETIRACETAM 500 MG: 500 TABLET, FILM COATED ORAL at 08:32

## 2018-10-18 RX ADMIN — SODIUM CHLORIDE: 9 INJECTION, SOLUTION INTRAVENOUS at 01:42

## 2018-10-18 RX ADMIN — SODIUM CHLORIDE: 9 INJECTION, SOLUTION INTRAVENOUS at 14:42

## 2018-10-18 RX ADMIN — FLUOXETINE 40 MG: 20 CAPSULE ORAL at 08:31

## 2018-10-18 RX ADMIN — FERROUS SULFATE TAB 325 MG (65 MG ELEMENTAL FE) 325 MG: 325 (65 FE) TAB at 20:58

## 2018-10-18 RX ADMIN — LEVETIRACETAM 500 MG: 500 TABLET, FILM COATED ORAL at 20:58

## 2018-10-18 RX ADMIN — FENOFIBRATE 160 MG: 160 TABLET ORAL at 11:35

## 2018-10-18 RX ADMIN — ATROPA BELLADONNA AND OPIUM 1 SUPPOSITORY: 16.2; 3 SUPPOSITORY RECTAL at 13:10

## 2018-10-18 RX ADMIN — SIMVASTATIN 10 MG: 10 TABLET, FILM COATED ORAL at 21:03

## 2018-10-18 RX ADMIN — ACETAMINOPHEN 650 MG: 325 TABLET, FILM COATED ORAL at 18:01

## 2018-10-18 RX ADMIN — FERROUS SULFATE TAB 325 MG (65 MG ELEMENTAL FE) 325 MG: 325 (65 FE) TAB at 08:32

## 2018-10-18 RX ADMIN — INSULIN ASPART 2 UNITS: 100 INJECTION, SOLUTION INTRAVENOUS; SUBCUTANEOUS at 21:03

## 2018-10-18 ASSESSMENT — PAIN DESCRIPTION - DESCRIPTORS
DESCRIPTORS: BURNING;CONSTANT;DISCOMFORT
DESCRIPTORS: BURNING;CONSTANT;DISCOMFORT

## 2018-10-18 ASSESSMENT — ACTIVITIES OF DAILY LIVING (ADL)
ADLS_ACUITY_SCORE: 14
ADLS_ACUITY_SCORE: 14
ADLS_ACUITY_SCORE: 13
ADLS_ACUITY_SCORE: 13

## 2018-10-18 ASSESSMENT — ENCOUNTER SYMPTOMS
SEIZURES: 1
HEMATURIA: 1
STRIDOR: 0
ABDOMINAL PAIN: 0
DYSRHYTHMIAS: 0

## 2018-10-18 ASSESSMENT — COPD QUESTIONNAIRES
CAT_SEVERITY: MODERATE
COPD: 1

## 2018-10-18 ASSESSMENT — LIFESTYLE VARIABLES: TOBACCO_USE: 0

## 2018-10-18 NOTE — ED PROVIDER NOTES
History     Chief Complaint:  Hematuria    HPI   Donnie Vincent is a 72 year old male patient with past medical history of BPH, COPD, CVA, seizure disorder, diabetes mellitus, GERD, hypertension, hyperlipidemia, who was admitted on 10/3 for TURP and bladder stone removal.  His postoperative course was complicated by acute blood loss anemia and renal failure.  He had an indwelling Sanabria catheter until 2 days ago when it was removed.  He notes that he had been urinating clear until 1630 this afternoon when he had red colored urine and red strings of blood in his urine. He reports that he does not feel chest pain, shortness of breath, abdominal pain, or dizziness. He reports some penile pain which his wife suspects is from the trauma of having many catheter insertions but the patient otherwise has no other concerns.    Allergies:  Penicillins      Medications:    Albuterol Inhaler  Symbicort  Plavix  Fenofibrate 160 Mg Tablet  Ferrous Sulfate   Prozac  Folic Acid   Insulin Glargine   Keppra  Tradjenta  Metoprolol Succinate Er Po  Pantoprazole Sodium Po  Zocor  Flomax  Topamax     Past Medical History:     Anemia   BPH (benign prostatic hypertrophy)   Brain tumor (benign) (H)   COPD (chronic obstructive pulmonary disease) (H)   CVA (cerebral infarction)   Diabetes   Diverticulitis of colon (without mention of hemorrhage)(562.11)   Gastro-oesophageal reflux disease   Hernia, abdominal   History of blood transfusion   HTN (hypertension)   Hyperlipidemia   PFO (patent foramen ovale)   Renal disease   Seizure disorder (H)   Seizures (H)   Sleep apnea   Stroke (H)   Unspecified congenital anomaly of lung     Past Surgical History:    C Nonspecific Procedure   Colonoscopy 2005  Cystoscopy, Retrogrades, Extract Stone, Combined  Cystoscopy, Transurethral Resection (Tur) Prostate, Combined   Esophagoscopy, Gastroscopy, Duodenoscopy (Egd), Combined   Kidney Stone - Lithotripsy  Hernia Repair   Laser Holmium Lithotripsy  "Bladder   Phacoemulsification Clear Cornea With Standard Intraocular Lens Implant     Family History:    None on file    Social History:  The patient  reports that he quit smoking about 48 years ago. He has never used smokeless tobacco. He reports that he drinks alcohol. He reports that he does not use illicit drugs.   Marital Status:   [2]  Review of Systems   Gastrointestinal: Negative for abdominal pain.   Genitourinary: Positive for hematuria and penile pain.   All other systems reviewed and are negative.    Physical Exam     Vital signs    Estimated body mass index is 33.61 kg/(m^2) as calculated from the following:    Height as of this encounter: 1.803 m (5' 11\").    Weight as of this encounter: 109.3 kg (241 lb).    Patient Vitals for the past 24 hrs:   BP Temp Temp src Pulse Heart Rate Resp SpO2 Height Weight   10/17/18 2321 151/82 97.8  F (36.6  C) Oral - 108 18 97 % 1.803 m (5' 11\") 109.3 kg (241 lb)   10/17/18 2245 (!) 151/91 - - - - - 97 % - -   10/17/18 2230 148/84 - - - - - 98 % - -   10/17/18 2215 131/85 - - - - - 97 % - -   10/17/18 2200 (!) 150/98 - - - - - 99 % - -   10/17/18 2145 (!) 156/96 - - - - - 97 % - -   10/17/18 2130 (!) 158/95 - - - - - 98 % - -   10/17/18 2115 (!) 153/92 - - - - - 98 % - -   10/17/18 2100 145/90 - - - - - 98 % - -   10/17/18 2045 167/87 - - - - - 98 % - -   10/17/18 2030 (!) 148/91 - - - - - 99 % - -   10/17/18 1956 (!) 172/108 98.6  F (37  C) - 115 115 18 99 % - -         Physical Exam  VS: Reviewed per above  HENT: Mucous membranes moist  EYES: sclera anicteric  CV: Rate as noted, regular rhythm.   RESP: Effort normal. Breath sounds are normal bilaterally.  GI: no tenderness, not distended.  : Blood clots in underwear and at the urethral meatus.  NEURO: Alert, moving all extremities  MSK: No deformity of the extremities  SKIN: Warm and dry      Emergency Department Course   Laboratory:  Hemoglobin 9.4  UA: Urine Glc 500, Urine Blood Large, Protein Albumin " Urine >499, WBC >182,  RBC >182, otherwise within normal limits    CBC: RBC 3.76, HGB 10.2, 'HCT 33.5, MCHC 30.4, RDW 15.5, , otherwise within normal limits   ABO/Rh A+  INR 1.00    BMP: Cl 112, Glucose 281,  Creatinine 1.47, GFR Estimate 47 Ca 8.2 OW WNL       Interventions:  2034: Dilaudid 0.5mg IV  2125: Ativan 0.5mg IV    Emergency Department Course:  Past medical records, nursing notes, and vitals reviewed.  2011: I performed an exam of the patient and obtained history, as documented above.       IV inserted and blood drawn for the above work up to be conducted.     The patient provided a Urine sample for the above work up to be conducted.     Discussed the patient with Dr. Estrada, who will admit the patient to a Med/Surg bed for further monitoring, evaluation, and treatment.     Findings and plan explained to the Patient and spouse who consents to admission.   Impression & Plan    Medical Decision Making:  Patient presents to the ER with gross hematuria 1-2 weeks after TURP and bladder stone removal.  Initial vital signs are notable for heart rate of 115.  He was given Dilaudid for his discomfort.  Lab work revealed stable hemoglobin.  Renal function stable.  I discussed the case with on-call urologist who recommended three-way Sanabria placement for bladder irrigation, admission and n.p.o. at midnight in case he needs cystoscopy in the morning.  Patient remained stable while in the ER.  Serial hemoglobin pending upon patient transferred to inpatient unit.    Diagnosis:    ICD-10-CM    1. Hematuria, unspecified type R31.9 ABO/Rh type and screen     UA with Microscopic     Basic metabolic panel     Basic metabolic panel     Hemoglobin     CANCELED: Basic metabolic panel (BMP)       Disposition:  Admitted to Hospitalist service.    Megan COLLINS, am serving as a scribe at 8:11 PM on 10/17/2018 to document services personally performed by Vipul Hernandez MD based on my observations and the provider's  statements to me.    [facility] EMERGENCY DEPARTMENT       Vipul Hernandez MD  10/18/18 0108

## 2018-10-18 NOTE — PLAN OF CARE
Problem: Patient Care Overview  Goal: Plan of Care/Patient Progress Review  Outcome: No Change  A&Ox4, VSS, SBA, NPO, heart sounds- WNL, lungs- clear, bowel sounds- active, CBI running, b&o supp. given for pain x1, tylenol given for pain x1, skin intact- bruises and scars, IVF running, blood sugars- 185 & 153.   Nursing staff had to hand irrigate due to CBI clotting off. Plan: surgery later today.

## 2018-10-18 NOTE — PLAN OF CARE
Problem: Patient Care Overview  Goal: Plan of Care/Patient Progress Review  Pt admitted for hematuria  CBI infusing fast overnight - unable to titrate down   Bright red urine with stringy clots  IV dilaudid and B&O suppository for pain  NPO for possible procedure  BS active, passing gas, BM this AM  Hx seizures - precautions initiatied  VSS and afebrile   Wearing home CPAP   Urology following

## 2018-10-18 NOTE — PHARMACY-ADMISSION MEDICATION HISTORY
Admission medication history interview status for this patient is complete. See [facility] admission navigator for allergy information, prior to admission medications and immunization status.     Medication history interview source(s):Patient and Family  Medication history resources (including written lists, pill bottles, clinic record):medlist    Changes made to PTA medication list:  Added: none  Deleted: none  Changed: Iron    Actions taken by pharmacist (provider contacted, etc):None     Additional medication history information:None    Medication reconciliation/reorder completed by provider prior to medication history? No    For patients on insulin therapy: Yes--but RN needs lab work done (Yes/No)   Lantus/levemir/NPH/Mix 70/30 dose: ___ in AM/PM or twice daily   Sliding scale Novolog Y/N   If Yes, do you have a baseline novolog pre-meal dose: ______units with meals   Patients eat three meals a day: Y/N ---  How many episodes of hypoglycemia (low blood glucose) do you have weekly: ---   How many missed doses do you have a week: ---  How many times do you check your blood glucose per day: ---  Any Barriers to therapy: cost of medications/comfortable with giving injections (if applicable)/ comfortable and confident with current diabetes regimen ---      Prior to Admission medications    Medication Sig Last Dose Taking? Auth Provider   albuterol (PROAIR HFA/PROVENTIL HFA/VENTOLIN HFA) 108 (90 Base) MCG/ACT inhaler Inhale 2 puffs into the lungs every 6 hours as needed for shortness of breath / dyspnea or wheezing  Yes Unknown, Entered By History   budesonide-formoterol (SYMBICORT) 80-4.5 MCG/ACT inhaler Inhale 2 puffs into the lungs 2 times daily  10/16/2018 at Unknown time Yes Reported, Patient   clopidogrel (PLAVIX) 75 MG tablet Take 1 tablet (75 mg) by mouth daily Past Month at Unknown time Yes Suyapa Leon MD   fenofibrate 160 MG tablet Take 1 tablet (160 mg) by mouth daily 10/17/2018 at am Yes Edward  Suyapa DUMAS MD   ferrous sulfate (IRON) 325 (65 Fe) MG tablet Take 325 mg by mouth 2 times daily 10/17/2018 at am Yes Unknown, Entered By History   FLUoxetine (PROZAC) 40 MG capsule Take 1 capsule (40 mg) by mouth daily  Yes Suyapa Leon MD   folic acid (FOLVITE) 1 MG tablet Take 1 tablet (1 mg) by mouth daily 10/17/2018 at am Yes Suyapa Leon MD   insulin glargine (LANTUS SOLOSTAR) 100 UNIT/ML pen Inject 10 Units Subcutaneous At Bedtime 10/16/2018 at Unknown time Yes Suyapa Leon MD   levETIRAcetam (KEPPRA) 500 MG tablet Take 500 mg by mouth 2 times daily 10/17/2018 at am Yes Reported, Patient   linagliptin (TRADJENTA) 5 MG TABS tablet Take 2.5 mg by mouth daily  10/17/2018 at am Yes Reported, Patient   METOPROLOL SUCCINATE ER PO Take 50 mg by mouth daily 10/16/2018 at Unknown time Yes Reported, Patient   PANTOPRAZOLE SODIUM PO Take 40 mg by mouth 2 times daily (before meals) 10/16/2018 at Unknown time Yes Reported, Patient   simvastatin (ZOCOR) 10 MG tablet Take 1 tablet (10 mg) by mouth At Bedtime 10/16/2018 at Unknown time Yes Suyapa Leon MD   tamsulosin (FLOMAX) 0.4 MG 24 hr capsule Take 1 capsule (0.4 mg) by mouth daily 10/16/2018 at Unknown time Yes Suyapa Leon MD   Topiramate (TOPAMAX PO) Take 50 mg by mouth 2 times daily 10/17/2018 at am Yes Reported, Patient

## 2018-10-18 NOTE — OR NURSING
Surgery cancelled, catheter irrigated no clots found in bladder. Catheter placed on traction until tomorrow.

## 2018-10-18 NOTE — PROGRESS NOTES
Urology Note    Urine clear after irrigation. CBI with clear output on slow drip.    OK for regular diet.  NPO at MN  Continue CBI- OK to wean as able to keep urine clear to light pink  Urology will re-assess in AM    Renato Das MD  Urology  [facility]  Clinic Phone [phone]

## 2018-10-18 NOTE — PROGRESS NOTES
"[facility]  Hospitalist Progress Note  Jose Gonsales, DO 10/18/2018    Reason for Stay (Diagnosis): Hematuria.         Assessment and Plan:      Summary of Stay: Donnie Vincent is a 72 year old male admitted on 10/17/2018 with hematuria.  Problem List:   1. Hematuria.  Improving.  Urology following.  Hold clopidogrel.  2. Previous stroke.  Clopidogrel on hold due to hematuria.  Continue simvastatin.  3. Diabetes mellitus type 2.  NovoLog sliding scale.  4. Hyperlipidemia.  Continue simvastatin and fenofibrate.  5. Depression.  Continue fluoxetine.  6. Hypertension.  Continue metoprolol.    7. Benign prostatic hyperplasia.  Continue tamsulosin.  8. GERD.  Continue pantoprazole.  9. Seizure disorder.  Continue Keppra and topiramate.  10. COPD.  No acute exacerbation.  Continue Breo Ellipta.  Albuterol as needed.  11. Chronic kidney disease.  Creatinine at baseline.  Avoid nephrotoxins as able.  DVT Prophylaxis: Pneumatic Compression Devices  Code Status: Full Code  Discharge Dispo: Home  Estimated Disch Date / # of Days until Disch: 1-2        Interval History (Subjective):      Denies chest pain, shortness of breath, fevers, chills, nausea, vomiting, or diarrhea.                  Physical Exam:      Last Vital Signs:  /80  Pulse 72  Temp 98  F (36.7  C) (Temporal)  Resp 18  Ht 1.803 m (5' 11\")  Wt 109.3 kg (241 lb)  SpO2 100%  BMI 33.61 kg/m2    Gen:  NAD, A&Ox3.  Eyes:  PERRL, sclera anicteric.  OP:  MMM, no lesions.  Neck:  Supple.  CV:  Regular, no murmurs.  Lung:  CTA b/l, normal effort.  Ab:  +BS, soft.  Skin:  Warm, dry to touch.  No rash.  Ext:  No pitting edema LE b/l.           Medications:      All current medications were reviewed with changes reflected in problem list.         Data:      All new lab and imaging data was reviewed.   Labs:    Recent Labs  Lab 10/18/18  0705      POTASSIUM 4.1   CHLORIDE 113*   CO2 19*   ANIONGAP 8   *   BUN 23   CR 1.34* "   GFRESTIMATED 52*   GFRESTBLACK 63   ZAYRA 8.0*       Recent Labs  Lab 10/18/18  0705   WBC 10.8   HGB 8.9*   HCT 29.7*   MCV 91         Imaging:   No results found for this or any previous visit (from the past 24 hour(s)).

## 2018-10-18 NOTE — H&P
History and Physical     Donnie Vincent MRN# [id number]   YOB: 1945 Age: 72 year old      Date of Admission:  10/17/2018    Primary care provider: Suyapa Leon          Assessment and Plan:   This patient is a 72 year old male who who has past medical history of COPD, CVA on chronic clopidogrel, seizure disorder, T2 diabetes mellitus, hypertension, hyperlipidemia, ISAEL on CPAP who presents and is admitted with gross hematuria.    His most recent past medical history is notable for hospitalization at this facility from 10/3/2018 through 10/9/2018 for TURP procedure in the setting of BPH.  He underwent TURP and also had laser removal of bladder stones postprocedure he had significant issues with hematuria leading to acute blood loss anemia in addition to acute kidney injury.  He required transfusion of 3 units.    He represents today with gross hematuria and partial urethral obstruction.    Gross hematuria: Presumably due to resumption of clopidogrel setting of recent procedures.  For now we will hold clopidogrel and ask for formal urologic consultation.  He will be kept n.p.o. after midnight for possible need for urgent cystoscopy in the morning.  In the ER he did have a Sanabria catheter inserted and three-way irrigation initiated    Acute blood loss anemia: Serial hemoglobins, will check type and cross and transfuse as needed    Type 2 diabetes: Prior to admission medications are glargine 10 units nightly, and linagliptin 2.5 mg p.o. daily we will hold the setting of n.p.o., and monitor with an insulin sliding scale    History of a stroke: He had actually been on warfarin up until about 3 years ago when he had an episode of acute internal bleeding and since that time has been on clopidogrel.  In the setting of acute blood loss anemia from hematuria we will hold clopidogrel for now    Hypertension: He was quite hypertensive in the emergency room I suspect it was mostly anxiety related and  is currently without significant pain.  We will continue his baseline regimen of metoprolol ER 50 mg p.o. Daily    CKD: Baseline creatinine typically runs in the 1.3-1.5 range so it at baseline    Seizure disorder continue levetiracetam at 500 mg p.o. twice daily, in addition to topiramate 50 mg p.o. twice daily    Hyperlipidemia continue simvastatin 10 mg at bedtime as at home along with fenofibrate 160 mg p.o. Daily    COPD continue home inhalers    Depression continue serotonin specific reuptake inhibitor    ISAEL:  CPAP as at home    Left Lateral thigh burning: strongly suspected meralgia paresthetica-he should have that checked out by his primary MD and in the meantime choose loose fitting clothing.     Chilled sensation pre-urination at night:  Not clear what this represents - he should discuss it with his urologist    PPX:Perla  Code:Full  Dispo: admit IP for > 2 MN stay               Chief Complaint:   Gross hematuria       History of Present Illness:   This patient is a 72 year old male who who has past medical history of COPD, CVA on chronic clopidogrel, seizure disorder, T2 diabetes mellitus, hypertension, hyperlipidemia who presents and is admitted with gross hematuria.    His most recent past medical history is notable for hospitalization at this facility from 10/3/2018 through 10/9/2018 for TURP procedure in the setting of BPH.  He underwent TURP and also had laser removal of bladder stones postprocedure he had significant issues with hematuria leading to acute blood loss anemia in addition to acute kidney injury.  He required transfusion of 3 units.    He had been doing well since discharge, and actually had his catheter out yesterday.  This afternoon he noticed that his urine was turning bloody initially and had stringy red clots in it.  Prior to this he was urinating without difficulty.  It then seemed like it was getting harder for him to urinate so he came to the emergency room to be further  evaluated.    In the ER vital signs are notable for some tachycardia and hypertension but he is afebrile.  Labs show stable chronic kidney disease glucose is quite elevated at 281 and CBC shows a normal white count and hemoglobin of 10.2 (improved from discharge hemoglobin).    He believes he has been taking his clopidogrel since discharge.      He also notes 3 week hx of left lateral thigh burning discomfort which is stable.     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     COPD (chronic obstructive pulmonary disease) (H)      CVA (cerebral infarction)      Diabetes new 4/09    initial A1C 6.8 4/08;; has done diabetic teaching      Diverticulitis of colon (without mention of hemorrhage)(562.11) 2001; 9/06     Gastro-oesophageal reflux disease      Hernia, abdominal      History of blood transfusion      HTN (hypertension)      Hyperlipidemia LDL goal < 100      Other forms of migraine, without mention of intractable migraine without mention of status migrainosus      PFO (patent foramen ovale)     small per echo 11/2013     Renal disease     Hx kidney stones 12 yrs ago     Seizure disorder (H)     sees neurologist     Seizures (H)     8 years ago - no recurrence     Sleep apnea     wears CPAP     Stroke (H) early april 2017     Unspecified congenital anomaly of lung     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 possibly from asbestos exposure in [employer] (remote)              Past Surgical History:     Past Surgical  History:   Procedure Laterality Date     C NONSPECIFIC PROCEDURE      colonoscopy 2005     COLONOSCOPY  11/4/2015    Dr. Orozco [facility]     COLONOSCOPY N/A 11/4/2015    Procedure: COLONOSCOPY;  Surgeon: Yazmin Orozco MD;  Location: [facility]     CYSTOSCOPY, RETROGRADES, EXTRACT STONE, COMBINED Left 11/25/2015    Procedure: COMBINED CYSTOSCOPY, RETROGRADES, EXTRACT STONE;  Surgeon: Neville Banuelos MD;  Location: [facility] OR     CYSTOSCOPY, TRANSURETHRAL RESECTION (TUR) PROSTATE, COMBINED N/A 10/3/2018    Procedure: COMBINED CYSTOSCOPY, TRANSURETHRAL RESECTION (TUR) PROSTATE;  Cystoscopy, removal of bladder stones with holmium laser, transurethral resection of prostate using Olympus bipolar electrode;  Surgeon: Neville Banuelos MD;  Location:  OR     ESOPHAGOSCOPY, GASTROSCOPY, DUODENOSCOPY (EGD), COMBINED  11/5/2015    Dr. Ernesto CONTRERAS     ESOPHAGOSCOPY, GASTROSCOPY, DUODENOSCOPY (EGD), COMBINED  06/27/2018    Dr. Ernesto CONTRERAS     GENITOURINARY SURGERY      kidney stone - lithotripsy     HERNIA REPAIR       LASER HOLMIUM LITHOTRIPSY BLADDER N/A 10/3/2018    Procedure: LASER HOLMIUM LITHOTRIPSY BLADDER;;  Surgeon: Neville Banuelos MD;  Location:  OR     PHACOEMULSIFICATION CLEAR CORNEA WITH STANDARD INTRAOCULAR LENS IMPLANT  12/20/2011    Procedure:PHACOEMULSIFICATION CLEAR CORNEA WITH STANDARD INTRAOCULAR LENS IMPLANT; RIGHT PHACOEMULSIFICATION CLEAR CORNEA WITH STANDARD INTRAOCULAR LENS IMPLANT ; Surgeon:GUILHERME KAUFMAN; [facility]             Social History:     Social History   Substance Use Topics     Smoking status: Former Smoker     Quit date: 1/1/1970     Smokeless tobacco: Never Used      Comment: quit age 28     Alcohol use Yes      Comment: 1 beer/week      Lives with his wife.  Walks independently or with a cane.  Code status is full        Family History:     Family History   Problem Relation Age of Onset     Family History Negative Mother      migraines     Family History Negative  Father      lived to be 92     Colon Cancer No family hx of             Allergies:     Allergies   Allergen Reactions     Penicillins      Sore joints and he had to be hospitalized for it             Medications:     Awaiting med rec          Review of Systems:   A Comprehensive greater than 10 system review of systems was carried out.  Pertinent positives and negatives are noted above.  Otherwise negative for contributory information.           Physical Exam:   Blood pressure 131/85, pulse 115, temperature 98.6  F (37  C), resp. rate 18, SpO2 98 %.  Exam:    General:  Pleasant nad looks stated age nad  HEENT:  Head nc/at sclera clear PERRL O/P:  Moist mucus membranes no posterior pharyngeal erythema or exudate.  Neck is supple  Lungs: cta b nl effort   CV:  RRR-tachy  no m/r/g no le edema  Abd:  Obese S/nt/nd no r/g  Neuro:  Cn 2-12 grossly intact and strength is intact in b ue and le  Alert and oriented affect appropriate   Skin:  W/d no c/c    Sanabria in place, gross bloody urine draining into bag               Data:          Lab Results   Component Value Date     10/17/2018    Lab Results   Component Value Date    CHLORIDE 112 10/17/2018    Lab Results   Component Value Date    BUN 24 10/17/2018      Lab Results   Component Value Date    POTASSIUM 4.2 10/17/2018    Lab Results   Component Value Date    CO2 20 10/17/2018    Lab Results   Component Value Date    CR 1.47 10/17/2018        Lab Results   Component Value Date    WBC 8.6 10/17/2018    HGB 10.2 (L) 10/17/2018    HCT 33.5 (L) 10/17/2018    MCV 89 10/17/2018     (H) 10/17/2018     Lab Results   Component Value Date     (H) 10/17/2018            Imaging:   No results found for this or any previous visit (from the past 24 hour(s)).

## 2018-10-18 NOTE — ED NOTES
"[facility]  ED Nurse Handoff Report    Donnie Vincent is a 72 year old male   ED Chief complaint: Hematuria  . ED Diagnosis:   Final diagnoses:   Hematuria, unspecified type     Allergies:   Allergies   Allergen Reactions     Penicillins      Sore joints and he had to be hospitalized for it       Code Status: Full Code  Activity level - Baseline/Home:  Independent. Activity Level - Current:   Stand with Assist. Lift room needed: No. Bariatric: No   Needed: No   Isolation: No. Infection: Not Applicable.     Vital Signs:   Vitals:    10/17/18 2115 10/17/18 2130 10/17/18 2145 10/17/18 2200   BP: (!) 153/92 (!) 158/95 (!) 156/96 (!) 150/98   Pulse:       Resp:       Temp:       SpO2: 98% 98% 97% 99%       Cardiac Rhythm:  ,      Pain level:    Patient confused: No. Patient Falls Risk: Yes.   Elimination Status: Urethral catheter (daniels) in place; refer to orders to discontinue as per protocol    Patient Report - Initial Complaint: hematuria. Focused Assessment:  Patient presents with complaints of bleeding from his penis and penile pain. Patient states that bleeding started this afternoon. He had prostate surgery on the 3rd and required 3 uints of blood after surgery due to blood loss. Today he started to pass large amounts of blood with \"strings of blood\". Patient states that he feels dizzy and light headed. Patient had catheter placed and pain was significantly relieved.   Tests Performed: labs. Abnormal Results: none of note.   Treatments provided: catheter inserted and bladder irrigation initiated, meds per MAR.   Family Comments: wife at bedside.  OBS brochure/video discussed/provided to patient:  No  ED Medications:   Medications   HYDROmorphone (PF) (DILAUDID) injection 0.5 mg (0.5 mg Intravenous Given 10/17/18 2034)   lidocaine (XYLOCAINE) 2 % topical gel (not administered)   LORazepam (ATIVAN) injection 0.5 mg (0.5 mg Intravenous Given 10/17/18 2125)     Drips infusing:  No  For the " majority of the shift, the patient's behavior Green. Interventions performed were standard.     Severe Sepsis OR Septic Shock Diagnosis Present: No      ED Nurse Name/Phone Number: Shirley GALLOWAY,   10:11 PM    RECEIVING UNIT ED HANDOFF REVIEW    Above ED Nurse Handoff Report was reviewed: Yes  Reviewed by: Brianne Bolaños on October 17, 2018 at 10:37 PM

## 2018-10-18 NOTE — ANESTHESIA PREPROCEDURE EVALUATION
PAC NOTE:       ANESTHESIA PRE EVALUATION:  Anesthesia Evaluation     . Pt has had prior anesthetic. Type: General    No history of anesthetic complications          ROS/MED HX    ENT/Pulmonary:     (+)sleep apnea, moderate COPD, , . .   (-) tobacco use, asthma and recent URI   Neurologic:     (+)seizures CVA     Cardiovascular:     (+) Dyslipidemia, hypertension-Peripheral Vascular Disease-CAD, --. : . . . :. . Previous cardiac testing date:results:date: results:ECG reviewed date:9/18 results:Sinus.LAD.Anterior Qs. date: results:         (-) angina, arrhythmias, valvular problems/murmurs and angina   METS/Exercise Tolerance:     Hematologic: Comments: Lab Test        10/18/18     10/17/18     10/17/18     10/11/18      --          09/14/18      --          03/16/17                       0705          2251          2037          1225           --           2022           --                             WBC          10.8          --          8.6          7.5            < >        5.9            < >         --           HGB          8.9*         9.4*         10.2*        9.2*           < >        11.3*          < >         --           MCV          91            --          89           91             < >        85             < >         --           PLT          386           --          497*         312            < >        211            < >         --           INR           --           --          1.00          --           --          0.95          --          1.0            < > = values in this interval not displayed.                  Lab Test        10/18/18     10/17/18     10/11/18                       0705          2037          1225          NA           140          141          142           POTASSIUM    4.1          4.2          3.6           CHLORIDE     113*         112*         113*          CO2          19*          20           20            BUN          23           24           15             CR           1.34*        1.47*        1.51*         ANIONGAP     8            9            9             ZAYRA          8.0*         8.2*         8.5           GLC          226*         281*         133*         - neg hematologic  ROS       Musculoskeletal:   (+) arthritis, , , -       GI/Hepatic:     (+) GERD       Renal/Genitourinary:     (+) chronic renal disease, type: CRI, Nephrolithiasis , Pt does not require dialysis, Pt has no history of transplant,       Endo:     (+) type II DM Using insulin - not using insulin pump Obesity, .   (-) Type I DM, thyroid disease and chronic steroid usage   Psychiatric:  - neg psychiatric ROS       Infectious Disease:  - neg infectious disease ROS       Malignancy:      - no malignancy   Other:    - neg other ROS                 Physical Exam  Normal systems: cardiovascular, pulmonary and dental    Airway   Mallampati: III  TM distance: >3 FB  Neck ROM: full    Dental     Cardiovascular   Rhythm and rate: regular and normal  (-) no friction rub, no systolic click and no murmur    Pulmonary    breath sounds clear to auscultation(-) no rhonchi, no decreased breath sounds, no wheezes, no rales and no stridor             Anesthesia Plan      History & Physical Review  History and physical reviewed and following examination; no interval change.    ASA Status:  3 .    NPO Status:  > 8 hours    Plan for General and LMA with Intravenous induction. Maintenance will be Balanced.    PONV prophylaxis:  Ondansetron (or other 5HT-3) and Dexamethasone or Solumedrol       Postoperative Care  Postoperative pain management:  IV analgesics.      Consents  Anesthetic plan, risks, benefits and alternatives discussed with:  Patient or representative, Spouse and Patient..                            .

## 2018-10-18 NOTE — PROGRESS NOTES
Donnie is a 72-year-old male who underwent TURP for benign disease 15 days ago. He has had intermittent bleeding. He has not started his Plavix at home.  He and his wife were met in the preoperative holding area. His urine is completely clear on a medium drip of saline.  30 cc of saline was placed in the Sanabria balloon and the Sanabria was placed on traction using a Velcro leg strap. The Sanabria was irrigated and no clots returned. The urine remains completely clear  Assessment: Bleeding after TURP  Plan: Cancel video cystoscopy this afternoon            Keep Sanabria on traction overnight            Regular diet

## 2018-10-18 NOTE — ED TRIAGE NOTES
"Patient presents with complaints of bleeding from his penis. Patient states that bleeding stated this afternoon. He had prostate surgery on the 3rd and required 3 uints of blood after surgery due to blood loss through the penis. Today he started to pass large amounts of blood with \"strings of blood\". Patient states that he feels dizzy and light headed. . ABC intact without need for intervention at this time.     "

## 2018-10-19 LAB
ANION GAP SERPL CALCULATED.3IONS-SCNC: 7 MMOL/L (ref 3–14)
BUN SERPL-MCNC: 16 MG/DL (ref 7–30)
CALCIUM SERPL-MCNC: 7.9 MG/DL (ref 8.5–10.1)
CHLORIDE SERPL-SCNC: 112 MMOL/L (ref 94–109)
CO2 SERPL-SCNC: 20 MMOL/L (ref 20–32)
CREAT SERPL-MCNC: 1.28 MG/DL (ref 0.66–1.25)
ERYTHROCYTE [DISTWIDTH] IN BLOOD BY AUTOMATED COUNT: 15.5 % (ref 10–15)
GFR SERPL CREATININE-BSD FRML MDRD: 55 ML/MIN/1.7M2
GLUCOSE BLDC GLUCOMTR-MCNC: 138 MG/DL (ref 70–99)
GLUCOSE BLDC GLUCOMTR-MCNC: 164 MG/DL (ref 70–99)
GLUCOSE BLDC GLUCOMTR-MCNC: 166 MG/DL (ref 70–99)
GLUCOSE BLDC GLUCOMTR-MCNC: 167 MG/DL (ref 70–99)
GLUCOSE BLDC GLUCOMTR-MCNC: 182 MG/DL (ref 70–99)
GLUCOSE SERPL-MCNC: 174 MG/DL (ref 70–99)
HCT VFR BLD AUTO: 25.9 % (ref 40–53)
HGB BLD-MCNC: 7.7 G/DL (ref 13.3–17.7)
HGB BLD-MCNC: 8.5 G/DL (ref 13.3–17.7)
MCH RBC QN AUTO: 27.1 PG (ref 26.5–33)
MCHC RBC AUTO-ENTMCNC: 29.7 G/DL (ref 31.5–36.5)
MCV RBC AUTO: 91 FL (ref 78–100)
PLATELET # BLD AUTO: 326 10E9/L (ref 150–450)
POTASSIUM SERPL-SCNC: 4.1 MMOL/L (ref 3.4–5.3)
RBC # BLD AUTO: 2.84 10E12/L (ref 4.4–5.9)
SODIUM SERPL-SCNC: 139 MMOL/L (ref 133–144)
WBC # BLD AUTO: 7.4 10E9/L (ref 4–11)

## 2018-10-19 PROCEDURE — 99232 SBSQ HOSP IP/OBS MODERATE 35: CPT | Performed by: INTERNAL MEDICINE

## 2018-10-19 PROCEDURE — 25000132 ZZH RX MED GY IP 250 OP 250 PS 637: Performed by: INTERNAL MEDICINE

## 2018-10-19 PROCEDURE — 25000131 ZZH RX MED GY IP 250 OP 636 PS 637: Performed by: INTERNAL MEDICINE

## 2018-10-19 PROCEDURE — 85027 COMPLETE CBC AUTOMATED: CPT | Performed by: INTERNAL MEDICINE

## 2018-10-19 PROCEDURE — 25000128 H RX IP 250 OP 636: Performed by: INTERNAL MEDICINE

## 2018-10-19 PROCEDURE — 99231 SBSQ HOSP IP/OBS SF/LOW 25: CPT | Mod: 24 | Performed by: UROLOGY

## 2018-10-19 PROCEDURE — 00000146 ZZHCL STATISTIC GLUCOSE BY METER IP

## 2018-10-19 PROCEDURE — 36415 COLL VENOUS BLD VENIPUNCTURE: CPT | Performed by: INTERNAL MEDICINE

## 2018-10-19 PROCEDURE — 12000000 ZZH R&B MED SURG/OB

## 2018-10-19 PROCEDURE — 85018 HEMOGLOBIN: CPT | Performed by: UROLOGY

## 2018-10-19 PROCEDURE — 94640 AIRWAY INHALATION TREATMENT: CPT

## 2018-10-19 PROCEDURE — 40000275 ZZH STATISTIC RCP TIME EA 10 MIN

## 2018-10-19 PROCEDURE — 36415 COLL VENOUS BLD VENIPUNCTURE: CPT | Performed by: UROLOGY

## 2018-10-19 PROCEDURE — 80048 BASIC METABOLIC PNL TOTAL CA: CPT | Performed by: INTERNAL MEDICINE

## 2018-10-19 RX ORDER — FOLIC ACID 1 MG/1
1 TABLET ORAL DAILY
Status: DISCONTINUED | OUTPATIENT
Start: 2018-10-19 | End: 2018-10-20 | Stop reason: HOSPADM

## 2018-10-19 RX ORDER — NALOXONE HYDROCHLORIDE 0.4 MG/ML
.1-.4 INJECTION, SOLUTION INTRAMUSCULAR; INTRAVENOUS; SUBCUTANEOUS
Status: ACTIVE | OUTPATIENT
Start: 2018-10-19 | End: 2018-10-20

## 2018-10-19 RX ORDER — DEXTROSE MONOHYDRATE 25 G/50ML
25-50 INJECTION, SOLUTION INTRAVENOUS
Status: DISCONTINUED | OUTPATIENT
Start: 2018-10-19 | End: 2018-10-19

## 2018-10-19 RX ORDER — NICOTINE POLACRILEX 4 MG
15-30 LOZENGE BUCCAL
Status: DISCONTINUED | OUTPATIENT
Start: 2018-10-19 | End: 2018-10-19

## 2018-10-19 RX ORDER — OXYCODONE HYDROCHLORIDE 5 MG/1
5 TABLET ORAL
Status: COMPLETED | OUTPATIENT
Start: 2018-10-19 | End: 2018-10-19

## 2018-10-19 RX ADMIN — SODIUM CHLORIDE 3000 ML: 900 IRRIGANT IRRIGATION at 00:50

## 2018-10-19 RX ADMIN — LEVETIRACETAM 500 MG: 500 TABLET, FILM COATED ORAL at 08:30

## 2018-10-19 RX ADMIN — INSULIN GLARGINE 10 UNITS: 100 INJECTION, SOLUTION SUBCUTANEOUS at 20:43

## 2018-10-19 RX ADMIN — SODIUM CHLORIDE: 9 INJECTION, SOLUTION INTRAVENOUS at 05:25

## 2018-10-19 RX ADMIN — SODIUM CHLORIDE 3000 ML: 900 IRRIGANT IRRIGATION at 02:10

## 2018-10-19 RX ADMIN — TOPIRAMATE 50 MG: 25 TABLET, FILM COATED ORAL at 08:30

## 2018-10-19 RX ADMIN — FENOFIBRATE 160 MG: 160 TABLET ORAL at 08:30

## 2018-10-19 RX ADMIN — SIMVASTATIN 10 MG: 10 TABLET, FILM COATED ORAL at 20:43

## 2018-10-19 RX ADMIN — METOPROLOL SUCCINATE 50 MG: 50 TABLET, EXTENDED RELEASE ORAL at 08:30

## 2018-10-19 RX ADMIN — SODIUM CHLORIDE: 9 INJECTION, SOLUTION INTRAVENOUS at 18:58

## 2018-10-19 RX ADMIN — ACETAMINOPHEN 650 MG: 325 TABLET, FILM COATED ORAL at 08:30

## 2018-10-19 RX ADMIN — INSULIN ASPART 1 UNITS: 100 INJECTION, SOLUTION INTRAVENOUS; SUBCUTANEOUS at 05:17

## 2018-10-19 RX ADMIN — FERROUS SULFATE TAB 325 MG (65 MG ELEMENTAL FE) 325 MG: 325 (65 FE) TAB at 08:30

## 2018-10-19 RX ADMIN — INSULIN ASPART 1 UNITS: 100 INJECTION, SOLUTION INTRAVENOUS; SUBCUTANEOUS at 01:28

## 2018-10-19 RX ADMIN — PANTOPRAZOLE SODIUM 40 MG: 40 TABLET, DELAYED RELEASE ORAL at 06:38

## 2018-10-19 RX ADMIN — TOPIRAMATE 50 MG: 25 TABLET, FILM COATED ORAL at 20:43

## 2018-10-19 RX ADMIN — OXYCODONE HYDROCHLORIDE 5 MG: 5 TABLET ORAL at 04:36

## 2018-10-19 RX ADMIN — FLUTICASONE FUROATE AND VILANTEROL TRIFENATATE 1 PUFF: 100; 25 POWDER RESPIRATORY (INHALATION) at 11:17

## 2018-10-19 RX ADMIN — FOLIC ACID 1 MG: 1 TABLET ORAL at 10:38

## 2018-10-19 RX ADMIN — FERROUS SULFATE TAB 325 MG (65 MG ELEMENTAL FE) 325 MG: 325 (65 FE) TAB at 20:43

## 2018-10-19 RX ADMIN — SODIUM CHLORIDE 3000 ML: 900 IRRIGANT IRRIGATION at 05:13

## 2018-10-19 RX ADMIN — PANTOPRAZOLE SODIUM 40 MG: 40 TABLET, DELAYED RELEASE ORAL at 16:35

## 2018-10-19 RX ADMIN — FLUOXETINE 40 MG: 20 CAPSULE ORAL at 08:30

## 2018-10-19 RX ADMIN — SODIUM CHLORIDE 3000 ML: 900 IRRIGANT IRRIGATION at 04:05

## 2018-10-19 RX ADMIN — TAMSULOSIN HYDROCHLORIDE 0.4 MG: 0.4 CAPSULE ORAL at 08:30

## 2018-10-19 RX ADMIN — SODIUM CHLORIDE 3000 ML: 900 IRRIGANT IRRIGATION at 06:16

## 2018-10-19 RX ADMIN — LEVETIRACETAM 500 MG: 500 TABLET, FILM COATED ORAL at 20:43

## 2018-10-19 ASSESSMENT — ACTIVITIES OF DAILY LIVING (ADL)
ADLS_ACUITY_SCORE: 13

## 2018-10-19 NOTE — PROGRESS NOTES
Urology    Catheter drained clear urine overnight  hgb 7.7  I hand irrigated catheter this AM and clear output with no clots    A/P: Remove daniels this AM  Trial of void today  Recheck hgb at noon

## 2018-10-19 NOTE — CONSULTS
"CLINICAL NUTRITION SERVICES  -  ASSESSMENT NOTE      Recommendations Ordered by Registered Dietitian (RD): Continue eating 3 well-balanced meals each day with good protein sources incorporated.   Malnutrition: Non-severe malnutrition in context of acute illness or injury        REASON FOR ASSESSMENT  Donnie Vincent is a 72 year old male seen by Registered Dietitian for Admission Nutrition Risk Screen - unintentional weight loss of 10 lbs or more in the past 2 mos (4-6 months 25 lbs loss).    NUTRITION HISTORY  - Diet history - patient reports eating well PTA, about 2 meals per day after waking up. He lives with his wife, who seems to be supportive of his eating. Pt. is ordering good sources of protein, such as eggs and sausage. He reports having no problems with appetite or intake. Pt does not has no known food allergies or restrictions.      CURRENT NUTRITION ORDERS  Diet Order:   Mod Carb  Was NPO yesterday for possible cystostomy, which was canceled. Pt then was allowed to eat by mouth and was tolerating a moderate consistent CHO diet. Now is NPO again.    Current Intake/Tolerance:  Pt ate 100% of his meals yesterday and reported good appetite.      PHYSICAL FINDINGS  Observed  Muscle wasting in clavicle, quadricep, and calf regions.  Subcutaneous fat loss in bicep, temple, and orbital regions.  Pt reports minimal mobility in the past couple weeks.  Obtained from Chart/Interdisciplinary Team  From flowsheet, poor skin turgor is noted.    ANTHROPOMETRICS  Height: 5' 11\"  Weight: 241 lbs 0 oz  Body mass index is 33.61 kg/(m^2).  Weight Status:  Obesity Grade I BMI 30-34.9  IBW: 155 lbs.  % IBW: 155%  Weight History:  Wt Readings from Last 10 Encounters:   10/17/18 109.3 kg (241 lb)   10/12/18 111.8 kg (246 lb 8 oz)   10/07/18 117.5 kg (259 lb 1.6 oz)   09/24/18 111.3 kg (245 lb 6.4 oz)   09/14/18 111.1 kg (245 lb)   08/22/18 111.1 kg (245 lb)   07/17/18 111.1 kg (245 lb)   07/10/18 113.4 kg (250 lb)   06/27/18 " 111.1 kg (245 lb)   06/01/18 111.4 kg (245 lb 9.6 oz)   Patient reports he was trying to lose weight prior to coming to the hospital, so not all weight loss occurred during his visit.     LABS  Labs reviewed   (high)  CR 1.34 (high, CKD)  Lab Results   Component Value Date    A1C 5.7 10/18/2018    A1C 5.2 09/24/2018    A1C 6.0 05/15/2018    A1C 7.8 05/22/2017    A1C 9.2 03/15/2017         MEDICATIONS  Medications reviewed  Folic acid 1 mg  Iron 325 mg BID  Lantis 10 U at bedtime  IVF 75 ml/hr      ASSESSED NUTRITION NEEDS PER APPROVED PRACTICE GUIDELINES:    Dosing Weight 109.3 kg (241 lbs.)  Estimated Energy Needs: 2186 - 2732   kcals (20-25 Kcal/Kg)  Justification: maintenance  Estimated Protein Needs: 109 - 133 grams protein (1-1.2 g pro/Kg)   Justification: maintenance, hypercatabolism with acute illness and preservation of lean body mass, CKD  Estimated Fluid Needs: 2186 - 2732  mL (1 mL/Kcal)  Justification: maintenance    MALNUTRITION:  % Weight Loss:  Does not meet criteria.  % Intake:  No decreased intake noted  Subcutaneous Fat Loss: Bicep region mild depletion. Temple region mild depletion. Orbital region mild depletion.  Muscle Loss: Suspect at least mild muscle loss in clavicle region with the true extent masked by adiposity. Lower quadriceps mild muscle loss. Calf region mild muscle loss.  Fluid Retention: None.    Wt Readings from Last 10 Encounters:   10/17/18 109.3 kg (241 lb)   10/12/18 111.8 kg (246 lb 8 oz)   10/07/18 117.5 kg (259 lb 1.6 oz)   09/24/18 111.3 kg (245 lb 6.4 oz)   09/14/18 111.1 kg (245 lb)   08/22/18 111.1 kg (245 lb)   07/17/18 111.1 kg (245 lb)   07/10/18 113.4 kg (250 lb)   06/27/18 111.1 kg (245 lb)   06/01/18 111.4 kg (245 lb 9.6 oz)       Malnutrition Diagnosis: Non-Severe malnutrition  In Context of:  Acute illness or injury    NUTRITION DIAGNOSIS:  Malnutrition related to sedentary activity level and increase needs during hospital stay as evidenced by mild fat loss  in bicep, temple, and orbital region; and muscle wasting in clavicle, quadricep, and calf region.      NUTRITION INTERVENTIONS  Recommendations / Nutrition Prescription  Continue eating balanced, protein-dense meals.       Implementation  Nutrition education: Provided education on a general healthful diet and tips for maintaining muscle mass with physical activity.  Composition of Meals and Snacks - emphasized the importance of a well balanced diet incorporating good sources of protein.  Collaboration and Referral of Nutrition care - discussed pt at IDT rounds.      Nutrition Goals  Patient to eat 3 well balanced meals with emphasis on protein over the next 24 hours.      MONITORING AND EVALUATION:  Progress towards goals will be monitored and evaluated per protocol and Practice Guidelines      Selin Neal, Dietetic Intern

## 2018-10-19 NOTE — PROGRESS NOTES
"[facility]  Hospitalist Progress Note  Jose Gonsales, DO 10/19/2018    Reason for Stay (Diagnosis): Hematuria.         Assessment and Plan:      Summary of Stay: Donnie Vincent is a 72 year old male admitted on 10/17/2018 with hematuria.  Problem List:   1. Hematuria.  Improving.  Urology following.  Has been on continuous bladder irrigation.  Plan to remove Sanabria catheter today.  Hold clopidogrel.  Recheck hemoglobin this afternoon and again tomorrow morning.  2. Previous stroke.  Clopidogrel on hold due to hematuria.  Continue simvastatin.  3. Diabetes mellitus type 2.  Restart Lantus 10 units/day.  NovoLog sliding scale.  4. Hyperlipidemia.  Continue simvastatin and fenofibrate.  5. Depression.  Continue fluoxetine.  6. Hypertension.  Continue metoprolol.    7. Benign prostatic hyperplasia.  Continue tamsulosin.  8. GERD.  Continue pantoprazole.  9. Seizure disorder.  Continue Keppra and topiramate.  10. COPD.  No acute exacerbation.  Continue Breo Ellipta.  Albuterol as needed.  11. Chronic kidney disease.  Creatinine at baseline.  Avoid nephrotoxins as able.  DVT Prophylaxis: Pneumatic Compression Devices  Code Status: Full Code  Discharge Dispo: Home  Estimated Disch Date / # of Days until Disch: 1-2           Interval History (Subjective):      Having mild pain in his penis at times.  Denies chest pain, shortness of breath, fevers, chills, nausea, vomiting, or diarrhea.                  Physical Exam:      Last Vital Signs:  /74 (BP Location: Right arm)  Pulse 72  Temp 97.5  F (36.4  C) (Oral)  Resp 16  Ht 1.803 m (5' 11\")  Wt 109.3 kg (241 lb)  SpO2 97%  BMI 33.61 kg/m2    Gen:  NAD, A&Ox3.  Eyes:  PERRL, sclera anicteric.  OP:  MMM, no lesions.  Neck:  Supple.  CV:  Regular, no murmurs.  Lung:  CTA b/l, normal effort.  Ab:  +BS, soft.  Skin:  Warm, dry to touch.  No rash.  Ext:  No pitting edema LE b/l.         Medications:      All current medications were reviewed with " changes reflected in problem list.         Data:      All new lab and imaging data was reviewed.   Labs:    Recent Labs  Lab 10/19/18  0650      POTASSIUM 4.1   CHLORIDE 112*   CO2 20   ANIONGAP 7   *   BUN 16   CR 1.28*   GFRESTIMATED 55*   GFRESTBLACK 66   ZAYRA 7.9*       Recent Labs  Lab 10/19/18  0650   WBC 7.4   HGB 7.7*   HCT 25.9*   MCV 91         Imaging:   No results found for this or any previous visit (from the past 24 hour(s)).

## 2018-10-19 NOTE — PLAN OF CARE
Problem: Patient Care Overview  Goal: Plan of Care/Patient Progress Review  Pt remains hospitalized for Hematuria.  CBI running at a steady stream, no clots this shift, clear most of shift.   Around 0400 pt c/o feeling some pressure/pain CBI had been turned down.  Urine was pink\red tinged. Opened CBI to steady stream, pt stated relief.   Oxycodone x 1 given at that time.   PIV NS 75/hr. BG checks @ 4 hrs 167 and 164 1 unit given each time.   NPO @ 0000 except for medications.  Up with assist x 1, not out of bed this shift.   Soft BP's other vital signs stable, wore cpap at HS.   Will continue to monitor.

## 2018-10-19 NOTE — PLAN OF CARE
Pt up Ax1, LS clear. BS+ IVF infusing via PIV. Surgery was cancelled @ beginning of shift. When returned CBI was @ steady drip, clotted off, hand irrigated with several clots removed, placed CBI wide open, gradually turned down to steady stream, clotted off again, irrigated again with several clots removed, returned to wide open with clear urine return. C/o sever discomfort when clotted off & mild w/o, gave tyl & B&O supp x1. Spoke with Dr. Espino about pt's status. Sanabria to traction. Tolerating mod cho diet, denies nausea.  & 233.

## 2018-10-20 VITALS
DIASTOLIC BLOOD PRESSURE: 67 MMHG | HEIGHT: 71 IN | WEIGHT: 241 LBS | OXYGEN SATURATION: 98 % | TEMPERATURE: 97.7 F | BODY MASS INDEX: 33.74 KG/M2 | SYSTOLIC BLOOD PRESSURE: 130 MMHG | RESPIRATION RATE: 20 BRPM | HEART RATE: 66 BPM

## 2018-10-20 LAB
ERYTHROCYTE [DISTWIDTH] IN BLOOD BY AUTOMATED COUNT: 15.1 % (ref 10–15)
GLUCOSE BLDC GLUCOMTR-MCNC: 148 MG/DL (ref 70–99)
GLUCOSE BLDC GLUCOMTR-MCNC: 148 MG/DL (ref 70–99)
GLUCOSE BLDC GLUCOMTR-MCNC: 159 MG/DL (ref 70–99)
GLUCOSE BLDC GLUCOMTR-MCNC: 182 MG/DL (ref 70–99)
HCT VFR BLD AUTO: 26.4 % (ref 40–53)
HGB BLD-MCNC: 8 G/DL (ref 13.3–17.7)
MCH RBC QN AUTO: 27.6 PG (ref 26.5–33)
MCHC RBC AUTO-ENTMCNC: 30.3 G/DL (ref 31.5–36.5)
MCV RBC AUTO: 91 FL (ref 78–100)
PLATELET # BLD AUTO: 322 10E9/L (ref 150–450)
RBC # BLD AUTO: 2.9 10E12/L (ref 4.4–5.9)
WBC # BLD AUTO: 5.7 10E9/L (ref 4–11)

## 2018-10-20 PROCEDURE — 36415 COLL VENOUS BLD VENIPUNCTURE: CPT | Performed by: INTERNAL MEDICINE

## 2018-10-20 PROCEDURE — 94640 AIRWAY INHALATION TREATMENT: CPT

## 2018-10-20 PROCEDURE — 25000128 H RX IP 250 OP 636: Performed by: INTERNAL MEDICINE

## 2018-10-20 PROCEDURE — 85027 COMPLETE CBC AUTOMATED: CPT | Performed by: INTERNAL MEDICINE

## 2018-10-20 PROCEDURE — 25000132 ZZH RX MED GY IP 250 OP 250 PS 637: Performed by: INTERNAL MEDICINE

## 2018-10-20 PROCEDURE — 99231 SBSQ HOSP IP/OBS SF/LOW 25: CPT | Mod: 24 | Performed by: UROLOGY

## 2018-10-20 PROCEDURE — 99239 HOSP IP/OBS DSCHRG MGMT >30: CPT | Performed by: INTERNAL MEDICINE

## 2018-10-20 PROCEDURE — 40000275 ZZH STATISTIC RCP TIME EA 10 MIN

## 2018-10-20 PROCEDURE — 00000146 ZZHCL STATISTIC GLUCOSE BY METER IP

## 2018-10-20 RX ADMIN — TOPIRAMATE 50 MG: 25 TABLET, FILM COATED ORAL at 08:19

## 2018-10-20 RX ADMIN — TAMSULOSIN HYDROCHLORIDE 0.4 MG: 0.4 CAPSULE ORAL at 08:19

## 2018-10-20 RX ADMIN — FENOFIBRATE 160 MG: 160 TABLET ORAL at 08:19

## 2018-10-20 RX ADMIN — FLUTICASONE FUROATE AND VILANTEROL TRIFENATATE 1 PUFF: 100; 25 POWDER RESPIRATORY (INHALATION) at 08:37

## 2018-10-20 RX ADMIN — PANTOPRAZOLE SODIUM 40 MG: 40 TABLET, DELAYED RELEASE ORAL at 08:19

## 2018-10-20 RX ADMIN — FERROUS SULFATE TAB 325 MG (65 MG ELEMENTAL FE) 325 MG: 325 (65 FE) TAB at 08:19

## 2018-10-20 RX ADMIN — LEVETIRACETAM 500 MG: 500 TABLET, FILM COATED ORAL at 08:19

## 2018-10-20 RX ADMIN — SODIUM CHLORIDE: 9 INJECTION, SOLUTION INTRAVENOUS at 08:18

## 2018-10-20 RX ADMIN — METOPROLOL SUCCINATE 50 MG: 50 TABLET, EXTENDED RELEASE ORAL at 08:18

## 2018-10-20 RX ADMIN — FLUOXETINE 40 MG: 20 CAPSULE ORAL at 08:19

## 2018-10-20 RX ADMIN — FOLIC ACID 1 MG: 1 TABLET ORAL at 08:19

## 2018-10-20 ASSESSMENT — ACTIVITIES OF DAILY LIVING (ADL)
ADLS_ACUITY_SCORE: 13

## 2018-10-20 NOTE — PROGRESS NOTES
Urology    Sanabria is out and has been voiding for 24 hours  PVR in 220 range  Urine clear  hgb 8.0    A/P: OK to discharge home  He has an appt to see me on 10/29, hold all blood thinners until that appt

## 2018-10-20 NOTE — PLAN OF CARE
Problem: Patient Care Overview  Goal: Plan of Care/Patient Progress Review  Pt slept majority of shift waking for assessments and toileting, 2 large BMs this shift, voiding appropriately with max PVR of 238, urine is yellow with intermittent pink tinged noted, denies pain, VSS, SBA for mobility with some unsteady noted. BGs 182 / 148 without sliding scale given. Expected to discharge home today.

## 2018-10-20 NOTE — PROGRESS NOTES
Pt to D/C to home.  Pt provided with d/c instructions, including new medications, when medications were last given, and when to take them again.  Pt also informed to f/u with Dr. Leon with in 6 days for hospital follow-up and Dr. Banuelos on October 29 at 11:00.  Pt verbalized understanding of all d/c and f/u instructions.  All questions were answered at this time.  Copy of paperwork sent with pt.  No medication/script sent with pt.  Wife to provide transport.  All personal belongings sent with pt.     Flu Vaccine: Recieved PTA

## 2018-10-20 NOTE — PLAN OF CARE
Problem: Patient Care Overview  Goal: Plan of Care/Patient Progress Review  [id number]:  VS: Stable. Afebrile. C/o minimal penile pain this AM.   GI: BS active, passing flatus. BM today. Tolerating diet. Blood sugar monitoring.   LS: Clear, denies shortness of breath.   : Sanabria discontinued this AM. Voiding frequently in small amounts with PVRs <300ml. Urine clear/pale yellow.   Neuro: Alert and oriented x4. CMS intact.   Mobility: Up SBA in room.   Disposition: Discharge tomorrow AM.

## 2018-10-20 NOTE — DISCHARGE SUMMARY
Discharge Summary  Hospitalist Service    Donnie Vincent MRN# [id number]   YOB: 1945 Age: 72 year old     Date of Admission:  10/17/2018  Date of Discharge:  10/20/2018  Admitting Physician:  Regina Estrada MD  Discharge Physician: Jose Gonsales DO  Discharging Service: Hospitalist Service     Primary Provider: Suyapa Leon  Primary Care Physician Phone Number: [phone]         Discharge Diagnoses/Problem Oriented Hospital Course (Providers):    Donnie Vincent was admitted on 10/17/2018 by Regina Estrada MD and I would refer you to their history and physical.  The following problems were addressed during his hospitalization:  1. Hematuria.  Resolved.  Urology following.  Initially on continuous bladder irrigation.  Sanabria catheter removed on 10/19/18.  Hold clopidogrel until he follows up with urology in the outpatient setting.  Recheck hemoglobin in 6 days.  2. Previous stroke.  Clopidogrel on hold due to hematuria.  Continue simvastatin.  3. Diabetes mellitus type 2.  Restart Lantus 10 units/day and linagliptin.  4. Hyperlipidemia.  Continue simvastatin and fenofibrate.  5. Depression.  Continue fluoxetine.  6. Hypertension.  Continue metoprolol.    7. Benign prostatic hyperplasia.  Continue tamsulosin.  8. GERD.  Continue pantoprazole.  9. Seizure disorder.  Continue Keppra and topiramate.  10. COPD.  No acute exacerbation.  Continue Breo Ellipta.  Albuterol as needed.  11. Chronic kidney disease.  Creatinine at baseline.  Avoid nephrotoxins as able.         Code Status:      Full Code          Pending Results:        Unresulted Labs Ordered in the Past 30 Days of this Admission     No orders found from 8/18/2018 to 10/18/2018.            Discharge Instructions and Follow-Up:      Follow-up Appointments     Follow-up and recommended labs and tests        Follow up with primary care provider, Suyapa Leon, within 6   days for hospital follow- up.  The  following labs/tests are recommended:   CBC and BMP in 6 days.  Follow-up with urology as previously scheduled.                      Discharge Disposition:      Discharged to home         Discharge Medications:        Current Discharge Medication List      CONTINUE these medications which have NOT CHANGED    Details   albuterol (PROAIR HFA/PROVENTIL HFA/VENTOLIN HFA) 108 (90 Base) MCG/ACT inhaler Inhale 2 puffs into the lungs every 6 hours as needed for shortness of breath / dyspnea or wheezing      budesonide-formoterol (SYMBICORT) 80-4.5 MCG/ACT inhaler Inhale 2 puffs into the lungs 2 times daily       fenofibrate 160 MG tablet Take 1 tablet (160 mg) by mouth daily  Qty: 90 tablet, Refills: 1    Associated Diagnoses: Hyperlipidemia LDL goal <100      ferrous sulfate (IRON) 325 (65 Fe) MG tablet Take 325 mg by mouth 2 times daily      FLUoxetine (PROZAC) 40 MG capsule Take 1 capsule (40 mg) by mouth daily  Qty: 90 capsule, Refills: 1    Associated Diagnoses: Major depressive disorder, recurrent episode, mild (H)      folic acid (FOLVITE) 1 MG tablet Take 1 tablet (1 mg) by mouth daily  Qty: 90 tablet, Refills: 0    Comments: Pt due for May appt-needs to call our office.  Associated Diagnoses: Increased homocysteine (H)      insulin glargine (LANTUS SOLOSTAR) 100 UNIT/ML pen Inject 10 Units Subcutaneous At Bedtime  Qty: 15 mL, Refills: 1    Associated Diagnoses: Type 2 diabetes mellitus with stage 3 chronic kidney disease, with long-term current use of insulin (H)      levETIRAcetam (KEPPRA) 500 MG tablet Take 500 mg by mouth 2 times daily      linagliptin (TRADJENTA) 5 MG TABS tablet Take 2.5 mg by mouth daily       METOPROLOL SUCCINATE ER PO Take 50 mg by mouth daily      PANTOPRAZOLE SODIUM PO Take 40 mg by mouth 2 times daily (before meals)      simvastatin (ZOCOR) 10 MG tablet Take 1 tablet (10 mg) by mouth At Bedtime  Qty: 90 tablet, Refills: 1    Associated Diagnoses: Hyperlipidemia LDL goal <100     "  tamsulosin (FLOMAX) 0.4 MG 24 hr capsule Take 1 capsule (0.4 mg) by mouth daily  Qty: 90 capsule, Refills: 3    Associated Diagnoses: BPH (benign prostatic hypertrophy)      Topiramate (TOPAMAX PO) Take 50 mg by mouth 2 times daily         STOP taking these medications       clopidogrel (PLAVIX) 75 MG tablet Comments:   Reason for Stopping:                 Allergies:         Allergies   Allergen Reactions     Penicillins      Sore joints and he had to be hospitalized for it         Condition and Physical on Discharge:      Discharge condition: Stable   Vitals: Blood pressure 130/67, pulse 66, temperature 97.7  F (36.5  C), temperature source Oral, resp. rate 20, height 1.803 m (5' 11\"), weight 109.3 kg (241 lb), SpO2 98 %.   Gen:  NAD, A&Ox3.  Eyes:  PERRL, sclera anicteric.  OP:  MMM, no lesions.  Neck:  Supple.  CV:  Regular, no murmurs.  Lung:  CTA b/l, normal effort.  Ab:  +BS, soft.  Skin:  Warm, dry to touch.  No rash.  Ext:  No pitting edema LE b/l.        Discharge Time:      I spent 40 minutes with Mr. Vincent and working on discharge on 10/20/18.        Image Results From This Hospital Stay (For Non-EPIC Providers):        Results for orders placed or performed during the hospital encounter of 10/03/18   US Renal Complete    Narrative    ULTRASOUND RETROPERITONEAL COMPLETE  10/6/2018 6:35 PM     HISTORY: Acute kidney injury.    COMPARISON: None.    FINDINGS: The bilateral renal parenchyma are unremarkable in  echogenicity without evidence for shadowing stone or mass. Bilateral  renal cysts measuring up to 9.8 cm on the right and 7.1 cm on the  left. No hydronephrosis. Right kidney measures 12.4 x 6.4 x 7.6 cm and  the left measures 21.1 x 6.5 x 10.3 cm. Cortical thickness is 1.5 cm  on the right and 2.5 cm on the left. Bladder is unremarkable given its  level of distention.      Impression    IMPRESSION: No obstruction demonstrated.    NEELA GUAMAN MD           Most Recent Lab Results In EPIC (For " Non-EPIC Providers):    Most Recent 3 CBC's:  Recent Labs   Lab Test  10/20/18   0805  10/19/18   1213  10/19/18   0650  10/18/18   0705   WBC  5.7   --   7.4  10.8   HGB  8.0*  8.5*  7.7*  8.9*   MCV  91   --   91  91   PLT  322   --   326  386      Most Recent 3 BMP's:  Recent Labs   Lab Test  10/19/18   0650  10/18/18   0705  10/17/18   2037   NA  139  140  141   POTASSIUM  4.1  4.1  4.2   CHLORIDE  112*  113*  112*   CO2  20  19*  20   BUN  16  23  24   CR  1.28*  1.34*  1.47*   ANIONGAP  7  8  9   ZAYRA  7.9*  8.0*  8.2*   GLC  174*  226*  281*     Most Recent 3 Troponin's:No lab results found.  Most Recent 3 INR's:  Recent Labs   Lab Test  10/17/18   2037  09/14/18   2022 03/16/17   INR  1.00  0.95  1.0     Most Recent 2 LFT's:  Recent Labs   Lab Test  05/15/18   1145 03/16/17 05/13/16   1119   AST  10  15  14   ALT  16  19  24   ALKPHOS  56   --   45   BILITOTAL  0.5   --   0.7     Most Recent Cholesterol Panel:  Recent Labs   Lab Test  05/15/18   1145   CHOL  108   LDL  54   HDL  32*   TRIG  110     Most Recent 6 Bacteria Isolates From Any Culture (See EPIC Reports for Culture Details):  Recent Labs   Lab Test  10/05/18   0305  08/22/18   0938  07/17/18   1308  07/10/18   1110  06/01/18   1440  07/23/14   1624   CULT  No growth  <10,000 colonies/mL  mixed urogenital jono    10,000 to 50,000 colonies/mL  mixed urogenital jono  Susceptibility testing not routinely done    50,000 to 100,000 colonies/mL  mixed urogenital jono    >100,000 colonies/mL  mixed urogenital jono  Susceptibility testing not routinely done    >100,000 colonies/mL Enterococcus species*     Most Recent TSH, T4 and HgbA1c:   Recent Labs   Lab Test  10/18/18   0705   05/15/18   1145   TSH   --    --   1.53   A1C  5.7*   < >  6.0*    < > = values in this interval not displayed.

## 2018-10-23 ENCOUNTER — TELEPHONE (OUTPATIENT)
Dept: INTERNAL MEDICINE | Facility: CLINIC | Age: 73
End: 2018-10-23

## 2018-10-23 NOTE — TELEPHONE ENCOUNTER
IP F/U    Date: 10/20/18  Diagnosis: Hematuria, unspecified type  Is patient active in care coordination? No  Was patient in TCU? No

## 2018-10-23 NOTE — TELEPHONE ENCOUNTER
"ED/Discharge Protocol    \"Hi, my name is Corona, a nurse, and I am calling on behalf of Dr. Leon's office at [facility].  I am calling to follow up and see how things are going for you after your recent visit.\"    \"I see that you were in the (ER/UC/IP) on 10/17/18.    How are you doing now that you are home?\"     Doing ok, no difficulty urinating or blood in my urine.    Is patient experiencing symptoms that may require a hospital visit?  No    Discharge Instructions    \"Let's review your discharge instructions.  What is/are the follow-up recommendations?  Pt. Response: Follow up with Dr. Banuelos on 10/29/18    \"Were you instructed to make a follow-up appointment?\"  Pt. Response: Yes.  Has appointment been made?   No.  \"Can I help you schedule that appointment?\" Yes      Scheduled follow up with Dr. Leon for 10/31/18.    \"When you see the provider, I would recommend that you bring your discharge instructions with you.    Medications    \"How many new medications are you on since your hospitalization/ED visit?\"    0-1  \"How many of your current medicines changed (dose, timing, name, etc.) while you were in the hospital/ED visit?\"   0-1  \"Do you have questions about your medications?\"   No  \"Were you newly diagnosed with heart failure, COPD, diabetes or did you have a heart attack?\"   No  For patients on insulin: \"Did you start on insulin in the hospital or did you have your insulin dose changed?\"   No    Call Summary    \"Do you have any questions or concerns about your condition or care plan at the moment?\"    No  Triage nurse advice given: Follow up as scheduled    \"If you have questions or things don't continue to improve, we encourage you contact us through the main clinic number,  [phone].  Even if the clinic is not open, triage nurses are available 24/7 to help you.     We would like you to know that our clinic has extended hours (provide information).  We also have urgent care (provide details on " "closest location and hours/contact info)\"      \"Thank you for your time and take care!\"        "

## 2018-10-29 ENCOUNTER — OFFICE VISIT (OUTPATIENT)
Dept: UROLOGY | Facility: CLINIC | Age: 73
End: 2018-10-29
Payer: COMMERCIAL

## 2018-10-29 VITALS — WEIGHT: 241 LBS | HEART RATE: 108 BPM | OXYGEN SATURATION: 98 % | BODY MASS INDEX: 33.74 KG/M2 | HEIGHT: 71 IN

## 2018-10-29 DIAGNOSIS — N40.0 ENLARGED PROSTATE: Primary | ICD-10-CM

## 2018-10-29 LAB
ALBUMIN UR-MCNC: >=300 MG/DL
APPEARANCE UR: CLEAR
BILIRUB UR QL STRIP: ABNORMAL
COLOR UR AUTO: YELLOW
GLUCOSE UR STRIP-MCNC: NEGATIVE MG/DL
HGB UR QL STRIP: ABNORMAL
KETONES UR STRIP-MCNC: NEGATIVE MG/DL
LEUKOCYTE ESTERASE UR QL STRIP: ABNORMAL
NITRATE UR QL: NEGATIVE
PH UR STRIP: 5.5 PH (ref 5–7)
RESIDUAL VOLUME (RV) (EXTERNAL): 11
SOURCE: ABNORMAL
SP GR UR STRIP: >1.03 (ref 1–1.03)
UROBILINOGEN UR STRIP-ACNC: 0.2 EU/DL (ref 0.2–1)

## 2018-10-29 PROCEDURE — 99024 POSTOP FOLLOW-UP VISIT: CPT | Performed by: UROLOGY

## 2018-10-29 PROCEDURE — 51798 US URINE CAPACITY MEASURE: CPT | Mod: 58 | Performed by: UROLOGY

## 2018-10-29 PROCEDURE — 81003 URINALYSIS AUTO W/O SCOPE: CPT | Mod: QW | Performed by: UROLOGY

## 2018-10-29 ASSESSMENT — PAIN SCALES - GENERAL: PAINLEVEL: NO PAIN (0)

## 2018-10-29 NOTE — MR AVS SNAPSHOT
"              After Visit Summary   10/29/2018    oDnnie Vincent    MRN: [id number]           Patient Information     Date Of Birth          1945        Visit Information        Provider Department      10/29/2018 11:00 AM Neville Banuelos MD [facility]        Today's Diagnoses     Enlarged prostate    -  1       Follow-ups after your visit        Follow-up notes from your care team     Return in about 7 months (around 5/29/2019) for UA and bladder scan.      Your next 10 appointments already scheduled     Oct 31, 2018 10:20 AM CDT   SHORT with Suyapa Leon MD   [facility] ([facility]    [address]  [facility] [address] [phone]              Who to contact     If you have questions or need follow up information about today's clinic visit or your schedule please contact [facility] directly at [phone].  Normal or non-critical lab and imaging results will be communicated to you by MyChart, letter or phone within 4 business days after the clinic has received the results. If you do not hear from us within 7 days, please contact the clinic through MyChart or phone. If you have a critical or abnormal lab result, we will notify you by phone as soon as possible.  Submit refill requests through [employer] or call your pharmacy and they will forward the refill request to us. Please allow 3 business days for your refill to be completed.          Additional Information About Your Visit        Care EveryWhere ID     This is your Care EveryWhere ID. This could be used by other organizations to access your [place] medical records  [id number]        Your Vitals Were     Pulse Height Pulse Oximetry BMI (Body Mass Index)          108 1.803 m (5' 11\") 98% 33.61 kg/m2         Blood Pressure from Last 3 Encounters:   10/20/18 130/67   10/12/18 132/80 "   10/09/18 118/62    Weight from Last 3 Encounters:   10/29/18 109.3 kg (241 lb)   10/17/18 109.3 kg (241 lb)   10/12/18 111.8 kg (246 lb 8 oz)              We Performed the Following     Bladder scan     UA without Microscopic        Primary Care Provider Office Phone # Fax #    Suyapa DUMAS MD Edward [phone]       Gianfranco DUTTONJOSE EDUARDO [facility] [address]        Equal Access to Services     KINA CEDILLO : Hadii aad ku hadasho Soomaali, waaxda luqadaha, qaybta kaalmada adeegyada, waxay idiin hayaan adeeg kharaairam lavaibhav . So [facility] [phone].    ATENCIÓN: Si habla español, tiene a navarrete disposición servicios gratuitos de asistencia lingüística. Ll[facility] [phone].    We comply with applicable federal civil rights laws and [place] laws. We do not discriminate on the basis of race, color, national origin, age, disability, sex, sexual orientation, or gender identity.            Thank you!     Thank you for choosing [facility] [place]  for your care. Our goal is always to provide you with excellent care. Hearing back from our patients is one way we can continue to improve our services. Please take a few minutes to complete the written survey that you may receive in the mail after your visit with us. Thank you!             Your Updated Medication List - Protect others around you: Learn how to safely use, store and throw away your medicines at www.disposemymeds.org.          This list is accurate as of 10/29/18 11:17 AM.  Always use your most recent med list.                   Brand Name Dispense Instructions for use Diagnosis    albuterol 108 (90 Base) MCG/ACT inhaler    PROAIR HFA/PROVENTIL HFA/VENTOLIN HFA     Inhale 2 puffs into the lungs every 6 hours as needed for shortness of breath / dyspnea or wheezing        fenofibrate 160 MG tablet     90 tablet    Take 1 tablet (160 mg) by mouth daily    Hyperlipidemia LDL goal <100       ferrous sulfate 325 (65  Fe) MG tablet    IRON     Take 325 mg by mouth 2 times daily        FLUoxetine 40 MG capsule    PROzac    90 capsule    Take 1 capsule (40 mg) by mouth daily    Major depressive disorder, recurrent episode, mild (H)       folic acid 1 MG tablet    FOLVITE    90 tablet    Take 1 tablet (1 mg) by mouth daily    Increased homocysteine (H)       insulin glargine 100 UNIT/ML injection    LANTUS SOLOSTAR    15 mL    Inject 10 Units Subcutaneous At Bedtime    Type 2 diabetes mellitus with stage 3 chronic kidney disease, with long-term current use of insulin (H)       KEPPRA 500 MG tablet   Generic drug:  levETIRAcetam      Take 500 mg by mouth 2 times daily        METOPROLOL SUCCINATE ER PO      Take 50 mg by mouth daily        PANTOPRAZOLE SODIUM PO      Take 40 mg by mouth 2 times daily (before meals)        simvastatin 10 MG tablet    ZOCOR    90 tablet    Take 1 tablet (10 mg) by mouth At Bedtime    Hyperlipidemia LDL goal <100       SYMBICORT 80-4.5 MCG/ACT Inhaler   Generic drug:  budesonide-formoterol      Inhale 2 puffs into the lungs 2 times daily        tamsulosin 0.4 MG capsule    FLOMAX    90 capsule    Take 1 capsule (0.4 mg) by mouth daily    BPH (benign prostatic hypertrophy)       TOPAMAX PO      Take 50 mg by mouth 2 times daily        TRADJENTA 5 MG Tabs tablet   Generic drug:  linagliptin      Take 2.5 mg by mouth daily

## 2018-10-29 NOTE — PROGRESS NOTES
Office Visit Note  [facility]  [phone]    UROLOGIC DIAGNOSES:   Enlarged prostate, history of bladder stones, bladder diverticulum    CURRENT INTERVENTIONS:   S/P TURP    HISTORY:   Donnie underwent TURP on October 3. He was briefly readmitted to the hospital about 2 weeks later when he had hematuria and clot retention. However, the hematuria resolved on its own and his Sanabria catheter was removed. It turns out, there had been a miscommunication at his pre-op visit and evaluation in that he had only held his Plavix for 3 days prior to his surgery. This likely explains the prolonged bleeding. He has felt well since that the most recent catheter was removed. He reports a strong urinary stream.      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     COPD (chronic obstructive pulmonary disease) (H)      CVA (cerebral infarction)      Diabetes new 4/09    initial A1C 6.8 4/08;; has done diabetic teaching      Diverticulitis of colon (without mention of hemorrhage)(562.11) 2001; 9/06     Gastro-oesophageal reflux disease      Hernia, abdominal      History of blood transfusion      HTN (hypertension)      Hyperlipidemia LDL goal < 100      Other forms of migraine, without mention of intractable migraine without mention of status migrainosus      PFO (patent foramen ovale)     small per echo 11/2013     Renal disease     Hx kidney stones 12 yrs ago     Seizure disorder (H)     sees neurologist     Seizures (H)     8 years ago - no recurrence     Sleep apnea     wears CPAP     Stroke (H) early april 2017     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 Ernesto SPEARS     COLONOSCOPY N/A 11/4/2015    Procedure: COLONOSCOPY;  Surgeon: Yazmin Orozco MD;  Location: [facility]     CYSTOSCOPY, RETROGRADES, EXTRACT STONE, COMBINED Left 11/25/2015    Procedure: COMBINED CYSTOSCOPY, RETROGRADES, EXTRACT STONE;  Surgeon: Neville Banuelos MD;  Location: [facility]     CYSTOSCOPY, TRANSURETHRAL RESECTION (TUR) PROSTATE, COMBINED N/A 10/3/2018    Procedure: COMBINED CYSTOSCOPY, TRANSURETHRAL RESECTION (TUR) PROSTATE;  Cystoscopy, removal of bladder stones with holmium laser, transurethral resection of prostate using Olympus bipolar electrode;  Surgeon: Neville Banuelos MD;  Location:  OR     ESOPHAGOSCOPY, GASTROSCOPY, DUODENOSCOPY (EGD), COMBINED  11/5/2015    Dr. Ernesto CONTRERAS     ESOPHAGOSCOPY, GASTROSCOPY, DUODENOSCOPY (EGD), COMBINED  06/27/2018    Dr. Ernesto CONTRERAS     GENITOURINARY SURGERY      kidney stone - lithotripsy     HERNIA REPAIR       LASER HOLMIUM LITHOTRIPSY BLADDER N/A 10/3/2018    Procedure: LASER HOLMIUM LITHOTRIPSY BLADDER;;  Surgeon: Neville Banuelos MD;  Location:  OR     PHACOEMULSIFICATION CLEAR CORNEA WITH STANDARD INTRAOCULAR LENS IMPLANT  12/20/2011    Procedure:PHACOEMULSIFICATION CLEAR CORNEA WITH STANDARD INTRAOCULAR LENS IMPLANT; RIGHT PHACOEMULSIFICATION CLEAR CORNEA WITH STANDARD INTRAOCULAR LENS IMPLANT ; Surgeon:GUILHERME KAUFMAN; Location:[facility]       FAMILY HISTORY:   Family History   Problem Relation Age of Onset     Family History Negative Mother      migraines     Family History Negative Father      lived to be 92     Colon Cancer No family hx of        SOCIAL HISTORY:   Social History   Substance Use Topics     Smoking status: Former Smoker     Quit date: 1/1/1970     Smokeless tobacco: Never Used      Comment: quit age 28     Alcohol use Yes      Comment: 1 beer/week       Current Outpatient Prescriptions   Medication     albuterol (PROAIR  "HFA/PROVENTIL HFA/VENTOLIN HFA) 108 (90 Base) MCG/ACT inhaler     budesonide-formoterol (SYMBICORT) 80-4.5 MCG/ACT inhaler     fenofibrate 160 MG tablet     ferrous sulfate (IRON) 325 (65 Fe) MG tablet     FLUoxetine (PROZAC) 40 MG capsule     folic acid (FOLVITE) 1 MG tablet     insulin glargine (LANTUS SOLOSTAR) 100 UNIT/ML pen     levETIRAcetam (KEPPRA) 500 MG tablet     linagliptin (TRADJENTA) 5 MG TABS tablet     METOPROLOL SUCCINATE ER PO     PANTOPRAZOLE SODIUM PO     simvastatin (ZOCOR) 10 MG tablet     tamsulosin (FLOMAX) 0.4 MG 24 hr capsule     Topiramate (TOPAMAX PO)     No current facility-administered medications for this visit.      Facility-Administered Medications Ordered in Other Visits   Medication     gadobutrol (GADAVIST) injection 15 mL         PHYSICAL EXAM:    Pulse 108  Ht 1.803 m (5' 11\")  Wt 109.3 kg (241 lb)  SpO2 98%  BMI 33.61 kg/m2    Constitutional: Well developed. Conversant and in no acute distress  Eyes: Anicteric sclera, conjunctiva clear, normal extraocular movements  ENT: Normocephalic and atraumatic,   Skin: Warm and dry. No rashes or lesions  Cardiac: No peripheral edema  Back/Flank: Not done  CNS/PNS: Normal musculature and movements, moves all extremities normally  Respiratory: Normal non-labored breathing  Abdomen: Soft nontender and nondistended  Peripheral Vascular: No peripheral edema  Mental Status/Psych: Alert and Oriented x 3. Normal mood and affect    Penis: Not done  Scrotal Skin: Not done  Testicles: Not done  Epididymis: Not done  Digital Rectal Exam:     Cystoscopy: Not done    Imaging: None    Urinalysis: UA RESULTS:  Recent Labs   Lab Test  10/17/18   2222  08/22/18   0907   COLOR  Red  Yellow   APPEARANCE  Cloudy  Clear   URINEGLC  500*  Negative   URINEBILI  Negative  Negative   URINEKETONE  Negative  Negative   SG  1.015  1.025   UBLD  Large*  Trace*   URINEPH  7.0  5.5   PROTEIN  >499*  Negative   UROBILINOGEN   --   0.2   NITRITE  Negative  Positive* "   LEUKEST  Negative  Small*   RBCU  >182*   --    WBCU  >182*   --        PSA:     Post Void Residual: 11mL    Other labs: None today      IMPRESSION:  Doing well    PLAN:  He is now doing well after a TURP. I told him it ould be OK to restart the plavix at this time, but to watch for recurrence of his hematuria. I will plan on seeing her back in the spring to recheck urinalysis and bladder emptying.                                           Neville Banuelos M.D.

## 2018-10-29 NOTE — NURSING NOTE
pvr by scanner=11mL  Pt denies any voiding troubles.  Pt has some minor discomfort from the cath.  Pt denies gross hem.  KAVITA Pope, CMA

## 2018-10-29 NOTE — LETTER
10/29/2018       RE: Donnie Vincent  [address]  [place] [address]     Dear Colleague,    Thank you for referring your patient, Donnie Vincent, to the [facility]OLOG[facility] [place] at [facility]. Please see a copy of my visit note below.    Office Visit Note  [facility]  [phone]    UROLOGIC DIAGNOSES:   Enlarged prostate, history of bladder stones, bladder diverticulum    CURRENT INTERVENTIONS:   S/P TURP    HISTORY:   Donnie underwent TURP on October 3. He was briefly readmitted to the hospital about 2 weeks later when he had hematuria and clot retention. However, the hematuria resolved on its own and his Sanabria catheter was removed. It turns out, there had been a miscommunication at his pre-op visit and evaluation in that he had only held his Plavix for 3 days prior to his surgery. This likely explains the prolonged bleeding. He has felt well since that the most recent catheter was removed. He reports a strong urinary stream.      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     COPD (chronic obstructive pulmonary disease) (H)      CVA (cerebral infarction)      Diabetes new 4/09    initial A1C 6.8 4/08;; has done diabetic teaching      Diverticulitis of colon (without mention of hemorrhage)(562.11) 2001; 9/06     Gastro-oesophageal reflux disease      Hernia, abdominal      History of blood transfusion      HTN (hypertension)      Hyperlipidemia LDL goal < 100      Other forms of migraine, without mention of intractable migraine without mention of status migrainosus      PFO (patent foramen ovale)     small per echo 11/2013     Renal disease     Hx kidney stones 12 yrs ago     Seizure disorder (H)     sees neurologist     Seizures  (H)     8 years ago - no recurrence     Sleep apnea     wears CPAP     Stroke (H) early april 2017     Unspecified congenital anomaly of lung     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 possibly from asbestos exposure in [employer] (remote)        PAST SURGICAL HISTORY:   Past Surgical History:   Procedure Laterality Date     C NONSPECIFIC PROCEDURE      colonoscopy 2005     COLONOSCOPY  11/4/2015    Dr. Ernesto CONTRERAS     COLONOSCOPY N/A 11/4/2015    Procedure: COLONOSCOPY;  Surgeon: Yazmin Orozco MD;  Location:  GI     CYSTOSCOPY, RETROGRADES, EXTRACT STONE, COMBINED Left 11/25/2015    Procedure: COMBINED CYSTOSCOPY, RETROGRADES, EXTRACT STONE;  Surgeon: Neville Banuelos MD;  Location: [facility] OR     CYSTOSCOPY, TRANSURETHRAL RESECTION (TUR) PROSTATE, COMBINED N/A 10/3/2018    Procedure: COMBINED CYSTOSCOPY, TRANSURETHRAL RESECTION (TUR) PROSTATE;  Cystoscopy, removal of bladder stones with holmium laser, transurethral resection of prostate using Olympus bipolar electrode;  Surgeon: Neville Banuelos MD;  Location:  OR     ESOPHAGOSCOPY, GASTROSCOPY, DUODENOSCOPY (EGD), COMBINED  11/5/2015    Dr. Ernesto CONTRERAS     ESOPHAGOSCOPY, GASTROSCOPY, DUODENOSCOPY (EGD), COMBINED  06/27/2018    Dr. Ernesto CONTRERAS     GENITOURINARY SURGERY      kidney stone - lithotripsy     HERNIA REPAIR       LASER HOLMIUM LITHOTRIPSY BLADDER N/A 10/3/2018    Procedure: LASER HOLMIUM LITHOTRIPSY BLADDER;;  Surgeon: Neville Banuelos MD;  Location:  OR     PHACOEMULSIFICATION CLEAR CORNEA WITH STANDARD INTRAOCULAR LENS IMPLANT  12/20/2011    Procedure:PHACOEMULSIFICATION CLEAR CORNEA WITH STANDARD INTRAOCULAR LENS IMPLANT; RIGHT PHACOEMULSIFICATION CLEAR CORNEA WITH STANDARD INTRAOCULAR LENS IMPLANT ; Surgeon:GUILHERME KAUFMAN; Location:[facility]       FAMILY HISTORY:   Family History   Problem Relation Age of Onset     Family History Negative Mother      migraines     Family History Negative Father      lived to be 92     Colon Cancer No  "family hx of        SOCIAL HISTORY:   Social History   Substance Use Topics     Smoking status: Former Smoker     Quit date: 1/1/1970     Smokeless tobacco: Never Used      Comment: quit age 28     Alcohol use Yes      Comment: 1 beer/week       Current Outpatient Prescriptions   Medication     albuterol (PROAIR HFA/PROVENTIL HFA/VENTOLIN HFA) 108 (90 Base) MCG/ACT inhaler     budesonide-formoterol (SYMBICORT) 80-4.5 MCG/ACT inhaler     fenofibrate 160 MG tablet     ferrous sulfate (IRON) 325 (65 Fe) MG tablet     FLUoxetine (PROZAC) 40 MG capsule     folic acid (FOLVITE) 1 MG tablet     insulin glargine (LANTUS SOLOSTAR) 100 UNIT/ML pen     levETIRAcetam (KEPPRA) 500 MG tablet     linagliptin (TRADJENTA) 5 MG TABS tablet     METOPROLOL SUCCINATE ER PO     PANTOPRAZOLE SODIUM PO     simvastatin (ZOCOR) 10 MG tablet     tamsulosin (FLOMAX) 0.4 MG 24 hr capsule     Topiramate (TOPAMAX PO)     No current facility-administered medications for this visit.      Facility-Administered Medications Ordered in Other Visits   Medication     gadobutrol (GADAVIST) injection 15 mL         PHYSICAL EXAM:    Pulse 108  Ht 1.803 m (5' 11\")  Wt 109.3 kg (241 lb)  SpO2 98%  BMI 33.61 kg/m2    Constitutional: Well developed. Conversant and in no acute distress  Eyes: Anicteric sclera, conjunctiva clear, normal extraocular movements  ENT: Normocephalic and atraumatic,   Skin: Warm and dry. No rashes or lesions  Cardiac: No peripheral edema  Back/Flank: Not done  CNS/PNS: Normal musculature and movements, moves all extremities normally  Respiratory: Normal non-labored breathing  Abdomen: Soft nontender and nondistended  Peripheral Vascular: No peripheral edema  Mental Status/Psych: Alert and Oriented x 3. Normal mood and affect    Penis: Not done  Scrotal Skin: Not done  Testicles: Not done  Epididymis: Not done  Digital Rectal Exam:     Cystoscopy: Not done    Imaging: None    Urinalysis: UA RESULTS:  Recent Labs   Lab Test  " 10/17/18   2222  08/22/18   0907   COLOR  Red  Yellow   APPEARANCE  Cloudy  Clear   URINEGLC  500*  Negative   URINEBILI  Negative  Negative   URINEKETONE  Negative  Negative   SG  1.015  1.025   UBLD  Large*  Trace*   URINEPH  7.0  5.5   PROTEIN  >499*  Negative   UROBILINOGEN   --   0.2   NITRITE  Negative  Positive*   LEUKEST  Negative  Small*   RBCU  >182*   --    WBCU  >182*   --        PSA:     Post Void Residual: 11mL    Other labs: None today      IMPRESSION:  Doing well    PLAN:  He is now doing well after a TURP. I told him it ould be OK to restart the plavix at this time, but to watch for recurrence of his hematuria. I will plan on seeing her back in the spring to recheck urinalysis and bladder emptying.                                           Neville Banuelos MD

## 2018-10-31 ENCOUNTER — OFFICE VISIT (OUTPATIENT)
Dept: INTERNAL MEDICINE | Facility: CLINIC | Age: 73
End: 2018-10-31
Payer: COMMERCIAL

## 2018-10-31 VITALS
RESPIRATION RATE: 19 BRPM | TEMPERATURE: 98.2 F | SYSTOLIC BLOOD PRESSURE: 158 MMHG | WEIGHT: 246.5 LBS | DIASTOLIC BLOOD PRESSURE: 84 MMHG | BODY MASS INDEX: 34.38 KG/M2 | HEART RATE: 111 BPM | OXYGEN SATURATION: 97 %

## 2018-10-31 DIAGNOSIS — E11.22 TYPE 2 DIABETES MELLITUS WITH STAGE 3 CHRONIC KIDNEY DISEASE, WITH LONG-TERM CURRENT USE OF INSULIN (H): ICD-10-CM

## 2018-10-31 DIAGNOSIS — R31.9 HEMATURIA, UNSPECIFIED TYPE: ICD-10-CM

## 2018-10-31 DIAGNOSIS — Z79.4 TYPE 2 DIABETES MELLITUS WITH STAGE 3 CHRONIC KIDNEY DISEASE, WITH LONG-TERM CURRENT USE OF INSULIN (H): ICD-10-CM

## 2018-10-31 DIAGNOSIS — D62 ANEMIA DUE TO BLOOD LOSS, ACUTE: Primary | ICD-10-CM

## 2018-10-31 DIAGNOSIS — I10 ESSENTIAL HYPERTENSION: ICD-10-CM

## 2018-10-31 DIAGNOSIS — N18.30 TYPE 2 DIABETES MELLITUS WITH STAGE 3 CHRONIC KIDNEY DISEASE, WITH LONG-TERM CURRENT USE OF INSULIN (H): ICD-10-CM

## 2018-10-31 LAB
ERYTHROCYTE [DISTWIDTH] IN BLOOD BY AUTOMATED COUNT: 16 % (ref 10–15)
HCT VFR BLD AUTO: 33 % (ref 40–53)
HGB BLD-MCNC: 10 G/DL (ref 13.3–17.7)
MCH RBC QN AUTO: 27.3 PG (ref 26.5–33)
MCHC RBC AUTO-ENTMCNC: 30.3 G/DL (ref 31.5–36.5)
MCV RBC AUTO: 90 FL (ref 78–100)
PLATELET # BLD AUTO: 244 10E9/L (ref 150–450)
RBC # BLD AUTO: 3.66 10E12/L (ref 4.4–5.9)
WBC # BLD AUTO: 5.7 10E9/L (ref 4–11)

## 2018-10-31 PROCEDURE — 85027 COMPLETE CBC AUTOMATED: CPT | Performed by: INTERNAL MEDICINE

## 2018-10-31 PROCEDURE — 99495 TRANSJ CARE MGMT MOD F2F 14D: CPT | Performed by: INTERNAL MEDICINE

## 2018-10-31 PROCEDURE — 36415 COLL VENOUS BLD VENIPUNCTURE: CPT | Performed by: INTERNAL MEDICINE

## 2018-10-31 PROCEDURE — 80048 BASIC METABOLIC PNL TOTAL CA: CPT | Performed by: INTERNAL MEDICINE

## 2018-10-31 RX ORDER — METOPROLOL SUCCINATE 50 MG/1
75 TABLET, EXTENDED RELEASE ORAL DAILY
Qty: 45 TABLET | Refills: 0
Start: 2018-10-31 | End: 2019-10-17

## 2018-10-31 RX ORDER — LIRAGLUTIDE 6 MG/ML
1.8 INJECTION SUBCUTANEOUS DAILY
Qty: 6 ML | Refills: 1
Start: 2018-10-31 | End: 2020-04-27

## 2018-10-31 NOTE — MR AVS SNAPSHOT
After Visit Summary   10/31/2018    Donnie Vincent    MRN: [id number]           Patient Information     Date Of Birth          1945        Visit Information        Provider Department      10/31/2018 10:20 AM Suyapa Leon MD [facility]        Today's Diagnoses     Anemia due to blood loss, acute    -  1    Type 2 diabetes mellitus with stage 3 chronic kidney disease, with long-term current use of insulin (H)        Essential hypertension        Hematuria, unspecified type           Follow-ups after your visit        Who to contact     If you have questions or need follow up information about today's clinic visit or your schedule please contact [facility] directly at [phone].  Normal or non-critical lab and imaging results will be communicated to you by MyChart, letter or phone within 4 business days after the clinic has received the results. If you do not hear from us within 7 days, please contact the clinic through MyChart or phone. If you have a critical or abnormal lab result, we will notify you by phone as soon as possible.  Submit refill requests through [employer] or call your pharmacy and they will forward the refill request to us. Please allow 3 business days for your refill to be completed.          Additional Information About Your Visit        Care EveryWhere ID     This is your Care EveryWhere ID. This could be used by other organizations to access your [facility] medical records  [id number]        Your Vitals Were     Pulse Temperature Respirations Pulse Oximetry BMI (Body Mass Index)       111 98.2  F (36.8  C) (Oral) 19 97% 34.38 kg/m2        Blood Pressure from Last 3 Encounters:   10/31/18 158/84   10/20/18 130/67   10/12/18 132/80    Weight from Last 3 Encounters:   10/31/18 246 lb 8 oz (111.8 kg)   10/29/18 241 lb (109.3 kg)   10/17/18 241 lb (109.3 kg)              We Performed the Following     Basic metabolic panel     CBC  with platelets          Today's Medication Changes          These changes are accurate as of 10/31/18 12:17 PM.  If you have any questions, ask your nurse or doctor.               Start taking these medicines.        Dose/Directions    liraglutide 18 MG/3ML soln   Commonly known as:  VICTOZA   Used for:  Type 2 diabetes mellitus with stage 3 chronic kidney disease, with long-term current use of insulin (H)   Started by:  Suyapa Leon MD        Dose:  1.8 mg   Inject 1.8 mg Subcutaneous daily   Quantity:  6 mL   Refills:  1         These medicines have changed or have updated prescriptions.        Dose/Directions    metoprolol succinate 50 MG 24 hr tablet   Commonly known as:  TOPROL-XL   This may have changed:    - medication strength  - how much to take   Used for:  Essential hypertension   Changed by:  Suyapa Leon MD        Dose:  75 mg   Take 1.5 tablets (75 mg) by mouth daily   Quantity:  45 tablet   Refills:  0            Where to get your medicines      Some of these will need a paper prescription and others can be bought over the counter.  Ask your nurse if you have questions.     You don't need a prescription for these medications     liraglutide 18 MG/3ML soln    metoprolol succinate 50 MG 24 hr tablet                Primary Care Provider Office Phone # Fax #    Suyapa Leon [phone]       [facility] [address] [facility] [address]        Equal Access to Services     [facility] : Hadmessi ag Soelda, waaxda luqadaha, qaybta kaalmada valentin, aquilino dumont. So [facility] [phone].    ATENCIÓN: Si habla español, tiene a navarrete disposición servicios gratuitos de asistencia lingüística. Terrence al [phone].    We comply with applicable federal civil rights laws and [place] laws. We do not discriminate on the basis of race, color, national origin, age, disability, sex, sexual orientation, or gender identity.             Thank you!     Thank you for choosing [facility]  for your care. Our goal is always to provide you with excellent care. Hearing back from our patients is one way we can continue to improve our services. Please take a few minutes to complete the written survey that you may receive in the mail after your visit with us. Thank you!             Your Updated Medication List - Protect others around you: Learn how to safely use, store and throw away your medicines at www.disposemymeds.org.          This list is accurate as of 10/31/18 12:17 PM.  Always use your most recent med list.                   Brand Name Dispense Instructions for use Diagnosis    albuterol 108 (90 Base) MCG/ACT inhaler    PROAIR HFA/PROVENTIL HFA/VENTOLIN HFA     Inhale 2 puffs into the lungs every 6 hours as needed for shortness of breath / dyspnea or wheezing        fenofibrate 160 MG tablet     90 tablet    Take 1 tablet (160 mg) by mouth daily    Hyperlipidemia LDL goal <100       ferrous sulfate 325 (65 Fe) MG tablet    IRON     Take 325 mg by mouth 2 times daily        FLUoxetine 40 MG capsule    PROzac    90 capsule    Take 1 capsule (40 mg) by mouth daily    Major depressive disorder, recurrent episode, mild (H)       folic acid 1 MG tablet    FOLVITE    90 tablet    Take 1 tablet (1 mg) by mouth daily    Increased homocysteine (H)       insulin glargine 100 UNIT/ML injection    LANTUS SOLOSTAR    15 mL    Inject 10 Units Subcutaneous At Bedtime    Type 2 diabetes mellitus with stage 3 chronic kidney disease, with long-term current use of insulin (H)       KEPPRA 500 MG tablet   Generic drug:  levETIRAcetam      Take 500 mg by mouth 2 times daily        liraglutide 18 MG/3ML soln    VICTOZA    6 mL    Inject 1.8 mg Subcutaneous daily    Type 2 diabetes mellitus with stage 3 chronic kidney disease, with long-term current use of insulin (H)       metoprolol succinate 50 MG 24 hr tablet    TOPROL-XL    45 tablet    Take 1.5  tablets (75 mg) by mouth daily    Essential hypertension       PANTOPRAZOLE SODIUM PO      Take 40 mg by mouth 2 times daily (before meals)        simvastatin 10 MG tablet    ZOCOR    90 tablet    Take 1 tablet (10 mg) by mouth At Bedtime    Hyperlipidemia LDL goal <100       SYMBICORT 80-4.5 MCG/ACT Inhaler   Generic drug:  budesonide-formoterol      Inhale 2 puffs into the lungs 2 times daily        tamsulosin 0.4 MG capsule    FLOMAX    90 capsule    Take 1 capsule (0.4 mg) by mouth daily    BPH (benign prostatic hypertrophy)       TOPAMAX PO      Take 50 mg by mouth 2 times daily        TRADJENTA 5 MG Tabs tablet   Generic drug:  linagliptin      Take 2.5 mg by mouth daily

## 2018-10-31 NOTE — NURSING NOTE
/84  Pulse 111  Temp 98.2  F (36.8  C) (Oral)  Resp 19  Wt 246 lb 8 oz (111.8 kg)  SpO2 97%  BMI 34.38 kg/m2  Patient in for hospital follow up.  Zoe Taylor CMA

## 2018-10-31 NOTE — PROGRESS NOTES
SUBJECTIVE:   Donnie Vincent is a 72 year old male who presents to clinic today for the following health issues:         Hospital Follow-up Visit:    Hospital/Nursing Home/IP Rehab Facility: [facility]  Date of Admission: 10/17/18  Date of Discharge: 10/20/18  Reason(s) for Admission: Hematuria, Anemia            Problems taking medications regularly:  None       Medication changes since discharge: DC'd Plavix will restart  11/1/2018       Problems adhering to non-medication therapy:  None    Summary of hospitalization:  [facility] discharge summary reviewed  Diagnostic Tests/Treatments reviewed.  Follow up needed: needs CBC and BMP  Other Healthcare Providers Involved in Patient s Care:         Specialist appointment - urologist   Update since discharge: improved.     Post Discharge Medication Reconciliation: discharge medications reconciled and changed, per note/orders (see AVS).  Plan of care communicated with patient and family     Coding guidelines for this visit:  Type of Medical   Decision Making Face-to-Face Visit       within 7 Days of discharge Face-to-Face Visit        within 14 days of discharge   Moderate Complexity [id number]   High Complexity [id number]            This patient is a 72 year old male who who has past medical history of COPD, CVA on chronic clopidogrel, seizure disorder, T2 diabetes mellitus, hypertension, hyperlipidemia, ISAEL on CPAP who presents and is admitted with gross hematuria.  His most recent past medical history is notable for hospitalization at this facility from 10/3/2018 through 10/9/2018 for TURP procedure in the setting of BPH.  He underwent TURP and also had laser removal of bladder stones postprocedure he had significant issues with hematuria leading to acute blood loss anemia in addition to acute kidney injury.  He required transfusion of 3 units.  Gross hematuria: Presumably due to resumption of clopidogrel setting of recent procedures.    hold clopidogrel and urologic consultation.  Initially on continuous bladder irrigation.  Sanabria catheter removed on 10/19/18    Acute blood loss anemia: Serial hemoglobins, will check type and cross and transfuse as needed  Type 2 diabetes: Prior to admission medications are glargine 10 units nightly, and linagliptin 2.5 mg p.o. daily   History of a stroke: He had actually been on warfarin up until about 3 years ago when he had an episode of acute internal bleeding and since that time has been on clopidogrel.  In the setting of acute blood loss anemia from hematuria -clopidogrel on hold  Hypertension: He was quite hypertensive in the emergency room  suspect it was mostly anxiety related and is currently without significant pain.  On metoprolol ER 50 mg p.o. Daily     Patient states his symptoms have significantly improved ,has not noticed any blood in the urine.  Patient had visit with his urologist yesterday and he was advised to restart his Plavix from tomorrow.  Patient states his blood sugars are  140s currently on Lantus 10 units and also on Tradjenta 2.5 mg daily.  Metformin and glipizide discontinued .      Patient Active Problem List   Diagnosis     Other type of migraine     Diverticulitis     Seizure disorder (H)     Mixed hyperlipidemia     HYPERLIPIDEMIA LDL GOAL <100     Obstructive sleep apnea     Meningioma (H)     Advanced directives, counseling/discussion     COPD (chronic obstructive pulmonary disease) (H)     GERD (gastroesophageal reflux disease)     Thoracic aortic aneurysm (H)     Bilateral renal cysts     Acute ischemic stroke (H)     Increased homocysteine (H)     Benign prostatic hyperplasia with lower urinary tract symptoms     Cerebral infarction (H)     PFO (patent foramen ovale)     Type 2 diabetes with stage 3 chronic kidney disease GFR 30-59 (H)     Morbid obesity (H)     Major depressive disorder, recurrent episode, mild (H)     Gastrointestinal hemorrhage, unspecified  gastrointestinal hemorrhage type     Essential hypertension     Thoracic aortic aneurysm without rupture (H)     Renal failure     SOB (shortness of breath)     Long-term (current) use of anticoagulants [Z79.01]     Iron deficiency anemia, unspecified iron deficiency anemia type     Enlarged prostate     Hematuria     Hematuria, gross     Past Surgical History:   Procedure Laterality Date     C NONSPECIFIC PROCEDURE      colonoscopy 2005     COLONOSCOPY  11/4/2015    Dr. Ernesto CONTRERAS     COLONOSCOPY N/A 11/4/2015    Procedure: COLONOSCOPY;  Surgeon: Yazmin Orozco MD;  Location:  GI     CYSTOSCOPY, RETROGRADES, EXTRACT STONE, COMBINED Left 11/25/2015    Procedure: COMBINED CYSTOSCOPY, RETROGRADES, EXTRACT STONE;  Surgeon: Neville Banuelos MD;  Location: [facility] OR     CYSTOSCOPY, TRANSURETHRAL RESECTION (TUR) PROSTATE, COMBINED N/A 10/3/2018    Procedure: COMBINED CYSTOSCOPY, TRANSURETHRAL RESECTION (TUR) PROSTATE;  Cystoscopy, removal of bladder stones with holmium laser, transurethral resection of prostate using Olympus bipolar electrode;  Surgeon: Neville Banuelos MD;  Location:  OR     ESOPHAGOSCOPY, GASTROSCOPY, DUODENOSCOPY (EGD), COMBINED  11/5/2015    Dr. Ernesto CONTRERAS     ESOPHAGOSCOPY, GASTROSCOPY, DUODENOSCOPY (EGD), COMBINED  06/27/2018    Dr. Ernesto CONTRERAS     GENITOURINARY SURGERY      kidney stone - lithotripsy     HERNIA REPAIR       LASER HOLMIUM LITHOTRIPSY BLADDER N/A 10/3/2018    Procedure: LASER HOLMIUM LITHOTRIPSY BLADDER;;  Surgeon: Neville Banuelos MD;  Location:  OR     PHACOEMULSIFICATION CLEAR CORNEA WITH STANDARD INTRAOCULAR LENS IMPLANT  12/20/2011    Procedure:PHACOEMULSIFICATION CLEAR CORNEA WITH STANDARD INTRAOCULAR LENS IMPLANT; RIGHT PHACOEMULSIFICATION CLEAR CORNEA WITH STANDARD INTRAOCULAR LENS IMPLANT ; Surgeon:GUILHERME KAUFMAN; Location:[facility]       Social History   Substance Use Topics     Smoking status: Former Smoker     Quit date: 1/1/1970     Smokeless  tobacco: Never Used      Comment: quit age 28     Alcohol use Yes      Comment: 1 beer/week     Family History   Problem Relation Age of Onset     Family History Negative Mother      migraines     Family History Negative Father      lived to be 92     Colon Cancer No family hx of          Current Outpatient Prescriptions   Medication Sig Dispense Refill     albuterol (PROAIR HFA/PROVENTIL HFA/VENTOLIN HFA) 108 (90 Base) MCG/ACT inhaler Inhale 2 puffs into the lungs every 6 hours as needed for shortness of breath / dyspnea or wheezing       budesonide-formoterol (SYMBICORT) 80-4.5 MCG/ACT inhaler Inhale 2 puffs into the lungs 2 times daily        fenofibrate 160 MG tablet Take 1 tablet (160 mg) by mouth daily 90 tablet 1     ferrous sulfate (IRON) 325 (65 Fe) MG tablet Take 325 mg by mouth 2 times daily       FLUoxetine (PROZAC) 40 MG capsule Take 1 capsule (40 mg) by mouth daily 90 capsule 1     folic acid (FOLVITE) 1 MG tablet Take 1 tablet (1 mg) by mouth daily 90 tablet 0     insulin glargine (LANTUS SOLOSTAR) 100 UNIT/ML pen Inject 10 Units Subcutaneous At Bedtime 15 mL 1     levETIRAcetam (KEPPRA) 500 MG tablet Take 500 mg by mouth 2 times daily       linagliptin (TRADJENTA) 5 MG TABS tablet Take 2.5 mg by mouth daily        METOPROLOL SUCCINATE ER PO Take 50 mg by mouth daily       PANTOPRAZOLE SODIUM PO Take 40 mg by mouth 2 times daily (before meals)       simvastatin (ZOCOR) 10 MG tablet Take 1 tablet (10 mg) by mouth At Bedtime 90 tablet 1     tamsulosin (FLOMAX) 0.4 MG 24 hr capsule Take 1 capsule (0.4 mg) by mouth daily 90 capsule 3     Topiramate (TOPAMAX PO) Take 50 mg by mouth 2 times daily         Reviewed and updated as needed this visit by clinical staff  Tobacco  Allergies  Meds  Med Hx  Surg Hx  Fam Hx  Soc Hx      Reviewed and updated as needed this visit by Provider         ROS:  CONSTITUTIONAL: NEGATIVE for fever, chills, change in weight  EYES: NEGATIVE for vision changes or  irritation  ENT/MOUTH: NEGATIVE for ear, mouth and throat problems  RESP: NEGATIVE for significant cough or SOB  CV: NEGATIVE for chest pain, palpitations or peripheral edema  : negative for dysuria, hematuria,   ENDOCRINE: NEGATIVE for temperature intolerance, skin/hair changes  CNS; h/o seizures  Psych; h/o depression   Ros otherwise negative    OBJECTIVE:                                                    /84  Pulse 111  Temp 98.2  F (36.8  C) (Oral)  Resp 19  Wt 246 lb 8 oz (111.8 kg)  SpO2 97%  BMI 34.38 kg/m2  Body mass index is 34.38 kg/(m^2).   GENERAL: healthy, alert, well nourished, well hydrated, no distress  EYES: Eyes grossly normal to inspection, extraocular movements - intact, and PERRL  HENT: ear canals- normal; TMs- normal; Nose- normal; Mouth- no ulcers, no lesions  NECK: no tenderness, no adenopathy, no asymmetry, no masses, no stiffness; thyroid- normal to palpation  RESP: lungs clear to auscultation - no rales, no rhonchi, no wheezes  CV: regular rates and rhythm,    MS: extremities- no gross deformities noted, no edema  NEURO: strength and tone- normal, sensory exam- grossly normal, mentation- intact, speech- normal, reflexes- symmetric       ASSESSMENT/PLAN:                                                         (D62) Anemia due to blood loss, acute     Comment: Had a gross hematuria, currently resolved  Plan: On iron supplements twice a day, will recheck CBC with platelets              (E11.22,  N18.3,  Z79.4) Type 2 diabetes mellitus with stage 3 chronic kidney disease, with long-term current use of insulin (H)  Comment: Blood sugars elevated  Plan: Will restart liraglutide (VICTOZA) 18 MG/3ML soln once daily as directed, continue Lantus and Tradjenta           (I10) Essential hypertension  Comment: Elevated blood pressure  Plan: We will increase metoprolol succinate (TOPROL-XL) to 75 mg as directed.explained clearly about the medication,insructions and side effects.  Patient  was advised to follow low-sodium diet regular exercise and follow-up in 2-3 weeks            (R31.9) Hematuria, unspecified type  Plan: Completely resolved, patient follows up with the urologist       Suyapa Leon MD  [facility]

## 2018-11-01 LAB
ANION GAP SERPL CALCULATED.3IONS-SCNC: 12 MMOL/L (ref 3–14)
BUN SERPL-MCNC: 21 MG/DL (ref 7–30)
CALCIUM SERPL-MCNC: 8.6 MG/DL (ref 8.5–10.1)
CHLORIDE SERPL-SCNC: 109 MMOL/L (ref 94–109)
CO2 SERPL-SCNC: 20 MMOL/L (ref 20–32)
CREAT SERPL-MCNC: 1.34 MG/DL (ref 0.66–1.25)
GFR SERPL CREATININE-BSD FRML MDRD: 52 ML/MIN/1.7M2
GLUCOSE SERPL-MCNC: 248 MG/DL (ref 70–99)
POTASSIUM SERPL-SCNC: 3.9 MMOL/L (ref 3.4–5.3)
SODIUM SERPL-SCNC: 141 MMOL/L (ref 133–144)

## 2018-11-09 NOTE — PROGRESS NOTES
Patient presents to clinic for TOV. Patient's catheter was disconnected from leg-bag and a sterile syringe was attached to catheter. 250cc sterile water was instilled via gravity. Patient's catheter balloon was then deflated. Patient was able to successfully void in office.     Donnie Vincent comes into clinic today at the request of Dr. Banuelos  Ordering Provider for Catheter Removal.         This service provided today was under the supervising provider of the day Dr. Neville Banuelos, who was available if needed.    Alejandra Holm LPN

## 2018-11-12 ENCOUNTER — TELEPHONE (OUTPATIENT)
Dept: INTERNAL MEDICINE | Facility: CLINIC | Age: 73
End: 2018-11-12

## 2018-11-12 NOTE — TELEPHONE ENCOUNTER
Per Dr. Leon's 10/31/18 office visit note:   (I10) Essential hypertension  Comment: Elevated blood pressure  Plan: We will increase metoprolol succinate (TOPROL-XL) to 75 mg as directed.explained clearly about the medication,insructions and side effects.  Patient was advised to follow low-sodium diet regular exercise and follow-up in 2-3 weeks    Attempted to contact patient. Left voice message to call back.

## 2018-11-12 NOTE — TELEPHONE ENCOUNTER
Reason for Call:  Other appt      Detailed comments: Pt says he fell and bruised his ribs.  Pt is also supposed to f/u with the dr before leaving next week for [place].  Can Dr Leon see him this week?    Phone Number Patient can be reached at: Home number on file [phone] (home)    Best Time: any    Can we leave a detailed message on this number? YES    Call taken on 11/12/2018 at 8:23 AM by Daja Whitney

## 2018-11-13 NOTE — TELEPHONE ENCOUNTER
Patient's wife calls, patient is with her and consent to communicate on file.   Appointment scheduled for follow-up on 11/16/18.     She also mentions that patient fell over the weekend and injured his ribs, he is still is having pain today. She asks if he should go to Urgent Care for this. Advised that rib pain can linger, if he is having difficulty breathing or shortness of breath, she should take him in to be seen. Otherwise it will take time for pain to improve from injury. She speaks to patient and he states he can wait until appointment on Friday to discuss rib pain. He will go to Urgent Care if symptoms worsen or he has difficulty breathing or shortness of breath prior to appointment.

## 2018-11-14 ENCOUNTER — OFFICE VISIT (OUTPATIENT)
Dept: URGENT CARE | Facility: URGENT CARE | Age: 73
End: 2018-11-14
Payer: COMMERCIAL

## 2018-11-14 ENCOUNTER — RADIANT APPOINTMENT (OUTPATIENT)
Dept: GENERAL RADIOLOGY | Facility: CLINIC | Age: 73
End: 2018-11-14
Attending: FAMILY MEDICINE
Payer: COMMERCIAL

## 2018-11-14 VITALS
BODY MASS INDEX: 34.45 KG/M2 | TEMPERATURE: 97.8 F | SYSTOLIC BLOOD PRESSURE: 130 MMHG | RESPIRATION RATE: 16 BRPM | DIASTOLIC BLOOD PRESSURE: 70 MMHG | OXYGEN SATURATION: 98 % | WEIGHT: 247 LBS | HEART RATE: 65 BPM
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
## 2018-11-14 DIAGNOSIS — S20.212A BRUISED RIBS, LEFT, INITIAL ENCOUNTER: ICD-10-CM

## 2018-11-14 DIAGNOSIS — R07.81 RIB PAIN ON LEFT SIDE: Primary | ICD-10-CM

## 2018-11-14 DIAGNOSIS — S22.32XA CLOSED FRACTURE OF ONE RIB OF LEFT SIDE, INITIAL ENCOUNTER: ICD-10-CM

## 2018-11-14 PROCEDURE — 99213 OFFICE O/P EST LOW 20 MIN: CPT | Performed by: FAMILY MEDICINE

## 2018-11-14 PROCEDURE — 71101 X-RAY EXAM UNILAT RIBS/CHEST: CPT | Mod: LT

## 2018-11-14 NOTE — PROGRESS NOTES
SUBJECTIVE:   Donnie Vincent is a 72 year old male  With h/o BPH, COPD , CVA , HTN , hyperlipidemia presenting with a chief complaint of left sided lower rib pain which happened following a fall in his house porch landing [place]  He is an established patient of [employer].  Onset of symptoms was 5 day(s) ago.  Course of illness is worsening.    Severity moderate  Current and Associated symptoms: left lower rib pain and pain over a bruise which happened since the fall , he denies any sob or acute chest pain   Treatment measures tried include None tried.  Predisposing factors include None.    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     COPD (chronic obstructive pulmonary disease) (H)      CVA (cerebral infarction)      Diabetes new 4/09    initial A1C 6.8 4/08;; has done diabetic teaching      Diverticulitis of colon (without mention of hemorrhage)(562.11) 2001; 9/06     Gastro-oesophageal reflux disease      Hernia, abdominal      History of blood transfusion      HTN (hypertension)      Hyperlipidemia LDL goal < 100      Other forms of migraine, without mention of intractable migraine without mention of status migrainosus      PFO (patent foramen ovale)     small per echo 11/2013     Renal disease     Hx kidney stones 12 yrs ago     Seizure disorder (H)     sees neurologist     Seizures (H)     8 years ago - no recurrence     Sleep apnea     wears CPAP     Stroke (H) early april 2017     Unspecified congenital anomaly of lung     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 possibly from asbestos exposure in [employer] (remote)      Current Outpatient Prescriptions   Medication Sig Dispense Refill     albuterol (PROAIR HFA/PROVENTIL HFA/VENTOLIN HFA) 108 (90 Base) MCG/ACT inhaler Inhale 2 puffs into the lungs every 6 hours as needed for shortness of breath /  dyspnea or wheezing       budesonide-formoterol (SYMBICORT) 80-4.5 MCG/ACT inhaler Inhale 2 puffs into the lungs 2 times daily        fenofibrate 160 MG tablet Take 1 tablet (160 mg) by mouth daily 90 tablet 1     ferrous sulfate (IRON) 325 (65 Fe) MG tablet Take 325 mg by mouth 2 times daily       FLUoxetine (PROZAC) 40 MG capsule Take 1 capsule (40 mg) by mouth daily 90 capsule 1     folic acid (FOLVITE) 1 MG tablet Take 1 tablet (1 mg) by mouth daily 90 tablet 0     insulin glargine (LANTUS SOLOSTAR) 100 UNIT/ML pen Inject 10 Units Subcutaneous At Bedtime 15 mL 1     levETIRAcetam (KEPPRA) 500 MG tablet Take 500 mg by mouth 2 times daily       linagliptin (TRADJENTA) 5 MG TABS tablet Take 2.5 mg by mouth daily        liraglutide (VICTOZA) 18 MG/3ML soln Inject 1.8 mg Subcutaneous daily 6 mL 1     metoprolol succinate (TOPROL-XL) 50 MG 24 hr tablet Take 1.5 tablets (75 mg) by mouth daily 45 tablet 0     PANTOPRAZOLE SODIUM PO Take 40 mg by mouth 2 times daily (before meals)       simvastatin (ZOCOR) 10 MG tablet Take 1 tablet (10 mg) by mouth At Bedtime 90 tablet 1     tamsulosin (FLOMAX) 0.4 MG 24 hr capsule Take 1 capsule (0.4 mg) by mouth daily 90 capsule 3     Topiramate (TOPAMAX PO) Take 50 mg by mouth 2 times daily       Social History   Substance Use Topics     Smoking status: Former Smoker     Quit date: 1/1/1970     Smokeless tobacco: Never Used      Comment: quit age 28     Alcohol use Yes      Comment: 1 beer/week     Family History   Problem Relation Age of Onset     Family History Negative Mother      migraines     Family History Negative Father      lived to be 92     Colon Cancer No family hx of          ROS:    10 point ROS of systems including Constitutional, Eyes, Respiratory, Cardiovascular, Gastroenterology, Genitourinary, Integumentary,Psychiatric were all negative except for pertinent positives noted in my HPI         OBJECTIVE:  /70  Pulse 65  Temp 97.8  F (36.6  C) (Oral)  Resp 16   Wt 247 lb (112 kg)  SpO2 98%  BMI 34.45 kg/m2  GENERAL APPEARANCE: healthy, alert and no distress  EYES: EOMI,  PERRL, conjunctiva clear  HENT: ear canals and TM's normal.  Nose and mouth without ulcers, erythema or lesions  NECK: supple, nontender, no lymphadenopathy  RESP: lungs clear to auscultation - no rales, rhonchi or wheezes  CV: regular rates and rhythm, normal S1 S2, no murmur noted  ABDOMEN:  soft, nontender, no HSM or masses and bowel sounds normal  MSK- there is a 4cm bruise noted in the left upper abdomen area along the left lower rib area   Also left lower rib tenderness noted   SKIN: no suspicious lesions or rashes  PSYCH: mentation appears normal  Physical Exam      X-Ray was done, my findings are: prominent lung markings , rib abnormality noted which is a fracture of the left  ninth rib   No acute pneumonia noted       ASSESSMENT:  Donnie was seen today for urgent care.    Diagnoses and all orders for this visit:    Rib pain on left side  -     XR Ribs & Chest Lt 3v    Fall, accidental, initial encounter    Bruised ribs, left, initial encounter    Closed fracture of one rib of left side, initial encounter          PLAN:  Tylenol, Rest and apply cold packs   If notices any worsening chest pain or acute sob should follow up   advised patient to work on his breathing, continue Tylenol every 4-6 hours for pain for next 2-3 days patient understood and agreed with plan  Discussed with pt to work on good breathing   Follow up if  symptoms fail to improve or worsens   Pt understood and agreed with plan     See orders in Epic      Ana Rocha MD

## 2018-11-14 NOTE — MR AVS SNAPSHOT
After Visit Summary   11/14/2018    Donnie Vincent    MRN: [id number]           Patient Information     Date Of Birth          1945        Visit Information        Provider Department      11/14/2018 1:00 PM Ana Rocha MD [facility]        Today's Diagnoses     Rib pain on left side    -  1    Fall, accidental, initial encounter        Bruised ribs, left, initial encounter          Care Instructions      Rib Contusion or Minor Fracture    A rib contusion is a bruise to one or more rib bones. It may cause pain, tenderness, swelling, and a purplish color to the skin. There may be a sharp pain with each breath. A rib contusion takes anywhere from a few days to a few weeks to heal. A minor rib fracture or break may cause the same symptoms as a rib contusion. The small crack may not be seen on a regular chest X-ray. Treatment for both problems is basically the same.  Home care    You may use over-the-counter pain medicine to control pain, unless another pain medicine was prescribed. If you have chronic liver or kidney disease or ever had a stomach ulcer or GI (gastrointestinal) bleeding, talk with your healthcare provider before using these medicines.    Rest. Don't lift anything heavy or do any activity that causes pain.    Apply an ice pack over the injured area for 15 to 20 minutes every 1 to 2 hours. You should do this for the first 24 to 48 hours. To make an ice pack, put ice cubes in a plastic bag that seals at the top. Wrap the bag in a clean, thin towel or cloth. Never put ice or an ice pack directly on the skin. Continue with ice packs as needed for the relief of pain and swelling.    The first 3 to 4 weeks of healing will be the most painful. If your pain is not under control with the treatment given, call your healthcare provider. Sometimes a stronger pain medicine may be needed. A nerve block can be done in case of severe pain. It will numb the nerve between the  ribs.  Follow-up care  Follow up with your healthcare provider, or as advised.  If X-rays were taken, you will be told of any new findings that may affect your care.  Call 911  Call 911 if you have:    Dizziness, weakness or fainting    Shortness of breath with or without chest discomfort    New or worsening pain  When to seek medical advice  Call your healthcare provider right away if any of these occur:    Fever of 100.4 F (38 C) or higher, or as directed by your healthcare provider    Chills    Stomach pain, vomiting  Date Last Reviewed: 6/1/2018 2000-2018 [employer]. [address] [place] [address]. All rights reserved. This information is not intended as a substitute for professional medical care. Always follow your healthcare professional's instructions.                Follow-ups after your visit        Your next 10 appointments already scheduled     Nov 16, 2018  2:40 PM CST   Office Visit with Suyapa Leon MD   [facility]    [address] [place] [id number] [phone]           Bring a current list of meds and any records pertaining to this visit. For Physicals, please bring immunization records and any forms needing to be filled out. Please arrive 10 minutes early to complete paperwork.              Who to contact     If you have questions or need follow up information about today's clinic visit or your schedule please contact [facility] directly at [phone].  Normal or non-critical lab and imaging results will be communicated to you by MyChart, letter or phone within 4 business days after the clinic has received the results. If you do not hear from us within 7 days, please contact the clinic through MyChart or phone. If you have a critical or abnormal lab result, we will notify you by phone as soon as possible.  Submit refill requests through [employer] or call your pharmacy and  they will forward the refill request to us. Please allow 3 business days for your refill to be completed.          Additional Information About Your Visit        Care EveryWhere ID     This is your Care EveryWhere ID. This could be used by other organizations to access your [place] medical records  [id number]        Your Vitals Were     Pulse Temperature Respirations Pulse Oximetry BMI (Body Mass Index)       65 97.8  F (36.6  C) (Oral) 16 98% 34.45 kg/m2        Blood Pressure from Last 3 Encounters:   11/14/18 130/70   10/31/18 158/84   10/20/18 130/67    Weight from Last 3 Encounters:   11/14/18 247 lb (112 kg)   10/31/18 246 lb 8 oz (111.8 kg)   10/29/18 241 lb (109.3 kg)              We Performed the Following     XR Ribs & Chest Lt 3v        Primary Care Provider Office Phone # Fax #    Krishnakumari G MD Edward [phone]       [address] [facility] [address]        Equal Access to Services     [facility]: Hadii aad ku hadasho Soomaali, waaxda luqadaha, qaybta kaalmada adeegyada, waxay idiin hayaan simon luna . So [facility] [phone].    ATENCIÓN: Si habla español, tiene a navarrete disposición servicios gratuitos de asistencia lingüística. Ll[facility] [phone].    We comply with applicable federal civil rights laws and [place] laws. We do not discriminate on the basis of race, color, national origin, age, disability, sex, sexual orientation, or gender identity.            Thank you!     Thank you for choosing [facility]  for your care. Our goal is always to provide you with excellent care. Hearing back from our patients is one way we can continue to improve our services. Please take a few minutes to complete the written survey that you may receive in the mail after your visit with us. Thank you!             Your Updated Medication List - Protect others around you: Learn how to safely use, store and throw away your medicines at www.disposemymeds.org.           This list is accurate as of 11/14/18  1:52 PM.  Always use your most recent med list.                   Brand Name Dispense Instructions for use Diagnosis    albuterol 108 (90 Base) MCG/ACT inhaler    PROAIR HFA/PROVENTIL HFA/VENTOLIN HFA     Inhale 2 puffs into the lungs every 6 hours as needed for shortness of breath / dyspnea or wheezing        fenofibrate 160 MG tablet     90 tablet    Take 1 tablet (160 mg) by mouth daily    Hyperlipidemia LDL goal <100       ferrous sulfate 325 (65 Fe) MG tablet    IRON     Take 325 mg by mouth 2 times daily        FLUoxetine 40 MG capsule    PROzac    90 capsule    Take 1 capsule (40 mg) by mouth daily    Major depressive disorder, recurrent episode, mild (H)       folic acid 1 MG tablet    FOLVITE    90 tablet    Take 1 tablet (1 mg) by mouth daily    Increased homocysteine (H)       insulin glargine 100 UNIT/ML injection    LANTUS SOLOSTAR    15 mL    Inject 10 Units Subcutaneous At Bedtime    Type 2 diabetes mellitus with stage 3 chronic kidney disease, with long-term current use of insulin (H)       KEPPRA 500 MG tablet   Generic drug:  levETIRAcetam      Take 500 mg by mouth 2 times daily        liraglutide 18 MG/3ML soln    VICTOZA    6 mL    Inject 1.8 mg Subcutaneous daily    Type 2 diabetes mellitus with stage 3 chronic kidney disease, with long-term current use of insulin (H)       metoprolol succinate 50 MG 24 hr tablet    TOPROL-XL    45 tablet    Take 1.5 tablets (75 mg) by mouth daily    Essential hypertension       PANTOPRAZOLE SODIUM PO      Take 40 mg by mouth 2 times daily (before meals)        simvastatin 10 MG tablet    ZOCOR    90 tablet    Take 1 tablet (10 mg) by mouth At Bedtime    Hyperlipidemia LDL goal <100       SYMBICORT 80-4.5 MCG/ACT Inhaler   Generic drug:  budesonide-formoterol      Inhale 2 puffs into the lungs 2 times daily        tamsulosin 0.4 MG capsule    FLOMAX    90 capsule    Take 1 capsule (0.4 mg) by mouth daily    BPH (benign  prostatic hypertrophy)       TOPAMAX PO      Take 50 mg by mouth 2 times daily        TRADJENTA 5 MG Tabs tablet   Generic drug:  linagliptin      Take 2.5 mg by mouth daily

## 2018-11-14 NOTE — PATIENT INSTRUCTIONS
Rib Contusion or Minor Fracture    A rib contusion is a bruise to one or more rib bones. It may cause pain, tenderness, swelling, and a purplish color to the skin. There may be a sharp pain with each breath. A rib contusion takes anywhere from a few days to a few weeks to heal. A minor rib fracture or break may cause the same symptoms as a rib contusion. The small crack may not be seen on a regular chest X-ray. Treatment for both problems is basically the same.  Home care    You may use over-the-counter pain medicine to control pain, unless another pain medicine was prescribed. If you have chronic liver or kidney disease or ever had a stomach ulcer or GI (gastrointestinal) bleeding, talk with your healthcare provider before using these medicines.    Rest. Don't lift anything heavy or do any activity that causes pain.    Apply an ice pack over the injured area for 15 to 20 minutes every 1 to 2 hours. You should do this for the first 24 to 48 hours. To make an ice pack, put ice cubes in a plastic bag that seals at the top. Wrap the bag in a clean, thin towel or cloth. Never put ice or an ice pack directly on the skin. Continue with ice packs as needed for the relief of pain and swelling.    The first 3 to 4 weeks of healing will be the most painful. If your pain is not under control with the treatment given, call your healthcare provider. Sometimes a stronger pain medicine may be needed. A nerve block can be done in case of severe pain. It will numb the nerve between the ribs.  Follow-up care  Follow up with your healthcare provider, or as advised.  If X-rays were taken, you will be told of any new findings that may affect your care.  Call 911  Call 911 if you have:    Dizziness, weakness or fainting    Shortness of breath with or without chest discomfort    New or worsening pain  When to seek medical advice  Call your healthcare provider right away if any of these occur:    Fever of 100.4 F (38 C) or higher, or as  directed by your healthcare provider    Chills    Stomach pain, vomiting  Date Last Reviewed: 6/1/2018 2000-2018 [employer]. [address] [place] [address], [place] [address]. All rights reserved. This information is not intended as a substitute for professional medical care. Always follow your healthcare professional's instructions.

## 2018-11-16 ENCOUNTER — OFFICE VISIT (OUTPATIENT)
Dept: INTERNAL MEDICINE | Facility: CLINIC | Age: 73
End: 2018-11-16
Payer: COMMERCIAL

## 2018-11-16 VITALS
SYSTOLIC BLOOD PRESSURE: 122 MMHG | TEMPERATURE: 98.3 F | OXYGEN SATURATION: 98 % | DIASTOLIC BLOOD PRESSURE: 82 MMHG | RESPIRATION RATE: 12 BRPM | WEIGHT: 248.6 LBS | BODY MASS INDEX: 34.67 KG/M2 | HEART RATE: 87 BPM

## 2018-11-16 DIAGNOSIS — S22.32XD CLOSED FRACTURE OF ONE RIB OF LEFT SIDE WITH ROUTINE HEALING, SUBSEQUENT ENCOUNTER: Primary | ICD-10-CM

## 2018-11-16 PROCEDURE — 99213 OFFICE O/P EST LOW 20 MIN: CPT | Performed by: INTERNAL MEDICINE

## 2018-11-16 ASSESSMENT — PATIENT HEALTH QUESTIONNAIRE - PHQ9: SUM OF ALL RESPONSES TO PHQ QUESTIONS 1-9: 0

## 2018-11-16 NOTE — NURSING NOTE
/82  Pulse 87  Temp 98.3  F (36.8  C) (Oral)  Resp 12  Wt 248 lb 9.6 oz (112.8 kg)  SpO2 98%  BMI 34.67 kg/m2  Patient in for follow up on BP.  Zoe Taylor, ROBERTO

## 2018-11-16 NOTE — PROGRESS NOTES
SUBJECTIVE:   Donnie Vincent is a 73 year old male who presents to clinic today for the following health issues:    Pt is a 73 year old male who is seen here to day to f/u on fall and UC visit. Pt says he stepped on his dogs toy at home and fell on his lt side on to the [place] about 7 days ago, started having pain on lt lower rib area and was seen at UC 2 days ago and and was found to have lt lateral 9th rib fracture , has pain with sneezing and coughing. No SOB. Pt has been taking OTC Tylenol with pain relief.        Patient Active Problem List   Diagnosis     Other type of migraine     Diverticulitis     Seizure disorder (H)     Mixed hyperlipidemia     HYPERLIPIDEMIA LDL GOAL <100     Obstructive sleep apnea     Meningioma (H)     Advanced directives, counseling/discussion     COPD (chronic obstructive pulmonary disease) (H)     GERD (gastroesophageal reflux disease)     Thoracic aortic aneurysm (H)     Bilateral renal cysts     Acute ischemic stroke (H)     Increased homocysteine (H)     Benign prostatic hyperplasia with lower urinary tract symptoms     Cerebral infarction (H)     PFO (patent foramen ovale)     Type 2 diabetes with stage 3 chronic kidney disease GFR 30-59 (H)     Morbid obesity (H)     Major depressive disorder, recurrent episode, mild (H)     Gastrointestinal hemorrhage, unspecified gastrointestinal hemorrhage type     Essential hypertension     Thoracic aortic aneurysm without rupture (H)     Renal failure     SOB (shortness of breath)     Long-term (current) use of anticoagulants [Z79.01]     Iron deficiency anemia, unspecified iron deficiency anemia type     Enlarged prostate     Hematuria     Hematuria, gross     Past Surgical History:   Procedure Laterality Date     C NONSPECIFIC PROCEDURE      colonoscopy 2005     COLONOSCOPY  11/4/2015    Dr. Orozco [facility]     COLONOSCOPY N/A 11/4/2015    Procedure: COLONOSCOPY;  Surgeon: Yazmin Orozco MD;  Location:  GI     CYSTOSCOPY,  RETROGRADES, EXTRACT STONE, COMBINED Left 11/25/2015    Procedure: COMBINED CYSTOSCOPY, RETROGRADES, EXTRACT STONE;  Surgeon: Neville Banuelos MD;  Location: [facility] OR     CYSTOSCOPY, TRANSURETHRAL RESECTION (TUR) PROSTATE, COMBINED N/A 10/3/2018    Procedure: COMBINED CYSTOSCOPY, TRANSURETHRAL RESECTION (TUR) PROSTATE;  Cystoscopy, removal of bladder stones with holmium laser, transurethral resection of prostate using Olympus bipolar electrode;  Surgeon: Neville Banuelos MD;  Location:  OR     ESOPHAGOSCOPY, GASTROSCOPY, DUODENOSCOPY (EGD), COMBINED  11/5/2015    Dr. Ernesto CONTRERAS     ESOPHAGOSCOPY, GASTROSCOPY, DUODENOSCOPY (EGD), COMBINED  06/27/2018    Dr. Ernesto CONTRERAS     GENITOURINARY SURGERY      kidney stone - lithotripsy     HERNIA REPAIR       LASER HOLMIUM LITHOTRIPSY BLADDER N/A 10/3/2018    Procedure: LASER HOLMIUM LITHOTRIPSY BLADDER;;  Surgeon: Neville Banuelos MD;  Location:  OR     PHACOEMULSIFICATION CLEAR CORNEA WITH STANDARD INTRAOCULAR LENS IMPLANT  12/20/2011    Procedure:PHACOEMULSIFICATION CLEAR CORNEA WITH STANDARD INTRAOCULAR LENS IMPLANT; RIGHT PHACOEMULSIFICATION CLEAR CORNEA WITH STANDARD INTRAOCULAR LENS IMPLANT ; Surgeon:GUILHERME KAUFMAN; Location:[facility]       Social History   Substance Use Topics     Smoking status: Former Smoker     Quit date: 1/1/1970     Smokeless tobacco: Never Used      Comment: quit age 28     Alcohol use Yes      Comment: 1 beer/week     Family History   Problem Relation Age of Onset     Family History Negative Mother      migraines     Family History Negative Father      lived to be 92     Colon Cancer No family hx of          Current Outpatient Prescriptions   Medication Sig Dispense Refill     albuterol (PROAIR HFA/PROVENTIL HFA/VENTOLIN HFA) 108 (90 Base) MCG/ACT inhaler Inhale 2 puffs into the lungs every 6 hours as needed for shortness of breath / dyspnea or wheezing       budesonide-formoterol (SYMBICORT) 80-4.5 MCG/ACT inhaler Inhale 2  puffs into the lungs 2 times daily        fenofibrate 160 MG tablet Take 1 tablet (160 mg) by mouth daily 90 tablet 1     ferrous sulfate (IRON) 325 (65 Fe) MG tablet Take 325 mg by mouth 2 times daily       FLUoxetine (PROZAC) 40 MG capsule Take 1 capsule (40 mg) by mouth daily 90 capsule 1     folic acid (FOLVITE) 1 MG tablet Take 1 tablet (1 mg) by mouth daily 90 tablet 0     insulin glargine (LANTUS SOLOSTAR) 100 UNIT/ML pen Inject 10 Units Subcutaneous At Bedtime 15 mL 1     levETIRAcetam (KEPPRA) 500 MG tablet Take 500 mg by mouth 2 times daily       linagliptin (TRADJENTA) 5 MG TABS tablet Take 2.5 mg by mouth daily        liraglutide (VICTOZA) 18 MG/3ML soln Inject 1.8 mg Subcutaneous daily 6 mL 1     metoprolol succinate (TOPROL-XL) 50 MG 24 hr tablet Take 1.5 tablets (75 mg) by mouth daily 45 tablet 0     PANTOPRAZOLE SODIUM PO Take 40 mg by mouth 2 times daily (before meals)       simvastatin (ZOCOR) 10 MG tablet Take 1 tablet (10 mg) by mouth At Bedtime 90 tablet 1     tamsulosin (FLOMAX) 0.4 MG 24 hr capsule Take 1 capsule (0.4 mg) by mouth daily 90 capsule 3     Topiramate (TOPAMAX PO) Take 50 mg by mouth 2 times daily         Reviewed and updated as needed this visit by clinical staff  Tobacco  Allergies  Med Hx  Surg Hx  Fam Hx  Soc Hx      Reviewed and updated as needed this visit by Provider         ROS:  CONSTITUTIONAL: NEGATIVE for fever, chills, change in weight  RESP: NEGATIVE for significant cough or SOB  CV: NEGATIVE for chest pain, palpitations    MUSCULOSKELETAL: lt lower rib pain     OBJECTIVE:                                                    /82  Pulse 87  Temp 98.3  F (36.8  C) (Oral)  Resp 12  Wt 248 lb 9.6 oz (112.8 kg)  SpO2 98%  BMI 34.67 kg/m2  Body mass index is 34.67 kg/(m^2).   GENERAL:   Alert,  no distress  RESP: lungs clear to auscultation - no rales, no rhonchi, no wheezes  CV: regular rates and rhythm,    ABDOMEN: soft, no tenderness,  normal bowel  sounds  MS:  Mild tenderness lt lateral lower rib area.  Skin -small area of resolving ecchymosis noted lt lateral lower rib area       ASSESSMENT/PLAN:                                                       (S22.32XD) Closed fracture of one rib of left side with routine healing, subsequent encounter  (primary encounter diagnosis)  Plan: lt lateral 9th rib fracture, explained natural course of healing process , continue OTC Tylenol prn as directed.Call or return to clinic prn if these symtoms worsen, fail to improve as anticipated, or if new symptoms develop.      Suyapa Leon MD  [facility]

## 2018-11-16 NOTE — MR AVS SNAPSHOT
After Visit Summary   11/16/2018    Donnie Vincent    MRN: [id number]           Patient Information     Date Of Birth          1945        Visit Information        Provider Department      11/16/2018 2:40 PM Suyapa Leon MD [facility]        Today's Diagnoses     Closed fracture of one rib of left side with routine healing, subsequent encounter    -  1       Follow-ups after your visit        Who to contact     If you have questions or need follow up information about today's clinic visit or your schedule please contact [facility] directly at [phone].  Normal or non-critical lab and imaging results will be communicated to you by MyChart, letter or phone within 4 business days after the clinic has received the results. If you do not hear from us within 7 days, please contact the clinic through MyChart or phone. If you have a critical or abnormal lab result, we will notify you by phone as soon as possible.  Submit refill requests through [employer] or call your pharmacy and they will forward the refill request to us. Please allow 3 business days for your refill to be completed.          Additional Information About Your Visit        Care EveryWhere ID     This is your Care EveryWhere ID. This could be used by other organizations to access your [place] medical records  [id number]        Your Vitals Were     Pulse Temperature Respirations Pulse Oximetry BMI (Body Mass Index)       87 98.3  F (36.8  C) (Oral) 12 98% 34.67 kg/m2        Blood Pressure from Last 3 Encounters:   11/16/18 122/82   11/14/18 130/70   10/31/18 158/84    Weight from Last 3 Encounters:   11/16/18 248 lb 9.6 oz (112.8 kg)   11/14/18 247 lb (112 kg)   10/31/18 246 lb 8 oz (111.8 kg)              Today, you had the following     No orders found for display       Primary Care Provider Office Phone # Fax #    Suyapa Leon [phone]       [address] [facility] [address]        Equal Access to Services     KINA CEDILLO : Hadii aad ku hadchinojuan Fishman, wamajorda nomi, qabraydonta yolandashimonjulia hanson, aquilino ageeemyairam dumont. So [facility] [phone].    ATENCIÓN: Si habla español, tiene a navarrete disposición servicios gratuitos de asistencia lingüística. Llame al [phone].    We comply with applicable federal civil rights laws and [place] laws. We do not discriminate on the basis of race, color, national origin, age, disability, sex, sexual orientation, or gender identity.            Thank you!     Thank you for choosing [facility]  for your care. Our goal is always to provide you with excellent care. Hearing back from our patients is one way we can continue to improve our services. Please take a few minutes to complete the written survey that you may receive in the mail after your visit with us. Thank you!             Your Updated Medication List - Protect others around you: Learn how to safely use, store and throw away your medicines at www.disposemymeds.org.          This list is accurate as of 11/16/18 11:59 PM.  Always use your most recent med list.                   Brand Name Dispense Instructions for use Diagnosis    albuterol 108 (90 Base) MCG/ACT inhaler    PROAIR HFA/PROVENTIL HFA/VENTOLIN HFA     Inhale 2 puffs into the lungs every 6 hours as needed for shortness of breath / dyspnea or wheezing        fenofibrate 160 MG tablet     90 tablet    Take 1 tablet (160 mg) by mouth daily    Hyperlipidemia LDL goal <100       ferrous sulfate 325 (65 Fe) MG tablet    IRON     Take 325 mg by mouth 2 times daily        FLUoxetine 40 MG capsule    PROzac    90 capsule    Take 1 capsule (40 mg) by mouth daily    Major depressive disorder, recurrent episode, mild (H)       folic acid 1 MG tablet    FOLVITE    90 tablet    Take 1 tablet (1 mg) by mouth daily    Increased homocysteine (H)       insulin glargine 100 UNIT/ML  injection    LANTUS SOLOSTAR    15 mL    Inject 10 Units Subcutaneous At Bedtime    Type 2 diabetes mellitus with stage 3 chronic kidney disease, with long-term current use of insulin (H)       KEPPRA 500 MG tablet   Generic drug:  levETIRAcetam      Take 500 mg by mouth 2 times daily        liraglutide 18 MG/3ML soln    VICTOZA    6 mL    Inject 1.8 mg Subcutaneous daily    Type 2 diabetes mellitus with stage 3 chronic kidney disease, with long-term current use of insulin (H)       metoprolol succinate 50 MG 24 hr tablet    TOPROL-XL    45 tablet    Take 1.5 tablets (75 mg) by mouth daily    Essential hypertension       PANTOPRAZOLE SODIUM PO      Take 40 mg by mouth 2 times daily (before meals)        simvastatin 10 MG tablet    ZOCOR    90 tablet    Take 1 tablet (10 mg) by mouth At Bedtime    Hyperlipidemia LDL goal <100       SYMBICORT 80-4.5 MCG/ACT Inhaler   Generic drug:  budesonide-formoterol      Inhale 2 puffs into the lungs 2 times daily        tamsulosin 0.4 MG capsule    FLOMAX    90 capsule    Take 1 capsule (0.4 mg) by mouth daily    BPH (benign prostatic hypertrophy)       TOPAMAX PO      Take 50 mg by mouth 2 times daily        TRADJENTA 5 MG Tabs tablet   Generic drug:  linagliptin      Take 2.5 mg by mouth daily

## 2018-12-07 ENCOUNTER — TELEPHONE (OUTPATIENT)
Dept: INTERNAL MEDICINE | Facility: CLINIC | Age: 73
End: 2018-12-07

## 2018-12-07 NOTE — TELEPHONE ENCOUNTER
Can discontinue fenofibrate as it is non formulary and pts lipids were normal in 05/18  Fax the progress note of 10/12/18

## 2018-12-07 NOTE — TELEPHONE ENCOUNTER
Fax received from [facility] [employer] for review and signature.  Put in Dr. Leon's in basket.

## 2019-05-30 ENCOUNTER — ANCILLARY PROCEDURE (OUTPATIENT)
Dept: GENERAL RADIOLOGY | Facility: CLINIC | Age: 74
End: 2019-05-30
Attending: FAMILY MEDICINE
Payer: COMMERCIAL

## 2019-05-30 ENCOUNTER — OFFICE VISIT (OUTPATIENT)
Dept: INTERNAL MEDICINE | Facility: CLINIC | Age: 74
End: 2019-05-30
Payer: COMMERCIAL

## 2019-05-30 VITALS
HEART RATE: 116 BPM | OXYGEN SATURATION: 95 % | TEMPERATURE: 98.3 F | BODY MASS INDEX: 35 KG/M2 | SYSTOLIC BLOOD PRESSURE: 122 MMHG | DIASTOLIC BLOOD PRESSURE: 70 MMHG | WEIGHT: 250 LBS | HEIGHT: 71 IN | RESPIRATION RATE: 24 BRPM

## 2019-05-30 DIAGNOSIS — I63.9 CEREBRAL INFARCTION, UNSPECIFIED MECHANISM (H): ICD-10-CM

## 2019-05-30 DIAGNOSIS — M25.512 ACUTE PAIN OF LEFT SHOULDER: Primary | ICD-10-CM

## 2019-05-30 DIAGNOSIS — M75.22 BICEPS TENDONITIS, LEFT: ICD-10-CM

## 2019-05-30 DIAGNOSIS — M25.512 ACUTE PAIN OF LEFT SHOULDER: ICD-10-CM

## 2019-05-30 PROBLEM — N40.0 ENLARGED PROSTATE: Status: RESOLVED | Noted: 2018-10-03 | Resolved: 2019-05-30

## 2019-05-30 PROBLEM — R31.9 HEMATURIA: Status: RESOLVED | Noted: 2018-10-05 | Resolved: 2019-05-30

## 2019-05-30 PROCEDURE — 73030 X-RAY EXAM OF SHOULDER: CPT | Mod: LT

## 2019-05-30 PROCEDURE — 99214 OFFICE O/P EST MOD 30 MIN: CPT | Performed by: FAMILY MEDICINE

## 2019-05-30 ASSESSMENT — PATIENT HEALTH QUESTIONNAIRE - PHQ9
10. IF YOU CHECKED OFF ANY PROBLEMS, HOW DIFFICULT HAVE THESE PROBLEMS MADE IT FOR YOU TO DO YOUR WORK, TAKE CARE OF THINGS AT HOME, OR GET ALONG WITH OTHER PEOPLE: NOT DIFFICULT AT ALL
SUM OF ALL RESPONSES TO PHQ QUESTIONS 1-9: 5
SUM OF ALL RESPONSES TO PHQ QUESTIONS 1-9: 5

## 2019-05-30 ASSESSMENT — MIFFLIN-ST. JEOR: SCORE: 1891.12

## 2019-05-30 NOTE — PROGRESS NOTES
CHIEF COMPLAINT    Pain in left shoulder for 1 month.      HISTORY    She has pain in the left shoulder which radiates down toward his elbow.  He has had it for about a month.  He has pain when he abducts the left arm to 90.  He has difficulty sleeping, especially when lying on his left side.  He had a fall a couple of months ago in [place] which may have contributed.  He has not sought medical attention yet.      He is not complaining of neck pain and this does not really sound like radicular pain.    Patient has balance problems following a CVA.  He typically will ambulate with a cane in left hand which she uses primarily for balance.      Patient Active Problem List   Diagnosis     Other type of migraine     Diverticulitis     Seizure disorder (H)     Mixed hyperlipidemia     HYPERLIPIDEMIA LDL GOAL <100     Obstructive sleep apnea     Meningioma (H)     Advanced directives, counseling/discussion     COPD (chronic obstructive pulmonary disease) (H)     GERD (gastroesophageal reflux disease)     Thoracic aortic aneurysm (H)     Bilateral renal cysts     Acute ischemic stroke (H)     Increased homocysteine (H)     Benign prostatic hyperplasia with lower urinary tract symptoms     Cerebral infarction (H)     PFO (patent foramen ovale)     Type 2 diabetes with stage 3 chronic kidney disease GFR 30-59 (H)     Morbid obesity (H)     Major depressive disorder, recurrent episode, mild (H)     Gastrointestinal hemorrhage, unspecified gastrointestinal hemorrhage type     Essential hypertension     Thoracic aortic aneurysm without rupture (H)     Renal failure     SOB (shortness of breath)     Long-term (current) use of anticoagulants [Z79.01]     Iron deficiency anemia, unspecified iron deficiency anemia type     Hematuria, gross     Current Outpatient Medications   Medication Sig Dispense Refill     fenofibrate 160 MG tablet Take 1 tablet (160 mg) by mouth daily 90 tablet 1     ferrous sulfate (IRON) 325 (65 Fe) MG  tablet Take 325 mg by mouth 2 times daily       FLUoxetine (PROZAC) 40 MG capsule Take 1 capsule (40 mg) by mouth daily 90 capsule 1     folic acid (FOLVITE) 1 MG tablet Take 1 tablet (1 mg) by mouth daily 90 tablet 0     levETIRAcetam (KEPPRA) 500 MG tablet Take 500 mg by mouth 2 times daily       linagliptin (TRADJENTA) 5 MG TABS tablet Take 2.5 mg by mouth daily        liraglutide (VICTOZA) 18 MG/3ML soln Inject 1.8 mg Subcutaneous daily 6 mL 1     metoprolol succinate (TOPROL-XL) 50 MG 24 hr tablet Take 1.5 tablets (75 mg) by mouth daily 45 tablet 0     PANTOPRAZOLE SODIUM PO Take 40 mg by mouth 2 times daily (before meals)       simvastatin (ZOCOR) 10 MG tablet Take 1 tablet (10 mg) by mouth At Bedtime 90 tablet 1     tamsulosin (FLOMAX) 0.4 MG 24 hr capsule Take 1 capsule (0.4 mg) by mouth daily 90 capsule 3     Topiramate (TOPAMAX PO) Take 50 mg by mouth 2 times daily       albuterol (PROAIR HFA/PROVENTIL HFA/VENTOLIN HFA) 108 (90 Base) MCG/ACT inhaler Inhale 2 puffs into the lungs every 6 hours as needed for shortness of breath / dyspnea or wheezing       budesonide-formoterol (SYMBICORT) 80-4.5 MCG/ACT inhaler Inhale 2 puffs into the lungs 2 times daily        insulin glargine (LANTUS SOLOSTAR) 100 UNIT/ML pen Inject 10 Units Subcutaneous At Bedtime 15 mL 1       REVIEW OF SYSTEMS    No fevers.  No S OB above baseline.  No chest pain.  No localized weakness.      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     COPD (chronic obstructive pulmonary disease) (H)      CVA (cerebral infarction)      Diabetes new 4/09    initial A1C 6.8 4/08;; has done diabetic teaching      Diverticulitis of colon (without mention of hemorrhage)(562.11) 2001; 9/06     Gastro-oesophageal reflux disease      Hernia, abdominal      History of blood transfusion      HTN  "(hypertension)      Hyperlipidemia LDL goal < 100      Other forms of migraine, without mention of intractable migraine without mention of status migrainosus      PFO (patent foramen ovale)     small per echo 11/2013     Renal disease     Hx kidney stones 12 yrs ago     Seizure disorder (H)     sees neurologist     Seizures (H)     8 years ago - no recurrence     Sleep apnea     wears CPAP     Stroke (H) early april 2017     Unspecified congenital anomaly of lung     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 possibly from asbestos exposure in [employer] (remote)        EXAM  /70   Pulse 116   Temp 98.3  F (36.8  C) (Oral)   Resp 24   Ht 1.803 m (5' 11\")   Wt 113.4 kg (250 lb)   SpO2 95%   BMI 34.87 kg/m       Patient with obesity.  Ambulates with cane in left hand.  HEENT unremarkable.  Nonlabored breathing.  Abdomen protuberant.    Left shoulder:  Tenderness with abduction beyond 70 degrees and with external rotation.  Tenderness over left biceps tendon.  Rotator cuff tendon not especially tender.    Gait was observed, slightly unsteady.      X-ray of left shoulder looks normal for age.      (M25.512) Acute pain of left shoulder  (primary encounter diagnosis)  Comment:   Plan: XR Shoulder Left G/E 3 Views, ORTHOPEDICS ADULT        REFERRAL            (M75.22) Biceps tendonitis, left  Comment:     This is what I believe he has based on evaluation today.  I am going to have him see orthopedics and consider further management.  I did ask him to try to you use the cane in the right hand for now.    Plan: ORTHOPEDICS ADULT REFERRAL            (I63.9) Cerebral infarction, unspecified mechanism (H)  Comment:   Condition noted.  Likely a contributing factor as noted above.  Plan                                  Answers for HPI/ROS submitted by the patient on 5/30/2019   If you checked off any problems, how difficult have these problems made it for you to do your work, take care of things at home, or get along with other " people?: Not difficult at all  PHQ9 TOTAL SCORE: 5

## 2019-05-31 ASSESSMENT — PATIENT HEALTH QUESTIONNAIRE - PHQ9: SUM OF ALL RESPONSES TO PHQ QUESTIONS 1-9: 5

## 2019-06-04 ENCOUNTER — OFFICE VISIT (OUTPATIENT)
Dept: ORTHOPEDICS | Facility: CLINIC | Age: 74
End: 2019-06-04
Payer: COMMERCIAL

## 2019-06-04 VITALS
HEIGHT: 71 IN | BODY MASS INDEX: 35 KG/M2 | SYSTOLIC BLOOD PRESSURE: 120 MMHG | WEIGHT: 250 LBS | DIASTOLIC BLOOD PRESSURE: 66 MMHG

## 2019-06-04 DIAGNOSIS — Z79.4 TYPE 2 DIABETES MELLITUS WITH STAGE 3 CHRONIC KIDNEY DISEASE, WITH LONG-TERM CURRENT USE OF INSULIN (H): ICD-10-CM

## 2019-06-04 DIAGNOSIS — M75.42 SUBACROMIAL IMPINGEMENT OF LEFT SHOULDER: Primary | ICD-10-CM

## 2019-06-04 DIAGNOSIS — N18.30 TYPE 2 DIABETES MELLITUS WITH STAGE 3 CHRONIC KIDNEY DISEASE, WITH LONG-TERM CURRENT USE OF INSULIN (H): ICD-10-CM

## 2019-06-04 DIAGNOSIS — E11.22 TYPE 2 DIABETES MELLITUS WITH STAGE 3 CHRONIC KIDNEY DISEASE, WITH LONG-TERM CURRENT USE OF INSULIN (H): ICD-10-CM

## 2019-06-04 PROCEDURE — 20610 DRAIN/INJ JOINT/BURSA W/O US: CPT | Mod: LT | Performed by: FAMILY MEDICINE

## 2019-06-04 PROCEDURE — 99214 OFFICE O/P EST MOD 30 MIN: CPT | Mod: 25 | Performed by: FAMILY MEDICINE

## 2019-06-04 RX ORDER — METHYLPREDNISOLONE ACETATE 40 MG/ML
40 INJECTION, SUSPENSION INTRA-ARTICULAR; INTRALESIONAL; INTRAMUSCULAR; SOFT TISSUE
Status: DISCONTINUED | OUTPATIENT
Start: 2019-06-04 | End: 2020-03-27

## 2019-06-04 RX ORDER — LIDOCAINE HYDROCHLORIDE 10 MG/ML
1 INJECTION, SOLUTION INFILTRATION; PERINEURAL
Status: DISCONTINUED | OUTPATIENT
Start: 2019-06-04 | End: 2020-03-27

## 2019-06-04 RX ADMIN — LIDOCAINE HYDROCHLORIDE 1 ML: 10 INJECTION, SOLUTION INFILTRATION; PERINEURAL at 10:38

## 2019-06-04 RX ADMIN — METHYLPREDNISOLONE ACETATE 40 MG: 40 INJECTION, SUSPENSION INTRA-ARTICULAR; INTRALESIONAL; INTRAMUSCULAR; SOFT TISSUE at 10:38

## 2019-06-04 ASSESSMENT — MIFFLIN-ST. JEOR: SCORE: 1891.12

## 2019-06-04 NOTE — PATIENT INSTRUCTIONS
1. Subacromial impingement of left shoulder    2. Type 2 diabetes mellitus with stage 3 chronic kidney disease, with long-term current use of insulin (H)      Has some weakness on exam but not concerned about a large/surgical rotator cuff tear  Steroid injection of the left shoulder: subacromial space was performed today in clinic  Discussed risk of increasing your blood sugar. Be sure to work with your pharmacist to help manage this.  If pain is not well controlled would recommend structured physical therapy    - Would not soak in a hot tub, bath or swimming pool for 48 hours  - Ok to shower  - Ice today and only do your normal amounts of activity  - The lidocaine (what is giving you pain relief right now) will likely stop working in 1-2 hours.  You will then have pain again, similar to before you received the injection. The corticosteroid will not start working until approximately 1-2 weeks from now.  In a small percentage of people, cortisone can cause flushing/redness in the face. This usually lasts for 1-3 days and resolves. Cool compress and Ibuprofen/Tylenol can help if this happens.    Follow up pain is not well controlled.

## 2019-06-04 NOTE — LETTER
6/4/2019         RE: Donnie Vincent  [address]  [place] [address]        Dear Colleague,    Thank you for referring your patient, Donnie Vincent, to the [facility]. Please see a copy of my visit note below.    ASSESSMENT & PLAN    1. Subacromial impingement of left shoulder    2. Type 2 diabetes mellitus with stage 3 chronic kidney disease, with long-term current use of insulin (H)      Seen in consultation for acute left shoulder pain, in nature  Has some weakness on exam and would certainly expect some partial tearing given his age.  Has no giveaway weakness to suspect a large surgical rotator cuff tear   Discussed risk of an injection and he desires to proceed  Steroid injection of the left shoulder: subacromial space was performed today in clinic  Discussed risk of increasing your blood sugar. Be sure to work with your pharmacist to help manage this.  If pain is not well controlled would recommend structured physical therapy    Patient also gives a history of worsening slurred speech and balance.  [facility] and had a recent appointment with neurology with referral to neurosurgery.  Has an upcoming appointment and agree with this work-up and encouraged to make that their focus.    Follow up pain is not well controlled.   -----    SUBJECTIVE  Donnie Vincent is a/an 73 year old Left handed male who is seen in consultation at the request of  Uziel Pollack M.D. for evaluation of left shoulder pain. The patient is seen with their wife.    Onset: approximately 2 month(s) ago. Patient describes injury as a fall 2 months ago while in [place].   Location of Pain: left superior shoulder, radiates into the upper arm.   Rating of Pain at worst: 9/10  Rating of Pain Currently: 8/10  Worsened by: worse in the morning, and at nighttime when trying to sleep on his shoulder. Increased pain with raising the arm overhead, and lifting.   Better  "with: better throughout the day.   Treatments tried: diclofenac (prescrbed in [place]), Lidocaine patches, and Blue Emu topical.   Acetaminophen, ice and heating pad. Patient states the heating pad provided more relief than the ice.   Associated symptoms: patient denies weakness of the arm, but activities are limited due to pain. He notes numbness and tingling in the left shoulder and upper arm, intermittently he has sensation into his hand.   Orthopedic history: NO  Relevant surgical history: NO  Patient Social History: retired    Patient states that he has been having difficulites keeping his balance and slurring his speech. Patient's wife states symptoms started last summer, and over the winter his symptoms have worsened and continue to worsen over the last couple months. [facility].  Patient uses a cane for ambulation.     Patient's past medical, surgical, social, and family histories were reviewed today and no changes are noted.    REVIEW OF SYSTEMS:  10 point ROS is negative other than symptoms noted above in HPI, Past Medical History or as stated below  Constitutional: NEGATIVE for fever, chills, change in weight  Skin: NEGATIVE for worrisome rashes, moles or lesions  GI/: NEGATIVE for bowel or bladder changes  Neuro: NEGATIVE for weakness, dizziness or paresthesias    OBJECTIVE:  /66 (BP Location: Right arm, Patient Position: Chair, Cuff Size: Adult Regular)   Ht 1.803 m (5' 11\")   Wt 113.4 kg (250 lb)   BMI 34.87 kg/m      General: healthy, alert and in no distress  HEENT: no scleral icterus or conjunctival erythema  Skin: no suspicious lesions or rash. No jaundice.  CV: regular rhythm by palpation  Resp: normal respiratory effort without conversational dyspnea   Psych: normal mood and affect  Gait: normal steady gait with appropriate coordination and balance  Neuro: normal light touch sensory exam of the bilateral upper extremities.    MSK:  LEFT " SHOULDER  Inspection:    no atrophy  Palpation:    Tender about the supraspinatus insertion. Remainder of bony and tendinous landmarks are nontender.  Active Range of Motion:     Abduction 1500, FF 1650, , IR normal.    Strength:    Scapular plane abduction 5-/5, painful,  ER 5/5, IR 5/5  Special Tests:    Positive: Aly', supraspinatus (empty can)     Independent visualization of the below image:  Recent Results (from the past 744 hour(s))   XR Shoulder Left G/E 3 Views    Narrative    LEFT SHOULDER THREE OR MORE VIEWS  5/30/2019 3:27 PM     HISTORY: Acute pain of left shoulder.    COMPARISON: None.      Impression    IMPRESSION: Glenohumeral and acromioclavicular articulations intact.  No acute fracture.    SUZI ANDERS MD     Large Joint Injection/Arthocentesis: L subacromial bursa  Date/Time: 6/4/2019 10:38 AM  Performed by: Cheri Dukes DO  Authorized by: Cheri Dukes DO     Indications:  Pain  Needle Size:  25 G  Guidance: landmark guided    Approach:  Posterior  Location:  Shoulder      Site:  L subacromial bursa  Medications:  40 mg methylPREDNISolone 40 MG/ML; 1 mL lidocaine 1 %  Outcome:  Tolerated well, no immediate complications  Procedure discussed: discussed risks, benefits, and alternatives    Consent Given by:  Patient  Timeout: timeout called immediately prior to procedure    Prep: patient was prepped and draped in usual sterile fashion             Prior to the procedure patient reported pain level: 8/10, after completion of the procedure patient notes pain level 0/10.     Patient's conditions were thoroughly discussed during today's visit with greater than 50% of the visit spent counseling the patient with total time spent face-to-face with the patient being 25 minutes with injection taking 5 minutes.    Cheri Dukes DO CAM  [facility]      Again, thank you for allowing me to participate in the care of your patient.         Sincerely,        Cheri Dukes DO

## 2019-06-04 NOTE — PROGRESS NOTES
ASSESSMENT & PLAN    1. Subacromial impingement of left shoulder    2. Type 2 diabetes mellitus with stage 3 chronic kidney disease, with long-term current use of insulin (H)      Seen in consultation for acute left shoulder pain, in nature  Has some weakness on exam and would certainly expect some partial tearing given his age.  Has no giveaway weakness to suspect a large surgical rotator cuff tear   Discussed risk of an injection and he desires to proceed  Steroid injection of the left shoulder: subacromial space was performed today in clinic  Discussed risk of increasing your blood sugar. Be sure to work with your pharmacist to help manage this.  If pain is not well controlled would recommend structured physical therapy    Patient also gives a history of worsening slurred speech and balance.  [facility] and had a recent appointment with neurology with referral to neurosurgery.  Has an upcoming appointment and agree with this work-up and encouraged to make that their focus.    Follow up pain is not well controlled.   -----    SUBJECTIVE  Donnie Vincent is a/an 73 year old Left handed male who is seen in consultation at the request of  Uziel Pollack M.D. for evaluation of left shoulder pain. The patient is seen with their wife.    Onset: approximately 2 month(s) ago. Patient describes injury as a fall 2 months ago while in [place].   Location of Pain: left superior shoulder, radiates into the upper arm.   Rating of Pain at worst: 9/10  Rating of Pain Currently: 8/10  Worsened by: worse in the morning, and at nighttime when trying to sleep on his shoulder. Increased pain with raising the arm overhead, and lifting.   Better with: better throughout the day.   Treatments tried: diclofenac (prescrbed in [place]), Lidocaine patches, and Blue Emu topical.   Acetaminophen, ice and heating pad. Patient states the heating pad provided more relief than the ice.   Associated symptoms:  "patient denies weakness of the arm, but activities are limited due to pain. He notes numbness and tingling in the left shoulder and upper arm, intermittently he has sensation into his hand.   Orthopedic history: NO  Relevant surgical history: NO  Patient Social History: retired    Patient states that he has been having difficulites keeping his balance and slurring his speech. Patient's wife states symptoms started last summer, and over the winter his symptoms have worsened and continue to worsen over the last couple months. [facility].  Patient uses a cane for ambulation.     Patient's past medical, surgical, social, and family histories were reviewed today and no changes are noted.    REVIEW OF SYSTEMS:  10 point ROS is negative other than symptoms noted above in HPI, Past Medical History or as stated below  Constitutional: NEGATIVE for fever, chills, change in weight  Skin: NEGATIVE for worrisome rashes, moles or lesions  GI/: NEGATIVE for bowel or bladder changes  Neuro: NEGATIVE for weakness, dizziness or paresthesias    OBJECTIVE:  /66 (BP Location: Right arm, Patient Position: Chair, Cuff Size: Adult Regular)   Ht 1.803 m (5' 11\")   Wt 113.4 kg (250 lb)   BMI 34.87 kg/m     General: healthy, alert and in no distress  HEENT: no scleral icterus or conjunctival erythema  Skin: no suspicious lesions or rash. No jaundice.  CV: regular rhythm by palpation  Resp: normal respiratory effort without conversational dyspnea   Psych: normal mood and affect  Gait: normal steady gait with appropriate coordination and balance  Neuro: normal light touch sensory exam of the bilateral upper extremities.    MSK:  LEFT SHOULDER  Inspection:    no atrophy  Palpation:    Tender about the supraspinatus insertion. Remainder of bony and tendinous landmarks are nontender.  Active Range of Motion:     Abduction 1500, FF 1650, , IR normal.    Strength:    Scapular plane " abduction 5-/5, painful,  ER 5/5, IR 5/5  Special Tests:    Positive: Aly', supraspinatus (empty can)     Independent visualization of the below image:  Recent Results (from the past 744 hour(s))   XR Shoulder Left G/E 3 Views    Narrative    LEFT SHOULDER THREE OR MORE VIEWS  5/30/2019 3:27 PM     HISTORY: Acute pain of left shoulder.    COMPARISON: None.      Impression    IMPRESSION: Glenohumeral and acromioclavicular articulations intact.  No acute fracture.    SUZI ANDERS MD     Large Joint Injection/Arthocentesis: L subacromial bursa  Date/Time: 6/4/2019 10:38 AM  Performed by: Cheri Dukes DO  Authorized by: Cheri Dukes DO     Indications:  Pain  Needle Size:  25 G  Guidance: landmark guided    Approach:  Posterior  Location:  Shoulder      Site:  L subacromial bursa  Medications:  40 mg methylPREDNISolone 40 MG/ML; 1 mL lidocaine 1 %  Outcome:  Tolerated well, no immediate complications  Procedure discussed: discussed risks, benefits, and alternatives    Consent Given by:  Patient  Timeout: timeout called immediately prior to procedure    Prep: patient was prepped and draped in usual sterile fashion             Prior to the procedure patient reported pain level: 8/10, after completion of the procedure patient notes pain level 0/10.     Patient's conditions were thoroughly discussed during today's visit with greater than 50% of the visit spent counseling the patient with total time spent face-to-face with the patient being 25 minutes with injection taking 5 minutes.    Cheri Dukes DO [facility] [place]

## 2019-06-21 ENCOUNTER — TRANSFERRED RECORDS (OUTPATIENT)
Dept: HEALTH INFORMATION MANAGEMENT | Facility: CLINIC | Age: 74
End: 2019-06-21

## 2019-06-21 ENCOUNTER — MEDICAL CORRESPONDENCE (OUTPATIENT)
Dept: HEALTH INFORMATION MANAGEMENT | Facility: CLINIC | Age: 74
End: 2019-06-21

## 2019-06-21 DIAGNOSIS — R06.02 SHORTNESS OF BREATH: ICD-10-CM

## 2019-06-21 DIAGNOSIS — D32.9 MENINGIOMA (H): ICD-10-CM

## 2019-06-21 DIAGNOSIS — M54.2 NECK PAIN: ICD-10-CM

## 2019-06-21 DIAGNOSIS — I67.1 ANEURYSM, CEREBRAL: Primary | ICD-10-CM

## 2019-06-25 ENCOUNTER — HOSPITAL ENCOUNTER (OUTPATIENT)
Dept: LAB | Facility: CLINIC | Age: 74
Discharge: HOME | End: 2019-06-25
Attending: PSYCHIATRY & NEUROLOGY | Admitting: PSYCHIATRY & NEUROLOGY
Payer: COMMERCIAL

## 2019-06-25 DIAGNOSIS — M54.2 NECK PAIN: ICD-10-CM

## 2019-06-25 DIAGNOSIS — R06.02 SHORTNESS OF BREATH: ICD-10-CM

## 2019-06-25 DIAGNOSIS — D32.9 MENINGIOMA (H): ICD-10-CM

## 2019-06-25 DIAGNOSIS — I67.1 ANEURYSM, CEREBRAL: ICD-10-CM

## 2019-06-25 PROCEDURE — 83520 IMMUNOASSAY QUANT NOS NONAB: CPT | Mod: 91 | Performed by: PSYCHIATRY & NEUROLOGY

## 2019-06-25 PROCEDURE — 84999 UNLISTED CHEMISTRY PROCEDURE: CPT | Mod: 91 | Performed by: PSYCHIATRY & NEUROLOGY

## 2019-06-25 PROCEDURE — 86235 NUCLEAR ANTIGEN ANTIBODY: CPT | Performed by: PSYCHIATRY & NEUROLOGY

## 2019-06-25 PROCEDURE — 83516 IMMUNOASSAY NONANTIBODY: CPT | Performed by: PSYCHIATRY & NEUROLOGY

## 2019-06-25 PROCEDURE — 36415 COLL VENOUS BLD VENIPUNCTURE: CPT | Performed by: PSYCHIATRY & NEUROLOGY

## 2019-07-11 DIAGNOSIS — D32.9 MENINGIOMA (H): ICD-10-CM

## 2019-07-11 DIAGNOSIS — I63.9 CEREBROVASCULAR ACCIDENT (H): Primary | ICD-10-CM

## 2019-07-16 ENCOUNTER — HOSPITAL ENCOUNTER (OUTPATIENT)
Dept: LAB | Facility: CLINIC | Age: 74
Discharge: HOME | End: 2019-07-16
Attending: PSYCHIATRY & NEUROLOGY | Admitting: PSYCHIATRY & NEUROLOGY
Payer: COMMERCIAL

## 2019-07-16 DIAGNOSIS — I63.9 CEREBROVASCULAR ACCIDENT (H): ICD-10-CM

## 2019-07-16 DIAGNOSIS — D32.9 MENINGIOMA (H): ICD-10-CM

## 2019-07-16 PROCEDURE — 36415 COLL VENOUS BLD VENIPUNCTURE: CPT | Performed by: PSYCHIATRY & NEUROLOGY

## 2019-07-16 PROCEDURE — 86255 FLUORESCENT ANTIBODY SCREEN: CPT | Performed by: PSYCHIATRY & NEUROLOGY

## 2019-07-16 PROCEDURE — 83519 RIA NONANTIBODY: CPT | Performed by: PSYCHIATRY & NEUROLOGY

## 2019-07-16 PROCEDURE — 83520 IMMUNOASSAY QUANT NOS NONAB: CPT | Performed by: PSYCHIATRY & NEUROLOGY

## 2019-07-18 LAB — MISCELLANEOUS TEST: NORMAL

## 2019-07-22 LAB
LAB SCANNED RESULT: NORMAL
PNP ABY SERUM: NORMAL

## 2019-07-26 ENCOUNTER — OFFICE VISIT (OUTPATIENT)
Dept: INTERNAL MEDICINE | Facility: CLINIC | Age: 74
End: 2019-07-26
Payer: COMMERCIAL

## 2019-07-26 ENCOUNTER — HOSPITAL ENCOUNTER (OUTPATIENT)
Dept: GENERAL RADIOLOGY | Facility: CLINIC | Age: 74
End: 2019-07-26
Attending: INTERNAL MEDICINE
Payer: COMMERCIAL

## 2019-07-26 ENCOUNTER — HOSPITAL ENCOUNTER (OUTPATIENT)
Dept: LAB | Facility: CLINIC | Age: 74
Discharge: HOME | End: 2019-07-26
Attending: PSYCHIATRY & NEUROLOGY | Admitting: PSYCHIATRY & NEUROLOGY
Payer: COMMERCIAL

## 2019-07-26 VITALS
SYSTOLIC BLOOD PRESSURE: 120 MMHG | TEMPERATURE: 97.8 F | HEIGHT: 71 IN | OXYGEN SATURATION: 97 % | BODY MASS INDEX: 34.58 KG/M2 | WEIGHT: 247 LBS | HEART RATE: 85 BPM | RESPIRATION RATE: 16 BRPM | DIASTOLIC BLOOD PRESSURE: 64 MMHG

## 2019-07-26 DIAGNOSIS — J34.89 NOSE PAIN: ICD-10-CM

## 2019-07-26 DIAGNOSIS — M79.645 FINGER PAIN, LEFT: Primary | ICD-10-CM

## 2019-07-26 DIAGNOSIS — S62.645A CLOSED NONDISPLACED FRACTURE OF PROXIMAL PHALANX OF LEFT RING FINGER, INITIAL ENCOUNTER: ICD-10-CM
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
## 2019-07-26 DIAGNOSIS — T14.8XXA ABRASION OF SKIN: ICD-10-CM

## 2019-07-26 DIAGNOSIS — S80.11XA HEMATOMA OF RIGHT LOWER EXTREMITY, INITIAL ENCOUNTER: ICD-10-CM

## 2019-07-26 DIAGNOSIS — R26.9 GAIT ABNORMALITY: ICD-10-CM

## 2019-07-26 PROCEDURE — 73140 X-RAY EXAM OF FINGER(S): CPT | Mod: LT

## 2019-07-26 PROCEDURE — 99214 OFFICE O/P EST MOD 30 MIN: CPT | Performed by: INTERNAL MEDICINE

## 2019-07-26 PROCEDURE — 70160 X-RAY EXAM OF NASAL BONES: CPT

## 2019-07-26 ASSESSMENT — MIFFLIN-ST. JEOR: SCORE: 1877.51

## 2019-07-26 NOTE — PATIENT INSTRUCTIONS
Plan:  1. Finger and nose XRay - - [address]  2. Consider moving to 1 level house  3. No changes in meds   4. If the left finger changes color you need to go to ER to have the ring removed

## 2019-07-26 NOTE — PROGRESS NOTES
Dr Justin's note      Patient's instructions / PLAN:                                                        Plan:  1. Finger and nose XRay - - [address]  2. Consider moving to 1 level house  3. No changes in meds   4. If the left finger changes color you need to go to ER to have the ring removed    ASSESSMENT & PLAN:                                                      (M79.645) Finger pain, left  (primary encounter diagnosis)  Comment: XRay: fracture.   Plan: XR Finger Left G/E 2 Views        Ortho referal     (J34.89) Nose pain  Comment: XRay: no fracture   Plan: XR Nasal Bones 3 Views            (T14.8XXA) Abrasion of skin  Comment:   Plan: Watch for signs of infection    (S80.11XA) Hematoma of right lower extremity, initial encounter  Comment:   Plan: Watch for signs of infection    (R26.9) Gait abnormality  (W19.XXXA) Fall, initial encounter  Comment:   Plan: Advised for safety.  Advised to move to 1 level house       Chief complaint:                                                      Fall on 7/25/19  Present with Wife    SUBJECTIVE:                                                      Donnie Vincent is a 73 year old male who presents to clinic today for the following health issues:    Pt fell down some stairs on 7/24/19 at home.  --  Swollen left 4th finger, abrasion on nose, back and neck pain.   --  They live in a split level home and he uses a cane while walking  --  He leaned to the right, the top of the cane broke and he fell down the stairs  --  Hit his nose and it bled, used left hand to break fall, back of his head hurts, and has a leg wound  --  He says he has been having problems with balance, slurred speech, and double vision once in a while (uses a cane for balance)  --The neurologist suspects myasthenia gravis.  He is waiting for the test results.  He hopes that with treatment he will improve  --  Denies headache or new change of vision  --  He uses a walker in his home  --  No loss of  "consciousness   --  No low BS      Because of his fall and because of his neurologic symptoms I advised him and his wife to look into one floor house.  He hopes his neurologic symptoms will improve with future treatment.  I told him that the wait list for senior apartments can  2 years or longer    Review of Systems:                                                      ROS: negative for fever, chills, cough, wheezes, chest pain, shortness of breath, vomiting, abdominal pain, leg swelling     This document serves as a record of the services and decisions personally performed and made by Margoth Rodriguez MD. It was created on her behalf by Carissa Salinas, a trained medical scribe. The creation of this document is based on the provider's statements to the medical scribe.  Carissa Salinas July 26, 2019 2:40 PM     OBJECTIVE:         Physical exam:  /64   Pulse 85   Temp 97.8  F (36.6  C) (Oral)   Resp 16   Ht 1.803 m (5' 11\")   Wt 112 kg (247 lb)   SpO2 97%   BMI 34.45 kg/m     NAD, appears comfortable  Skin: no rashes   HEENT: PERRLA, EOMI, pink conjunctiva, anicteric sclerae, bilateral tympanic membranes are clinically normal,   Neck: supple, no JVD,  No thyroidmegaly. Lymph nodes nonpalpable cervical and supraclavicular.  Chest: clear to auscultation bilaterally, good respiratory effort  Heart: S1 S2, RRR, no mgr appreciated  Abdomen: soft, not tender,   Extremities: no edema,  Neurologic: A, Ox3, no focal signs appreciated  EXAM:  --  Anterior shin with hematoma superficial wound 1 x 2 cm, no cellulitis   --  Abrasion on the nose and L forehead  --  L lower back hematoma   --  L 4th finger hematoma, but the nail and tip of finger is pink (same color as other fingers), he can still move it    PMHx: reviewed  Past Medical History:   Diagnosis Date     Anemia      BPH (benign prostatic hypertrophy)     sees urologist in [place]     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     COPD (chronic obstructive pulmonary disease) (H)      CVA (cerebral infarction)      Diabetes new 4/09    initial A1C 6.8 4/08;; has done diabetic teaching      Diverticulitis of colon (without mention of hemorrhage)(562.11) 2001; 9/06     Gastro-oesophageal reflux disease      Hernia, abdominal      History of blood transfusion      HTN (hypertension)      Hyperlipidemia LDL goal < 100      Other forms of migraine, without mention of intractable migraine without mention of status migrainosus      PFO (patent foramen ovale)     small per echo 11/2013     Renal disease     Hx kidney stones 12 yrs ago     Seizure disorder (H)     sees neurologist     Seizures (H)     8 years ago - no recurrence     Sleep apnea     wears CPAP     Stroke (H) early april 2017     Unspecified congenital anomaly of lung     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 possibly from asbestos exposure in [employer] (remote)       PSHx: reviewed  Past Surgical History:   Procedure Laterality Date     C NONSPECIFIC PROCEDURE      colonoscopy 2005     COLONOSCOPY  11/4/2015    Dr. Orozco [facility]     COLONOSCOPY N/A 11/4/2015    Procedure: COLONOSCOPY;  Surgeon: Yazmin Orozco MD;  Location:  GI     CYSTOSCOPY, RETROGRADES, EXTRACT STONE, COMBINED Left 11/25/2015    Procedure: COMBINED CYSTOSCOPY, RETROGRADES, EXTRACT STONE;  Surgeon: Neville Banuelos MD;  Location:  OR     CYSTOSCOPY, TRANSURETHRAL RESECTION (TUR) PROSTATE, COMBINED N/A 10/3/2018    Procedure: COMBINED CYSTOSCOPY, TRANSURETHRAL RESECTION (TUR) PROSTATE;  Cystoscopy, removal of bladder stones with holmium laser, transurethral resection of prostate using Olympus bipolar electrode;  Surgeon: Neville Banuelos MD;  Location:  OR     ESOPHAGOSCOPY, GASTROSCOPY, DUODENOSCOPY (EGD), COMBINED  11/5/2015    Dr. Ernesto SPEARS     ESOPHAGOSCOPY, GASTROSCOPY, DUODENOSCOPY (EGD), COMBINED  06/27/2018    Dr. Ernesto CONTRERAS     GENITOURINARY SURGERY      kidney stone -  lithotripsy     HERNIA REPAIR       LASER HOLMIUM LITHOTRIPSY BLADDER N/A 10/3/2018    Procedure: LASER HOLMIUM LITHOTRIPSY BLADDER;;  Surgeon: Neville Banuelos MD;  Location:  OR     PHACOEMULSIFICATION CLEAR CORNEA WITH STANDARD INTRAOCULAR LENS IMPLANT  12/20/2011    Procedure:PHACOEMULSIFICATION CLEAR CORNEA WITH STANDARD INTRAOCULAR LENS IMPLANT; RIGHT PHACOEMULSIFICATION CLEAR CORNEA WITH STANDARD INTRAOCULAR LENS IMPLANT ; Surgeon:GUILHERME KAUFMAN; Location[facility]        Meds: reviewed  Current Outpatient Medications   Medication Sig Dispense Refill     albuterol (PROAIR HFA/PROVENTIL HFA/VENTOLIN HFA) 108 (90 Base) MCG/ACT inhaler Inhale 2 puffs into the lungs every 6 hours as needed for shortness of breath / dyspnea or wheezing       budesonide-formoterol (SYMBICORT) 80-4.5 MCG/ACT inhaler Inhale 2 puffs into the lungs 2 times daily        fenofibrate 160 MG tablet Take 1 tablet (160 mg) by mouth daily 90 tablet 1     ferrous sulfate (IRON) 325 (65 Fe) MG tablet Take 325 mg by mouth 2 times daily       FLUoxetine (PROZAC) 40 MG capsule Take 1 capsule (40 mg) by mouth daily 90 capsule 1     folic acid (FOLVITE) 1 MG tablet Take 1 tablet (1 mg) by mouth daily 90 tablet 0     insulin glargine (LANTUS SOLOSTAR) 100 UNIT/ML pen Inject 10 Units Subcutaneous At Bedtime 15 mL 1     levETIRAcetam (KEPPRA) 500 MG tablet Take 500 mg by mouth 2 times daily       linagliptin (TRADJENTA) 5 MG TABS tablet Take 2.5 mg by mouth daily        liraglutide (VICTOZA) 18 MG/3ML soln Inject 1.8 mg Subcutaneous daily 6 mL 1     metoprolol succinate (TOPROL-XL) 50 MG 24 hr tablet Take 1.5 tablets (75 mg) by mouth daily 45 tablet 0     PANTOPRAZOLE SODIUM PO Take 40 mg by mouth 2 times daily (before meals)       simvastatin (ZOCOR) 10 MG tablet Take 1 tablet (10 mg) by mouth At Bedtime 90 tablet 1     tamsulosin (FLOMAX) 0.4 MG 24 hr capsule Take 1 capsule (0.4 mg) by mouth daily 90 capsule 3     Topiramate (TOPAMAX PO)  Take 50 mg by mouth 2 times daily         Soc Hx: reviewed  Fam Hx: reviewed    The information in this document, created by the medical scribe for me, accurately reflects the services I personally performed and the decisions made by me. I have reviewed and approved this document for accuracy prior to leaving the patient care area.  July 26, 2019 2:54 PM     Jennifer Justin MD  Internal Medicine

## 2019-07-27 ENCOUNTER — TELEPHONE (OUTPATIENT)
Dept: INTERNAL MEDICINE | Facility: CLINIC | Age: 74
End: 2019-07-27

## 2019-07-27 NOTE — TELEPHONE ENCOUNTER
Please call the patient with the following results:    --There is no signs of fracture of the nose bones    --There is small fracture on the left fourth finger.  I made a referral for Ortho.

## 2019-08-02 ENCOUNTER — TRANSFERRED RECORDS (OUTPATIENT)
Dept: HEALTH INFORMATION MANAGEMENT | Facility: CLINIC | Age: 74
End: 2019-08-02

## 2019-08-02 ENCOUNTER — OFFICE VISIT (OUTPATIENT)
Dept: ORTHOPEDICS | Facility: CLINIC | Age: 74
End: 2019-08-02
Payer: COMMERCIAL

## 2019-08-02 ENCOUNTER — HOSPITAL ENCOUNTER (OUTPATIENT)
Dept: LAB | Facility: CLINIC | Age: 74
End: 2019-08-02
Attending: PSYCHIATRY & NEUROLOGY
Payer: COMMERCIAL

## 2019-08-02 ENCOUNTER — MEDICAL CORRESPONDENCE (OUTPATIENT)
Dept: HEALTH INFORMATION MANAGEMENT | Facility: CLINIC | Age: 74
End: 2019-08-02

## 2019-08-02 VITALS
SYSTOLIC BLOOD PRESSURE: 117 MMHG | DIASTOLIC BLOOD PRESSURE: 61 MMHG | BODY MASS INDEX: 34.58 KG/M2 | HEIGHT: 71 IN | WEIGHT: 247 LBS

## 2019-08-02 DIAGNOSIS — S62.645A CLOSED NONDISPLACED FRACTURE OF PROXIMAL PHALANX OF LEFT RING FINGER, INITIAL ENCOUNTER: Primary | ICD-10-CM

## 2019-08-02 PROCEDURE — 99214 OFFICE O/P EST MOD 30 MIN: CPT | Performed by: FAMILY MEDICINE

## 2019-08-02 ASSESSMENT — MIFFLIN-ST. JEOR: SCORE: 1877.51

## 2019-08-02 NOTE — LETTER
8/2/2019         RE: Donnie Vincent  [address]  [place] [address]        Dear Colleague,    Thank you for referring your patient, Donnie Vincent, to the [facility] SP[facility]TS [facility]. Please see a copy of my visit note below.    ASSESSMENT & PLAN  Patient Instructions     1. Closed nondisplaced fracture of proximal phalanx of left ring finger, initial encounter      -Patient has left fourth finger pain and swelling due to a proximal phalanx fracture  -Patient had significant swelling around his finger that was worsening due to his wedding ring.  Patient's ring was manually cut off in office today with a manual ring cutter.  -Patient will ice and elevate the finger and perform gentle range of motion exercises at home.  -Patient will avoid oral anti-inflammatory medications due to his multiple medications and potential risk for internal bleeding.  -Patient will follow-up in 4 weeks for repeat x-rays and reevaluation.  If patient continues to have stiffness and loss of range of motion, will start formal hand therapy at that time.  -Call direct clinic number [[phone]] at any time with questions or concerns.    Albert Yeo MD [facility] Orthopedics and Sports Medicine  [facility]          -----    SUBJECTIVE  Donnie Vincent is a/an 73 year old Left handed male who is seen in consultation at the request of  Jennifer Vasquez M.D. for evaluation of left 4th finger pain. The patient is seen with their wife.    Onset: 7/24/19. Patient describes injury as he fell down the stairs and landed on the left side of his body.  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     COPD (chronic obstructive pulmonary disease) (H)      CVA (cerebral infarction)      Diabetes new     initial A1C 6.8 ;; has done diabetic teaching      Diverticulitis of colon (without mention of hemorrhage)(562.11) ;      Gastro-oesophageal reflux disease      Hernia, abdominal      History of blood transfusion      HTN (hypertension)      Hyperlipidemia LDL goal < 100      Other forms of migraine, without mention of intractable migraine without mention of status migrainosus      PFO (patent foramen ovale)     small per echo 2013     Renal disease     Hx kidney stones 12 yrs ago     Seizure disorder (H)     sees neurologist     Seizures (H)     8 years ago - no recurrence     Sleep apnea     wears CPAP     Stroke (H) early 2017     Unspecified congenital anomaly of lung     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 possibly from asbestos exposure in [employer] (remote)      Social History     Socioeconomic History     Marital status:      Spouse name: Maico     Number of children: 2     Years of education: Not on file     Highest education level: Not on file   Occupational History     Occupation: teacher     Employer: RETIRED     Comment: retired     Social Needs     Financial resource strain: Not on file     Food insecurity:     Worry: Not on file     Inability: Not on file     Transportation needs:     Medical: Not on file     Non-medical: Not on file   Tobacco Use     Smoking status: Former Smoker     Last attempt to quit: 1970     Years since quittin.6     Smokeless tobacco: Never Used     Tobacco comment: quit age 28   Substance and Sexual Activity     Alcohol use: Yes     Comment: 1 beer/week     Drug use: No     Sexual  "activity: Yes     Partners: Female   Lifestyle     Physical activity:     Days per week: Not on file     Minutes per session: Not on file     Stress: Not on file   Relationships     Social connections:     Talks on phone: Not on file     Gets together: Not on file     Attends Restorationism service: Not on file     Active member of club or organization: Not on file     Attends meetings of clubs or organizations: Not on file     Relationship status: Not on file     Intimate partner violence:     Fear of current or ex partner: Not on file     Emotionally abused: Not on file     Physically abused: Not on file     Forced sexual activity: Not on file   Other Topics Concern     Parent/sibling w/ CABG, MI or angioplasty before 65F 55M? Not Asked      Service Not Asked     Blood Transfusions Not Asked     Caffeine Concern No     Comment: 1-2 coffee a day     Occupational Exposure Not Asked     Hobby Hazards Not Asked     Sleep Concern No     Comment: sleep apnea , c-pap     Stress Concern Not Asked     Weight Concern Not Asked     Special Diet Yes     Comment: trying diabetic      Back Care Not Asked     Exercise No     Bike Helmet Not Asked     Seat Belt Yes     Self-Exams Not Asked   Social History Narrative     Not on file         Patient's past medical, surgical, social, and family histories were reviewed today and no changes are noted.    REVIEW OF SYSTEMS:  10 point ROS is negative other than symptoms noted above in HPI, Past Medical History or as stated below  Constitutional: NEGATIVE for fever, chills, change in weight  Skin: NEGATIVE for worrisome rashes, moles or lesions  GI/: NEGATIVE for bowel or bladder changes  Neuro: NEGATIVE for weakness, dizziness or paresthesias    OBJECTIVE:  /61   Ht 1.803 m (5' 11\")   Wt 112 kg (247 lb)   BMI 34.45 kg/m      General: healthy, alert and in no distress  HEENT: no scleral icterus or conjunctival erythema  Skin: no suspicious lesions or rash. No jaundice.  CV: " regular rhythm by palpation  Resp: normal respiratory effort without conversational dyspnea   Psych: normal mood and affect  Gait: normal steady gait with appropriate coordination and balance  Neuro: normal light touch sensory exam of the bilateral hands.    MSK:  LEFT HAND  Inspection:    Significant swelling of entire 4th digit due to wedding ring, ecchymosis around distal phalanx  Palpation:   Carpals: normal   Metacarpals: normal   Thumb: normal   Fingers: proximal phalanx, PIP joint tenderness  Range of Motion:    flexion MCP limited slightly by pain and swelling, extension MCP limited slightly by pain and swelling, flexion PIP limited by pain and swelling, extension PIP limited by pain and swelling  Strength:    extension limited by tightness limited by pain, flexion limited by tightness limited by pain  Special Tests:    Positive: none       Independent visualization of the below image:  No results found for this or any previous visit (from the past 24 hour(s)).  FINGER LEFT TWO OR MORE VIEWS   7/26/2019 4:19 PM      HISTORY:  Left 4th finger hematoma, status post fall, rule out  fracture. Finger pain, left.     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 Mild  to moderate osteoarthrosis in the MCP joints and the interphalangeal  joints of the fingers and thumb. Fusiform soft tissue swelling about  the PIP joint of the ring finger. There is a small minimally impacted  and displaced oblique intra-articular fracture of the radial aspect of  the distal end of the proximal phalanx of the fourth finger. Otherwise  negative.                                                                      IMPRESSION: Small minimally displaced and impacted fracture of the  distal end of the proximal phalanx of the ring finger.     CB NEUMANN MD    Patient's conditions were thoroughly discussed during today's visit with greater than 50% of the visit spent counseling the patient with total time spent  face-to-face with the patient being  50 minutes.    Albert Yeo MD [facility] Sports and Orthopedic Care      Again, thank you for allowing me to participate in the care of your patient.        Sincerely,        Albert Yeo, MD

## 2019-08-02 NOTE — PROGRESS NOTES
ASSESSMENT & PLAN  Patient Instructions     1. Closed nondisplaced fracture of proximal phalanx of left ring finger, initial encounter      -Patient has left fourth finger pain and swelling due to a proximal phalanx fracture  -Patient had significant swelling around his finger that was worsening due to his wedding ring.  Patient's ring was manually cut off in office today with a manual ring cutter.  -Patient will ice and elevate the finger and perform gentle range of motion exercises at home.  -Patient will avoid oral anti-inflammatory medications due to his multiple medications and potential risk for internal bleeding.  -Patient will follow-up in 4 weeks for repeat x-rays and reevaluation.  If patient continues to have stiffness and loss of range of motion, will start formal hand therapy at that time.  -Call direct clinic number [[phone]] at any time with questions or concerns.    Albert Yeo MD [facility] Orthopedics and Sports Medicine  [facility]          -----    SUBJECTIVE  Donnie Vincent is a/an 73 year old Left handed male who is seen in consultation at the request of  Jennifer Vasquez M.D. for evaluation of left 4th finger pain. The patient is seen with their wife.    Onset: 7/24/19. Patient describes injury as he fell down the stairs and landed on the left side of his body.  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      COPD (chronic obstructive pulmonary disease) (H)      CVA (cerebral infarction)      Diabetes new     initial A1C 6.8 ;; has done diabetic teaching      Diverticulitis of colon (without mention of hemorrhage)(562.11) ;      Gastro-oesophageal reflux disease      Hernia, abdominal      History of blood transfusion      HTN (hypertension)      Hyperlipidemia LDL goal < 100      Other forms of migraine, without mention of intractable migraine without mention of status migrainosus      PFO (patent foramen ovale)     small per echo 2013     Renal disease     Hx kidney stones 12 yrs ago     Seizure disorder (H)     sees neurologist     Seizures (H)     8 years ago - no recurrence     Sleep apnea     wears CPAP     Stroke (H) early 2017     Unspecified congenital anomaly of lung     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 possibly from asbestos exposure in [employer] (remote)      Social History     Socioeconomic History     Marital status:      Spouse name: Maico     Number of children: 2     Years of education: Not on file     Highest education level: Not on file   Occupational History     Occupation: teacher     Employer: RETIRED     Comment: retired     Social Needs     Financial resource strain: Not on file     Food insecurity:     Worry: Not on file     Inability: Not on file     Transportation needs:     Medical: Not on file     Non-medical: Not on file   Tobacco Use     Smoking status: Former Smoker     Last attempt to quit: 1970     Years since quittin.6     Smokeless tobacco: Never Used     Tobacco comment: quit age 28   Substance and Sexual Activity     Alcohol use: Yes     Comment: 1 beer/week     Drug use: No     Sexual activity: Yes     Partners: Female   Lifestyle     Physical activity:     Days per week: Not on file     Minutes per session: Not on file     Stress: Not on file   Relationships     Social connections:     Talks on phone: Not on file     Gets together: Not on  "file     Attends Taoist service: Not on file     Active member of club or organization: Not on file     Attends meetings of clubs or organizations: Not on file     Relationship status: Not on file     Intimate partner violence:     Fear of current or ex partner: Not on file     Emotionally abused: Not on file     Physically abused: Not on file     Forced sexual activity: Not on file   Other Topics Concern     Parent/sibling w/ CABG, MI or angioplasty before 65F 55M? Not Asked      Service Not Asked     Blood Transfusions Not Asked     Caffeine Concern No     Comment: 1-2 coffee a day     Occupational Exposure Not Asked     Hobby Hazards Not Asked     Sleep Concern No     Comment: sleep apnea , c-pap     Stress Concern Not Asked     Weight Concern Not Asked     Special Diet Yes     Comment: trying diabetic      Back Care Not Asked     Exercise No     Bike Helmet Not Asked     Seat Belt Yes     Self-Exams Not Asked   Social History Narrative     Not on file         Patient's past medical, surgical, social, and family histories were reviewed today and no changes are noted.    REVIEW OF SYSTEMS:  10 point ROS is negative other than symptoms noted above in HPI, Past Medical History or as stated below  Constitutional: NEGATIVE for fever, chills, change in weight  Skin: NEGATIVE for worrisome rashes, moles or lesions  GI/: NEGATIVE for bowel or bladder changes  Neuro: NEGATIVE for weakness, dizziness or paresthesias    OBJECTIVE:  /61   Ht 1.803 m (5' 11\")   Wt 112 kg (247 lb)   BMI 34.45 kg/m     General: healthy, alert and in no distress  HEENT: no scleral icterus or conjunctival erythema  Skin: no suspicious lesions or rash. No jaundice.  CV: regular rhythm by palpation  Resp: normal respiratory effort without conversational dyspnea   Psych: normal mood and affect  Gait: normal steady gait with appropriate coordination and balance  Neuro: normal light touch sensory exam of the bilateral hands.  "   MSK:  LEFT HAND  Inspection:    Significant swelling of entire 4th digit due to wedding ring, ecchymosis around distal phalanx  Palpation:   Carpals: normal   Metacarpals: normal   Thumb: normal   Fingers: proximal phalanx, PIP joint tenderness  Range of Motion:    flexion MCP limited slightly by pain and swelling, extension MCP limited slightly by pain and swelling, flexion PIP limited by pain and swelling, extension PIP limited by pain and swelling  Strength:    extension limited by tightness limited by pain, flexion limited by tightness limited by pain  Special Tests:    Positive: none       Independent visualization of the below image:  No results found for this or any previous visit (from the past 24 hour(s)).  FINGER LEFT TWO OR MORE VIEWS   7/26/2019 4:19 PM      HISTORY:  Left 4th finger hematoma, status post fall, rule out  fracture. Finger pain, left.     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 Mild  to moderate osteoarthrosis in the MCP joints and the interphalangeal  joints of the fingers and thumb. Fusiform soft tissue swelling about  the PIP joint of the ring finger. There is a small minimally impacted  and displaced oblique intra-articular fracture of the radial aspect of  the distal end of the proximal phalanx of the fourth finger. Otherwise  negative.                                                                      IMPRESSION: Small minimally displaced and impacted fracture of the  distal end of the proximal phalanx of the ring finger.     BC NEUMANN MD    Patient's conditions were thoroughly discussed during today's visit with greater than 50% of the visit spent counseling the patient with total time spent face-to-face with the patient being  50 minutes.    Albert Yeo MD [facility] Sports and Orthopedic Care

## 2019-08-02 NOTE — PATIENT INSTRUCTIONS
1. Closed nondisplaced fracture of proximal phalanx of left ring finger, initial encounter      -Patient has left fourth finger pain and swelling due to a proximal phalanx fracture  -Patient had significant swelling around his finger that was worsening due to his wedding ring.  Patient's ring was manually cut off in office today with a manual ring cutter.  -Patient will ice and elevate the finger and perform gentle range of motion exercises at home.  -Patient will avoid oral anti-inflammatory medications due to his multiple medications and potential risk for internal bleeding.  -Patient will follow-up in 4 weeks for repeat x-rays and reevaluation.  If patient continues to have stiffness and loss of range of motion, will start formal hand therapy at that time.  -Call direct clinic number [[phone]] at any time with questions or concerns.    Albert Yeo MD [facility] Orthopedics and Sports Medicine  [facility]

## 2019-08-28 ENCOUNTER — TRANSFERRED RECORDS (OUTPATIENT)
Dept: HEALTH INFORMATION MANAGEMENT | Facility: CLINIC | Age: 74
End: 2019-08-28

## 2019-08-29 ENCOUNTER — HOSPITAL ENCOUNTER (OUTPATIENT)
Dept: GENERAL RADIOLOGY | Facility: CLINIC | Age: 74
Discharge: HOME | End: 2019-08-29
Attending: PSYCHIATRY & NEUROLOGY | Admitting: PSYCHIATRY & NEUROLOGY
Payer: COMMERCIAL

## 2019-08-29 ENCOUNTER — HOSPITAL ENCOUNTER (OUTPATIENT)
Dept: SPEECH THERAPY | Facility: CLINIC | Age: 74
End: 2019-08-29
Attending: PSYCHIATRY & NEUROLOGY
Payer: COMMERCIAL

## 2019-08-29 DIAGNOSIS — H53.2 DIPLOPIA: ICD-10-CM

## 2019-08-29 DIAGNOSIS — R13.10 DYSPHAGIA: ICD-10-CM

## 2019-08-29 PROCEDURE — 92611 MOTION FLUOROSCOPY/SWALLOW: CPT | Mod: GN

## 2019-08-29 PROCEDURE — 74230 X-RAY XM SWLNG FUNCJ C+: CPT

## 2019-08-29 RX ORDER — BARIUM SULFATE 400 MG/ML
4 SUSPENSION ORAL ONCE
Status: DISCONTINUED | OUTPATIENT
Start: 2019-08-29 | End: 2019-08-29 | Stop reason: CLARIF

## 2019-08-29 RX ORDER — BARIUM SULFATE 400 MG/ML
SUSPENSION ORAL ONCE
Status: DISCONTINUED | OUTPATIENT
Start: 2019-08-29 | End: 2019-08-29 | Stop reason: CLARIF

## 2019-08-29 NOTE — PROGRESS NOTES
"Video Swallow Study:      08/29/19 1230   General Information   Type Of Visit Initial   Start Of Care Date 08/29/19   Referring Physician Dr. Noel Chi   Orders Evaluate And Treat   Medical Diagnosis Diplopia (H53.2), Dysphagia (R13.10)   Onset Of Illness/injury Or Date Of Surgery 08/29/19   General Information Comments  Video swallow study orders received from Dr. Noel Chi from Neurology as pt has developed slurred speech/dysphagia within the past 6 months. He has not had a hx of formal objective swallow assessment, therefore this was ordered. He reports that he typically has difficulties with liquids when he \"gulps,\" no issues with small single sips. He does not report any difficulty swallowing solid food but does occassionally bite his tongue. [facility] for dys[facility]ria - they reported only going for one session. Per neurology, the cause of these new symptoms of dysarthria/dysphagia is uncertain (baseline CVA years ago, right subfrontal meningioma that did not show any clear interval change recently).    Clinical Swallow Evaluation   Oral Musculature anomalies present   Structural Abnormalities none present   Dentition present and adequate   Mucosal Quality good   Mandibular Strength and Mobility intact   Oral Labial Strength and Mobility   (mild L asymmetry at rest (baseline from old CVA?))   Lingual Strength and Mobility impaired anterior elevation;impaired left lateral movement  (large groove in middle of tongue, slow movement)   Buccal Strength and Mobility intact   VFSS Evaluation   VFSS Additional Documentation Yes   VFSS Eval: Radiology   Views Taken left lateral   Physical Location of Procedure [facility]   VFSS Eval: Thin Liquid Texture Trial   Mode of Presentation, Thin Liquid cup;straw   Order of Presentation 1, 2, 5   Preparatory Phase WFL   Oral Phase, Thin Liquid Premature pharyngeal entry   Pharyngeal Phase, Thin Liquid Delayed swallow reflex   Jane's " Penetration Aspiration Scale: Thin Liquid Trial Results 2 - contrast enters airway, remains above the vocal cords, no residue remains (penetration)   Diagnostic Statement Premature spillage to the valleculae with small single sips, to the pyriform sinuses with larger & rapid consecutive sips  resulting in overall delayed swallow and deep flash penetration.    VFSS Eval: Puree Solid Texture Trial   Mode of Presentation, Puree spoon;self-fed   Order of Presentation 3   Preparatory Phase WFL   Oral Phase, Puree Premature pharyngeal entry   Pharyngeal Phase, Puree Delayed swallow reflex   Rosenbek's Penetration Aspiration Scale: Puree Food Trial Results 1 - no aspiration, contrast does not enter airway   Diagnostic Statement piecemeal swallow, spillage to vallecular, no penetration/aspiration    VFSS Eval: Solid Food Texture Trial   Mode of Presentation, Solid self-fed   Order of Presentation 4   Preparatory Phase WFL   Oral Phase, Solid Premature pharyngeal entry   Pharyngeal Phase, Solid Delayed swallow reflex   Rosenbek's Penetration Aspiration Scale: Solid Food Trial Results 1 - no aspiration, contrast does not enter airway   Diagnostic Statement piecemeal swallow, spillage to vallecular, no penetration/aspiration    General Therapy Interventions   Planned Therapy Interventions Communication  (dysarthria reduction as OP)   Swallow Eval: Clinical Impressions   Functional Assessment Scale (FAS) 6   Dysphagia Outcome Severity Scale (PHILIP) Level 6 - PHILIP   Treatment Diagnosis dysphagia, dysarthria   Diet texture recommendations Regular diet;Thin liquids   Recommended Feeding/Eating Techniques alternate between small bites and sips of food/liquid;maintain upright posture during/after eating for 30 mins;small sips/bites   Anticipated Discharge Disposition home w/ outpatient services  (for dysarthria - no swallow needs)   Risks and Benefits of Treatment have been explained. Yes   Patient, family and/or staff in agreement  with Plan of Care Yes   Clinical Impression Comments Video swallow study completed with thin liquids, puree solids, general solids. Pt presents with a generally functional swallow, minimal deficits. Great control with liquids via small single sips, no penetration or aspiration with same. When taking larger/sequential sips, premature bolus spillage to the pyriform sinuses noted resulting in overall delayed swallow and deep flash penetration. Piecemeal swallow noted with solids with mild base of tongue/vallecular residuals, clearing with double swallow or liquid wash. No penetraiton or aspiration with solids. Hyolaryngeal elevation/excursion and epiglottic inversion all functional. Recommend: continue general solids, thin liquids via small single sips only with slow pace, periodic liquid wash, double swallows with dryer consistencies. Pt and spouse education on all results/recommendations, including video review - all questions answered. No further swallow tx needed however pt open to OP SLP services for dysarthria reduction tx (significant dysarthria in conversation, oromotor deficits). He does report he saw [facility] x[facility] and wanted to wait for more until having this test. MD please place OP SLP orders for dysarthria if OK with same.      Total Session Time   SLP Eval: VideoFluoroscopic Swallow function Minutes (47190) 31   Total Evaluation Time 31

## 2019-09-10 ENCOUNTER — TRANSFERRED RECORDS (OUTPATIENT)
Dept: HEALTH INFORMATION MANAGEMENT | Facility: CLINIC | Age: 74
End: 2019-09-10

## 2019-09-27 ENCOUNTER — OFFICE VISIT (OUTPATIENT)
Dept: INTERNAL MEDICINE | Facility: CLINIC | Age: 74
End: 2019-09-27
Payer: COMMERCIAL

## 2019-09-27 VITALS
SYSTOLIC BLOOD PRESSURE: 114 MMHG | DIASTOLIC BLOOD PRESSURE: 72 MMHG | RESPIRATION RATE: 18 BRPM | WEIGHT: 249 LBS | TEMPERATURE: 98.5 F | BODY MASS INDEX: 34.86 KG/M2 | HEART RATE: 104 BPM | HEIGHT: 71 IN | OXYGEN SATURATION: 95 %

## 2019-09-27 DIAGNOSIS — G40.909 SEIZURE DISORDER (H): ICD-10-CM

## 2019-09-27 DIAGNOSIS — J44.9 CHRONIC OBSTRUCTIVE PULMONARY DISEASE, UNSPECIFIED COPD TYPE (H): ICD-10-CM

## 2019-09-27 DIAGNOSIS — E78.2 MIXED HYPERLIPIDEMIA: ICD-10-CM

## 2019-09-27 DIAGNOSIS — Z79.4 TYPE 2 DIABETES MELLITUS WITH STAGE 3 CHRONIC KIDNEY DISEASE, WITH LONG-TERM CURRENT USE OF INSULIN (H): Primary | ICD-10-CM

## 2019-09-27 DIAGNOSIS — E66.01 MORBID OBESITY (H): ICD-10-CM

## 2019-09-27 DIAGNOSIS — I10 ESSENTIAL HYPERTENSION: ICD-10-CM

## 2019-09-27 DIAGNOSIS — E11.22 TYPE 2 DIABETES MELLITUS WITH STAGE 3 CHRONIC KIDNEY DISEASE, WITH LONG-TERM CURRENT USE OF INSULIN (H): Primary | ICD-10-CM

## 2019-09-27 DIAGNOSIS — F33.0 MAJOR DEPRESSIVE DISORDER, RECURRENT EPISODE, MILD (H): ICD-10-CM

## 2019-09-27 DIAGNOSIS — N18.30 TYPE 2 DIABETES MELLITUS WITH STAGE 3 CHRONIC KIDNEY DISEASE, WITH LONG-TERM CURRENT USE OF INSULIN (H): Primary | ICD-10-CM

## 2019-09-27 LAB
ALBUMIN SERPL-MCNC: 3.7 G/DL (ref 3.4–5)
ALP SERPL-CCNC: 65 U/L (ref 40–150)
ALT SERPL W P-5'-P-CCNC: 16 U/L (ref 0–70)
ANION GAP SERPL CALCULATED.3IONS-SCNC: 7 MMOL/L (ref 3–14)
AST SERPL W P-5'-P-CCNC: 8 U/L (ref 0–45)
BILIRUB SERPL-MCNC: 0.7 MG/DL (ref 0.2–1.3)
BUN SERPL-MCNC: 25 MG/DL (ref 7–30)
CALCIUM SERPL-MCNC: 8.7 MG/DL (ref 8.5–10.1)
CHLORIDE SERPL-SCNC: 111 MMOL/L (ref 94–109)
CHOLEST SERPL-MCNC: 150 MG/DL
CO2 SERPL-SCNC: 22 MMOL/L (ref 20–32)
CREAT SERPL-MCNC: 1.16 MG/DL (ref 0.66–1.25)
GFR SERPL CREATININE-BSD FRML MDRD: 61 ML/MIN/{1.73_M2}
GLUCOSE SERPL-MCNC: 130 MG/DL (ref 70–99)
HBA1C MFR BLD: 5.8 % (ref 0–5.6)
HDLC SERPL-MCNC: 28 MG/DL
LDLC SERPL CALC-MCNC: 54 MG/DL
NONHDLC SERPL-MCNC: 122 MG/DL
POTASSIUM SERPL-SCNC: 3.9 MMOL/L (ref 3.4–5.3)
PROT SERPL-MCNC: 7.3 G/DL (ref 6.8–8.8)
SODIUM SERPL-SCNC: 140 MMOL/L (ref 133–144)
TRIGL SERPL-MCNC: 342 MG/DL
TSH SERPL DL<=0.005 MIU/L-ACNC: 1.82 MU/L (ref 0.4–4)

## 2019-09-27 PROCEDURE — 80053 COMPREHEN METABOLIC PANEL: CPT | Performed by: INTERNAL MEDICINE

## 2019-09-27 PROCEDURE — 99207 C PAF COMPLETED  NO CHARGE: CPT | Performed by: INTERNAL MEDICINE

## 2019-09-27 PROCEDURE — 84443 ASSAY THYROID STIM HORMONE: CPT | Performed by: INTERNAL MEDICINE

## 2019-09-27 PROCEDURE — G0008 ADMIN INFLUENZA VIRUS VAC: HCPCS | Performed by: INTERNAL MEDICINE

## 2019-09-27 PROCEDURE — 82043 UR ALBUMIN QUANTITATIVE: CPT | Performed by: INTERNAL MEDICINE

## 2019-09-27 PROCEDURE — 36415 COLL VENOUS BLD VENIPUNCTURE: CPT | Performed by: INTERNAL MEDICINE

## 2019-09-27 PROCEDURE — 99214 OFFICE O/P EST MOD 30 MIN: CPT | Mod: 25 | Performed by: INTERNAL MEDICINE

## 2019-09-27 PROCEDURE — 83036 HEMOGLOBIN GLYCOSYLATED A1C: CPT | Performed by: INTERNAL MEDICINE

## 2019-09-27 PROCEDURE — 90662 IIV NO PRSV INCREASED AG IM: CPT | Performed by: INTERNAL MEDICINE

## 2019-09-27 PROCEDURE — 80061 LIPID PANEL: CPT | Performed by: INTERNAL MEDICINE

## 2019-09-27 ASSESSMENT — MIFFLIN-ST. JEOR: SCORE: 1886.59

## 2019-09-27 ASSESSMENT — PATIENT HEALTH QUESTIONNAIRE - PHQ9: SUM OF ALL RESPONSES TO PHQ QUESTIONS 1-9: 11

## 2019-09-27 NOTE — PROGRESS NOTES
Subjective     Donnie Vincent is a 73 year old male who presents to clinic today for the following health issues:    HPI     Diabetes Follow-up      How often are you checking your blood sugar? A few times a month    What time of day are you checking your blood sugars (select all that apply)?  Before meals    Have you had any blood sugars above 200?  No    Have you had any blood sugars below 70?  No    What symptoms do you notice when your blood sugar is low?  None    What concerns do you have today about your diabetes? None     Do you have any of these symptoms? (Select all that apply)  No numbness or tingling in feet.  No redness, sores or blisters on feet.  No complaints of excessive thirst.  No reports of blurry vision.  No significant changes to weight.     Have you had a diabetic eye exam in the last 12 months? Yes- [facility]      Hyperlipidemia /hypertriglyceridemia follow-Up      Are you having any of the following symptoms? (Select all that apply)  No complaints of shortness of breath, chest pain or pressure.  No increased sweating or nausea with activity.  No left-sided neck or arm pain.  No complaints of pain in calves when walking 1-2 blocks.    Are you regularly taking any medication or supplement to lower your cholesterol?   Yes- simvastatin, Fenofibrate    Are you having muscle aches or other side effects that you think could be caused by your cholesterol lowering medication?  No    Hypertension Follow-up      Do you check your blood pressure regularly outside of the clinic? No     Are you following a low salt diet? Yes    Are your blood pressures ever more than 140 on the top number (systolic) OR more   than 90 on the bottom number (diastolic), for example 140/90? No        Depression Followup    How are you doing with your depression since your last visit? Worsened since few months,     Are you having other symptoms that might be associated with depression? No    Have you  had a significant life event?  Health Concerns     Are you feeling anxious or having panic attacks?   No    Do you have any concerns with your use of alcohol or other drugs? No    PHQ 5/15/2018 11/16/2018 5/30/2019   PHQ-9 Total Score 3 0 5   Q9: Thoughts of better off dead/self-harm past 2 weeks Not at all Not at all Not at all       Pt has been having speech disturbances, and balance issues, pt has been seeing Neurologist and has had extensive work up including for myasthenia gravis. Pt has been advised to rpt PFTs and check for NIF, and has appt with pulmonologist.     Pt says he has been more depressed sec to all these issues, has not had a definitive diagnosis yet, pt is lacking motivation , does not feel like doing anything.  Pt is currently on Prozac 40 mg w/o much symptom relief.       How many servings of fruits and vegetables do you eat daily?  2-3    On average, how many sweetened beverages do you drink each day (soda, juice, sweet tea, etc)?   0    How many days per week do you miss taking your medication? 0        Patient Active Problem List   Diagnosis     Other type of migraine     Diverticulitis     Seizure disorder (H)     Mixed hyperlipidemia     HYPERLIPIDEMIA LDL GOAL <100     Obstructive sleep apnea     Meningioma (H)     Advanced directives, counseling/discussion     COPD (chronic obstructive pulmonary disease) (H)     GERD (gastroesophageal reflux disease)     Thoracic aortic aneurysm (H)     Bilateral renal cysts     Acute ischemic stroke (H)     Increased homocysteine (H)     Benign prostatic hyperplasia with lower urinary tract symptoms     Cerebral infarction (H)     PFO (patent foramen ovale)     Type 2 diabetes with stage 3 chronic kidney disease GFR 30-59 (H)     Morbid obesity (H)     Major depressive disorder, recurrent episode, mild (H)     Gastrointestinal hemorrhage, unspecified gastrointestinal hemorrhage type     Essential hypertension     Thoracic aortic aneurysm without rupture  (H)     Renal failure     SOB (shortness of breath)     Long-term (current) use of anticoagulants [Z79.01]     Iron deficiency anemia, unspecified iron deficiency anemia type     Hematuria, gross     Past Surgical History:   Procedure Laterality Date     C NONSPECIFIC PROCEDURE      colonoscopy      COLONOSCOPY  2015    Dr. Ernesto SPEARS     COLONOSCOPY N/A 2015    Procedure: COLONOSCOPY;  Surgeon: Yazmin Orozco MD;  Location:  GI     CYSTOSCOPY, RETROGRADES, EXTRACT STONE, COMBINED Left 2015    Procedure: COMBINED CYSTOSCOPY, RETROGRADES, EXTRACT STONE;  Surgeon: Neville Banuelos MD;  Location: [facility] OR     CYSTOSCOPY, TRANSURETHRAL RESECTION (TUR) PROSTATE, COMBINED N/A 10/3/2018    Procedure: COMBINED CYSTOSCOPY, TRANSURETHRAL RESECTION (TUR) PROSTATE;  Cystoscopy, removal of bladder stones with holmium laser, transurethral resection of prostate using Olympus bipolar electrode;  Surgeon: Neville Banuelos MD;  Location:  OR     ESOPHAGOSCOPY, GASTROSCOPY, DUODENOSCOPY (EGD), COMBINED  2015    Dr. Ernesto CONTRERAS     ESOPHAGOSCOPY, GASTROSCOPY, DUODENOSCOPY (EGD), COMBINED  2018    Dr. Ernesto CONTRERAS     GENITOURINARY SURGERY      kidney stone - lithotripsy     HERNIA REPAIR       LASER HOLMIUM LITHOTRIPSY BLADDER N/A 10/3/2018    Procedure: LASER HOLMIUM LITHOTRIPSY BLADDER;;  Surgeon: Neville Banuelos MD;  Location:  OR     PHACOEMULSIFICATION CLEAR CORNEA WITH STANDARD INTRAOCULAR LENS IMPLANT  2011    Procedure:PHACOEMULSIFICATION CLEAR CORNEA WITH STANDARD INTRAOCULAR LENS IMPLANT; RIGHT PHACOEMULSIFICATION CLEAR CORNEA WITH STANDARD INTRAOCULAR LENS IMPLANT ; Surgeon:GUILHERME KAUFMAN; Location:[facility]       Social History     Tobacco Use     Smoking status: Former Smoker     Last attempt to quit: 1970     Years since quittin.7     Smokeless tobacco: Never Used     Tobacco comment: quit age 28   Substance Use Topics     Alcohol use: Yes      Comment: 1 beer/week     Family History   Problem Relation Age of Onset     Family History Negative Mother         migraines     Family History Negative Father         lived to be 92     Colon Cancer No family hx of          Current Outpatient Medications   Medication Sig Dispense Refill     albuterol (PROAIR HFA/PROVENTIL HFA/VENTOLIN HFA) 108 (90 Base) MCG/ACT inhaler Inhale 2 puffs into the lungs every 6 hours as needed for shortness of breath / dyspnea or wheezing       budesonide-formoterol (SYMBICORT) 80-4.5 MCG/ACT inhaler Inhale 2 puffs into the lungs 2 times daily        fenofibrate 160 MG tablet Take 1 tablet (160 mg) by mouth daily 90 tablet 1     ferrous sulfate (IRON) 325 (65 Fe) MG tablet Take 325 mg by mouth 2 times daily       FLUoxetine (PROZAC) 40 MG capsule Take 1 capsule (40 mg) by mouth daily 90 capsule 1     folic acid (FOLVITE) 1 MG tablet Take 1 tablet (1 mg) by mouth daily 90 tablet 0     insulin glargine (LANTUS SOLOSTAR) 100 UNIT/ML pen Inject 10 Units Subcutaneous At Bedtime 15 mL 1     levETIRAcetam (KEPPRA) 500 MG tablet Take 500 mg by mouth 2 times daily       linagliptin (TRADJENTA) 5 MG TABS tablet Take 2.5 mg by mouth daily        liraglutide (VICTOZA) 18 MG/3ML soln Inject 1.8 mg Subcutaneous daily 6 mL 1     metoprolol succinate (TOPROL-XL) 50 MG 24 hr tablet Take 1.5 tablets (75 mg) by mouth daily 45 tablet 0     PANTOPRAZOLE SODIUM PO Take 40 mg by mouth 2 times daily (before meals)       simvastatin (ZOCOR) 10 MG tablet Take 1 tablet (10 mg) by mouth At Bedtime 90 tablet 1     tamsulosin (FLOMAX) 0.4 MG 24 hr capsule Take 1 capsule (0.4 mg) by mouth daily 90 capsule 3     Topiramate (TOPAMAX PO) Take 50 mg by mouth 2 times daily           Reviewed and updated as needed this visit by Provider         Review of Systems   ROS COMP: CONSTITUTIONAL: NEGATIVE for fever, chills, change in weight  EYES: NEGATIVE for vision changes or irritation  ENT/MOUTH: NEGATIVE for ear, mouth and  "throat problems  RESP: NEGATIVE for significant cough or SOB  CV: NEGATIVE for chest pain, palpitations or peripheral edema  GI: NEGATIVE for nausea, abdominal pain, heartburn, or change in bowel habits  MUSCULOSKELETAL: NEGATIVE for significant arthralgias or myalgia  NEURO: h/o seizures, speech disturbances,, balance issues  ENDOCRINE: Hx diabetes  HEME/ALLERGY/IMMUNE: anemia  PSYCHIATRIC: depressed mood      Objective    /72   Pulse 104   Temp 98.5  F (36.9  C) (Oral)   Resp 18   Ht 1.803 m (5' 11\")   Wt 112.9 kg (249 lb)   SpO2 95%   BMI 34.73 kg/m    Body mass index is 34.73 kg/m .  Physical Exam   GENERAL:   alert and no distress, speech- thick and sometimes slurred   EYES: Eyes grossly normal to inspection, PERRL and conjunctivae and sclerae normal  HENT: ear canals and TM's normal, nose and mouth without ulcers or lesions  NECK: no adenopathy, no asymmetry, masses, or scars and thyroid normal to palpation  RESP: lungs clear to auscultation - no rales, rhonchi or wheezes  CV: regular rate and rhythm,    MS:   no edema, no calf tenderness  NEURO: Normal strength and tone, mentation intact and speech normal  Diabetic foot exam: normal DP and PT pulses, no trophic changes or ulcerative lesions, normal sensory exam and normal monofilament exam            Assessment & Plan      (E11.22,  N18.3,  Z79.4) Type 2 diabetes mellitus with stage 3 chronic kidney disease, with long-term current use of insulin (H)     Plan: Currently on Lantus, Tradjenta, Victoza.  Will check comprehensive metabolic panel, Hemoglobin A1c,         Albumin Random Urine Quantitative with Creat         Ratio, TSH with free T4 reflex, Lipid panel         reflex to direct LDL Non-fasting,       (F33.0) Major depressive disorder, recurrent episode, mild (H)  Plan: depression symptoms have worsened sec to his health issues, advised to discontinue Prozac 10 mg once daily and started on Lexapro 10 mg once daily, patient was advised to " "follow-up in 4 weeks   ,  (G40.409) Seizure disorder (H)  Plan: Stable on Keppra         (E66.01) Morbid obesity (H)  Plan  Discussed in detail about Diet,calorie intake,and importance of regular exercise,      (E78.2) Mixed hyperlipidemia  Plan: Recent triglycerides 342, continue fenofibrate, LDL at goal continue simvastatin    (J44.9) Chronic obstructive pulmonary disease, unspecified COPD type (H)  Plan: On albuterol and Symbicort, patient is getting repeat pulmonary function testing and also has made an appointment to see pulmonologist    (I10) Essential hypertension  Plan: Blood pressure well controlled.  Continue metoprolol         BMI:   Estimated body mass index is 34.73 kg/m  as calculated from the following:    Height as of this encounter: 1.803 m (5' 11\").    Weight as of this encounter: 112.9 kg (249 lb).   Weight management plan: Discussed healthy diet and exercise guidelines        FUTURE APPOINTMENTS:       - Follow-up phone or office visit in 4 wks        Suyapa Leon MD  [facility]        "

## 2019-09-28 LAB
CREAT UR-MCNC: 141 MG/DL
MICROALBUMIN UR-MCNC: 88 MG/L
MICROALBUMIN/CREAT UR: 62.13 MG/G CR (ref 0–17)

## 2019-09-30 ENCOUNTER — TELEPHONE (OUTPATIENT)
Dept: INTERNAL MEDICINE | Facility: CLINIC | Age: 74
End: 2019-09-30

## 2019-09-30 DIAGNOSIS — F33.0 MAJOR DEPRESSIVE DISORDER, RECURRENT EPISODE, MILD (H): Primary | ICD-10-CM

## 2019-09-30 RX ORDER — ESCITALOPRAM OXALATE 10 MG/1
10 TABLET ORAL DAILY
Qty: 30 TABLET | Refills: 1 | Status: SHIPPED | OUTPATIENT
Start: 2019-09-30 | End: 2020-04-15 | Stop reason: ALTCHOICE

## 2019-09-30 NOTE — TELEPHONE ENCOUNTER
Per 9/27 office visit note:    (F33.0) Major depressive disorder, recurrent episode, mild (H)  Plan: depression symptoms have worsened sec to his health issues, advised to discontinue Prozac 10 mg once daily and started on Lexapro 10 mg once daily, patient was advised to follow-up in 4 weeks    Prescription pended. Please sign. Prozac removed from medication list.

## 2019-09-30 NOTE — TELEPHONE ENCOUNTER
Patient was seen and thought the doctor sent a Rx for Lexapro but patient said pharmacy never rec'd.    Patient uses  [facility] [address] in [facility] to call and  [phone]

## 2019-10-01 ENCOUNTER — TRANSFERRED RECORDS (OUTPATIENT)
Dept: HEALTH INFORMATION MANAGEMENT | Facility: CLINIC | Age: 74
End: 2019-10-01

## 2019-10-01 LAB — RETINOPATHY: NORMAL

## 2019-10-03 ENCOUNTER — TRANSFERRED RECORDS (OUTPATIENT)
Dept: HEALTH INFORMATION MANAGEMENT | Facility: CLINIC | Age: 74
End: 2019-10-03

## 2019-10-15 ENCOUNTER — MEDICAL CORRESPONDENCE (OUTPATIENT)
Dept: HEALTH INFORMATION MANAGEMENT | Facility: CLINIC | Age: 74
End: 2019-10-15

## 2019-10-15 ENCOUNTER — TRANSFERRED RECORDS (OUTPATIENT)
Dept: HEALTH INFORMATION MANAGEMENT | Facility: CLINIC | Age: 74
End: 2019-10-15

## 2019-10-16 ENCOUNTER — TELEPHONE (OUTPATIENT)
Dept: INTERNAL MEDICINE | Facility: CLINIC | Age: 74
End: 2019-10-16

## 2019-10-16 DIAGNOSIS — I10 ESSENTIAL HYPERTENSION: ICD-10-CM

## 2019-10-16 DIAGNOSIS — E53.8 LOW SERUM VITAMIN B12: Primary | ICD-10-CM

## 2019-10-16 DIAGNOSIS — E78.5 HYPERLIPIDEMIA LDL GOAL <100: ICD-10-CM

## 2019-10-16 NOTE — TELEPHONE ENCOUNTER
Patients wife calling and has many items to talk with a nurse and would rather to just have a nurse call her so she dont repeat herself.  Ok to call and  [phone]

## 2019-10-16 NOTE — TELEPHONE ENCOUNTER
Pt's wife (on consent to communicate) calling with multiple medication questions. Pt can be reached at [phone]. OK to leave a detailed message. Please advise. Thanks.

## 2019-10-16 NOTE — TELEPHONE ENCOUNTER
Left message with Maico, regarding his lab results.   Advised to call back if still has questions. Not sure if they need refills? [facility]     Notes recorded by Suyapa Leon MD on 10/14/2019 at 2:26 PM CDT  Diabetes is well controlled with A1c of 5.8, lipids has high triglycerides, continue fenofibrate 160 mg daily and continue to follow low-fat diet regular exercise and will repeat lipids in 1 year.  Please and advised patient

## 2019-10-17 NOTE — TELEPHONE ENCOUNTER
LM with Maico. When she calls back, please lync RN so we do not have to keep playing phone tag.

## 2019-10-17 NOTE — TELEPHONE ENCOUNTER
Maico calls back. She would like Fenofibrate faxed to pharmacy. Also Metoprolol.     Maico states Dr Chi's office is supposed to be faxing over Vitamin B12 injection orders. Have not received anything yet.   Report scanned in Epic states that he receives B12 1000 mcg IM every month at clinic.     Call to Dr Chi's office. They faxed orders twice to [phone].     Last OV 9/27/19    BP Readings from Last 3 Encounters:   09/27/19 114/72   08/02/19 117/61   07/26/19 120/64     Recent Labs   Lab Test 09/27/19  1400 05/15/18  1145  05/27/15  1546  06/10/14  0828   CHOL 150 108   < > 166  --  112   HDL 28* 32*   < > 31*  --  30*   LDL 54 54   < > 63  --  36   TRIG 342* 110   < > 362*   < > 229*   CHOLHDLRATIO  --   --   --  5.4*  --  3.7    < > = values in this interval not displayed.

## 2019-10-18 RX ORDER — METOPROLOL SUCCINATE 50 MG/1
75 TABLET, EXTENDED RELEASE ORAL DAILY
Qty: 135 TABLET | Refills: 3 | Status: SHIPPED | OUTPATIENT
Start: 2019-10-18 | End: 2020-06-21

## 2019-10-18 RX ORDER — FENOFIBRATE 160 MG/1
160 TABLET ORAL DAILY
Qty: 90 TABLET | Refills: 3 | Status: SHIPPED | OUTPATIENT
Start: 2019-10-18 | End: 2020-06-21

## 2019-10-18 RX ORDER — CYANOCOBALAMIN 1000 UG/ML
1000 INJECTION, SOLUTION INTRAMUSCULAR; SUBCUTANEOUS
Status: DISCONTINUED | OUTPATIENT
Start: 2019-10-18 | End: 2020-04-28

## 2019-10-18 NOTE — TELEPHONE ENCOUNTER
Vitamin B12 ordered.     Pt to have done at clinic. Needs to schedule monthly injections.     Call to [facility] to schedule Nurse appt.

## 2019-10-29 ENCOUNTER — TELEPHONE (OUTPATIENT)
Dept: INTERNAL MEDICINE | Facility: CLINIC | Age: 74
End: 2019-10-29

## 2019-10-29 NOTE — TELEPHONE ENCOUNTER
Fax received from [facility] for review and signature.  Put in Dr. Leon's in basket.

## 2019-11-05 ENCOUNTER — TELEPHONE (OUTPATIENT)
Dept: INTERNAL MEDICINE | Facility: CLINIC | Age: 74
End: 2019-11-05

## 2019-11-05 ENCOUNTER — ALLIED HEALTH/NURSE VISIT (OUTPATIENT)
Dept: NURSING | Facility: CLINIC | Age: 74
End: 2019-11-05
Payer: COMMERCIAL

## 2019-11-05 DIAGNOSIS — D50.9 IRON DEFICIENCY ANEMIA, UNSPECIFIED IRON DEFICIENCY ANEMIA TYPE: Primary | ICD-10-CM

## 2019-11-05 PROCEDURE — 96372 THER/PROPH/DIAG INJ SC/IM: CPT

## 2019-11-05 PROCEDURE — 99207 ZZC NO CHARGE NURSE ONLY: CPT

## 2019-11-05 RX ADMIN — CYANOCOBALAMIN 1000 MCG: 1000 INJECTION, SOLUTION INTRAMUSCULAR; SUBCUTANEOUS at 09:59

## 2019-11-05 NOTE — TELEPHONE ENCOUNTER
[place]  [facility]  Diabetes report  Dr Leon's in basket  Patient  call patient when ready

## 2019-11-13 NOTE — TELEPHONE ENCOUNTER
[facility] results, please send my recent progress notes of 9/27/2019 and lab results which includes lipids and LFT.
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
## 2019-11-19 ENCOUNTER — TELEPHONE (OUTPATIENT)
Dept: INTERNAL MEDICINE | Facility: CLINIC | Age: 74
End: 2019-11-19

## 2019-11-19 DIAGNOSIS — E78.1 HYPERTRIGLYCERIDEMIA: ICD-10-CM

## 2019-11-19 NOTE — TELEPHONE ENCOUNTER
Fax received from [facility] for review and signature.  Put in Dr. Leon's in basket.

## 2019-11-27 ENCOUNTER — TELEPHONE (OUTPATIENT)
Dept: INTERNAL MEDICINE | Facility: CLINIC | Age: 74
End: 2019-11-27

## 2019-11-27 NOTE — TELEPHONE ENCOUNTER
Fax received from [facility] for review and signature.  Put in Dr. ndiaye's in basket.

## 2020-02-03 ENCOUNTER — TELEPHONE (OUTPATIENT)
Dept: INTERNAL MEDICINE | Facility: CLINIC | Age: 75
End: 2020-02-03

## 2020-02-03 NOTE — TELEPHONE ENCOUNTER
Panel Management Review      Patient has the following on his problem list:     Diabetes    ASA: unknown    Last A1C  Lab Results   Component Value Date    A1C 5.8 09/27/2019    A1C 5.7 10/18/2018    A1C 5.2 09/24/2018    A1C 6.0 05/15/2018    A1C 7.8 05/22/2017     A1C tested: MONITOR    Last LDL:    Lab Results   Component Value Date    CHOL 150 09/27/2019     Lab Results   Component Value Date    HDL 28 09/27/2019     Lab Results   Component Value Date    LDL 54 09/27/2019     Lab Results   Component Value Date    TRIG 342 09/27/2019     Lab Results   Component Value Date    CHOLHDLRATIO 5.4 05/27/2015     Lab Results   Component Value Date    NHDL 122 09/27/2019       Is the patient on a Statin? YES             Is the patient on Aspirin? YES    Medications     HMG CoA Reductase Inhibitors     simvastatin (ZOCOR) 10 MG tablet             Last three blood pressure readings:  BP Readings from Last 3 Encounters:   09/27/19 114/72   08/02/19 117/61   07/26/19 120/64       Date of last diabetes office visit: 9/27/19     Tobacco History:     History   Smoking Status     Former Smoker     Quit date: 1/1/1970   Smokeless Tobacco     Never Used     Comment: quit age 28           IVD   ASA: MONITOR    Last LDL:    Lab Results   Component Value Date    CHOL 150 09/27/2019     Lab Results   Component Value Date    HDL 28 09/27/2019     Lab Results   Component Value Date    LDL 54 09/27/2019     Lab Results   Component Value Date    TRIG 342 09/27/2019        Lab Results   Component Value Date    CHOLHDLRATIO 5.4 05/27/2015        Is the patient on a Statin? YES   Is the patient on Aspirin? YES                  Medications     HMG CoA Reductase Inhibitors     simvastatin (ZOCOR) 10 MG tablet             Last three blood pressure readings:  BP Readings from Last 3 Encounters:   09/27/19 114/72   08/02/19 117/61   07/26/19 120/64        Tobacco History:     History   Smoking Status     Former Smoker     Quit date: 1/1/1970    Smokeless Tobacco     Never Used     Comment: quit age 28       Hypertension   Last three blood pressure readings:  BP Readings from Last 3 Encounters:   09/27/19 114/72   08/02/19 117/61   07/26/19 120/64     Blood pressure: MONITOR    HTN Guidelines:  Less than 140/90      Composite cancer screening  Chart review shows that this patient is due/due soon for the following None  Summary:    Patient is due/failing the following:   Diabetic eye exam    Action needed:   Patient needs office visit for diabetic eye exam.    Type of outreach:    Phone, spoke to patient.  patient agrees to schedule appointment.    Questions for provider review:    None                                                                                                                                    Zoe Taylor CMA       Chart routed to none .

## 2020-03-26 ENCOUNTER — TELEPHONE (OUTPATIENT)
Dept: INTERNAL MEDICINE | Facility: CLINIC | Age: 75
End: 2020-03-26

## 2020-03-26 NOTE — TELEPHONE ENCOUNTER
Patient's wife Maico calls to request PT and [facility] He needs help with strength so he can get up on his own and use his walker and also help with his speech. Please call Maico to advise at [phone].

## 2020-03-26 NOTE — TELEPHONE ENCOUNTER
Please see message below and advise.     Last office visit 9/27/20.    A face to face will likely be required, can this be done in clinic given Covid-19? Please advise on next steps.

## 2020-03-27 ENCOUNTER — VIRTUAL VISIT (OUTPATIENT)
Dept: INTERNAL MEDICINE | Facility: CLINIC | Age: 75
End: 2020-03-27
Payer: COMMERCIAL

## 2020-03-27 DIAGNOSIS — F33.0 MAJOR DEPRESSIVE DISORDER, RECURRENT EPISODE, MILD (H): Primary | ICD-10-CM

## 2020-03-27 DIAGNOSIS — M62.81 MUSCLE WEAKNESS (GENERALIZED): ICD-10-CM

## 2020-03-27 DIAGNOSIS — J44.9 CHRONIC OBSTRUCTIVE PULMONARY DISEASE, UNSPECIFIED COPD TYPE (H): ICD-10-CM

## 2020-03-27 DIAGNOSIS — Z79.4 TYPE 2 DIABETES MELLITUS WITH STAGE 3 CHRONIC KIDNEY DISEASE, WITH LONG-TERM CURRENT USE OF INSULIN (H): ICD-10-CM

## 2020-03-27 DIAGNOSIS — D32.9 MENINGIOMA (H): ICD-10-CM

## 2020-03-27 DIAGNOSIS — E66.01 MORBID OBESITY (H): ICD-10-CM

## 2020-03-27 DIAGNOSIS — N18.30 TYPE 2 DIABETES MELLITUS WITH STAGE 3 CHRONIC KIDNEY DISEASE, WITH LONG-TERM CURRENT USE OF INSULIN (H): ICD-10-CM

## 2020-03-27 DIAGNOSIS — R79.89 INCREASED HOMOCYSTEINE: ICD-10-CM

## 2020-03-27 DIAGNOSIS — E11.22 TYPE 2 DIABETES MELLITUS WITH STAGE 3 CHRONIC KIDNEY DISEASE, WITH LONG-TERM CURRENT USE OF INSULIN (H): ICD-10-CM

## 2020-03-27 DIAGNOSIS — G40.909 SEIZURE DISORDER (H): ICD-10-CM

## 2020-03-27 PROCEDURE — 99214 OFFICE O/P EST MOD 30 MIN: CPT | Mod: TEL | Performed by: INTERNAL MEDICINE

## 2020-03-27 NOTE — TELEPHONE ENCOUNTER
Call to wife, assisted in scheduling phone visit.     Next 5 appointments (look out 90 days)    Mar 27, 2020 10:20 AM CDT  Telephone Visit with Suyapa Leon MD  [facility] ([facility]) [address]  [facility] [id number] [phone]

## 2020-03-27 NOTE — PROGRESS NOTES
Subjective     Donnie Vincent is a 74 year old male for phone visit today for the following health issues:    Phone visit- 10:36 am attempted x 2 NA     Phone visit started  10;39 am ; 10:51 am   HPI   Spoke to pt- pt has speech difficulty , spoke to wife. Acc to wife Pt fell down 4-5 steps in 01/18/20 while in [place]  ,arabella blanco, was in hospital for 1 month in [place]  .  currently pt is in his assisted living in [place] , has- speech issues, neurologist [place] thinks pt provably has parkinson's and is on parkinson's medication.   Per wife pt feels weak, cannot ambulate , uses wheel chair, has walker but cannot use walker because of weakness. No dizziness. . Wife I requesting physical therapy for strength and balance.    Diabetes Follow-up    How often are you checking your blood sugar? One time daily  What time of day are you checking your blood sugars (select all that apply)?  Before meals  Have you had any blood sugars above 200?  No  Have you had any blood sugars below 70?  No    What symptoms do you notice when your blood sugar is low?  None    What concerns do you have today about your diabetes? None     Do you have any of these symptoms? (Select all that apply)  No numbness or tingling in feet.  No redness, sores or blisters on feet.  No complaints of excessive thirst.  No reports of blurry vision.  No significant changes to weight.    Have you had a diabetic eye exam in the last 12 months? Yes- Date of last eye exam: 10/2019,  Location: [facility]       Hyperlipidemia Follow-Up      Are you regularly taking any medication or supplement to lower your cholesterol?   Yes- zocor    Are you having muscle aches or other side effects that you think could be caused by your cholesterol lowering medication?  No    Hypertension Follow-up      Do you check your blood pressure regularly outside of the clinic? Yes     Are you following a low salt diet? Yes    Are your blood pressures ever more than 140 on the top  number (systolic) OR more   than 90 on the bottom number (diastolic), for example 140/90? No       How many servings of fruits and vegetables do you eat daily?  2-3    On average, how many sweetened beverages do you drink each day (Examples: soda, juice, sweet tea, etc.  Do NOT count diet or artificially sweetened beverages)?   0    How many days per week do you exercise enough to make your heart beat faster? 3 or less    How many minutes a day do you exercise enough to make your heart beat faster? 9 or less    How many days per week do you miss taking your medication? 0       Patient Active Problem List   Diagnosis     Other type of migraine     Diverticulitis     Seizure disorder (H)     Mixed hyperlipidemia     HYPERLIPIDEMIA LDL GOAL <100     Obstructive sleep apnea     Meningioma (H)     Advanced directives, counseling/discussion     COPD (chronic obstructive pulmonary disease) (H)     GERD (gastroesophageal reflux disease)     Thoracic aortic aneurysm (H)     Bilateral renal cysts     Acute ischemic stroke (H)     Increased homocysteine (H)     Benign prostatic hyperplasia with lower urinary tract symptoms     Cerebral infarction (H)     PFO (patent foramen ovale)     Type 2 diabetes with stage 3 chronic kidney disease GFR 30-59 (H)     Morbid obesity (H)     Major depressive disorder, recurrent episode, mild (H)     Gastrointestinal hemorrhage, unspecified gastrointestinal hemorrhage type     Essential hypertension     Thoracic aortic aneurysm without rupture (H)     Renal failure     SOB (shortness of breath)     Long-term (current) use of anticoagulants [Z79.01]     Iron deficiency anemia, unspecified iron deficiency anemia type     Hematuria, gross     Hypertriglyceridemia     Past Surgical History:   Procedure Laterality Date     C NONSPECIFIC PROCEDURE      colonoscopy 2005     COLONOSCOPY  11/4/2015    Dr. Orozco [facility]     COLONOSCOPY N/A 11/4/2015    Procedure: COLONOSCOPY;  Surgeon: Yazmin Orozco,  MD;  Location: [facility]     CYSTOSCOPY, RETROGRADES, EXTRACT STONE, COMBINED Left 2015    Procedure: COMBINED CYSTOSCOPY, RETROGRADES, EXTRACT STONE;  Surgeon: Neville Banuelos MD;  Location: [facility] OR     CYSTOSCOPY, TRANSURETHRAL RESECTION (TUR) PROSTATE, COMBINED N/A 10/3/2018    Procedure: COMBINED CYSTOSCOPY, TRANSURETHRAL RESECTION (TUR) PROSTATE;  Cystoscopy, removal of bladder stones with holmium laser, transurethral resection of prostate using Olympus bipolar electrode;  Surgeon: Neville Banuelos MD;  Location:  OR     ESOPHAGOSCOPY, GASTROSCOPY, DUODENOSCOPY (EGD), COMBINED  2015    Dr. Ernesto CONTRERAS     ESOPHAGOSCOPY, GASTROSCOPY, DUODENOSCOPY (EGD), COMBINED  2018    Dr. Ernesto CONTRERAS     GENITOURINARY SURGERY      kidney stone - lithotripsy     HERNIA REPAIR       LASER HOLMIUM LITHOTRIPSY BLADDER N/A 10/3/2018    Procedure: LASER HOLMIUM LITHOTRIPSY BLADDER;;  Surgeon: Neville Banuelos MD;  Location:  OR     PHACOEMULSIFICATION CLEAR CORNEA WITH STANDARD INTRAOCULAR LENS IMPLANT  2011    Procedure:PHACOEMULSIFICATION CLEAR CORNEA WITH STANDARD INTRAOCULAR LENS IMPLANT; RIGHT PHACOEMULSIFICATION CLEAR CORNEA WITH STANDARD INTRAOCULAR LENS IMPLANT ; Surgeon:GUILHERME KAUFMAN; Location:[facility]       Social History     Tobacco Use     Smoking status: Former Smoker     Last attempt to quit: 1970     Years since quittin.2     Smokeless tobacco: Never Used     Tobacco comment: quit age 28   Substance Use Topics     Alcohol use: Yes     Comment: 1 beer/week     Family History   Problem Relation Age of Onset     Family History Negative Mother         migraines     Family History Negative Father         lived to be 92     Colon Cancer No family hx of          Current Outpatient Medications   Medication Sig Dispense Refill     albuterol (PROAIR HFA/PROVENTIL HFA/VENTOLIN HFA) 108 (90 Base) MCG/ACT inhaler Inhale 2 puffs into the lungs every 6 hours as needed for shortness  of breath / dyspnea or wheezing       budesonide-formoterol (SYMBICORT) 80-4.5 MCG/ACT inhaler Inhale 2 puffs into the lungs 2 times daily        escitalopram (LEXAPRO) 10 MG tablet Take 1 tablet (10 mg) by mouth daily 30 tablet 1     fenofibrate (TRIGLIDE/LOFIBRA) 160 MG tablet Take 1 tablet (160 mg) by mouth daily 90 tablet 3     ferrous sulfate (IRON) 325 (65 Fe) MG tablet Take 325 mg by mouth 2 times daily       folic acid (FOLVITE) 1 MG tablet Take 1 tablet (1 mg) by mouth daily 90 tablet 0     insulin glargine (LANTUS SOLOSTAR) 100 UNIT/ML pen Inject 10 Units Subcutaneous At Bedtime 15 mL 1     levETIRAcetam (KEPPRA) 500 MG tablet Take 500 mg by mouth 2 times daily       linagliptin (TRADJENTA) 5 MG TABS tablet Take 2.5 mg by mouth daily        liraglutide (VICTOZA) 18 MG/3ML soln Inject 1.8 mg Subcutaneous daily 6 mL 1     metoprolol succinate ER (TOPROL-XL) 50 MG 24 hr tablet Take 1.5 tablets (75 mg) by mouth daily 135 tablet 3     PANTOPRAZOLE SODIUM PO Take 40 mg by mouth 2 times daily (before meals)       simvastatin (ZOCOR) 10 MG tablet Take 1 tablet (10 mg) by mouth At Bedtime 90 tablet 1     tamsulosin (FLOMAX) 0.4 MG 24 hr capsule Take 1 capsule (0.4 mg) by mouth daily 90 capsule 3     Topiramate (TOPAMAX PO) Take 50 mg by mouth 2 times daily         Reviewed and updated as needed this visit by Provider                  Assessment & Plan     (F33.0) Major depressive disorder, recurrent episode, mild (H)  (primary encounter diagnosis)  Plan: Stable on Lexapro 10 mg daily    (M62.81) Muscle weakness (generalized)  Plan: Patient currently using wheelchair, physical therapy orders done, probably Parkinson's per neurologist in [place] and is on Parkinson medication, advised patient's wife to call assisted living to fax medications medication list    (J44.9) Chronic obstructive pulmonary disease, unspecified COPD type (H)  Plan: Stable on Symbicort, albuterol    (E11.22,  N18.3,  Z79.4) Type 2 diabetes  mellitus with stage 3 chronic kidney disease, with long-term current use of insulin (H)  Plan: On Lantus, Tradjenta, needs A1c    (G40.909) Seizure disorder (H)  Plan: Follows up with a neurologist and is on Keppra    (E66.01) Morbid obesity (H)     (E72.11) Increased homocysteine (H)  Plan: On folic acid    (D32.9) Meningioma (H)  Plan: Follows up with neurologist      Suyapa Leon MD  [facility]

## 2020-03-30 ENCOUNTER — TELEPHONE (OUTPATIENT)
Dept: INTERNAL MEDICINE | Facility: CLINIC | Age: 75
End: 2020-03-30

## 2020-03-30 NOTE — TELEPHONE ENCOUNTER
Ok for PT 1w1, 2w2, 1w1 to address strength, balance, endurance, falls risk, and safety. OT to assess ADLs, cog, and equipment, ST to assess swallow and speech, and SW to assist with coordination of care, veterans benefits, and equipment resources.     Patient spouse is not sure of the Parkinson's diagnosis, she states that the neurologist and at his normal state possible Parkinson's, pt  needs to follow-up with his  neurologist for furtherinformational discussion

## 2020-03-30 NOTE — TELEPHONE ENCOUNTER
[facility] now requests orders and shares plan of care/discharge summaries for some patients through [employer] [facility].  Please REPLY TO THIS MESSAGE OR ROUTE BACK TO THE AUTHOR in order to give authorization for orders when needed.  This is considered a verbal order, you will still receive a faxed copy of orders for signature.  Thank you for your assistance in improving collaboration for our patients.    ORDER  PT 1w1, 2w2, 1w1 to address strength, balance, endurance, falls risk, and safety. OT to assess ADLs, cog, and equipment, ST to assess swallow and speech, and SW to assist with coordination of care, veterans benefits, and equipment resources.     FYI-spouse would like more information/discussion from MD on Parkinson's dx and treatment plan.    Thank you.

## 2020-04-06 ENCOUNTER — TELEPHONE (OUTPATIENT)
Dept: INTERNAL MEDICINE | Facility: CLINIC | Age: 75
End: 2020-04-06

## 2020-04-06 NOTE — TELEPHONE ENCOUNTER
Maria Teresa from [facility] calls for order for Speech Therapy 2 x wk for 3 wks.

## 2020-04-08 ENCOUNTER — TELEPHONE (OUTPATIENT)
Dept: INTERNAL MEDICINE | Facility: CLINIC | Age: 75
End: 2020-04-08

## 2020-04-08 ENCOUNTER — TELEPHONE (OUTPATIENT)
Dept: CARE COORDINATION | Facility: CLINIC | Age: 75
End: 2020-04-08

## 2020-04-09 NOTE — TELEPHONE ENCOUNTER
[facility] now requests orders and shares plan of care/discharge summaries for some patients through [employer].  Please REPLY TO THIS MESSAGE OR ROUTE BACK TO THE AUTHOR in order to give authorization for orders when needed.  This is considered a verbal order, you will still receive a faxed copy of orders for signature.  Thank you for your assistance in improving collaboration for our patients.    ORDER  SN [id number] to assess skin integrity and remove stiches above L eyebrow.

## 2020-04-13 ENCOUNTER — TELEPHONE (OUTPATIENT)
Dept: INTERNAL MEDICINE | Facility: CLINIC | Age: 75
End: 2020-04-13

## 2020-04-13 NOTE — TELEPHONE ENCOUNTER
Fax received from [facility] -  04/06/20 for review and signature.  Put in Dr. Leon's in basket.

## 2020-04-15 ENCOUNTER — TELEPHONE (OUTPATIENT)
Dept: INTERNAL MEDICINE | Facility: CLINIC | Age: 75
End: 2020-04-15

## 2020-04-15 DIAGNOSIS — F33.0 MAJOR DEPRESSIVE DISORDER, RECURRENT EPISODE, MILD (H): ICD-10-CM

## 2020-04-15 DIAGNOSIS — Z53.9 DIAGNOSIS NOT YET DEFINED: Primary | ICD-10-CM

## 2020-04-15 PROCEDURE — G0180 MD CERTIFICATION HHA PATIENT: HCPCS | Performed by: INTERNAL MEDICINE

## 2020-04-15 RX ORDER — FLUOXETINE 40 MG/1
40 CAPSULE ORAL DAILY
Qty: 30 CAPSULE | Refills: 3 | Status: SHIPPED | OUTPATIENT
Start: 2020-04-15 | End: 2020-06-21

## 2020-04-15 RX ORDER — FLUOXETINE 40 MG/1
40 CAPSULE ORAL DAILY
Qty: 30 CAPSULE | Refills: 3 | Status: SHIPPED | OUTPATIENT
Start: 2020-04-15 | End: 2020-04-15

## 2020-04-15 NOTE — TELEPHONE ENCOUNTER
Rx was erroneously sent to a [facility] in [place], [place], should be a written rx that MD signs and then fax to [facility] at number below along with a copy of this message.  DORETHA Noriega R.N.

## 2020-04-15 NOTE — TELEPHONE ENCOUNTER
Fax received from [facility] 03/30/20 for review and signature.  Put in Dr. Leon's in basket.

## 2020-04-15 NOTE — TELEPHONE ENCOUNTER
Rx received and faxed to [facility] [phone], along with notes regarding change in medication and error in sending to wrong pharmacy.  Zoe Taylor, CMA

## 2020-04-15 NOTE — TELEPHONE ENCOUNTER
Patient's wife Maico calling concerned that patient is having worsening depression in the past few weeks. Had telephone visit 3-. Patient has  difficulty speaking. Maico is not able to visit him in person due to Covid. She is worried that he sleeps all the time. Maico says he is mad at her because she will not let him move home.Maico is concerned she would not be able to care for him at home. Wonders if depression medication should be increased?     FYI:Maico plans to scheduled a telephone visit for herself.     Lives at [facility] [phone] fax prescription/orders to: [phone].

## 2020-04-15 NOTE — TELEPHONE ENCOUNTER
Pt is on maximum dose of lexapro for his age, we cannot increase any further, we need to  change to diff medication .   Advise to discontinue lexapro  and started on  prozac 40 mg daily . Pl inform pt /wife    negative

## 2020-04-17 ENCOUNTER — TELEPHONE (OUTPATIENT)
Dept: INTERNAL MEDICINE | Facility: CLINIC | Age: 75
End: 2020-04-17

## 2020-04-17 NOTE — TELEPHONE ENCOUNTER
This needs to be assessed by home care RN/PT to evaluate what kind of wheel chair pt requires and also evalaute for bed assessment as requested by pts wife. Pt already has home care . Pl advise [place] [facility] for these assessments. Pl inform wife.

## 2020-04-20 NOTE — TELEPHONE ENCOUNTER
Call to     Adamaris Romreo RN  [phone]    with [facility]. Left detailed message requesting call back.

## 2020-04-23 ENCOUNTER — TELEPHONE (OUTPATIENT)
Dept: INTERNAL MEDICINE | Facility: CLINIC | Age: 75
End: 2020-04-23

## 2020-04-25 ENCOUNTER — MEDICAL CORRESPONDENCE (OUTPATIENT)
Dept: HEALTH INFORMATION MANAGEMENT | Facility: CLINIC | Age: 75
End: 2020-04-25

## 2020-04-28 ENCOUNTER — VIRTUAL VISIT (OUTPATIENT)
Dept: INTERNAL MEDICINE | Facility: CLINIC | Age: 75
End: 2020-04-28
Payer: COMMERCIAL

## 2020-04-28 ENCOUNTER — TELEPHONE (OUTPATIENT)
Dept: INTERNAL MEDICINE | Facility: CLINIC | Age: 75
End: 2020-04-28

## 2020-04-28 DIAGNOSIS — R26.89 BALANCE PROBLEMS: ICD-10-CM

## 2020-04-28 DIAGNOSIS — M62.81 GENERALIZED MUSCLE WEAKNESS: Primary | ICD-10-CM

## 2020-04-28 DIAGNOSIS — I63.9 ACUTE ISCHEMIC STROKE (H): ICD-10-CM

## 2020-04-28 DIAGNOSIS — M79.641 PAIN OF RIGHT HAND: ICD-10-CM

## 2020-04-28 PROCEDURE — 99214 OFFICE O/P EST MOD 30 MIN: CPT | Mod: 95 | Performed by: INTERNAL MEDICINE

## 2020-04-28 NOTE — PROGRESS NOTES
"Donnie Vincent is a 74 year old male who is being evaluated via a billable video visit.      The patient has been notified of following:     \"This video visit will be conducted via a call between you and your physician/provider. We have found that certain health care needs can be provided without the need for an in-person physical exam.  This service lets us provide the care you need with a video conversation.  If a prescription is necessary we can send it directly to your pharmacy.  If lab work is needed we can place an order for that and you can then stop by our lab to have the test done at a later time.    Video visits are billed at different rates depending on your insurance coverage.  Please reach out to your insurance provider with any questions.    If during the course of the call the physician/provider feels a video visit is not appropriate, you will not be charged for this service.\"    Patient has given verbal consent for Video visit? Yes    How would you like to obtain your AVS? Mail a copy    Patient would like the video invitation sent by: Text to cell phone: [phone]    Will anyone else be joining your video visit? Yes OT staff        Video Start Time: 11;07 am     Subjective     Donnie Vincent is a 74 year old male who presents for video visit today for the following health issues:    HPI     Donnie Vincent is a 74 year old male for video visit today along with his occupational therapist Michaelle to discuss hospital bed and also wheelchair.  Patient has history of stroke, meningioma, diabetes ,depression, generalized weakness and speech issues currently getting speech therapy,Physcial therapy and Occupational therapy .  Pt has mobility limitations , difficult to ambulate due to balance issues,weakness, difficult to use cane due to risk of falling and cannot get to the bathroom in timely manner . Pt is getting PT/OT and they are recommending wheel chair, as per therapist pt has upper body " strength to propel the wheel chair and has space at home to maneuver the wheel chair and has assistance if he needs.  OT is also requesting hospital bed for pt. - pt has h/o dysphagia and needs head end elevation to 30 degrees -no traction. Pt needs frequent repositioning in bed probably every 2 hrs.(rib fractures)  Needs adjustable height for easy transfer to wheel chair. Pt says he spends 80% of the day in bed.    Pt also c/o pain rt wrist/baseof the rt thumb,, pt had a fall in 01/20 while in [place] and has a brace. Has not seen any ortho since back from [place].        Patient Active Problem List   Diagnosis     Other type of migraine     Diverticulitis     Seizure disorder (H)     Mixed hyperlipidemia     HYPERLIPIDEMIA LDL GOAL <100     Obstructive sleep apnea     Meningioma (H)     Advanced directives, counseling/discussion     COPD (chronic obstructive pulmonary disease) (H)     GERD (gastroesophageal reflux disease)     Thoracic aortic aneurysm (H)     Bilateral renal cysts     Acute ischemic stroke (H)     Increased homocysteine (H)     Benign prostatic hyperplasia with lower urinary tract symptoms     Cerebral infarction (H)     PFO (patent foramen ovale)     Type 2 diabetes with stage 3 chronic kidney disease GFR 30-59 (H)     Morbid obesity (H)     Major depressive disorder, recurrent episode, mild (H)     Gastrointestinal hemorrhage, unspecified gastrointestinal hemorrhage type     Essential hypertension     Thoracic aortic aneurysm without rupture (H)     Renal failure     SOB (shortness of breath)     Long-term (current) use of anticoagulants [Z79.01]     Iron deficiency anemia, unspecified iron deficiency anemia type     Hematuria, gross     Hypertriglyceridemia     Past Surgical History:   Procedure Laterality Date     C NONSPECIFIC PROCEDURE      colonoscopy 2005     COLONOSCOPY  11/4/2015    Dr. Orozco [facility]     COLONOSCOPY N/A 11/4/2015    Procedure: COLONOSCOPY;  Surgeon: Yazmin Orozco MD;   Location:  GI     CYSTOSCOPY, RETROGRADES, EXTRACT STONE, COMBINED Left 2015    Procedure: COMBINED CYSTOSCOPY, RETROGRADES, EXTRACT STONE;  Surgeon: Neville Banuelos MD;  Location: [facility]     CYSTOSCOPY, TRANSURETHRAL RESECTION (TUR) PROSTATE, COMBINED N/A 10/3/2018    Procedure: COMBINED CYSTOSCOPY, TRANSURETHRAL RESECTION (TUR) PROSTATE;  Cystoscopy, removal of bladder stones with holmium laser, transurethral resection of prostate using Olympus bipolar electrode;  Surgeon: Neville Banuelos MD;  Location:  OR     ESOPHAGOSCOPY, GASTROSCOPY, DUODENOSCOPY (EGD), COMBINED  2015    Dr. Ernesto CONTRERAS     ESOPHAGOSCOPY, GASTROSCOPY, DUODENOSCOPY (EGD), COMBINED  2018    Dr. Ernesto CONTRERAS     GENITOURINARY SURGERY      kidney stone - lithotripsy     HERNIA REPAIR       LASER HOLMIUM LITHOTRIPSY BLADDER N/A 10/3/2018    Procedure: LASER HOLMIUM LITHOTRIPSY BLADDER;;  Surgeon: Neville Banuelos MD;  Location:  OR     PHACOEMULSIFICATION CLEAR CORNEA WITH STANDARD INTRAOCULAR LENS IMPLANT  2011    Procedure:PHACOEMULSIFICATION CLEAR CORNEA WITH STANDARD INTRAOCULAR LENS IMPLANT; RIGHT PHACOEMULSIFICATION CLEAR CORNEA WITH STANDARD INTRAOCULAR LENS IMPLANT ; Surgeon:GUILHERME KAUFMAN; Location:[facility]       Social History     Tobacco Use     Smoking status: Former Smoker     Last attempt to quit: 1970     Years since quittin.3     Smokeless tobacco: Never Used     Tobacco comment: quit age 28   Substance Use Topics     Alcohol use: Yes     Comment: 1 beer/week     Family History   Problem Relation Age of Onset     Family History Negative Mother         migraines     Family History Negative Father         lived to be 92     Colon Cancer No family hx of          Current Outpatient Medications   Medication Sig Dispense Refill     albuterol (PROAIR HFA/PROVENTIL HFA/VENTOLIN HFA) 108 (90 Base) MCG/ACT inhaler Inhale 2 puffs into the lungs every 6 hours as needed for shortness of  "breath / dyspnea or wheezing       budesonide-formoterol (SYMBICORT) 80-4.5 MCG/ACT inhaler Inhale 2 puffs into the lungs 2 times daily        fenofibrate (TRIGLIDE/LOFIBRA) 160 MG tablet Take 1 tablet (160 mg) by mouth daily 90 tablet 3     ferrous sulfate (IRON) 325 (65 Fe) MG tablet Take 325 mg by mouth 2 times daily       FLUoxetine (PROZAC) 40 MG capsule Take 1 capsule (40 mg) by mouth daily 30 capsule 3     folic acid (FOLVITE) 1 MG tablet Take 1 tablet (1 mg) by mouth daily 90 tablet 0     insulin glargine (LANTUS SOLOSTAR) 100 UNIT/ML pen Inject 10 Units Subcutaneous At Bedtime 15 mL 1     levETIRAcetam (KEPPRA) 500 MG tablet Take 500 mg by mouth 2 times daily       linagliptin (TRADJENTA) 5 MG TABS tablet Take 2.5 mg by mouth daily        metoprolol succinate ER (TOPROL-XL) 50 MG 24 hr tablet Take 1.5 tablets (75 mg) by mouth daily 135 tablet 3     PANTOPRAZOLE SODIUM PO Take 40 mg by mouth 2 times daily (before meals)       simvastatin (ZOCOR) 10 MG tablet Take 1 tablet (10 mg) by mouth At Bedtime 90 tablet 1     tamsulosin (FLOMAX) 0.4 MG 24 hr capsule Take 1 capsule (0.4 mg) by mouth daily 90 capsule 3     Topiramate (TOPAMAX PO) Take 50 mg by mouth 2 times daily       VICTOZA PEN 18 MG/3ML soln INJECT 1.8MG SUBCUTANEOUSLY ONCE DAILY FOR DM 9 mL 3       Reviewed and updated as needed this visit by Provider         Review of Systems   ROS COMP: CONSTITUTIONAL: NEGATIVE for fever, chills, change in weight  ENT/MOUTH: NEGATIVE for ear, mouth and throat problems  RESP:  SOB baseline   CV: NEGATIVE for chest pain, palpitations or peripheral edema  MUSCULOSKELETAL: weakness,balance issues   NEURO: stroke /speech issues  PSYCHIATRIC: NEGATIVE for changes in mood or affect      Objective    There were no vitals taken for this visit.  Estimated body mass index is 34.73 kg/m  as calculated from the following:    Height as of 9/27/19: 1.803 m (5' 11\").    Weight as of 9/27/19: 112.9 kg (249 lb).  Physical Exam "   pts Ht-5'11 and wt 210 lbs.  GENERAL:   alert and no distress  EYES: Eyes grossly normal to inspection, conjunctivae and sclerae normal  RESP: no audible wheeze, cough, or visible cyanosis.  No visible retractions or increased work of breathing.  Able to speak fully in complete sentences.  NEURO: Cranial nerves grossly intact, mentation intact and speech normal  PSYCH: mentation appears normal,  speech affected           Assessment & Plan     (M62.81) Generalized muscle weakness  (primary encounter diagnosis)  (I63.9) Acute ischemic stroke (H)  Plan: needs manual wheel chair, as per OT- needs rob ht wheel chair with break extension with seat cushion . hospital bed, continue PT/OT/ST  pt needs a semi electric hospital bed and also he needs a standard wheel chair    (M79.641) Pain of right hand  Plan: XR Hand Right G/E 3 Views            (R26.89) Balance problems  Plan: wheel chiar, continue PT/OT          Suyapa Leon MD  [facility]      Video-Visit Details    Type of service:  Video Visit    Video End Time:11;37 am    Originating Location (pt. Location): Assisted Living    Distant Location (provider location):  [facility]     Mode of Communication:  Video Conference via [facility]       Suyapa Leon MD

## 2020-04-29 ENCOUNTER — TELEPHONE (OUTPATIENT)
Dept: INTERNAL MEDICINE | Facility: CLINIC | Age: 75
End: 2020-04-29

## 2020-04-30 NOTE — TELEPHONE ENCOUNTER
Patient had virtual visit on 4/28/20 with Dr Leon that addressed the face to face to support the need for the hospital bed and wheelchair.

## 2020-04-30 NOTE — TELEPHONE ENCOUNTER
Left another detailed message for Adamaris at number below.    Patient was seen 4/28/20 for virtual visit. Please advise if further information is still needed.

## 2020-05-01 ENCOUNTER — TELEPHONE (OUTPATIENT)
Dept: INTERNAL MEDICINE | Facility: CLINIC | Age: 75
End: 2020-05-01

## 2020-05-04 ENCOUNTER — TELEPHONE (OUTPATIENT)
Dept: INTERNAL MEDICINE | Facility: CLINIC | Age: 75
End: 2020-05-04

## 2020-05-04 DIAGNOSIS — Z79.4 TYPE 2 DIABETES MELLITUS WITH STAGE 3 CHRONIC KIDNEY DISEASE, WITH LONG-TERM CURRENT USE OF INSULIN (H): ICD-10-CM

## 2020-05-04 DIAGNOSIS — N18.30 TYPE 2 DIABETES MELLITUS WITH STAGE 3 CHRONIC KIDNEY DISEASE, WITH LONG-TERM CURRENT USE OF INSULIN (H): ICD-10-CM

## 2020-05-04 DIAGNOSIS — E11.22 TYPE 2 DIABETES MELLITUS WITH STAGE 3 CHRONIC KIDNEY DISEASE, WITH LONG-TERM CURRENT USE OF INSULIN (H): ICD-10-CM

## 2020-05-04 NOTE — TELEPHONE ENCOUNTER
[facility] calling regarding face to face encounter for hospital bed and wheel chair. Video visit was done with Dr. Leon on 4/28/20. Visit needs to state that pt needs a semi electric hospital bed in order for insurance to cover it. It also needs to state that he needs a standard wheel chair. Questions on forms from 4/29/20 encounter also need to be included in video visit done 4/28/20 with Dr. Leon. Forms emailed to Dr. Leon at home and put back in her out basket. Please advise. Thanks.

## 2020-05-05 ENCOUNTER — TELEPHONE (OUTPATIENT)
Dept: INTERNAL MEDICINE | Facility: CLINIC | Age: 75
End: 2020-05-05

## 2020-05-05 DIAGNOSIS — R11.0 NAUSEA: Primary | ICD-10-CM

## 2020-05-05 DIAGNOSIS — Z79.4 TYPE 2 DIABETES MELLITUS WITH STAGE 3 CHRONIC KIDNEY DISEASE, WITH LONG-TERM CURRENT USE OF INSULIN (H): Primary | ICD-10-CM

## 2020-05-05 DIAGNOSIS — E11.22 TYPE 2 DIABETES MELLITUS WITH STAGE 3 CHRONIC KIDNEY DISEASE, WITH LONG-TERM CURRENT USE OF INSULIN (H): Primary | ICD-10-CM

## 2020-05-05 DIAGNOSIS — N18.30 TYPE 2 DIABETES MELLITUS WITH STAGE 3 CHRONIC KIDNEY DISEASE, WITH LONG-TERM CURRENT USE OF INSULIN (H): Primary | ICD-10-CM

## 2020-05-05 RX ORDER — ONDANSETRON 4 MG/1
4 TABLET, FILM COATED ORAL EVERY 8 HOURS PRN
Qty: 30 TABLET | Refills: 1 | Status: SHIPPED | OUTPATIENT
Start: 2020-05-05 | End: 2020-07-02

## 2020-05-05 RX ORDER — INSULIN GLARGINE 100 [IU]/ML
INJECTION, SOLUTION SUBCUTANEOUS
Qty: 6 ML | OUTPATIENT
Start: 2020-05-05

## 2020-05-05 RX ORDER — INSULIN GLARGINE 100 [IU]/ML
20 INJECTION, SOLUTION SUBCUTANEOUS AT BEDTIME
Qty: 3 ML | Refills: 1 | Status: SHIPPED | OUTPATIENT
Start: 2020-05-05 | End: 2020-06-21

## 2020-05-05 NOTE — TELEPHONE ENCOUNTER
Call Yasir back and patient is unable to keep any food down due to nausea. Can patient get some zofran ordered.  The staff are worried that his sugars will drop.      Please fax order to [phone]

## 2020-05-05 NOTE — TELEPHONE ENCOUNTER
Yasir from [facility] calls to report patient is nauseated and is vomiting today. He has not had any of his meals today. Please call Yasir back to advise at [phone].

## 2020-05-05 NOTE — TELEPHONE ENCOUNTER
I did do addendum to pts face to face note on 04/28/20 about needing  semi electric hospital bed and a standard wheel chair,  Dr Tamayo has signed the forms, please fax forms with my progress note.

## 2020-05-06 NOTE — TELEPHONE ENCOUNTER
Yasir from [facility] calling to follow up on this request. Writer advised per chart, a prescription was sent to the pharmacy yesterday.

## 2020-05-06 NOTE — TELEPHONE ENCOUNTER
Called and left message that the provider faxed it to pharmacy and to call us back with any other concerns.

## 2020-05-07 ENCOUNTER — TELEPHONE (OUTPATIENT)
Dept: INTERNAL MEDICINE | Facility: CLINIC | Age: 75
End: 2020-05-07

## 2020-05-16 ENCOUNTER — NURSE TRIAGE (OUTPATIENT)
Dept: NURSING | Facility: CLINIC | Age: 75
End: 2020-05-16

## 2020-05-17 NOTE — TELEPHONE ENCOUNTER
Blood sugars running low - takes insulin BID   Accucheck 98 - should he receive his night-time insulin (20 units glargine)? He did drink some OJ. Usually 112 - 120's.     Advised nurse that long-acting insulin would be fine to give at bedtime. Facility will recheck blood sugar prior to giving. Advised that if blood sugars begin to consistently run below 100 at bedtime, that they should consider a PCP evaluation as medications may need to be adjusted.    COVID 19 Nurse Triage Plan/Patient Instructions    Please be aware that novel coronavirus (COVID-19) may be circulating in the community. If you develop symptoms such as fever, cough, or SOB or if you have concerns about the presence of another infection including coronavirus (COVID-19), please contact your health care provider or visit [email or web address].     Disposition/Instructions    Patient to stay at home and follow home care protocol based instructions.     Thank you for limiting contact with others, wearing a simple mask to cover your cough, practice good hand hygiene habits and accessing our virtual services where possible to limit the spread of this virus.    For more information about COVID19 and options for caring for yourself at home, please visit the CDC website at https://www.cdc.gov/coronavirus/2019-ncov/about/steps-when-sick.html  For more options for care at [facility], please visit our website at [email or web address]    For more information, please use the [place] Department of Health COVID-19 Website: https://www.health.[facility].[place].us/diseases/coronavirus/index.html  [place] Department of Health ([facility]) COVID-19 Hotlines (Interpreters available):      Health questions: Phone Number: [phone] or [phone] and Hours: 7 a.m. to 7 p.m.    Schools and  questions: Phone Number: [phone] or [phone] and Hours 7 a.m. to 7 p.m.    Amelia Fox RN on 5/16/2020 at 10:19 PM    Additional Information     Negative: Unconscious or difficult to awaken    Negative: Seizure occurs    Negative: Acting confused (e.g., disoriented, slurred speech)    Negative: Very weak (e.g., can't stand)    Negative: Sounds like a life-threatening emergency to the triager    Negative: [1] Vomiting AND [2] signs of dehydration (e.g., very dry mouth, lightheaded, dark urine)    Negative: [1] Low blood sugar symptoms persist > 30 minutes AND [2] using low blood sugar Care Advice    Negative: [1] Low blood glucose (< 70 mg/dL  or 3.9 mmol/L) persists > 30 minutes AND [2] using low blood sugar Care Advice    Negative: Patient sounds very sick or weak to the triager    Negative: [1] Low blood sugar symptoms with no other adult present AND [2] hasn't tried Care Advice    Negative: [1] Low blood glucose (< 70 mg/dL  or 3.9 mmol/L) with no other adult present AND [2] hasn't tried Care Advice    Negative: Diabetes drug error or overdose (e.g., insulin error or extra dose)    Negative: [1] Caller has URGENT medication or insulin pump question AND [2] triager unable to answer question    Negative: [1] Blood glucose < 70  mg/dL (3.9 mmol/L) or symptomatic, now improved with Care Advice AND [2] cause unknown    Negative: [1] Caller has NON-URGENT medication or insulin pump question AND [2] triager unable to answer question    Negative: [1] Morning (before breakfast) blood glucose < 80 mg/dL (4.4 mmol/L) AND [2] more than once in past week    Negative: [1] Evening (after bedtime snack) blood glucose < 100 mg/dL (5.6 mmol/L) AND [2] more than once in past week    Negative: [1] Blood glucose < 70 mg/dL (3.9 mmol/L) or symptomatic AND [2] cause known    Low blood sugar definition and treatment, questions about    Protocols used: [facility]AB[facility]

## 2020-05-19 ENCOUNTER — TELEPHONE (OUTPATIENT)
Dept: INTERNAL MEDICINE | Facility: CLINIC | Age: 75
End: 2020-05-19

## 2020-05-19 DIAGNOSIS — F33.0 MAJOR DEPRESSIVE DISORDER, RECURRENT EPISODE, MILD (H): ICD-10-CM

## 2020-05-19 NOTE — TELEPHONE ENCOUNTER
Patients wife calling  Patient is refusing to eat because he wants to go home and hates living where he is. Wife states he is depressed. His blood sugars are so low they fight with him to drink orange juice.   Patients wife wants someone to call him to convince him he needs to stay where he is at.     Ok to call and sherrell [phone]

## 2020-05-20 RX ORDER — FLUOXETINE 20 MG/1
60 TABLET, FILM COATED ORAL DAILY
Qty: 90 TABLET | Refills: 3 | Status: SHIPPED | OUTPATIENT
Start: 2020-05-20 | End: 2020-06-19 | Stop reason: ALTCHOICE

## 2020-05-20 NOTE — TELEPHONE ENCOUNTER
Wife advised. She said she has considered moving into the facility but would like to wait until the covid settles down. She said Donnie and her do not always get along and it might be hard for her to be quarantined with him there.  She also said she knows there is not a in house psych provider.  She does agree to increase the prozac. [facility] as to the need for the medication change.

## 2020-05-20 NOTE — TELEPHONE ENCOUNTER
"Spoke with wife per consent to communicate.    Wife states patient is currently living in an assisted living receiving cares, physical therapy and occupational therapy after a fall that occurred earlier this year. Patient has been there since January, wife is currently living at their home.     Wife states patient wants to come home. Wife states both her, and his current facility do not believe his care could be managed in their home. Wife has offered to move into the facility with patient, but patient refuses. Wife states patient will not talk to her lately, she will try to call, but patient will hang up the phone after calling her \"stupid\". Wife states due to Covid-19, she is unable to visit patient.     Wife states patient doesn't want to eat or sit up in his chair lately. Patient is depressed and very angry, wife states patient reports he wants to die.     Wife states patient was started on Prozac at the end of January, and it has decreased his panic attacks, but has not helped with his depression. Wife states patient will often refuse taking his medications as well.     Wife states she doesn't know what to do, and would like a physician or  to speak with patient to help him understand he needs to stay where he is. Wife states after Covid-19, she will revaluate the situation.      "

## 2020-05-20 NOTE — TELEPHONE ENCOUNTER
Please advise to consult psychiatrist in house as pt has expressed that he wants to die, he is on prozac 40 mg , we can increase to 60 mg( one and half of 40 mg) and also as pts wife mentioned it may help pt if wife moves there for short term.  Please also forward to care coordinator /

## 2020-05-21 ENCOUNTER — PATIENT OUTREACH (OUTPATIENT)
Dept: CARE COORDINATION | Facility: CLINIC | Age: 75
End: 2020-05-21

## 2020-05-21 DIAGNOSIS — Z71.89 COUNSELING AND COORDINATION OF CARE: Primary | ICD-10-CM

## 2020-05-21 NOTE — TELEPHONE ENCOUNTER
Patient's wife calls back after missing phone call. Informed her that we did leave a voice message, but Dr. Leon did approve prescription for Prozac 60 mg and sent this to the pharmacy.     Encounter was closed, but per previous messages, Dr. Leon asked that message be routed to Care Coordination for assistance with wife's concerns. Will re-route to Care Coordination to ensure they see this message.

## 2020-05-21 NOTE — PROGRESS NOTES
Clinic Care Coordination Contact  [facility]/Voicemail       Clinical Data: Care Coordinator Outreach    Care Coordination received a request from Pt's Care Team to follow up with Patient's spouse, Maico. Maico is calling with a concern that Pt wants to leave his [facility] to return home. Se wants someone from the clinic to call and convince him to stay there. It is likely most appropriate for [facility] staff to continue to reinforcement Pt staying at the [facility]. Pt may also benefit from mental health support.    Outreach attempted x 1.  Left message on Spouse's voicemail with call back information and requested return call.  Plan: Care Coordinator will try to reach patient again in 3-5 business days.    Francoise Harman [facility]  Clinic Care Coordinator  Ph. [phone]  [email or web address]

## 2020-05-21 NOTE — LETTER
[place] CARE COORDINATION  [address]  [facility] [address]  May 26, 2020    Sharmin Vincent  [address]  [place] [address]      Dear Maico,    I am a clinic care coordinator who works with Suyapa Leon MD at the [facility]. I recently tried to call and was unable to reach you. Below is a description of clinic care coordination and how I can further assist you.      The clinic care coordination team is made up of a registered nurse,  and community health worker who understand the health care system. The goal of clinic care coordination is to help you manage your health and improve access to the health care system in the most efficient manner. The team can assist you in meeting your health care goals by providing education, coordinating services, strengthening the communication among your providers and supporting you with any resource needs.    Please feel free to contact me at [phone] with any questions or concerns. We are focused on providing you with the highest-quality healthcare experience possible and that all starts with you.     Sincerely,       Francoise Harman, [facility]  Clinic Care Coordinator  Ph. [phone]  [email or web address]

## 2020-05-26 NOTE — PROGRESS NOTES
Clinic Care Coordination Contact  [facility]/Voicemail       Clinical Data: Care Coordinator Outreach  Outreach attempted x 2.  Left message on SO's voicemail with call back information and requested return call.  Plan: Care Coordinator will send care coordination introduction letter with care coordinator contact information and explanation of care coordination services via mail. Care Coordinator will do no further outreaches at this time.      Francoise Harman, [facility]  Clinic Care Coordinator  Ph. [phone]  [email or web address]

## 2020-05-27 ENCOUNTER — TELEPHONE (OUTPATIENT)
Dept: INTERNAL MEDICINE | Facility: CLINIC | Age: 75
End: 2020-05-27

## 2020-05-29 ENCOUNTER — TELEPHONE (OUTPATIENT)
Dept: INTERNAL MEDICINE | Facility: CLINIC | Age: 75
End: 2020-05-29

## 2020-05-29 NOTE — TELEPHONE ENCOUNTER
Patient calling  He has not been eating great and his blood sugars are low. Last test was 61 with no symptoms. Please advise  Ok to call and sherrell [phone]

## 2020-05-29 NOTE — TELEPHONE ENCOUNTER
Call to spouse. [facility] lately blood sugars have been low. [facility] last night blood sugar was 88. Patient has not been receiving insulin. [facility] patient was in a facility by himself and wife was unable to visit due to COVID but wife is now staying there also. Spouse moved in yesterday. [facility] patient did eat well last evening and has eaten well so far today. Spouse is concerned because his blood sugars have been so low. Spouse suggested writer call the facility for further information about what medications patient is taking. Call to [facility] at [phone]. Transferred to [facility] voice mail. Left message requesting call back for more information. Need information about how often and who is checking blood sugars (writer did not ask spouse this) and what medications patient is receiving for diabetes. Please transfer return call to RN.

## 2020-06-01 NOTE — TELEPHONE ENCOUNTER
Spoke with Maryan, nurse at [facility].  She states patient's blood sugars have been running on the lower side with that 61 reading being the lowest reading obtained.  Maryan states that patient is currently taking the following for diabetes:  Victoza 1.8 mg daily  Basaglar 20 units at bedtime  Maryan is not certain if patient takes Tradjenta which is on patient's med list.  She believes some of these meds may have been started when patient was in rehab facility in [place].  She will check his med list and get three times daily sugar readings from the last 4 days and fax those to us.  Maryan states that patient does not generally eat much but hoping that he will start eating better now that wife is at same facility.  DORETHA Noriega R.N.  Awaiting fax update from [facility]

## 2020-06-03 NOTE — TELEPHONE ENCOUNTER
I am not clinic for the next 3 wks, please give forms to provider who covers forms for me.   I have already discontinued Trejenta.

## 2020-06-03 NOTE — TELEPHONE ENCOUNTER
Left message on nurse Maryan's voicemail asking that she fax or refax information about blood sugar readings on this patient.  Gave [facility] fax number as well as our phone number.  DORETHA Noriega R.N.

## 2020-06-04 NOTE — TELEPHONE ENCOUNTER
Forwarding this message to Dr. Guerrero and will give fax from assisted living facility with blood sugar readings.  DORETHA Noriega R.N.

## 2020-06-04 NOTE — TELEPHONE ENCOUNTER
Call to nurse FELIX Steinberg and left detailed message per below.     Faxed signed form.     Also Dr Leon states she already stopped the Tradjenta. This was relayed on the VM message as well.

## 2020-06-05 ENCOUNTER — TELEPHONE (OUTPATIENT)
Dept: INTERNAL MEDICINE | Facility: CLINIC | Age: 75
End: 2020-06-05

## 2020-06-05 NOTE — TELEPHONE ENCOUNTER
Fransisco patients wife on C2C calling    She is concerned about patients not eating and sleeping a lot    Today he had 1/2 piece of toast and a little cottage cheese.     Lunch came and he refused and Fransisco states he refuses dinner as well.     Patient gets up once or twice a day and Fransisco is very worried    Please advise    Ok to call and  [phone]

## 2020-06-05 NOTE — TELEPHONE ENCOUNTER
"Call to spouse. States blood sugars have also been low in the 60's. States that he complains that his ribs hurt or are \"moving\". States he also complains of a backache. States the blood sugars have been low and he hasn't been eating well \"for awhile\". States he had been telling her this but she recently moved in with him and she thought it would get better once she moved in but it hasn't. Spouse requesting to continue to monitor for an additional week and then schedule video appointment. Scheduled 6/19 per spouse request.   "

## 2020-06-05 NOTE — TELEPHONE ENCOUNTER
Victoza/ blood glucose monitoring/ insulin/tradjenta order received via fax. Form in your mailbox to be signed.

## 2020-06-09 ENCOUNTER — TELEPHONE (OUTPATIENT)
Dept: INTERNAL MEDICINE | Facility: CLINIC | Age: 75
End: 2020-06-09

## 2020-06-09 NOTE — TELEPHONE ENCOUNTER
Central Prior Authorization Team   Phone: [phone]    PA Initiation    Medication: TOPIRAMATE  Insurance Company: [employer] - Phone [phone] Fax [phone]  Pharmacy Filling the Rx: [id number] - [place], [place] - [address] [place]  Filling Pharmacy Phone: [phone]  Filling Pharmacy Fax: [phone]  Start Date: 6/9/2020

## 2020-06-09 NOTE — TELEPHONE ENCOUNTER
Prior Authorization Retail Medication Request    Medication/Dose: TOPIRAMATE ER 50 MG ER SPRINKLE CAPS  ICD code (if different than what is on RX):    Previously Tried and Failed:    Rationale:      Insurance Name:  [employer]  Insurance ID:  [id number]       Pharmacy Information (if different than what is on RX)  Name:  [facility]  Phone:  [phone]

## 2020-06-10 NOTE — TELEPHONE ENCOUNTER
Prior Authorization Approval    Authorization Effective Date: 6/9/2020  Authorization Expiration Date: 12/31/2020  Medication: TOPIRAMATE-APPROVED  Approved Dose/Quantity:    Reference #:     Insurance Company: [employer] - Phone [phone] Fax [phone]  Expected CoPay:       CoPay Card Available:      Foundation Assistance Needed:    Which Pharmacy is filling the prescription (Not needed for infusion/clinic administered): [id number] [address] [facility]  Pharmacy Notified: Yes  Patient Notified: Yes  **Instructed pharmacy to notify patient when script is ready to /ship.**

## 2020-06-18 DIAGNOSIS — I10 ESSENTIAL HYPERTENSION: ICD-10-CM

## 2020-06-18 DIAGNOSIS — E78.5 HYPERLIPIDEMIA LDL GOAL <100: ICD-10-CM

## 2020-06-19 ENCOUNTER — VIRTUAL VISIT (OUTPATIENT)
Dept: INTERNAL MEDICINE | Facility: CLINIC | Age: 75
End: 2020-06-19
Payer: COMMERCIAL

## 2020-06-19 DIAGNOSIS — F33.0 MAJOR DEPRESSIVE DISORDER, RECURRENT EPISODE, MILD (H): ICD-10-CM

## 2020-06-19 DIAGNOSIS — K21.9 GASTROESOPHAGEAL REFLUX DISEASE, ESOPHAGITIS PRESENCE NOT SPECIFIED: ICD-10-CM

## 2020-06-19 DIAGNOSIS — R79.89 INCREASED HOMOCYSTEINE: ICD-10-CM

## 2020-06-19 DIAGNOSIS — I71.20 THORACIC AORTIC ANEURYSM WITHOUT RUPTURE (H): ICD-10-CM

## 2020-06-19 DIAGNOSIS — I48.21 PERMANENT ATRIAL FIBRILLATION (H): ICD-10-CM

## 2020-06-19 DIAGNOSIS — Z79.4 TYPE 2 DIABETES MELLITUS WITH STAGE 3 CHRONIC KIDNEY DISEASE, WITH LONG-TERM CURRENT USE OF INSULIN (H): ICD-10-CM

## 2020-06-19 DIAGNOSIS — M62.81 GENERALIZED MUSCLE WEAKNESS: ICD-10-CM

## 2020-06-19 DIAGNOSIS — E78.5 HYPERLIPIDEMIA LDL GOAL <100: ICD-10-CM

## 2020-06-19 DIAGNOSIS — I10 ESSENTIAL HYPERTENSION: ICD-10-CM

## 2020-06-19 DIAGNOSIS — N18.30 TYPE 2 DIABETES MELLITUS WITH STAGE 3 CHRONIC KIDNEY DISEASE, WITH LONG-TERM CURRENT USE OF INSULIN (H): ICD-10-CM

## 2020-06-19 DIAGNOSIS — E11.22 TYPE 2 DIABETES MELLITUS WITH STAGE 3 CHRONIC KIDNEY DISEASE, WITH LONG-TERM CURRENT USE OF INSULIN (H): ICD-10-CM

## 2020-06-19 DIAGNOSIS — N40.1 BENIGN PROSTATIC HYPERPLASIA WITH LOWER URINARY TRACT SYMPTOMS, SYMPTOM DETAILS UNSPECIFIED: Primary | ICD-10-CM

## 2020-06-19 PROCEDURE — 99214 OFFICE O/P EST MOD 30 MIN: CPT | Mod: 95 | Performed by: INTERNAL MEDICINE

## 2020-06-19 RX ORDER — CARBIDOPA AND LEVODOPA 25; 100 MG/1; MG/1
TABLET ORAL
COMMUNITY
Start: 2020-06-04 | End: 2023-01-01

## 2020-06-19 RX ORDER — AMIODARONE HYDROCHLORIDE 200 MG/1
200 TABLET ORAL DAILY
COMMUNITY
Start: 2020-06-04 | End: 2023-01-01

## 2020-06-19 NOTE — TELEPHONE ENCOUNTER
Patient calling  They need todays visit notes faxed to them as well as the refills  [facility] [phone]

## 2020-06-19 NOTE — PROGRESS NOTES
"Donnie Vincent is a 74 year old male who is being evaluated via a billable telephone visit.      The patient has been notified of following:     \"This telephone visit will be conducted via a call between you and your physician/provider. We have found that certain health care needs can be provided without the need for a physical exam.  This service lets us provide the care you need with a short phone conversation.  If a prescription is necessary we can send it directly to your pharmacy.  If lab work is needed we can place an order for that and you can then stop by our lab to have the test done at a later time.    Telephone visits are billed at different rates depending on your insurance coverage. During this emergency period, for some insurers they may be billed the same as an in-person visit.  Please reach out to your insurance provider with any questions.    If during the course of the call the physician/provider feels a telephone visit is not appropriate, you will not be charged for this service.\"    Patient has given verbal consent for Telephone visit?  Yes    What phone number would you like to be contacted at? [phone]    How would you like to obtain your AVS? Mail a copy      Phone visit- 10;28 am - 10 53 am     Subjective     Donnie Vincent is a 74 year old male who presents via phone visit today for the following health issues:  Wife Maico on speaker phone as pt has speech difficulty due to stroke.     HPI    Pt is a 74 year old male for phone visit today along with wife to review medications, pts wife says his meds are costing them too much and would like all [facility] .  Wife also says pt has hard time ambulating,hard time using walker and currently wheel chair bound. Pt had Physical therapy for some time but they stopped.  Pts BS was dropping and was advised to stop victoza and also Trejenta was discontinued. But pt is still taking victoza. Also on " lantus ( not basaglar) .  Pt also is on amiodarone through his cardiologist in [place], but has not established with any cardiologist her. Would like cardiology referral.  Pt sees neurologist for seizures and stroke and parkinson's.          Patient Active Problem List   Diagnosis     Other type of migraine     Diverticulitis     Seizure disorder (H)     Mixed hyperlipidemia     HYPERLIPIDEMIA LDL GOAL <100     Obstructive sleep apnea     Meningioma (H)     Advanced directives, counseling/discussion     COPD (chronic obstructive pulmonary disease) (H)     GERD (gastroesophageal reflux disease)     Thoracic aortic aneurysm (H)     Bilateral renal cysts     Acute ischemic stroke (H)     Increased homocysteine     Benign prostatic hyperplasia with lower urinary tract symptoms     Cerebral infarction (H)     PFO (patent foramen ovale)     Type 2 diabetes with stage 3 chronic kidney disease GFR 30-59 (H)     Morbid obesity (H)     Major depressive disorder, recurrent episode, mild (H)     Gastrointestinal hemorrhage, unspecified gastrointestinal hemorrhage type     Essential hypertension     Thoracic aortic aneurysm without rupture (H)     Renal failure     SOB (shortness of breath)     Long-term (current) use of anticoagulants [Z79.01]     Iron deficiency anemia, unspecified iron deficiency anemia type     Hematuria, gross     Hypertriglyceridemia     Past Surgical History:   Procedure Laterality Date     C NONSPECIFIC PROCEDURE      colonoscopy 2005     COLONOSCOPY  11/4/2015    Dr. Orozco [facility]     COLONOSCOPY N/A 11/4/2015    Procedure: COLONOSCOPY;  Surgeon: Yazmin Orozco MD;  Location: [facility]     CYSTOSCOPY, RETROGRADES, EXTRACT STONE, COMBINED Left 11/25/2015    Procedure: COMBINED CYSTOSCOPY, RETROGRADES, EXTRACT STONE;  Surgeon: Neville Banuelos MD;  Location: [facility]     CYSTOSCOPY, TRANSURETHRAL RESECTION (TUR) PROSTATE, COMBINED N/A 10/3/2018    Procedure: COMBINED CYSTOSCOPY, TRANSURETHRAL RESECTION  (TUR) PROSTATE;  Cystoscopy, removal of bladder stones with holmium laser, transurethral resection of prostate using Olympus bipolar electrode;  Surgeon: Neville Banuelos MD;  Location: [facility]     ESOPHAGOSCOPY, GASTROSCOPY, DUODENOSCOPY (EGD), COMBINED  2015    Dr. Ernesto CONTRERAS     ESOPHAGOSCOPY, GASTROSCOPY, DUODENOSCOPY (EGD), COMBINED  2018    Dr. Ernesto CONTRERAS     GENITOURINARY SURGERY      kidney stone - lithotripsy     HERNIA REPAIR       LASER HOLMIUM LITHOTRIPSY BLADDER N/A 10/3/2018    Procedure: LASER HOLMIUM LITHOTRIPSY BLADDER;;  Surgeon: Neville Banuelos MD;  Location: [facility]     PHACOEMULSIFICATION CLEAR CORNEA WITH STANDARD INTRAOCULAR LENS IMPLANT  2011    Procedure:PHACOEMULSIFICATION CLEAR CORNEA WITH STANDARD INTRAOCULAR LENS IMPLANT; RIGHT PHACOEMULSIFICATION CLEAR CORNEA WITH STANDARD INTRAOCULAR LENS IMPLANT ; Surgeon:GUILHERME KAUFMAN; Location:[facility]       Social History     Tobacco Use     Smoking status: Former Smoker     Last attempt to quit: 1970     Years since quittin.4     Smokeless tobacco: Never Used     Tobacco comment: quit age 28   Substance Use Topics     Alcohol use: Yes     Comment: 1 beer/week     Family History   Problem Relation Age of Onset     Family History Negative Mother         migraines     Family History Negative Father         lived to be 92     Colon Cancer No family hx of          Current Outpatient Medications   Medication Sig Dispense Refill     albuterol (PROAIR HFA/PROVENTIL HFA/VENTOLIN HFA) 108 (90 Base) MCG/ACT inhaler Inhale 2 puffs into the lungs every 6 hours as needed for shortness of breath / dyspnea or wheezing       amiodarone (PACERONE) 200 MG tablet 200 mg daily Through cardiologist       KOBE BURR 100-25 MCG/INH inhaler INHALE 2 PUFFS BY MOUTH ONCE DAILY FOR COPD 1 Inhaler 3     carbidopa-levodopa (SINEMET)  MG tablet Through Neurologist       clopidogrel (PLAVIX) 75 MG tablet Take 75 mg by mouth daily  Through Neurologist       fenofibrate (TRIGLIDE/LOFIBRA) 160 MG tablet Take 1 tablet (160 mg) by mouth daily 90 tablet 3     ferrous sulfate (IRON) 325 (65 Fe) MG tablet Take 325 mg by mouth 2 times daily       FLUoxetine (PROZAC) 40 MG capsule Take 1 capsule (40 mg) by mouth daily 90 capsule 3     folic acid (FOLVITE) 1 MG tablet Take 1 tablet (1 mg) by mouth daily 90 tablet 3     insulin glargine (LANTUS PEN) 100 UNIT/ML pen Inject 20 Units Subcutaneous At Bedtime 15 mL 3     levETIRAcetam (KEPPRA) 500 MG tablet Take 500 mg by mouth 2 times daily Through Neurologist       metoprolol succinate ER (TOPROL-XL) 50 MG 24 hr tablet Take 1.5 tablets (75 mg) by mouth daily 135 tablet 3     omeprazole (PRILOSEC) 40 MG DR capsule Take 1 capsule (40 mg) by mouth daily 90 capsule 3     ondansetron (ZOFRAN) 4 MG tablet Take 1 tablet (4 mg) by mouth every 8 hours as needed for nausea 30 tablet 1     simvastatin (ZOCOR) 10 MG tablet Take 1 tablet (10 mg) by mouth At Bedtime 90 tablet 3     tamsulosin (FLOMAX) 0.4 MG capsule Take 1 capsule (0.4 mg) by mouth daily 90 capsule 3     Topiramate (TOPAMAX PO) Take 50 mg by mouth 2 times daily Through Neurologist         Reviewed and updated as needed this visit by Provider         Review of Systems   CONSTITUTIONAL: NEGATIVE for fever, chills, change in weight  EYES: NEGATIVE for vision changes or irritation  ENT/MOUTH: NEGATIVE for ear, mouth and throat problems  RESP: NEGATIVE for significant cough or SOB  CV: NEGATIVE for chest pain, palpitations or peripheral edema  MUSCULOSKELETAL:weakness,   NEURO: h/o seizures/stroke   PSYCHIATRIC: NEGATIVE for changes in mood or affect       Objective   Reported vitals:  There were no vitals taken for this visit.   alert and no distress  PSYCH: Alert and oriented times 3; has speech issues,   no hallucinations   or delusions  His affect is normal  RESP: No cough, no audible wheezing, able to talk in full sentences  Remainder of exam unable to  be completed due to telephone visits             Assessment/Plan:  1. Generalized muscle weakness  -needs Physical therapy , pts wife was advised to contact Physical therapy again. Also advised to f/u with neurologist to evaluate     2. Major depressive disorder, recurrent episode, mild (H)  - FLUoxetine (PROZAC) 40 MG capsule; Take 1 capsule (40 mg) by mouth daily  Dispense: 90 capsule; Refill: 3    3. Type 2 diabetes mellitus with stage 3 chronic kidney disease, with long-term current use of insulin (H)  - insulin glargine (LANTUS PEN) 100 UNIT/ML pen; Inject 20 Units Subcutaneous At Bedtime  Dispense: 15 mL; Refill: 3    4. Hyperlipidemia LDL goal <100  - fenofibrate (TRIGLIDE/LOFIBRA) 160 MG tablet; Take 1 tablet (160 mg) by mouth daily  Dispense: 90 tablet; Refill: 3  - simvastatin (ZOCOR) 10 MG tablet; Take 1 tablet (10 mg) by mouth At Bedtime  Dispense: 90 tablet; Refill: 3    5. Increased homocysteine  - folic acid (FOLVITE) 1 MG tablet; Take 1 tablet (1 mg) by mouth daily  Dispense: 90 tablet; Refill: 3    6. Essential hypertension  - metoprolol succinate ER (TOPROL-XL) 50 MG 24 hr tablet; Take 1.5 tablets (75 mg) by mouth daily  Dispense: 135 tablet; Refill: 3    7. Benign prostatic hyperplasia with lower urinary tract symptoms, symptom details unspecified  - tamsulosin (FLOMAX) 0.4 MG capsule; Take 1 capsule (0.4 mg) by mouth daily  Dispense: 90 capsule; Refill: 3    8. Gastroesophageal reflux disease, esophagitis presence not specified  - omeprazole (PRILOSEC) 40 MG DR capsule; Take 1 capsule (40 mg) by mouth daily  Dispense: 90 capsule; Refill: 3    9. Permanent atrial fibrillation (H)  - on amiodarone 200 mg daily, referred to cardiologist .    10. Thoracic aortic aneurysm without rupture (H)     Reviewed all medications with pt/pts wife and [facility].    Phone call duration:  25 minutes    Suyapa Leon MD

## 2020-06-21 PROBLEM — I48.91 ATRIAL FIBRILLATION (H): Status: ACTIVE | Noted: 2020-06-21

## 2020-06-21 RX ORDER — CLOPIDOGREL BISULFATE 75 MG/1
75 TABLET ORAL DAILY
COMMUNITY
End: 2023-01-01

## 2020-06-21 RX ORDER — TAMSULOSIN HYDROCHLORIDE 0.4 MG/1
0.4 CAPSULE ORAL DAILY
Qty: 90 CAPSULE | Refills: 3 | Status: SHIPPED | OUTPATIENT
Start: 2020-06-21

## 2020-06-21 RX ORDER — OMEPRAZOLE 40 MG/1
40 CAPSULE, DELAYED RELEASE ORAL DAILY
Qty: 90 CAPSULE | Refills: 3 | Status: SHIPPED | OUTPATIENT
Start: 2020-06-21 | End: 2023-01-01

## 2020-06-21 RX ORDER — SIMVASTATIN 10 MG
10 TABLET ORAL AT BEDTIME
Qty: 90 TABLET | Refills: 3 | Status: SHIPPED | OUTPATIENT
Start: 2020-06-21 | End: 2023-01-01

## 2020-06-21 RX ORDER — FLUOXETINE 40 MG/1
40 CAPSULE ORAL DAILY
Qty: 90 CAPSULE | Refills: 3 | Status: SHIPPED | OUTPATIENT
Start: 2020-06-21

## 2020-06-21 RX ORDER — FENOFIBRATE 160 MG/1
160 TABLET ORAL DAILY
Qty: 90 TABLET | Refills: 3 | Status: SHIPPED | OUTPATIENT
Start: 2020-06-21 | End: 2023-01-01

## 2020-06-21 RX ORDER — FOLIC ACID 1 MG/1
1 TABLET ORAL DAILY
Qty: 90 TABLET | Refills: 3 | Status: SHIPPED | OUTPATIENT
Start: 2020-06-21 | End: 2023-01-01

## 2020-06-21 RX ORDER — METOPROLOL SUCCINATE 50 MG/1
75 TABLET, EXTENDED RELEASE ORAL DAILY
Qty: 135 TABLET | Refills: 3 | Status: SHIPPED | OUTPATIENT
Start: 2020-06-21 | End: 2023-01-01

## 2020-06-22 RX ORDER — CARBIDOPA AND LEVODOPA 25; 100 MG/1; MG/1
TABLET ORAL
Qty: 90 TABLET | OUTPATIENT
Start: 2020-06-22

## 2020-06-22 RX ORDER — METOPROLOL SUCCINATE 50 MG/1
TABLET, EXTENDED RELEASE ORAL
Qty: 45 TABLET | OUTPATIENT
Start: 2020-06-22

## 2020-06-22 RX ORDER — TOPIRAMATE 50 MG/1
CAPSULE, EXTENDED RELEASE ORAL
Qty: 60 CAPSULE | OUTPATIENT
Start: 2020-06-22

## 2020-06-22 RX ORDER — LEVETIRACETAM 500 MG/1
TABLET ORAL
Qty: 60 TABLET | OUTPATIENT
Start: 2020-06-22

## 2020-06-22 RX ORDER — CLOPIDOGREL BISULFATE 75 MG/1
TABLET ORAL
Qty: 30 TABLET | OUTPATIENT
Start: 2020-06-22

## 2020-06-22 RX ORDER — AMIODARONE HYDROCHLORIDE 200 MG/1
TABLET ORAL
Qty: 30 TABLET | OUTPATIENT
Start: 2020-06-22

## 2020-06-22 RX ORDER — FENOFIBRATE 160 MG/1
TABLET ORAL
Qty: 30 TABLET | OUTPATIENT
Start: 2020-06-22

## 2020-06-22 NOTE — TELEPHONE ENCOUNTER
Had a virtual visit with patient and his wife and wife wanted [facility] not [facility].  Also I [facility].  And   Plavix, Topamax ,Keppra, carbidopa levodopa  should be through her neurologist ,please see the note in the medication.  The amiodarone should be through the cardiologist.

## 2020-06-22 NOTE — TELEPHONE ENCOUNTER
Pending Prescriptions:                       Disp   Refills    omeprazole (PRILOSEC) 20 MG DR capsule [Ph*30 cap*         Sig: TAKE 1 CAP BY MOUTH ONCE DAILY FOR GI PROPHYLAXIS    clopidogrel (PLAVIX) 75 MG tablet [Pharmac*30 tab*         Sig: TAKE 1 TAB BY MOUTH ONCE DAILY FOR AFIB    topiramate ER (QUDEXY XR) 50 MG 24 hr caps*60 cap*         Sig: TAKE 1 CAPSULE BY MOUTH TWI CE DAILY FOR           SEIZURE/HEADACHE    fenofibrate (TRIGLIDE/LOFIBRA) 160 MG tabl*30 tab*         Sig: TAKE 1 TABLET BY MOUTH EVERY DAY FOR HDL    amiodarone (PACERONE) 200 MG tablet [Pharm*30 tab*         Sig: TAKE 1 TAB BY MOUTH ONCE DAILY FOR AFIB    carbidopa-levodopa (SINEMET)  MG tab*90 tab*         Sig: TAKE 1 TAB BY MOUTH THREE TIMES DAILY FOR PARKINSONS    metoprolol succinate ER (TOPROL-XL) 50 MG *45 tab*         Sig: TAKE 1 AND 1/2 TABS (75MG) BY MOUTH ONCE DAILY FOR           AFIB/HTN    levETIRAcetam (KEPPRA) 500 MG tablet [Phar*60 tab*         Sig: TAKE 1 TAB BY MOUTH TWICE A DAY FOR SEIZURE DISORDER      Routing refill requests to provider for review/approval because:  Protocols failed.    Please advise, thanks.

## 2020-06-23 DIAGNOSIS — I10 ESSENTIAL HYPERTENSION: ICD-10-CM

## 2020-06-23 RX ORDER — CARBIDOPA AND LEVODOPA 25; 100 MG/1; MG/1
TABLET ORAL
Qty: 90 TABLET | OUTPATIENT
Start: 2020-06-23

## 2020-06-23 RX ORDER — AMIODARONE HYDROCHLORIDE 200 MG/1
TABLET ORAL
Qty: 30 TABLET | OUTPATIENT
Start: 2020-06-23

## 2020-06-23 NOTE — TELEPHONE ENCOUNTER
Clopidogrel & Keppra  Routing refill request to provider for review/approval because:  Medication is reported/historical    Carbidopa-levodopa   Per chart, this is through patient's neurologist, informed pharmacy of this    amiodarone   Per chart, this is through patient's cardiologist, informed pharmacy of this     Omeprazole  Patient has a different dose on file, please clarify

## 2020-06-23 NOTE — TELEPHONE ENCOUNTER
"Requested Prescriptions   Pending Prescriptions Disp Refills     clopidogrel (PLAVIX) 75 MG tablet [Pharmacy Med Name: CLOPIDOGREL 75MG TABLET] 30 tablet      Sig: TAKE 1 TAB BY MOUTH ONCE DAILY FOR AFIB       Plavix Failed - 6/23/2020  8:17 AM        Failed - No active PPI on record unless is Protonix        Failed - Normal HGB on file in past 12 months     Recent Labs   Lab Test 10/31/18  1102   HGB 10.0*               Failed - Normal Platelets on file in past 12 months     Recent Labs   Lab Test 10/31/18  1102                  Passed - Recent (12 mo) or future (30 days) visit within the authorizing provider's specialty     Patient has had an office visit with the authorizing provider or a provider within the authorizing providers department within the previous 12 mos or has a future within next 30 days. See \"Patient Info\" tab in inbasket, or \"Choose Columns\" in Meds & Orders section of the refill encounter.              Passed - Medication is active on med list        Passed - Patient is age 18 or older           levETIRAcetam (KEPPRA) 500 MG tablet [Pharmacy Med Name: LEVETIRACETAM 500MG TABLET] 60 tablet      Sig: TAKE 1 TAB BY MOUTH TWICE A DAY FOR SEIZURE DISORDER       There is no refill protocol information for this order        carbidopa-levodopa (SINEMET)  MG tablet [Pharmacy Med Name: CARBIDOPA/L-DOPA 25-100MG TAB] 90 tablet      Sig: TAKE 1 TAB BY MOUTH THREE TIMES DAILY FOR PARKINSONS       Antiparkinson's Agents Protocol Passed - 6/23/2020  8:17 AM        Passed - Recent (12 mo) or future (30 days) visit within the authorizing provider's specialty     Patient has had an office visit with the authorizing provider or a provider within the authorizing providers department within the previous 12 mos or has a future within next 30 days. See \"Patient Info\" tab in inbasket, or \"Choose Columns\" in Meds & Orders section of the refill encounter.              Passed - Medication is active on med " "list        Passed - Patient is age 18 or older           amiodarone (PACERONE) 200 MG tablet [Pharmacy Med Name: AMIODARONE 200MG TABLET] 30 tablet      Sig: TAKE 1 TAB BY MOUTH ONCE DAILY FOR AFIB       There is no refill protocol information for this order        omeprazole (PRILOSEC) 20 MG DR capsule [Pharmacy Med Name: OMEPRAZOLE 20MG DR CAPSULE] 30 capsule      Sig: TAKE 1 CAP BY MOUTH ONCE DAILY FOR GI PROPHYLAXIS       PPI Protocol Failed - 6/23/2020  8:17 AM        Failed - Not on Clopidogrel (unless Pantoprazole ordered)        Passed - No diagnosis of osteoporosis on record        Passed - Recent (12 mo) or future (30 days) visit within the authorizing provider's specialty     Patient has had an office visit with the authorizing provider or a provider within the authorizing providers department within the previous 12 mos or has a future within next 30 days. See \"Patient Info\" tab in inbasket, or \"Choose Columns\" in Meds & Orders section of the refill encounter.              Passed - Medication is active on med list        Passed - Patient is age 18 or older           metoprolol succinate ER (TOPROL-XL) 50 MG 24 hr tablet [Pharmacy Med Name: METOPROLOL SUCC 50MG ER TABLET] 45 tablet      Sig: TAKE 1 AND 1/2 TABS (75MG) BY MOUTH ONCE DAILY FOR AFIB/HTN       Beta-Blockers Protocol Passed - 6/23/2020  8:17 AM        Passed - Blood pressure under 140/90 in past 12 months     BP Readings from Last 3 Encounters:   09/27/19 114/72   08/02/19 117/61   07/26/19 120/64                 Passed - Patient is age 6 or older        Passed - Recent (12 mo) or future (30 days) visit within the authorizing provider's specialty     Patient has had an office visit with the authorizing provider or a provider within the authorizing providers department within the previous 12 mos or has a future within next 30 days. See \"Patient Info\" tab in inbasket, or \"Choose Columns\" in Meds & Orders section of the refill encounter.          "     Passed - Medication is active on med list

## 2020-06-24 RX ORDER — CLOPIDOGREL BISULFATE 75 MG/1
TABLET ORAL
Qty: 30 TABLET | OUTPATIENT
Start: 2020-06-24

## 2020-06-24 RX ORDER — METOPROLOL SUCCINATE 50 MG/1
TABLET, EXTENDED RELEASE ORAL
Qty: 45 TABLET | OUTPATIENT
Start: 2020-06-24

## 2020-06-24 RX ORDER — LEVETIRACETAM 500 MG/1
TABLET ORAL
Qty: 60 TABLET | OUTPATIENT
Start: 2020-06-24

## 2020-06-24 NOTE — TELEPHONE ENCOUNTER
I had already mentioned that Plavix, Keppra, Carbidopa- through his neurologist, pl advise pharmacy to forward to Dr Chi( [facility] neurology)  Amiodarone through cardiologist.  Continue omeprazole 40 mg daily .

## 2020-06-24 NOTE — TELEPHONE ENCOUNTER
Spoke with tech at [facility].  Informed of primary care provider's message below and message from refill encounter 6-18-20.  Tech was going to reach out to patient's facility to inform of message below due to they are requesting refills, not pharmacy.    Medications refused.

## 2020-07-01 ENCOUNTER — TELEPHONE (OUTPATIENT)
Dept: INTERNAL MEDICINE | Facility: CLINIC | Age: 75
End: 2020-07-01

## 2020-07-01 NOTE — TELEPHONE ENCOUNTER
Fax received from [facility] [employer] for review and signature.  Put in Dr. Justin's in basket in Dr. Leon's absence.

## 2020-07-02 DIAGNOSIS — N40.1 BENIGN PROSTATIC HYPERPLASIA WITH LOWER URINARY TRACT SYMPTOMS, SYMPTOM DETAILS UNSPECIFIED: ICD-10-CM

## 2020-07-02 DIAGNOSIS — R11.0 NAUSEA: ICD-10-CM

## 2020-07-02 DIAGNOSIS — J44.9 CHRONIC OBSTRUCTIVE PULMONARY DISEASE, UNSPECIFIED COPD TYPE (H): ICD-10-CM

## 2020-07-02 RX ORDER — ONDANSETRON 4 MG/1
4 TABLET, FILM COATED ORAL EVERY 8 HOURS PRN
Qty: 30 TABLET | Refills: 1 | Status: SHIPPED | OUTPATIENT
Start: 2020-07-02 | End: 2023-01-01

## 2020-07-02 NOTE — TELEPHONE ENCOUNTER
Ondansetron  Prescription approved per [facility] Refill Protocol.    Breo ellipta   Prescription approved per [facility] Refill Protocol.

## 2020-07-03 RX ORDER — TAMSULOSIN HYDROCHLORIDE 0.4 MG/1
CAPSULE ORAL
Qty: 30 CAPSULE | OUTPATIENT
Start: 2020-07-03

## 2020-07-03 NOTE — TELEPHONE ENCOUNTER
This from is requesting recent clinic notes regarding the need for Fenofibrate, Tamsulosin and Omeprazole and the need for the dose decrease of Fluoxetine. Also requesting recent lipids. Form also states Fenofibrate is non-formulary. Requesting alternative or clinical documentation regarding why medication can't be changed.    Printed visit notes from last virtual visit from 6/19. This included documentation regarding Fenofibrate, Tamsulosin and Omeprazole. Per chart Fluoxetine was decrease at this virtual visit from 60 mg to 40 mg. Writer does not see rationalization for this change. Please advise.     List of covered alternatives for Fenofibrate placed in Dr. Higgins's in basket for review. Please advise alternative or reason why medication can't be changed.     Lipids also printed. All information placed in Dr. Leon's  in basket for review.

## 2020-07-03 NOTE — TELEPHONE ENCOUNTER
[facility] This request is from khanh pulido. Call to wife (on consent to communicate) to clarify, she confirms [facility].    Denied. Duplicate. See refill from 6/21/20

## 2020-07-03 NOTE — TELEPHONE ENCOUNTER
"Requested Prescriptions   Pending Prescriptions Disp Refills     tamsulosin (FLOMAX) 0.4 MG capsule [Pharmacy Med Name: TAMSULOSIN 0.4MG CAPSULE]  Last Written Prescription Date:  6/21/2020  Last Fill Quantity: 90,  # refills: 3   Last office visit: 9/27/2019 with prescribing provider:  Edward   Future Office Visit:           30 capsule      Sig: TAKE 1 CAP BY MOUTH AT BEDTIME FOR BPH       Alpha Blockers Passed - 7/2/2020  2:59 PM        Passed - Blood pressure under 140/90 in past 12 months     BP Readings from Last 3 Encounters:   09/27/19 114/72   08/02/19 117/61   07/26/19 120/64                 Passed - Recent (12 mo) or future (30 days) visit within the authorizing provider's specialty     Patient has had an office visit with the authorizing provider or a provider within the authorizing providers department within the previous 12 mos or has a future within next 30 days. See \"Patient Info\" tab in inbasket, or \"Choose Columns\" in Meds & Orders section of the refill encounter.              Passed - Patient does not have Tadalafil, Vardenafil, or Sildenafil on their medication list        Passed - Medication is active on med list        Passed - Patient is 18 years of age or older             "

## 2020-07-21 ENCOUNTER — TELEPHONE (OUTPATIENT)
Dept: INTERNAL MEDICINE | Facility: CLINIC | Age: 75
End: 2020-07-21

## 2020-07-21 DIAGNOSIS — J44.9 CHRONIC OBSTRUCTIVE PULMONARY DISEASE, UNSPECIFIED COPD TYPE (H): Primary | ICD-10-CM

## 2020-07-21 NOTE — TELEPHONE ENCOUNTER
Fax received from [facility] [employer] [facility] for review and signature.  Put in Dr. Leon's in basket.

## 2020-07-22 RX ORDER — BUDESONIDE AND FORMOTEROL FUMARATE DIHYDRATE 160; 4.5 UG/1; UG/1
2 AEROSOL RESPIRATORY (INHALATION) 2 TIMES DAILY
Qty: 1 INHALER | Refills: 3 | Status: SHIPPED | OUTPATIENT
Start: 2020-07-22 | End: 2023-01-01

## 2020-07-22 NOTE — TELEPHONE ENCOUNTER
Jose Samano is not covered by patient's insurance and they cover budesonide/formoterol medication faxed

## 2020-08-05 NOTE — TELEPHONE ENCOUNTER
"Requested Prescriptions   Pending Prescriptions Disp Refills     omeprazole (PRILOSEC) 20 MG DR capsule [Pharmacy Med Name: OMEPRAZOLE 20MG DR CAPSULE] 30 capsule      Sig: TAKE 1 CAP BY MOUTH ONCE DAILY FOR GI PROPHYLAXIS       PPI Protocol Failed - 8/4/2020  2:40 PM        Failed - Not on Clopidogrel (unless Pantoprazole ordered)        Passed - No diagnosis of osteoporosis on record        Passed - Recent (12 mo) or future (30 days) visit within the authorizing provider's specialty     Patient has had an office visit with the authorizing provider or a provider within the authorizing providers department within the previous 12 mos or has a future within next 30 days. See \"Patient Info\" tab in inbasket, or \"Choose Columns\" in Meds & Orders section of the refill encounter.              Passed - Medication is active on med list        Passed - Patient is age 18 or older           amiodarone (PACERONE) 200 MG tablet [Pharmacy Med Name: AMIODARONE 200MG TABLET] 30 tablet      Sig: TAKE 1 TAB BY MOUTH ONCE DAILY FOR AFIB       There is no refill protocol information for this order        carbidopa-levodopa (SINEMET)  MG tablet [Pharmacy Med Name: CARBIDOPA/L-DOPA 25-100MG TAB] 90 tablet      Sig: TAKE 1 TAB BY MOUTH THREE TIMES DAILY FOR PARKINSONS       Antiparkinson's Agents Protocol Passed - 8/4/2020  2:40 PM        Passed - Recent (12 mo) or future (30 days) visit within the authorizing provider's specialty     Patient has had an office visit with the authorizing provider or a provider within the authorizing providers department within the previous 12 mos or has a future within next 30 days. See \"Patient Info\" tab in inbasket, or \"Choose Columns\" in Meds & Orders section of the refill encounter.              Passed - Medication is active on med list        Passed - Patient is age 18 or older           levETIRAcetam (KEPPRA) 500 MG tablet [Pharmacy Med Name: LEVETIRACETAM 500MG TABLET] 60 tablet      Sig: TAKE " 1 TAB BY MOUTH TWICE A DAY FOR SEIZURE DISORDER       There is no refill protocol information for this order

## 2020-08-05 NOTE — TELEPHONE ENCOUNTER
Request coming from [facility], [facility]. Left a voicemail asking wife to call the clinic back.

## 2020-08-12 RX ORDER — LEVETIRACETAM 500 MG/1
TABLET ORAL
Qty: 60 TABLET | OUTPATIENT
Start: 2020-08-12

## 2020-08-12 RX ORDER — AMIODARONE HYDROCHLORIDE 200 MG/1
TABLET ORAL
Qty: 30 TABLET | OUTPATIENT
Start: 2020-08-12

## 2020-08-12 RX ORDER — CARBIDOPA AND LEVODOPA 25; 100 MG/1; MG/1
TABLET ORAL
Qty: 90 TABLET | OUTPATIENT
Start: 2020-08-12

## 2020-08-12 NOTE — TELEPHONE ENCOUNTER
Medication refused, called wife and spoke with her (ok per ctc) and she stated that the [facility].

## 2020-12-04 NOTE — TELEPHONE ENCOUNTER
Detail Level: Detailed Routing refill request to provider for review/approval because:  Drug not active on patient's medication list    Called Brett Gutierrez and the RN stated patient is taking Basaglar 20 units subcutaneous Q HS.      Medication pended below.  Please sign if you agree.  thanks         Depth Of Biopsy: dermis Was A Bandage Applied: Yes Size Of Lesion In Cm: 0.5 X Size Of Lesion In Cm: 0 Biopsy Type: H and E Biopsy Method: Dermablade Anesthesia Type: 1% lidocaine with epinephrine Hemostasis: Electrocautery and Aluminum Chloride Wound Care: Petrolatum Dressing: bandage Destruction After The Procedure: No Type Of Destruction Used: Curettage Curettage Text: The wound bed was treated with curettage after the biopsy was performed. Cryotherapy Text: The wound bed was treated with cryotherapy after the biopsy was performed. Electrodesiccation Text: The wound bed was treated with electrodesiccation after the biopsy was performed. Electrodesiccation And Curettage Text: The wound bed was treated with electrodesiccation and curettage after the biopsy was performed. Silver Nitrate Text: The wound bed was treated with silver nitrate after the biopsy was performed. Lab: [id number] Lab Facility: [id number] Consent: Written consent was obtained and risks were reviewed including but not limited to scarring, infection, bleeding, scabbing, incomplete removal, nerve damage and allergy to anesthesia. Post-Care Instructions: I reviewed with the patient in detail post-care instructions. Patient is to keep the biopsy site dry overnight, and then apply bacitracin twice daily until healed. Patient may apply hydrogen peroxide soaks to remove any crusting. Notification Instructions: Patient will be notified of biopsy results. However, patient instructed to call the office if not contacted within 2 weeks. Billing Type: Third-Party Bill Information: Selecting Yes will display possible errors in your note based on the variables you have selected. This validation is only offered as a suggestion for you. PLEASE NOTE THAT THE VALIDATION TEXT WILL BE REMOVED WHEN YOU FINALIZE YOUR NOTE. IF YOU WANT TO FAX A PRELIMINARY NOTE YOU WILL NEED TO TOGGLE THIS TO 'NO' IF YOU DO NOT WANT IT IN YOUR FAXED NOTE.

## 2021-05-18 ENCOUNTER — NURSE TRIAGE (OUTPATIENT)
Dept: INTERNAL MEDICINE | Facility: CLINIC | Age: 76
End: 2021-05-18

## 2021-05-18 NOTE — TELEPHONE ENCOUNTER
Patient complains of abdominal pain left lower quadrant x4 days.  He has a history of diverticulitis and needs transportation to be seen anywhere due to mobility issues.  [facility] and can only get transportation there Thursdays or Fridays.  Unable to get patient into car to take to clinic or hospital.  Advised patient be evaluated in ER for abdominal pain, wife will call 911.  DORETHA Noriega R.N.      Reason for Disposition    Constant abdominal pain lasting > 2 hours    Age > 60 years    Additional Information    Negative: Passed out (i.e., fainted, collapsed and was not responding)    Negative: Shock suspected (e.g., cold/pale/clammy skin, too weak to stand, low BP, rapid pulse)    Negative: Sounds like a life-threatening emergency to the triager    Negative: Chest pain    Negative: Pain is mainly in upper abdomen (if needed ask: 'is it mainly above the belly button?')    Negative: SEVERE abdominal pain (e.g., excruciating)    Negative: Vomiting red blood or black (coffee ground) material    Negative: Bloody, black, or tarry bowel movements (Exception: chronic-unchanged black-grey bowel movements and is taking iron pills or Pepto-bismol)    Negative: Unable to urinate (or only a few drops) and bladder feels very full    Negative: Pain in scrotum persists > 1 hour    Negative: Vomiting bile (green color)    Negative: Patient sounds very sick or weak to the triager    Negative: Vomiting and abdomen looks much more swollen than usual    Negative: White of the eyes have turned yellow (i.e., jaundice)    Negative: Blood in urine (red, pink, or tea-colored)    Negative: Fever > 103 F (39.4 C)    Negative: Fever > 101 F (38.3 C) and over 60 years of age    Negative: Fever > 100.0 F (37.8 C) and has diabetes mellitus or a weak immune system (e.g., HIV positive, cancer chemotherapy, organ transplant, splenectomy, chronic steroids)    Negative: Fever > 100.0 F (37.8 C) and bedridden  "(e.g., nursing home patient, stroke, chronic illness, recovering from surgery)    Negative: MILD pain that comes and goes (cramps) lasts > 24 hours    Answer Assessment - Initial Assessment Questions  1. LOCATION: \"Where does it hurt?\"       Left lower abdomen  2. RADIATION: \"Does the pain shoot anywhere else?\" (e.g., chest, back)      Non radiating  3. ONSET: \"When did the pain begin?\" (Minutes, hours or days ago)       4 days ago  4. SUDDEN: \"Gradual or sudden onset?\"         5. PATTERN \"Does the pain come and go, or is it constant?\"     - If constant: \"Is it getting better, staying the same, or worsening?\"       (Note: Constant means the pain never goes away completely; most serious pain is constant and it progresses)      - If intermittent: \"How long does it last?\" \"Do you have pain now?\"      (Note: Intermittent means the pain goes away completely between bouts)      Seems to come and go  6. SEVERITY: \"How bad is the pain?\"  (e.g., Scale 1-10; mild, moderate, or severe)     - MILD (1-3): doesn't interfere with normal activities, abdomen soft and not tender to touch      - MODERATE (4-7): interferes with normal activities or awakens from sleep, tender to touch      - SEVERE (8-10): excruciating pain, doubled over, unable to do any normal activities        Rates pain 5 or 6 out of 10  7. RECURRENT SYMPTOM: \"Have you ever had this type of abdominal pain before?\" If so, ask: \"When was the last time?\" and \"What happened that time?\"       Yes, history of diverticulitis  8. CAUSE: \"What do you think is causing the abdominal pain?\"      diverticulitis  9. RELIEVING/AGGRAVATING FACTORS: \"What makes it better or worse?\" (e.g., movement, antacids, bowel movement)      Feels a little better after a bowel movement.  Last BM this morning, diarrhea  10. OTHER SYMPTOMS: \"Has there been any vomiting, diarrhea, constipation, or urine problems?\"        Patient denies fever, nausea, vomiting, blood in stool.    Protocols used: " ABDOMINAL PAIN - MALE-A-OH

## 2021-10-12 ENCOUNTER — TELEPHONE (OUTPATIENT)
Dept: INTERNAL MEDICINE | Facility: CLINIC | Age: 76
End: 2021-10-12

## 2023-01-01 ENCOUNTER — DOCUMENTATION ONLY (OUTPATIENT)
Dept: OTHER | Facility: CLINIC | Age: 78
End: 2023-01-01
Payer: COMMERCIAL

## 2023-01-01 ENCOUNTER — TELEPHONE (OUTPATIENT)
Dept: UROLOGY | Facility: CLINIC | Age: 78
End: 2023-01-01

## 2023-01-01 ENCOUNTER — HOSPITAL ENCOUNTER (OUTPATIENT)
Facility: CLINIC | Age: 78
Setting detail: OBSERVATION
Discharge: HOME | End: 2023-04-17
Attending: EMERGENCY MEDICINE | Admitting: HOSPITALIST
Payer: COMMERCIAL

## 2023-01-01 ENCOUNTER — HEALTH MAINTENANCE LETTER (OUTPATIENT)
Age: 78
End: 2023-01-01

## 2023-01-01 ENCOUNTER — HOSPITAL ENCOUNTER (INPATIENT)
Facility: CLINIC | Age: 78
LOS: 1 days | DRG: 871 | End: 2023-07-03
Attending: EMERGENCY MEDICINE | Admitting: INTERNAL MEDICINE
Payer: COMMERCIAL

## 2023-01-01 ENCOUNTER — HOSPITAL ENCOUNTER (EMERGENCY)
Facility: CLINIC | Age: 78
Discharge: HOME | End: 2023-04-25
Attending: EMERGENCY MEDICINE | Admitting: EMERGENCY MEDICINE
Payer: COMMERCIAL

## 2023-01-01 ENCOUNTER — APPOINTMENT (OUTPATIENT)
Dept: CT IMAGING | Facility: CLINIC | Age: 78
End: 2023-01-01
Attending: EMERGENCY MEDICINE
Payer: COMMERCIAL

## 2023-01-01 ENCOUNTER — MEDICAL CORRESPONDENCE (OUTPATIENT)
Dept: HEALTH INFORMATION MANAGEMENT | Facility: CLINIC | Age: 78
End: 2023-01-01

## 2023-01-01 ENCOUNTER — APPOINTMENT (OUTPATIENT)
Dept: GENERAL RADIOLOGY | Facility: CLINIC | Age: 78
DRG: 871 | End: 2023-01-01
Attending: EMERGENCY MEDICINE
Payer: COMMERCIAL

## 2023-01-01 ENCOUNTER — TELEPHONE (OUTPATIENT)
Dept: EMERGENCY MEDICINE | Facility: CLINIC | Age: 78
End: 2023-01-01
Payer: COMMERCIAL

## 2023-01-01 ENCOUNTER — APPOINTMENT (OUTPATIENT)
Dept: CT IMAGING | Facility: CLINIC | Age: 78
DRG: 871 | End: 2023-01-01
Attending: INTERNAL MEDICINE
Payer: COMMERCIAL

## 2023-01-01 VITALS
TEMPERATURE: 98.4 F | DIASTOLIC BLOOD PRESSURE: 68 MMHG | HEART RATE: 106 BPM | WEIGHT: 152.56 LBS | HEIGHT: 72 IN | BODY MASS INDEX: 20.66 KG/M2 | SYSTOLIC BLOOD PRESSURE: 99 MMHG | OXYGEN SATURATION: 96 % | RESPIRATION RATE: 5 BRPM

## 2023-01-01 VITALS
DIASTOLIC BLOOD PRESSURE: 65 MMHG | OXYGEN SATURATION: 100 % | TEMPERATURE: 98 F | SYSTOLIC BLOOD PRESSURE: 104 MMHG | RESPIRATION RATE: 16 BRPM | HEART RATE: 93 BPM

## 2023-01-01 VITALS
OXYGEN SATURATION: 99 % | SYSTOLIC BLOOD PRESSURE: 108 MMHG | HEIGHT: 72 IN | RESPIRATION RATE: 16 BRPM | WEIGHT: 178.5 LBS | BODY MASS INDEX: 24.18 KG/M2 | HEART RATE: 88 BPM | DIASTOLIC BLOOD PRESSURE: 68 MMHG | TEMPERATURE: 98.5 F

## 2023-01-01 DIAGNOSIS — N28.89 RENAL MASS: ICD-10-CM

## 2023-01-01 DIAGNOSIS — N17.9 ACUTE RENAL FAILURE, UNSPECIFIED ACUTE RENAL FAILURE TYPE (H): ICD-10-CM

## 2023-01-01 DIAGNOSIS — N10 PYELONEPHRITIS, ACUTE: ICD-10-CM

## 2023-01-01 DIAGNOSIS — J96.21 ACUTE AND CHRONIC RESPIRATORY FAILURE WITH HYPOXIA (H): ICD-10-CM

## 2023-01-01 DIAGNOSIS — R79.89 ELEVATED BRAIN NATRIURETIC PEPTIDE (BNP) LEVEL: ICD-10-CM

## 2023-01-01 DIAGNOSIS — C79.51 METASTASIS TO BONE (H): ICD-10-CM

## 2023-01-01 DIAGNOSIS — R31.0 GROSS HEMATURIA: ICD-10-CM

## 2023-01-01 DIAGNOSIS — R31.0 GROSS HEMATURIA: Primary | ICD-10-CM

## 2023-01-01 DIAGNOSIS — J69.0 ASPIRATION PNEUMONIA OF LEFT LUNG, UNSPECIFIED ASPIRATION PNEUMONIA TYPE, UNSPECIFIED PART OF LUNG (H): ICD-10-CM

## 2023-01-01 LAB
ABO/RH(D): NORMAL
ALBUMIN SERPL BCG-MCNC: 3.9 G/DL (ref 3.5–5.2)
ALBUMIN UR-MCNC: 100 MG/DL
ALBUMIN UR-MCNC: 100 MG/DL
ALBUMIN UR-MCNC: 30 MG/DL
ALP SERPL-CCNC: 91 U/L (ref 40–129)
ALT SERPL W P-5'-P-CCNC: 13 U/L (ref 10–50)
ANION GAP BLD CALCULATED.3IONS-SCNC: 17 MMOL/L (ref 3–14)
ANION GAP BLD CALCULATED.3IONS-SCNC: 19 MMOL/L (ref 3–14)
ANION GAP SERPL CALCULATED.3IONS-SCNC: 13 MMOL/L (ref 7–15)
ANION GAP SERPL CALCULATED.3IONS-SCNC: 13 MMOL/L (ref 7–15)
ANION GAP SERPL CALCULATED.3IONS-SCNC: 14 MMOL/L (ref 7–15)
ANION GAP SERPL CALCULATED.3IONS-SCNC: 14 MMOL/L (ref 7–15)
ANION GAP SERPL CALCULATED.3IONS-SCNC: 18 MMOL/L (ref 7–15)
ANTIBODY SCREEN: NEGATIVE
APPEARANCE UR: ABNORMAL
APPEARANCE UR: ABNORMAL
APPEARANCE UR: CLEAR
APTT PPP: 26 SECONDS (ref 22–38)
APTT PPP: 31 SECONDS (ref 22–38)
AST SERPL W P-5'-P-CCNC: 18 U/L (ref 10–50)
ATRIAL RATE - MUSE: 135 BPM
ATRIAL RATE - MUSE: 81 BPM
ATRIAL RATE - MUSE: 84 BPM
BACTERIA #/AREA URNS HPF: ABNORMAL /HPF
BACTERIA UR CULT: ABNORMAL
BACTERIA UR CULT: NORMAL
BASE EXCESS BLDV CALC-SCNC: -12.8 MMOL/L (ref -7.7–1.9)
BASE EXCESS BLDV CALC-SCNC: -13.1 MMOL/L (ref -7.7–1.9)
BASOPHILS # BLD AUTO: 0 10E3/UL (ref 0–0.2)
BASOPHILS # BLD AUTO: 0 10E3/UL (ref 0–0.2)
BASOPHILS # BLD AUTO: 0.1 10E3/UL (ref 0–0.2)
BASOPHILS # BLD AUTO: 0.2 10E3/UL (ref 0–0.2)
BASOPHILS NFR BLD AUTO: 1 %
BILIRUB SERPL-MCNC: 0.3 MG/DL
BILIRUB UR QL STRIP: NEGATIVE
BUN SERPL-MCNC: 27.1 MG/DL (ref 8–23)
BUN SERPL-MCNC: 27.6 MG/DL (ref 8–23)
BUN SERPL-MCNC: 31 MG/DL (ref 7–30)
BUN SERPL-MCNC: 32 MG/DL (ref 7–30)
BUN SERPL-MCNC: 33.8 MG/DL (ref 8–23)
BUN SERPL-MCNC: 36 MG/DL (ref 8–23)
BUN SERPL-MCNC: 42.3 MG/DL (ref 8–23)
CA-I BLD-MCNC: 4.6 MG/DL (ref 4.4–5.2)
CA-I BLD-MCNC: 4.8 MG/DL (ref 4.4–5.2)
CALCIUM SERPL-MCNC: 8 MG/DL (ref 8.8–10.2)
CALCIUM SERPL-MCNC: 8 MG/DL (ref 8.8–10.2)
CALCIUM SERPL-MCNC: 8.7 MG/DL (ref 8.8–10.2)
CALCIUM SERPL-MCNC: 8.8 MG/DL (ref 8.8–10.2)
CALCIUM SERPL-MCNC: 8.9 MG/DL (ref 8.8–10.2)
CHLORIDE BLD-SCNC: 114 MMOL/L (ref 94–109)
CHLORIDE BLD-SCNC: 116 MMOL/L (ref 94–109)
CHLORIDE SERPL-SCNC: 107 MMOL/L (ref 98–107)
CHLORIDE SERPL-SCNC: 108 MMOL/L (ref 98–107)
CHLORIDE SERPL-SCNC: 109 MMOL/L (ref 98–107)
CHLORIDE SERPL-SCNC: 110 MMOL/L (ref 98–107)
CHLORIDE SERPL-SCNC: 111 MMOL/L (ref 98–107)
CO2 BLD-SCNC: 18 MMOL/L (ref 20–32)
CO2 BLD-SCNC: 18 MMOL/L (ref 20–32)
COLOR UR AUTO: ABNORMAL
COLOR UR AUTO: ABNORMAL
COLOR UR AUTO: YELLOW
CREAT BLD-MCNC: 1.7 MG/DL (ref 0.7–1.3)
CREAT BLD-MCNC: 1.8 MG/DL (ref 0.7–1.3)
CREAT SERPL-MCNC: 1.41 MG/DL (ref 0.67–1.17)
CREAT SERPL-MCNC: 1.57 MG/DL (ref 0.67–1.17)
CREAT SERPL-MCNC: 1.58 MG/DL (ref 0.67–1.17)
CREAT SERPL-MCNC: 1.65 MG/DL (ref 0.67–1.17)
CREAT SERPL-MCNC: 1.9 MG/DL (ref 0.67–1.17)
DEPRECATED HCO3 PLAS-SCNC: 16 MMOL/L (ref 22–29)
DEPRECATED HCO3 PLAS-SCNC: 19 MMOL/L (ref 22–29)
DEPRECATED HCO3 PLAS-SCNC: 22 MMOL/L (ref 22–29)
DIASTOLIC BLOOD PRESSURE - MUSE: NORMAL MMHG
EOSINOPHIL # BLD AUTO: 0 10E3/UL (ref 0–0.7)
EOSINOPHIL # BLD AUTO: 0.1 10E3/UL (ref 0–0.7)
EOSINOPHIL NFR BLD AUTO: 0 %
EOSINOPHIL NFR BLD AUTO: 2 %
EOSINOPHIL NFR BLD AUTO: 2 %
EOSINOPHIL NFR BLD AUTO: 3 %
ERYTHROCYTE [DISTWIDTH] IN BLOOD BY AUTOMATED COUNT: 14.5 % (ref 10–15)
ERYTHROCYTE [DISTWIDTH] IN BLOOD BY AUTOMATED COUNT: 14.6 % (ref 10–15)
ERYTHROCYTE [DISTWIDTH] IN BLOOD BY AUTOMATED COUNT: 14.6 % (ref 10–15)
ERYTHROCYTE [DISTWIDTH] IN BLOOD BY AUTOMATED COUNT: 18.1 % (ref 10–15)
GFR SERPL CREATININE-BSD FRML MDRD: 35 ML/MIN/1.73M2
GFR SERPL CREATININE-BSD FRML MDRD: 42 ML/MIN/1.73M2
GFR SERPL CREATININE-BSD FRML MDRD: 44 ML/MIN/1.73M2
GFR SERPL CREATININE-BSD FRML MDRD: 45 ML/MIN/1.73M2
GFR SERPL CREATININE-BSD FRML MDRD: 51 ML/MIN/1.73M2
GLUCOSE BLD-MCNC: 225 MG/DL (ref 70–99)
GLUCOSE BLD-MCNC: 229 MG/DL (ref 70–99)
GLUCOSE BLDC GLUCOMTR-MCNC: 228 MG/DL (ref 70–99)
GLUCOSE SERPL-MCNC: 119 MG/DL (ref 70–99)
GLUCOSE SERPL-MCNC: 133 MG/DL (ref 70–99)
GLUCOSE SERPL-MCNC: 137 MG/DL (ref 70–99)
GLUCOSE SERPL-MCNC: 139 MG/DL (ref 70–99)
GLUCOSE SERPL-MCNC: 231 MG/DL (ref 70–99)
GLUCOSE UR STRIP-MCNC: NEGATIVE MG/DL
HBA1C MFR BLD: 6.2 %
HCO3 BLDV-SCNC: 16 MMOL/L (ref 21–28)
HCO3 BLDV-SCNC: 17 MMOL/L (ref 21–28)
HCO3 BLDV-SCNC: 17 MMOL/L (ref 21–28)
HCO3 BLDV-SCNC: 18 MMOL/L (ref 21–28)
HCT VFR BLD AUTO: 32.8 % (ref 40–53)
HCT VFR BLD AUTO: 34.9 % (ref 40–53)
HCT VFR BLD AUTO: 36.7 % (ref 40–53)
HCT VFR BLD AUTO: 42.2 % (ref 40–53)
HCT VFR BLD CALC: 33 % (ref 40–53)
HCT VFR BLD CALC: 40 % (ref 40–53)
HEMOCCULT STL QL: NEGATIVE
HGB BLD-MCNC: 10.3 G/DL (ref 13.3–17.7)
HGB BLD-MCNC: 10.5 G/DL (ref 13.3–17.7)
HGB BLD-MCNC: 11 G/DL (ref 13.3–17.7)
HGB BLD-MCNC: 11.2 G/DL (ref 13.3–17.7)
HGB BLD-MCNC: 12 G/DL (ref 13.3–17.7)
HGB BLD-MCNC: 13.6 G/DL (ref 13.3–17.7)
HGB UR QL STRIP: ABNORMAL
HGB UR QL STRIP: ABNORMAL
HGB UR QL STRIP: NEGATIVE
HOLD SPECIMEN: NORMAL
HYALINE CASTS: 1 /LPF
IMM GRANULOCYTES # BLD: 0 10E3/UL
IMM GRANULOCYTES # BLD: 0.1 10E3/UL
IMM GRANULOCYTES NFR BLD: 0 %
IMM GRANULOCYTES NFR BLD: 1 %
INR PPP: 1.16 (ref 0.85–1.15)
INR PPP: 1.31 (ref 0.85–1.15)
INTERPRETATION ECG - MUSE: NORMAL
KETONES UR STRIP-MCNC: ABNORMAL MG/DL
KETONES UR STRIP-MCNC: NEGATIVE MG/DL
KETONES UR STRIP-MCNC: NEGATIVE MG/DL
LACTATE BLD-SCNC: 1.8 MMOL/L
LACTATE BLD-SCNC: 3 MMOL/L
LACTATE SERPL-SCNC: 1.8 MMOL/L (ref 0.7–2)
LACTATE SERPL-SCNC: 2 MMOL/L (ref 0.7–2)
LACTATE SERPL-SCNC: 3.3 MMOL/L (ref 0.7–2)
LDH SERPL L TO P-CCNC: 342 U/L (ref 0–250)
LEUKOCYTE ESTERASE UR QL STRIP: ABNORMAL
LEVETIRACETAM SERPL-MCNC: 22.6 ΜG/ML (ref 10–40)
LYMPHOCYTES # BLD AUTO: 0.8 10E3/UL (ref 0.8–5.3)
LYMPHOCYTES # BLD AUTO: 0.8 10E3/UL (ref 0.8–5.3)
LYMPHOCYTES # BLD AUTO: 0.9 10E3/UL (ref 0.8–5.3)
LYMPHOCYTES # BLD AUTO: 1.8 10E3/UL (ref 0.8–5.3)
LYMPHOCYTES NFR BLD AUTO: 14 %
LYMPHOCYTES NFR BLD AUTO: 15 %
LYMPHOCYTES NFR BLD AUTO: 15 %
LYMPHOCYTES NFR BLD AUTO: 17 %
MCH RBC QN AUTO: 25.2 PG (ref 26.5–33)
MCH RBC QN AUTO: 26.4 PG (ref 26.5–33)
MCH RBC QN AUTO: 26.5 PG (ref 26.5–33)
MCH RBC QN AUTO: 27 PG (ref 26.5–33)
MCHC RBC AUTO-ENTMCNC: 28.4 G/DL (ref 31.5–36.5)
MCHC RBC AUTO-ENTMCNC: 30 G/DL (ref 31.5–36.5)
MCHC RBC AUTO-ENTMCNC: 30.1 G/DL (ref 31.5–36.5)
MCHC RBC AUTO-ENTMCNC: 31.4 G/DL (ref 31.5–36.5)
MCV RBC AUTO: 86 FL (ref 78–100)
MCV RBC AUTO: 88 FL (ref 78–100)
MCV RBC AUTO: 88 FL (ref 78–100)
MCV RBC AUTO: 89 FL (ref 78–100)
MONOCYTES # BLD AUTO: 0.4 10E3/UL (ref 0–1.3)
MONOCYTES # BLD AUTO: 0.4 10E3/UL (ref 0–1.3)
MONOCYTES # BLD AUTO: 0.5 10E3/UL (ref 0–1.3)
MONOCYTES # BLD AUTO: 0.7 10E3/UL (ref 0–1.3)
MONOCYTES NFR BLD AUTO: 6 %
MONOCYTES NFR BLD AUTO: 8 %
MONOCYTES NFR BLD AUTO: 9 %
MONOCYTES NFR BLD AUTO: 9 %
MUCOUS THREADS #/AREA URNS LPF: PRESENT /LPF
MUCOUS THREADS #/AREA URNS LPF: PRESENT /LPF
NEUTROPHILS # BLD AUTO: 3.3 10E3/UL (ref 1.6–8.3)
NEUTROPHILS # BLD AUTO: 4 10E3/UL (ref 1.6–8.3)
NEUTROPHILS # BLD AUTO: 4.2 10E3/UL (ref 1.6–8.3)
NEUTROPHILS # BLD AUTO: 9.4 10E3/UL (ref 1.6–8.3)
NEUTROPHILS NFR BLD AUTO: 69 %
NEUTROPHILS NFR BLD AUTO: 73 %
NEUTROPHILS NFR BLD AUTO: 74 %
NEUTROPHILS NFR BLD AUTO: 77 %
NITRATE UR QL: NEGATIVE
NRBC # BLD AUTO: 0 10E3/UL
NRBC BLD AUTO-RTO: 0 /100
NT-PROBNP SERPL-MCNC: ABNORMAL PG/ML (ref 0–1800)
NT-PROBNP SERPL-MCNC: ABNORMAL PG/ML (ref 0–1800)
O2/TOTAL GAS SETTING VFR VENT: 0 %
O2/TOTAL GAS SETTING VFR VENT: 15 %
P AXIS - MUSE: -20 DEGREES
P AXIS - MUSE: -3 DEGREES
P AXIS - MUSE: 20 DEGREES
PCO2 BLDV: 52 MM HG (ref 40–50)
PCO2 BLDV: 56 MM HG (ref 40–50)
PCO2 BLDV: 58 MM HG (ref 40–50)
PCO2 BLDV: 58 MM HG (ref 40–50)
PH BLDV: 7.08 [PH] (ref 7.32–7.43)
PH BLDV: 7.09 [PH] (ref 7.32–7.43)
PH BLDV: 7.1 [PH] (ref 7.32–7.43)
PH BLDV: 7.1 [PH] (ref 7.32–7.43)
PH UR STRIP: 5 [PH] (ref 5–7)
PH UR STRIP: 5.5 [PH] (ref 5–7)
PH UR STRIP: 5.5 [PH] (ref 5–7)
PLATELET # BLD AUTO: 139 10E3/UL (ref 150–450)
PLATELET # BLD AUTO: 167 10E3/UL (ref 150–450)
PLATELET # BLD AUTO: 179 10E3/UL (ref 150–450)
PLATELET # BLD AUTO: 377 10E3/UL (ref 150–450)
PO2 BLDV: 34 MM HG (ref 25–47)
PO2 BLDV: 39 MM HG (ref 25–47)
PO2 BLDV: 64 MM HG (ref 25–47)
PO2 BLDV: 71 MM HG (ref 25–47)
POTASSIUM BLD-SCNC: 3.8 MMOL/L (ref 3.4–5.3)
POTASSIUM BLD-SCNC: 4.2 MMOL/L (ref 3.4–5.3)
POTASSIUM SERPL-SCNC: 3.7 MMOL/L (ref 3.4–5.3)
POTASSIUM SERPL-SCNC: 4.2 MMOL/L (ref 3.4–5.3)
POTASSIUM SERPL-SCNC: 4.5 MMOL/L (ref 3.4–5.3)
PR INTERVAL - MUSE: 112 MS
PR INTERVAL - MUSE: 168 MS
PR INTERVAL - MUSE: 168 MS
PROT SERPL-MCNC: 6.8 G/DL (ref 6.4–8.3)
PROT SERPL-MCNC: 6.9 G/DL (ref 6.4–8.3)
QRS DURATION - MUSE: 138 MS
QRS DURATION - MUSE: 146 MS
QRS DURATION - MUSE: 148 MS
QT - MUSE: 352 MS
QT - MUSE: 440 MS
QT - MUSE: 450 MS
QTC - MUSE: 519 MS
QTC - MUSE: 522 MS
QTC - MUSE: 528 MS
R AXIS - MUSE: -19 DEGREES
R AXIS - MUSE: -41 DEGREES
R AXIS - MUSE: -41 DEGREES
RBC # BLD AUTO: 3.81 10E6/UL (ref 4.4–5.9)
RBC # BLD AUTO: 3.97 10E6/UL (ref 4.4–5.9)
RBC # BLD AUTO: 4.15 10E6/UL (ref 4.4–5.9)
RBC # BLD AUTO: 4.77 10E6/UL (ref 4.4–5.9)
RBC URINE: 3 /HPF
RBC URINE: >182 /HPF
RBC URINE: >182 /HPF
SAO2 % BLDV: 46 % (ref 94–100)
SAO2 % BLDV: 87 % (ref 94–100)
SARS-COV-2 RNA RESP QL NAA+PROBE: NEGATIVE
SARS-COV-2 RNA RESP QL NAA+PROBE: NEGATIVE
SODIUM BLD-SCNC: 145 MMOL/L (ref 133–144)
SODIUM BLD-SCNC: 147 MMOL/L (ref 133–144)
SODIUM SERPL-SCNC: 140 MMOL/L (ref 136–145)
SODIUM SERPL-SCNC: 141 MMOL/L (ref 136–145)
SODIUM SERPL-SCNC: 141 MMOL/L (ref 136–145)
SODIUM SERPL-SCNC: 142 MMOL/L (ref 136–145)
SODIUM SERPL-SCNC: 142 MMOL/L (ref 136–145)
SP GR UR STRIP: 1.02 (ref 1–1.03)
SP GR UR STRIP: 1.03 (ref 1–1.03)
SP GR UR STRIP: 1.03 (ref 1–1.03)
SPECIMEN EXPIRATION DATE: NORMAL
SQUAMOUS EPITHELIAL: 2 /HPF
SYSTOLIC BLOOD PRESSURE - MUSE: NORMAL MMHG
T AXIS - MUSE: 113 DEGREES
T AXIS - MUSE: 125 DEGREES
T AXIS - MUSE: 126 DEGREES
TROPONIN T SERPL HS-MCNC: 39 NG/L
TROPONIN T SERPL HS-MCNC: 39 NG/L
TROPONIN T SERPL HS-MCNC: 82 NG/L
UROBILINOGEN UR STRIP-MCNC: NORMAL MG/DL
VENTRICULAR RATE- MUSE: 135 BPM
VENTRICULAR RATE- MUSE: 81 BPM
VENTRICULAR RATE- MUSE: 84 BPM
WBC # BLD AUTO: 12.2 10E3/UL (ref 4–11)
WBC # BLD AUTO: 4.8 10E3/UL (ref 4–11)
WBC # BLD AUTO: 5.3 10E3/UL (ref 4–11)
WBC # BLD AUTO: 5.8 10E3/UL (ref 4–11)
WBC URINE: 12 /HPF
WBC URINE: 14 /HPF
WBC URINE: >182 /HPF
YEAST #/AREA URNS HPF: ABNORMAL /HPF

## 2023-01-01 PROCEDURE — 250N000013 HC RX MED GY IP 250 OP 250 PS 637: Performed by: INTERNAL MEDICINE

## 2023-01-01 PROCEDURE — 999N000157 HC STATISTIC RCP TIME EA 10 MIN

## 2023-01-01 PROCEDURE — 84484 ASSAY OF TROPONIN QUANT: CPT | Mod: 91 | Performed by: EMERGENCY MEDICINE

## 2023-01-01 PROCEDURE — 93005 ELECTROCARDIOGRAM TRACING: CPT

## 2023-01-01 PROCEDURE — 99285 EMERGENCY DEPT VISIT HI MDM: CPT | Mod: 25

## 2023-01-01 PROCEDURE — 99238 HOSP IP/OBS DSCHRG MGMT 30/<: CPT

## 2023-01-01 PROCEDURE — 250N000013 HC RX MED GY IP 250 OP 250 PS 637: Performed by: HOSPITALIST

## 2023-01-01 PROCEDURE — 94640 AIRWAY INHALATION TREATMENT: CPT | Mod: 76

## 2023-01-01 PROCEDURE — 84484 ASSAY OF TROPONIN QUANT: CPT | Performed by: EMERGENCY MEDICINE

## 2023-01-01 PROCEDURE — 96375 TX/PRO/DX INJ NEW DRUG ADDON: CPT

## 2023-01-01 PROCEDURE — 83605 ASSAY OF LACTIC ACID: CPT

## 2023-01-01 PROCEDURE — 99292 CRITICAL CARE ADDL 30 MIN: CPT

## 2023-01-01 PROCEDURE — 83605 ASSAY OF LACTIC ACID: CPT | Performed by: EMERGENCY MEDICINE

## 2023-01-01 PROCEDURE — 96376 TX/PRO/DX INJ SAME DRUG ADON: CPT

## 2023-01-01 PROCEDURE — 250N000011 HC RX IP 250 OP 636: Performed by: HOSPITALIST

## 2023-01-01 PROCEDURE — G0378 HOSPITAL OBSERVATION PER HR: HCPCS

## 2023-01-01 PROCEDURE — 200N000001 HC R&B ICU

## 2023-01-01 PROCEDURE — 85025 COMPLETE CBC W/AUTO DIFF WBC: CPT | Performed by: EMERGENCY MEDICINE

## 2023-01-01 PROCEDURE — 82803 BLOOD GASES ANY COMBINATION: CPT | Performed by: EMERGENCY MEDICINE

## 2023-01-01 PROCEDURE — 87088 URINE BACTERIA CULTURE: CPT | Performed by: EMERGENCY MEDICINE

## 2023-01-01 PROCEDURE — 80048 BASIC METABOLIC PNL TOTAL CA: CPT | Performed by: HOSPITALIST

## 2023-01-01 PROCEDURE — 81001 URINALYSIS AUTO W/SCOPE: CPT | Performed by: EMERGENCY MEDICINE

## 2023-01-01 PROCEDURE — 5A09357 ASSISTANCE WITH RESPIRATORY VENTILATION, LESS THAN 24 CONSECUTIVE HOURS, CONTINUOUS POSITIVE AIRWAY PRESSURE: ICD-10-PCS | Performed by: INTERNAL MEDICINE

## 2023-01-01 PROCEDURE — 83605 ASSAY OF LACTIC ACID: CPT | Performed by: INTERNAL MEDICINE

## 2023-01-01 PROCEDURE — 258N000003 HC RX IP 258 OP 636: Performed by: HOSPITALIST

## 2023-01-01 PROCEDURE — 80048 BASIC METABOLIC PNL TOTAL CA: CPT | Performed by: EMERGENCY MEDICINE

## 2023-01-01 PROCEDURE — 36415 COLL VENOUS BLD VENIPUNCTURE: CPT

## 2023-01-01 PROCEDURE — 258N000002 HC RX IP 258 OP 250: Performed by: INTERNAL MEDICINE

## 2023-01-01 PROCEDURE — 99291 CRITICAL CARE FIRST HOUR: CPT | Mod: 25

## 2023-01-01 PROCEDURE — 80047 BASIC METABLC PNL IONIZED CA: CPT

## 2023-01-01 PROCEDURE — 99222 1ST HOSP IP/OBS MODERATE 55: CPT | Performed by: NURSE PRACTITIONER

## 2023-01-01 PROCEDURE — 86901 BLOOD TYPING SEROLOGIC RH(D): CPT | Performed by: EMERGENCY MEDICINE

## 2023-01-01 PROCEDURE — 87040 BLOOD CULTURE FOR BACTERIA: CPT | Performed by: EMERGENCY MEDICINE

## 2023-01-01 PROCEDURE — 83615 LACTATE (LD) (LDH) ENZYME: CPT | Performed by: INTERNAL MEDICINE

## 2023-01-01 PROCEDURE — 80048 BASIC METABOLIC PNL TOTAL CA: CPT | Performed by: INTERNAL MEDICINE

## 2023-01-01 PROCEDURE — 99291 CRITICAL CARE FIRST HOUR: CPT | Performed by: INTERNAL MEDICINE

## 2023-01-01 PROCEDURE — 82803 BLOOD GASES ANY COMBINATION: CPT | Performed by: INTERNAL MEDICINE

## 2023-01-01 PROCEDURE — 36415 COLL VENOUS BLD VENIPUNCTURE: CPT | Performed by: HOSPITALIST

## 2023-01-01 PROCEDURE — 85610 PROTHROMBIN TIME: CPT | Performed by: EMERGENCY MEDICINE

## 2023-01-01 PROCEDURE — 87086 URINE CULTURE/COLONY COUNT: CPT | Performed by: EMERGENCY MEDICINE

## 2023-01-01 PROCEDURE — 36415 COLL VENOUS BLD VENIPUNCTURE: CPT | Performed by: EMERGENCY MEDICINE

## 2023-01-01 PROCEDURE — 96374 THER/PROPH/DIAG INJ IV PUSH: CPT

## 2023-01-01 PROCEDURE — 84155 ASSAY OF PROTEIN SERUM: CPT | Performed by: INTERNAL MEDICINE

## 2023-01-01 PROCEDURE — 250N000011 HC RX IP 250 OP 636: Performed by: EMERGENCY MEDICINE

## 2023-01-01 PROCEDURE — 86850 RBC ANTIBODY SCREEN: CPT | Performed by: EMERGENCY MEDICINE

## 2023-01-01 PROCEDURE — 99204 OFFICE O/P NEW MOD 45 MIN: CPT | Performed by: STUDENT IN AN ORGANIZED HEALTH CARE EDUCATION/TRAINING PROGRAM

## 2023-01-01 PROCEDURE — 82803 BLOOD GASES ANY COMBINATION: CPT

## 2023-01-01 PROCEDURE — 80053 COMPREHEN METABOLIC PANEL: CPT | Performed by: EMERGENCY MEDICINE

## 2023-01-01 PROCEDURE — 74176 CT ABD & PELVIS W/O CONTRAST: CPT

## 2023-01-01 PROCEDURE — 99239 HOSP IP/OBS DSCHRG MGMT >30: CPT | Performed by: INTERNAL MEDICINE

## 2023-01-01 PROCEDURE — 83880 ASSAY OF NATRIURETIC PEPTIDE: CPT | Performed by: EMERGENCY MEDICINE

## 2023-01-01 PROCEDURE — 83036 HEMOGLOBIN GLYCOSYLATED A1C: CPT | Performed by: INTERNAL MEDICINE

## 2023-01-01 PROCEDURE — 87086 URINE CULTURE/COLONY COUNT: CPT | Performed by: STUDENT IN AN ORGANIZED HEALTH CARE EDUCATION/TRAINING PROGRAM

## 2023-01-01 PROCEDURE — 99232 SBSQ HOSP IP/OBS MODERATE 35: CPT | Performed by: INTERNAL MEDICINE

## 2023-01-01 PROCEDURE — U0003 INFECTIOUS AGENT DETECTION BY NUCLEIC ACID (DNA OR RNA); SEVERE ACUTE RESPIRATORY SYNDROME CORONAVIRUS 2 (SARS-COV-2) (CORONAVIRUS DISEASE [COVID-19]), AMPLIFIED PROBE TECHNIQUE, MAKING USE OF HIGH THROUGHPUT TECHNOLOGIES AS DESCRIBED BY CMS-2020-01-R: HCPCS | Performed by: HOSPITALIST

## 2023-01-01 PROCEDURE — 250N000009 HC RX 250: Performed by: INTERNAL MEDICINE

## 2023-01-01 PROCEDURE — 36415 COLL VENOUS BLD VENIPUNCTURE: CPT | Performed by: INTERNAL MEDICINE

## 2023-01-01 PROCEDURE — 250N000011 HC RX IP 250 OP 636: Mod: JZ | Performed by: INTERNAL MEDICINE

## 2023-01-01 PROCEDURE — 82272 OCCULT BLD FECES 1-3 TESTS: CPT | Performed by: EMERGENCY MEDICINE

## 2023-01-01 PROCEDURE — 87635 SARS-COV-2 COVID-19 AMP PRB: CPT | Performed by: EMERGENCY MEDICINE

## 2023-01-01 PROCEDURE — 74177 CT ABD & PELVIS W/CONTRAST: CPT

## 2023-01-01 PROCEDURE — 250N000011 HC RX IP 250 OP 636: Performed by: INTERNAL MEDICINE

## 2023-01-01 PROCEDURE — 258N000003 HC RX IP 258 OP 636: Performed by: INTERNAL MEDICINE

## 2023-01-01 PROCEDURE — 94660 CPAP INITIATION&MGMT: CPT

## 2023-01-01 PROCEDURE — 99222 1ST HOSP IP/OBS MODERATE 55: CPT | Performed by: HOSPITALIST

## 2023-01-01 PROCEDURE — 85730 THROMBOPLASTIN TIME PARTIAL: CPT | Performed by: EMERGENCY MEDICINE

## 2023-01-01 PROCEDURE — C9803 HOPD COVID-19 SPEC COLLECT: HCPCS

## 2023-01-01 PROCEDURE — 71045 X-RAY EXAM CHEST 1 VIEW: CPT

## 2023-01-01 PROCEDURE — 80177 DRUG SCRN QUAN LEVETIRACETAM: CPT | Performed by: EMERGENCY MEDICINE

## 2023-01-01 PROCEDURE — 258N000003 HC RX IP 258 OP 636: Performed by: EMERGENCY MEDICINE

## 2023-01-01 PROCEDURE — 36416 COLLJ CAPILLARY BLOOD SPEC: CPT | Performed by: HOSPITALIST

## 2023-01-01 PROCEDURE — C9113 INJ PANTOPRAZOLE SODIUM, VIA: HCPCS | Mod: JZ | Performed by: INTERNAL MEDICINE

## 2023-01-01 PROCEDURE — 250N000009 HC RX 250: Performed by: EMERGENCY MEDICINE

## 2023-01-01 PROCEDURE — 94640 AIRWAY INHALATION TREATMENT: CPT

## 2023-01-01 PROCEDURE — 96365 THER/PROPH/DIAG IV INF INIT: CPT | Mod: 59

## 2023-01-01 PROCEDURE — 85025 COMPLETE CBC W/AUTO DIFF WBC: CPT | Performed by: HOSPITALIST

## 2023-01-01 PROCEDURE — 250N000011 HC RX IP 250 OP 636: Mod: JZ | Performed by: EMERGENCY MEDICINE

## 2023-01-01 RX ORDER — OXYCODONE HYDROCHLORIDE 5 MG/1
5 TABLET ORAL EVERY 4 HOURS PRN
COMMUNITY

## 2023-01-01 RX ORDER — CEFTRIAXONE 1 G/1
1 INJECTION, POWDER, FOR SOLUTION INTRAMUSCULAR; INTRAVENOUS EVERY 24 HOURS
Status: DISCONTINUED | OUTPATIENT
Start: 2023-01-01 | End: 2023-01-01 | Stop reason: HOSPADM

## 2023-01-01 RX ORDER — TOPIRAMATE 50 MG/1
50 TABLET, FILM COATED ORAL 2 TIMES DAILY
Status: DISCONTINUED | OUTPATIENT
Start: 2023-01-01 | End: 2023-01-01 | Stop reason: HOSPADM

## 2023-01-01 RX ORDER — IPRATROPIUM BROMIDE AND ALBUTEROL SULFATE 2.5; .5 MG/3ML; MG/3ML
3 SOLUTION RESPIRATORY (INHALATION) ONCE
Status: COMPLETED | OUTPATIENT
Start: 2023-01-01 | End: 2023-01-01

## 2023-01-01 RX ORDER — GLYCOPYRROLATE 0.2 MG/ML
0.2 INJECTION, SOLUTION INTRAMUSCULAR; INTRAVENOUS EVERY 4 HOURS PRN
Status: DISCONTINUED | OUTPATIENT
Start: 2023-01-01 | End: 2023-01-01 | Stop reason: HOSPADM

## 2023-01-01 RX ORDER — LEVOFLOXACIN 5 MG/ML
750 INJECTION, SOLUTION INTRAVENOUS ONCE
Status: COMPLETED | OUTPATIENT
Start: 2023-01-01 | End: 2023-01-01

## 2023-01-01 RX ORDER — TOPIRAMATE 50 MG/1
50 CAPSULE, EXTENDED RELEASE ORAL 2 TIMES DAILY
COMMUNITY

## 2023-01-01 RX ORDER — ROPIVACAINE IN 0.9% SOD CHL/PF 0.1 %
.03-.125 PLASTIC BAG, INJECTION (ML) EPIDURAL CONTINUOUS
Status: DISCONTINUED | OUTPATIENT
Start: 2023-01-01 | End: 2023-01-01

## 2023-01-01 RX ORDER — LEVETIRACETAM 500 MG/1
500 TABLET ORAL 2 TIMES DAILY
Status: DISCONTINUED | OUTPATIENT
Start: 2023-01-01 | End: 2023-01-01 | Stop reason: HOSPADM

## 2023-01-01 RX ORDER — PROCHLORPERAZINE 25 MG
12.5 SUPPOSITORY, RECTAL RECTAL EVERY 12 HOURS PRN
Status: DISCONTINUED | OUTPATIENT
Start: 2023-01-01 | End: 2023-01-01 | Stop reason: HOSPADM

## 2023-01-01 RX ORDER — ACETAMINOPHEN 650 MG/1
650 SUPPOSITORY RECTAL EVERY 4 HOURS PRN
Status: DISCONTINUED | OUTPATIENT
Start: 2023-01-01 | End: 2023-01-01 | Stop reason: HOSPADM

## 2023-01-01 RX ORDER — CEFTRIAXONE 1 G/1
1 INJECTION, POWDER, FOR SOLUTION INTRAMUSCULAR; INTRAVENOUS ONCE
Status: COMPLETED | OUTPATIENT
Start: 2023-01-01 | End: 2023-01-01

## 2023-01-01 RX ORDER — METRONIDAZOLE 500 MG/100ML
500 INJECTION, SOLUTION INTRAVENOUS EVERY 12 HOURS
Status: DISCONTINUED | OUTPATIENT
Start: 2023-01-01 | End: 2023-01-01

## 2023-01-01 RX ORDER — CIPROFLOXACIN 250 MG/1
250 TABLET, FILM COATED ORAL 2 TIMES DAILY
Qty: 6 TABLET | Refills: 0 | Status: SHIPPED | OUTPATIENT
Start: 2023-01-01 | End: 2023-01-01

## 2023-01-01 RX ORDER — OXYCODONE HYDROCHLORIDE 5 MG/1
5 TABLET ORAL EVERY 4 HOURS
COMMUNITY

## 2023-01-01 RX ORDER — ACETAMINOPHEN 325 MG/1
650 TABLET ORAL EVERY 4 HOURS PRN
Status: DISCONTINUED | OUTPATIENT
Start: 2023-01-01 | End: 2023-01-01 | Stop reason: HOSPADM

## 2023-01-01 RX ORDER — OLANZAPINE 5 MG/1
5 TABLET, ORALLY DISINTEGRATING ORAL EVERY 6 HOURS PRN
Status: DISCONTINUED | OUTPATIENT
Start: 2023-01-01 | End: 2023-01-01 | Stop reason: HOSPADM

## 2023-01-01 RX ORDER — ONDANSETRON 4 MG/1
4 TABLET, FILM COATED ORAL 2 TIMES DAILY PRN
COMMUNITY

## 2023-01-01 RX ORDER — ONDANSETRON 4 MG/1
4 TABLET, ORALLY DISINTEGRATING ORAL EVERY 6 HOURS PRN
Status: DISCONTINUED | OUTPATIENT
Start: 2023-01-01 | End: 2023-01-01 | Stop reason: HOSPADM

## 2023-01-01 RX ORDER — ONDANSETRON 2 MG/ML
4 INJECTION INTRAMUSCULAR; INTRAVENOUS EVERY 6 HOURS PRN
Status: DISCONTINUED | OUTPATIENT
Start: 2023-01-01 | End: 2023-01-01 | Stop reason: HOSPADM

## 2023-01-01 RX ORDER — CEFTRIAXONE 1 G/1
1 INJECTION, POWDER, FOR SOLUTION INTRAMUSCULAR; INTRAVENOUS EVERY 24 HOURS
Status: DISCONTINUED | OUTPATIENT
Start: 2023-01-01 | End: 2023-01-01

## 2023-01-01 RX ORDER — SODIUM CHLORIDE 9 MG/ML
INJECTION, SOLUTION INTRAVENOUS CONTINUOUS
Status: DISCONTINUED | OUTPATIENT
Start: 2023-01-01 | End: 2023-01-01 | Stop reason: HOSPADM

## 2023-01-01 RX ORDER — MULTIPLE VITAMINS W/ MINERALS TAB 9MG-400MCG
1 TAB ORAL DAILY
COMMUNITY

## 2023-01-01 RX ORDER — BISACODYL 10 MG
10 SUPPOSITORY, RECTAL RECTAL
Status: DISCONTINUED | OUTPATIENT
Start: 2023-07-05 | End: 2023-01-01 | Stop reason: HOSPADM

## 2023-01-01 RX ORDER — LANOLIN ALCOHOL/MO/W.PET/CERES
3 CREAM (GRAM) TOPICAL
COMMUNITY
End: 2023-01-01

## 2023-01-01 RX ORDER — LORAZEPAM 1 MG/1
1 TABLET ORAL
Status: DISCONTINUED | OUTPATIENT
Start: 2023-01-01 | End: 2023-01-01 | Stop reason: HOSPADM

## 2023-01-01 RX ORDER — SODIUM CHLORIDE 9 MG/ML
INJECTION, SOLUTION INTRAVENOUS CONTINUOUS
Status: CANCELLED | OUTPATIENT
Start: 2023-01-01

## 2023-01-01 RX ORDER — METHYLPREDNISOLONE SODIUM SUCCINATE 125 MG/2ML
125 INJECTION, POWDER, LYOPHILIZED, FOR SOLUTION INTRAMUSCULAR; INTRAVENOUS ONCE
Status: COMPLETED | OUTPATIENT
Start: 2023-01-01 | End: 2023-01-01

## 2023-01-01 RX ORDER — AMIODARONE HYDROCHLORIDE 200 MG/1
200 TABLET ORAL DAILY
Status: DISCONTINUED | OUTPATIENT
Start: 2023-01-01 | End: 2023-01-01

## 2023-01-01 RX ORDER — OXCARBAZEPINE 150 MG/1
150 TABLET, FILM COATED ORAL 2 TIMES DAILY
Status: DISCONTINUED | OUTPATIENT
Start: 2023-01-01 | End: 2023-01-01 | Stop reason: HOSPADM

## 2023-01-01 RX ORDER — IOPAMIDOL 755 MG/ML
500 INJECTION, SOLUTION INTRAVASCULAR ONCE
Status: COMPLETED | OUTPATIENT
Start: 2023-01-01 | End: 2023-01-01

## 2023-01-01 RX ORDER — OXYCODONE HYDROCHLORIDE 5 MG/1
5 TABLET ORAL EVERY 6 HOURS PRN
Qty: 6 TABLET | Refills: 0 | Status: SHIPPED | OUTPATIENT
Start: 2023-01-01 | End: 2023-01-01

## 2023-01-01 RX ORDER — ACETAMINOPHEN 325 MG/1
325-650 TABLET ORAL EVERY 6 HOURS PRN
COMMUNITY

## 2023-01-01 RX ORDER — METOPROLOL SUCCINATE 25 MG/1
75 TABLET, EXTENDED RELEASE ORAL DAILY
Status: DISCONTINUED | OUTPATIENT
Start: 2023-01-01 | End: 2023-01-01

## 2023-01-01 RX ORDER — MORPHINE SULFATE 2 MG/ML
2 INJECTION, SOLUTION INTRAMUSCULAR; INTRAVENOUS
Status: DISCONTINUED | OUTPATIENT
Start: 2023-01-01 | End: 2023-01-01 | Stop reason: HOSPADM

## 2023-01-01 RX ORDER — METHYLPREDNISOLONE SODIUM SUCCINATE 40 MG/ML
40 INJECTION, POWDER, LYOPHILIZED, FOR SOLUTION INTRAMUSCULAR; INTRAVENOUS EVERY 12 HOURS
Status: DISCONTINUED | OUTPATIENT
Start: 2023-01-01 | End: 2023-01-01

## 2023-01-01 RX ORDER — ATORVASTATIN CALCIUM 40 MG/1
40 TABLET, FILM COATED ORAL DAILY
COMMUNITY

## 2023-01-01 RX ORDER — SODIUM CHLORIDE 9 MG/ML
INJECTION, SOLUTION INTRAVENOUS CONTINUOUS
Status: DISCONTINUED | OUTPATIENT
Start: 2023-01-01 | End: 2023-01-01

## 2023-01-01 RX ORDER — LEVETIRACETAM 5 MG/ML
500 INJECTION INTRAVASCULAR EVERY 12 HOURS
Status: DISCONTINUED | OUTPATIENT
Start: 2023-01-01 | End: 2023-01-01 | Stop reason: HOSPADM

## 2023-01-01 RX ORDER — LANOLIN ALCOHOL/MO/W.PET/CERES
3 CREAM (GRAM) TOPICAL
COMMUNITY

## 2023-01-01 RX ORDER — DEXTROSE MONOHYDRATE 25 G/50ML
25-50 INJECTION, SOLUTION INTRAVENOUS
Status: DISCONTINUED | OUTPATIENT
Start: 2023-01-01 | End: 2023-01-01

## 2023-01-01 RX ORDER — NICOTINE POLACRILEX 4 MG
15-30 LOZENGE BUCCAL
Status: DISCONTINUED | OUTPATIENT
Start: 2023-01-01 | End: 2023-01-01

## 2023-01-01 RX ORDER — ASPIRIN 325 MG
325 TABLET, DELAYED RELEASE (ENTERIC COATED) ORAL DAILY PRN
COMMUNITY

## 2023-01-01 RX ORDER — IPRATROPIUM BROMIDE AND ALBUTEROL SULFATE 2.5; .5 MG/3ML; MG/3ML
3 SOLUTION RESPIRATORY (INHALATION) EVERY 4 HOURS PRN
Status: DISCONTINUED | OUTPATIENT
Start: 2023-01-01 | End: 2023-01-01 | Stop reason: HOSPADM

## 2023-01-01 RX ORDER — ACETAMINOPHEN 650 MG/1
650 SUPPOSITORY RECTAL EVERY 6 HOURS PRN
Status: DISCONTINUED | OUTPATIENT
Start: 2023-01-01 | End: 2023-01-01 | Stop reason: HOSPADM

## 2023-01-01 RX ORDER — NALOXONE HYDROCHLORIDE 0.4 MG/ML
0.2 INJECTION, SOLUTION INTRAMUSCULAR; INTRAVENOUS; SUBCUTANEOUS
Status: DISCONTINUED | OUTPATIENT
Start: 2023-01-01 | End: 2023-01-01 | Stop reason: HOSPADM

## 2023-01-01 RX ORDER — NALOXONE HYDROCHLORIDE 0.4 MG/ML
0.1 INJECTION, SOLUTION INTRAMUSCULAR; INTRAVENOUS; SUBCUTANEOUS
Status: DISCONTINUED | OUTPATIENT
Start: 2023-01-01 | End: 2023-01-01 | Stop reason: HOSPADM

## 2023-01-01 RX ORDER — PROCHLORPERAZINE MALEATE 10 MG
10 TABLET ORAL EVERY 6 HOURS PRN
COMMUNITY

## 2023-01-01 RX ORDER — ACETAMINOPHEN 500 MG
500-1000 TABLET ORAL EVERY 4 HOURS
COMMUNITY

## 2023-01-01 RX ORDER — MORPHINE SULFATE 2 MG/ML
1 INJECTION, SOLUTION INTRAMUSCULAR; INTRAVENOUS
Status: DISCONTINUED | OUTPATIENT
Start: 2023-01-01 | End: 2023-01-01 | Stop reason: HOSPADM

## 2023-01-01 RX ORDER — TAMSULOSIN HYDROCHLORIDE 0.4 MG/1
0.4 CAPSULE ORAL AT BEDTIME
Status: DISCONTINUED | OUTPATIENT
Start: 2023-01-01 | End: 2023-01-01 | Stop reason: HOSPADM

## 2023-01-01 RX ORDER — TOPIRAMATE 25 MG/1
50 TABLET, FILM COATED ORAL 2 TIMES DAILY
Status: DISCONTINUED | OUTPATIENT
Start: 2023-01-01 | End: 2023-01-01

## 2023-01-01 RX ORDER — PROCHLORPERAZINE MALEATE 5 MG
5 TABLET ORAL EVERY 6 HOURS PRN
Status: DISCONTINUED | OUTPATIENT
Start: 2023-01-01 | End: 2023-01-01 | Stop reason: HOSPADM

## 2023-01-01 RX ORDER — CLOPIDOGREL BISULFATE 75 MG/1
75 TABLET ORAL DAILY
Status: DISCONTINUED | OUTPATIENT
Start: 2023-01-01 | End: 2023-01-01

## 2023-01-01 RX ORDER — ZINC GLUCONATE 50 MG
50 TABLET ORAL DAILY
COMMUNITY

## 2023-01-01 RX ORDER — LORAZEPAM 2 MG/ML
1 INJECTION INTRAMUSCULAR
Status: DISCONTINUED | OUTPATIENT
Start: 2023-01-01 | End: 2023-01-01 | Stop reason: HOSPADM

## 2023-01-01 RX ORDER — LIDOCAINE 50 MG/G
PATCH TOPICAL EVERY 24 HOURS
COMMUNITY

## 2023-01-01 RX ORDER — OXCARBAZEPINE 150 MG/1
150 TABLET, FILM COATED ORAL 2 TIMES DAILY
COMMUNITY

## 2023-01-01 RX ORDER — IPRATROPIUM BROMIDE AND ALBUTEROL SULFATE 2.5; .5 MG/3ML; MG/3ML
3 SOLUTION RESPIRATORY (INHALATION)
Status: DISCONTINUED | OUTPATIENT
Start: 2023-01-01 | End: 2023-01-01

## 2023-01-01 RX ORDER — CEFDINIR 300 MG/1
300 CAPSULE ORAL 2 TIMES DAILY
Qty: 16 CAPSULE | Refills: 0 | Status: SHIPPED | OUTPATIENT
Start: 2023-01-01 | End: 2023-01-01

## 2023-01-01 RX ORDER — SODIUM CHLORIDE 450 MG/100ML
INJECTION, SOLUTION INTRAVENOUS CONTINUOUS
Status: DISCONTINUED | OUTPATIENT
Start: 2023-01-01 | End: 2023-01-01

## 2023-01-01 RX ORDER — HYDROMORPHONE HCL IN WATER/PF 6 MG/30 ML
0.2 PATIENT CONTROLLED ANALGESIA SYRINGE INTRAVENOUS
Status: DISCONTINUED | OUTPATIENT
Start: 2023-01-01 | End: 2023-01-01

## 2023-01-01 RX ORDER — ACETAMINOPHEN 325 MG/1
650 TABLET ORAL EVERY 6 HOURS PRN
COMMUNITY
End: 2023-01-01

## 2023-01-01 RX ORDER — ACETAMINOPHEN 325 MG/10.15ML
650 LIQUID ORAL EVERY 6 HOURS PRN
Status: DISCONTINUED | OUTPATIENT
Start: 2023-01-01 | End: 2023-01-01 | Stop reason: HOSPADM

## 2023-01-01 RX ADMIN — MORPHINE SULFATE 2 MG: 2 INJECTION, SOLUTION INTRAMUSCULAR; INTRAVENOUS at 21:30

## 2023-01-01 RX ADMIN — ONDANSETRON 4 MG: 2 INJECTION INTRAMUSCULAR; INTRAVENOUS at 18:58

## 2023-01-01 RX ADMIN — LEVETIRACETAM 500 MG: 500 TABLET, FILM COATED ORAL at 21:32

## 2023-01-01 RX ADMIN — CEFTRIAXONE 1 G: 1 INJECTION, POWDER, FOR SOLUTION INTRAMUSCULAR; INTRAVENOUS at 23:36

## 2023-01-01 RX ADMIN — PANTOPRAZOLE SODIUM 40 MG: 40 INJECTION, POWDER, FOR SOLUTION INTRAVENOUS at 19:40

## 2023-01-01 RX ADMIN — IPRATROPIUM BROMIDE AND ALBUTEROL SULFATE 3 ML: .5; 3 SOLUTION RESPIRATORY (INHALATION) at 20:34

## 2023-01-01 RX ADMIN — IOPAMIDOL 70 ML: 755 INJECTION, SOLUTION INTRAVENOUS at 17:35

## 2023-01-01 RX ADMIN — SODIUM CHLORIDE: 9 INJECTION, SOLUTION INTRAVENOUS at 01:39

## 2023-01-01 RX ADMIN — SODIUM CHLORIDE: 4.5 INJECTION, SOLUTION INTRAVENOUS at 17:59

## 2023-01-01 RX ADMIN — LEVETIRACETAM 500 MG: 500 TABLET, FILM COATED ORAL at 09:18

## 2023-01-01 RX ADMIN — LEVETIRACETAM 500 MG: 500 TABLET, FILM COATED ORAL at 08:13

## 2023-01-01 RX ADMIN — MORPHINE SULFATE 2 MG: 2 INJECTION, SOLUTION INTRAMUSCULAR; INTRAVENOUS at 02:14

## 2023-01-01 RX ADMIN — SODIUM CHLORIDE: 900 INJECTION INTRAVENOUS at 15:49

## 2023-01-01 RX ADMIN — MORPHINE SULFATE 2 MG: 2 INJECTION, SOLUTION INTRAMUSCULAR; INTRAVENOUS at 22:22

## 2023-01-01 RX ADMIN — CEFTRIAXONE 1 G: 1 INJECTION, POWDER, FOR SOLUTION INTRAMUSCULAR; INTRAVENOUS at 21:32

## 2023-01-01 RX ADMIN — SODIUM CHLORIDE 61 ML: 9 INJECTION, SOLUTION INTRAVENOUS at 12:03

## 2023-01-01 RX ADMIN — SODIUM CHLORIDE: 9 INJECTION, SOLUTION INTRAVENOUS at 09:20

## 2023-01-01 RX ADMIN — MORPHINE SULFATE 2 MG: 2 INJECTION, SOLUTION INTRAMUSCULAR; INTRAVENOUS at 05:44

## 2023-01-01 RX ADMIN — OXCARBAZEPINE 150 MG: 150 TABLET, FILM COATED ORAL at 08:13

## 2023-01-01 RX ADMIN — TAMSULOSIN HYDROCHLORIDE 0.4 MG: 0.4 CAPSULE ORAL at 21:32

## 2023-01-01 RX ADMIN — TOPIRAMATE 50 MG: 50 TABLET, FILM COATED ORAL at 08:13

## 2023-01-01 RX ADMIN — LEVETIRACETAM 500 MG: 5 INJECTION INTRAVENOUS at 21:42

## 2023-01-01 RX ADMIN — MORPHINE SULFATE 1 MG: 2 INJECTION, SOLUTION INTRAMUSCULAR; INTRAVENOUS at 21:15

## 2023-01-01 RX ADMIN — LEVOFLOXACIN 750 MG: 750 INJECTION, SOLUTION INTRAVENOUS at 15:16

## 2023-01-01 RX ADMIN — TOPIRAMATE 50 MG: 50 TABLET, FILM COATED ORAL at 10:34

## 2023-01-01 RX ADMIN — LORAZEPAM 1 MG: 2 INJECTION INTRAMUSCULAR; INTRAVENOUS at 00:54

## 2023-01-01 RX ADMIN — LORAZEPAM 1 MG: 2 INJECTION INTRAMUSCULAR; INTRAVENOUS at 21:42

## 2023-01-01 RX ADMIN — OXCARBAZEPINE 150 MG: 150 TABLET, FILM COATED ORAL at 21:32

## 2023-01-01 RX ADMIN — GLYCOPYRROLATE 0.2 MG: 0.2 INJECTION, SOLUTION INTRAMUSCULAR; INTRAVENOUS at 23:29

## 2023-01-01 RX ADMIN — OXCARBAZEPINE 150 MG: 150 TABLET, FILM COATED ORAL at 10:34

## 2023-01-01 RX ADMIN — CEFTRIAXONE 1 G: 1 INJECTION, POWDER, FOR SOLUTION INTRAMUSCULAR; INTRAVENOUS at 19:33

## 2023-01-01 RX ADMIN — MORPHINE SULFATE 2 MG: 2 INJECTION, SOLUTION INTRAMUSCULAR; INTRAVENOUS at 23:26

## 2023-01-01 RX ADMIN — TOPIRAMATE 50 MG: 50 TABLET, FILM COATED ORAL at 21:32

## 2023-01-01 RX ADMIN — ACETAMINOPHEN 650 MG: 325 TABLET ORAL at 15:12

## 2023-01-01 RX ADMIN — METHYLPREDNISOLONE SODIUM SUCCINATE 125 MG: 125 INJECTION, POWDER, FOR SOLUTION INTRAMUSCULAR; INTRAVENOUS at 15:14

## 2023-01-01 RX ADMIN — IOPAMIDOL 84 ML: 755 INJECTION, SOLUTION INTRAVENOUS at 12:02

## 2023-01-01 RX ADMIN — SODIUM CHLORIDE: 9 INJECTION, SOLUTION INTRAVENOUS at 16:50

## 2023-01-01 RX ADMIN — METHYLPREDNISOLONE SODIUM SUCCINATE 40 MG: 40 INJECTION, POWDER, FOR SOLUTION INTRAMUSCULAR; INTRAVENOUS at 19:38

## 2023-01-01 RX ADMIN — SODIUM CHLORIDE 1000 ML: 9 INJECTION, SOLUTION INTRAVENOUS at 14:53

## 2023-01-01 RX ADMIN — IPRATROPIUM BROMIDE AND ALBUTEROL SULFATE 3 ML: .5; 3 SOLUTION RESPIRATORY (INHALATION) at 15:24

## 2023-01-01 RX ADMIN — SODIUM CHLORIDE: 9 INJECTION, SOLUTION INTRAVENOUS at 00:10

## 2023-01-01 ASSESSMENT — ACTIVITIES OF DAILY LIVING (ADL)
ADLS_ACUITY_SCORE: 35
DEPENDENT_IADLS:: CLEANING;COOKING;LAUNDRY;SHOPPING;MEAL PREPARATION;MEDICATION MANAGEMENT;MONEY MANAGEMENT;TRANSPORTATION
ADLS_ACUITY_SCORE: 46
ADLS_ACUITY_SCORE: 47
ADLS_ACUITY_SCORE: 46
ADLS_ACUITY_SCORE: 39
ADLS_ACUITY_SCORE: 46
ADLS_ACUITY_SCORE: 45
ADLS_ACUITY_SCORE: 46
ADLS_ACUITY_SCORE: 35
ADLS_ACUITY_SCORE: 48
ADLS_ACUITY_SCORE: 46
ADLS_ACUITY_SCORE: 35
ADLS_ACUITY_SCORE: 48
ADLS_ACUITY_SCORE: 39
ADLS_ACUITY_SCORE: 48
ADLS_ACUITY_SCORE: 39
ADLS_ACUITY_SCORE: 45
ADLS_ACUITY_SCORE: 35
ADLS_ACUITY_SCORE: 35
ADLS_ACUITY_SCORE: 48
ADLS_ACUITY_SCORE: 46
ADLS_ACUITY_SCORE: 39
ADLS_ACUITY_SCORE: 48
ADLS_ACUITY_SCORE: 45
ADLS_ACUITY_SCORE: 39
ADLS_ACUITY_SCORE: 35
ADLS_ACUITY_SCORE: 46
ADLS_ACUITY_SCORE: 39
ADLS_ACUITY_SCORE: 35
ADLS_ACUITY_SCORE: 45
ADLS_ACUITY_SCORE: 35
ADLS_ACUITY_SCORE: 39

## 2023-01-01 ASSESSMENT — ENCOUNTER SYMPTOMS
DIFFICULTY URINATING: 0
DYSURIA: 0
BACK PAIN: 0
HEMATURIA: 1
ABDOMINAL PAIN: 0
FLANK PAIN: 0
FEVER: 0

## 2023-04-15 PROBLEM — N10 PYELONEPHRITIS, ACUTE: Status: ACTIVE | Noted: 2023-01-01

## 2023-04-15 PROBLEM — N17.9 ACUTE RENAL FAILURE, UNSPECIFIED ACUTE RENAL FAILURE TYPE (H): Status: ACTIVE | Noted: 2023-01-01

## 2023-04-16 NOTE — PHARMACY-ADMISSION MEDICATION HISTORY
Pharmacist Admission Medication History    Admission medication history is complete. The information provided in this note is only as accurate as the sources available at the time of the update.    Medication reconciliation/reorder completed by provider prior to medication history? Yes    Information Source(s): Facility ([facility]/) medication list/MAR via MAR    Pertinent Information: Called [facility] to verify pt no longer taking Plavix, Sinemet, and all other meds taken off the list. Per staff, pt only taking medications listed on MAR that was sent over - they verbally went over med list.    Changes made to PTA medication list:    Added: atorvastatin, oxcarbazepine, multivitamin, topiramate ER, zinc, Tylenol, ASA, Voltaren gel, melatonin, milk of mag    Deleted: albuterol inhaler, amiodarone, Symbicort, Sinemet, Plavix, fenofibrate, ferrous sulfate, Breo, folic acid, Lantus, metoprolol succinate, omeprazole, Zofran, simvastatin, topiramate IR    Changed: None       Allergies reviewed with patient and updates made in EHR: unable to assess    Medication History Completed By: Ángela Reyes RPH 4/16/2023 7:56 AM    PTA Med List   Medication Sig Last Dose     acetaminophen (TYLENOL) 325 MG tablet Take 650 mg by mouth every 6 hours as needed for mild pain prn at prn     aspirin (ASA) 325 MG EC tablet Take 325 mg by mouth daily as needed for moderate pain (migraine) prn at prn     atorvastatin (LIPITOR) 40 MG tablet Take 40 mg by mouth daily 4/15/2023 at am     diclofenac (VOLTAREN) 1 % topical gel Apply 2 g topically 4 times daily as needed for moderate pain prn at prn     FLUoxetine (PROZAC) 40 MG capsule Take 1 capsule (40 mg) by mouth daily 4/15/2023 at am     levETIRAcetam (KEPPRA) 500 MG tablet Take 500 mg by mouth 2 times daily Through Neurologist 4/15/2023 at x1     magnesium hydroxide (MILK OF MAGNESIA) 400 MG/5ML suspension Take 30 mLs by mouth daily as needed for constipation or heartburn prn at prn      melatonin 3 MG tablet Take 3 mg by mouth nightly as needed for sleep prn at prn     multivitamin w/minerals (THERA-VIT-M) tablet Take 1 tablet by mouth daily 4/15/2023 at am     OXcarbazepine (TRILEPTAL) 150 MG tablet Take 150 mg by mouth 2 times daily 4/15/2023 at x1     tamsulosin (FLOMAX) 0.4 MG capsule Take 1 capsule (0.4 mg) by mouth daily 4/14/2023 at hs     topiramate ER (QUDEXY XR) 50 MG 24 hr capsule Take 50 mg by mouth 2 times daily 4/15/2023 at x1     zinc gluconate 50 MG tablet Take 50 mg by mouth daily 4/15/2023 at am

## 2023-04-16 NOTE — PROGRESS NOTES
[facility]  Hospitalist Progress Note  Valentin Self MD 04/16/23    Reason for Stay (Diagnosis): UTI, suspected urinary obstruction, hematuria         Assessment and Plan:      Summary of Stay: Donnie Vincent is a 77 year old male with past medical history including stroke with dysarthria who resides in a nursing home, kidney stones, BPH, diet-controlled diabetes, COPD, parkinsonism and seizure disorder admitted on 4/15/2023 with new onset hematuria and mild low back pain.    Here in the ER he was hemodynamically stable.  CBC was relatively unremarkable.  BMP showed evidence of acute kidney injury.  CT of the abdomen and pelvis without contrast showed perinephric stranding of the left kidney and dilation of the distal ureter with a possible obstructing stone.  He was started on IV ceftriaxone, fluids and urology was consulted.    Per urology there are no immediate plans for cystoscopy as is unclear if he truly has an obstructing stone.  We will continue antibiotics and IV hydration overnight and monitor urine output as well as recheck BMP in the morning    Problem List/Assessment and Plan:   1. Hematuria with possible left-sided ureteral obstruction, remote history of kidney stones: Possible but not definitive presence of an obstructing stone in the left.  Consider other causes of obstruction as well.  Urine analysis primarily shows hematuria with at least some mild pyuria  --Cover for urinary tract infection with IV ceftriaxone  --Strain urine  --Continue IV hydration, monitor urine output and recheck BMP in the morning  -- Urology consulted, they will see him tomorrow  -- As per urology no immediate plans for cystoscopy while inpatient  -- Due to insurance coverage he might have to follow-up in another system for outpatient cystoscopy    2.   Acute kidney injury: Creatinine 1.90 upon presentation, baseline appears to be around 1.2.  May be obstructive versus prerenal  state.  --Gradually improving, recheck BMP in the morning  --Continue IV fluids overnight    3.   Metabolic acidosis: Suspect primarily related to renal failure and possibly saline acidosis.  Seems to be well perfused so doubt lactic acidosis.  --Recheck BMP in the morning  --Continue hydration, antibiotics    4.   History of COPD not in acute exacerbation: Per med rec he may not be on any inhalers.    5.   Remote history of CVA, seizure disorder: Resides in a nursing home.  Seems to have reasonably good motor function but is quite dysarthric though able to make his needs known.  -- Resume home Keppra, Trileptal and Topamax    6.  History of BPH: Resume home Flomax.  Monitor for retention.    DVT Prophylaxis: Mobilize as able. given hematuria will hold off on chemical DVT prophylaxis.  Code Status: Full Code  Discharge Dispo/Date: Potentially home tomorrow if kidney function improves and he continues to void well    Clinically Significant Risk Factors Present on Admission                                     Interval History (Subjective):      I assumed care today  Gene denies pain at this point  Urine remains dark but not clearly bloody  Urology input noted, planning to continue fluids and antibiotics for now with no immediate plans for cystoscopy while inpatient  Family updated at the bedside                  Physical Exam:      Last Vital Signs:  /66 (BP Location: Right arm)   Pulse 83   Temp 99.1  F (37.3  C) (Oral)   Resp 16   Ht 1.829 m (6')   Wt 81 kg (178 lb 8 oz)   SpO2 98%   BMI 24.21 kg/m      No intake/output data recorded.    General: Alert, awake, no acute distress.  HEENT: NC/AT, eyes anicteric, external occular movements intact, face symmetric.  Dysarthric.  Cardiac: RRR, S1, S2.  No murmurs appreciated.  Pulmonary: Normal chest rise, normal work of breathing.  Lungs CTA BL  Abdomen: soft, non-tender, non-distended.  Bowel Sounds Present.  No guarding.  Extremities: no deformities.  Warm,  well perfused.  Skin: no rashes or lesions noted.  Warm and Dry.  Neuro: No focal motor deficits noted.  Speech dysarthric. coordination and strength grossly normal.  Psych: Appropriate affect.         Medications:      All current medications were reviewed with changes reflected in problem list.         Data:      All new lab and imaging data was reviewed.   Labs:  Recent Labs   Lab 04/16/23  1136      POTASSIUM 4.5   CHLORIDE 110*   CO2 16*   ANIONGAP 14   *   BUN 36.0*   CR 1.58*   GFRESTIMATED 44*   ZAYRA 8.0*     Recent Labs   Lab 04/16/23  0807   WBC 4.8   HGB 10.3*   HCT 32.8*   MCV 86   *     Recent Labs   Lab 04/15/23  2241   COLOR Dark Brown*   APPEARANCE Cloudy*   URINEGLC Negative   URINEBILI Negative   URINEKETONE Negative   SG 1.026   UBLD Large*   URINEPH 5.5   PROTEIN 100*   NITRITE Negative   LEUKEST Small*   RBCU >182*   WBCU 12*      Imaging:   Recent Results (from the past 48 hour(s))   CT Abdomen Pelvis w/o Contrast    Narrative    EXAM: CT ABDOMEN PELVIS W/O CONTRAST  LOCATION: [facility]  DATE/TIME: 4/15/2023 8:49 PM CDT    INDICATION: Hematuria.  COMPARISON: 11/16/2015.  TECHNIQUE: CT scan of the abdomen and pelvis was performed without IV contrast. Multiplanar reformats were obtained. Dose reduction techniques were used.  CONTRAST: None.    FINDINGS:      Absence of intravenous contrast limits the sensitivity of this examination for detection of infectious/inflammatory change, post traumatic abnormalities, vascular abnormalities, and visceral lesions. Study additionally degraded by motion.    LOWER CHEST: Mild to moderate cardiomegaly. Atherosclerotic calcifications of coronary arteries and thoracic aorta. Trace pericardial fluid. Tiny bilateral pleural effusions. Calcified bilateral pleural plaques.    HEPATOBILIARY: Liver is normal in attenuation and size.    Cholelithiasis.    No intrahepatic or intrahepatic biliary ductal dilatation.    PANCREAS:  Normal attenuation. No peripancreatic inflammatory fat stranding.    SPLEEN: Normal attenuation. Normal size.    ADRENAL GLANDS: Normal.    KIDNEYS: Mild dilatation of the distal left ureter, with diffuse urothelial thickening and mild left perinephric stranding. A discretely obstructing stone or mass lesion is not identified, within limitation of the CT technique.    No nephroureterolithiasis. Simple bilateral renal cysts; no dedicated follow-up is recommended.    Mildly thick-walled urinary bladder, which is incompletely distended.    PELVIC ORGANS: Mild prostatomegaly.    BOWEL: No evidence of acute gastrointestinal inflammation or obstruction. Colonic diverticulosis.    No intraperitoneal free fluid or free air.    LYMPH NODES: Single, mildly enlarged left para-aortic lymph node, axial image 90, measuring 10 mm in short axis.    VASCULATURE: No abdominal aortic aneurysm. Moderate atherosclerotic calcifications.    MUSCULOSKELETAL: No suspicious abnormality. Degenerative ankylosis of both sacroiliac joints.    OTHER: No additionally significant abnormalities.      Impression    IMPRESSION:   1.  Mild left perinephric stranding, diffuse urothelial thickening and mild dilatation of the distal left ureter. A discretely obstructing stone or mass lesion is identified, within limitation of the noncontrast technique. Findings may be attributable to   an underlying upper urinary tract infection; correlation with urinalysis is recommended. CT urography follow-up is recommended, given the reported hematuria.  2.  Diffuse bladder wall thickening, possibly attributable to incomplete distention, though a concomitant cystitis is not excluded.  3.  Mild left para-aortic lymphadenopathy. Attention on follow-up.  4.  Mild-to-moderate cardiomegaly.  5.  Cholelithiasis.  6.  Mild prostatomegaly.               Valentin Self MD    I've spent 45 minutes in chart review, ordering medications and tests, obtaining additional  history as needed, evaluating the patient and in documentation for this encounter.

## 2023-04-16 NOTE — H&P
"[facility]    History and Physical  Hospitalist     Date of Admission:  4/15/2023  Date of Service (when I saw the patient): 04/16/23  Provider: Carmelo Cardenas MD      Chief Complaint   Passing blood in urine     History is obtained from the patient, electronic health record and emergency department physician    History of Present Illness   Donnie Vincent is a 77 year old male who presents with new onset of hematuria, and mild low back pain.  No fevers, no chills, no nausea or vomiting.  He has a remote history of kidney stones.  He is coming from a nursing home. He is wheelchair-bound after stroke years ago and is able to communicate but has significant dysarthria.  He has several other comorbidities that can be seen in the least below including BPH, COPD, history of type 2 diabetes currently not on any medication, Parkinson, hyperlipidemia, essential hypertension, GERD and seizure disorder among others.  He has been clinically stable in the emergency department, laboratory work-up is significant for hematuria and possible superimposed infection.  The CT of the abdomen shows perinephric stranding in the left kidney, dilation of the distal ureter and possible obstructing stone.  He had 1 dose of Rocephin IV, normal saline hydration started.  The emergency department physician Dr. Villegas has requested admission to observation for urology consultation in the morning.  Patient is accompanied by daughter and son who also contributed to the interview.  Vital signs:  Temp: 97.7  F (36.5  C) Temp src: Oral BP: 112/68 Pulse: 71   Resp: 22 SpO2: 99 %          Estimated body mass index is 34.73 kg/m  as calculated from the following:    Height as of 9/27/19: 1.803 m (5' 11\").    Weight as of 9/27/19: 112.9 kg (249 lb).      Past Medical History    I have reviewed this patient's medical history and updated it with pertinent information if needed.   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     COPD (chronic obstructive pulmonary disease) (H)      CVA (cerebral infarction)      Diabetes new 4/09    initial A1C 6.8 4/08;; has done diabetic teaching      Diverticulitis of colon (without mention of hemorrhage)(562.11) 2001; 9/06     Gastro-oesophageal reflux disease      Hernia, abdominal      History of blood transfusion      HTN (hypertension)      Hyperlipidemia LDL goal < 100      Hypertriglyceridemia      Other forms of migraine, without mention of intractable migraine without mention of status migrainosus      PFO (patent foramen ovale)     small per echo 11/2013     Renal disease     Hx kidney stones 12 yrs ago     Seizure disorder (H)     sees neurologist     Seizures (H)     8 years ago - no recurrence     Sleep apnea     wears CPAP     Stroke (H) early april 2017     Unspecified congenital anomaly of lung     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 possibly from asbestos exposure in [employer] (remote)           Assessment & Plan   Donnie Vincent is a 77 year old male who presents with new onset of hematuria, remote history of kidney stones and findings of abnormal UA compatible with superimposed infection, perinephric fat stranding on the CT, dilation of left distal ureter from possible obstructing stone.  Mild low back pain but denies typical nephritic pain.  No febrile, no nausea or vomiting.  Very complicated past medical history and several active comorbidities.    Acute pyelonephritis, left sided  Mild left hydroureter  Hx of renal stones.   He is admitting to observation, I will keep him n.p.o. until the morning in case urology decides to perform cystoscopy and stone retrieval.  Continue hydration with normal saline 125 mill per hour until the morning, reassessment of the renal function.  Treatment of nausea and vomiting with Zofran IV as  needed.  Continue ceftriaxone 1 g IV daily.  Dilaudid IV as needed for pain, though patient does not have any significant pain at the moment    Acute renal failure.  Acute Kidney Injury Assessment: Newly Diagnosed  Baseline Creatinine: 1.16 mg/dL  Creatinine increased to: 1.90mg/dL  Most recent creatinine result:   Creatinine   Date Value Ref Range Status   04/15/2023 1.90 (H) 0.67 - 1.17 mg/dL Final   09/27/2019 1.16 0.66 - 1.25 mg/dL Final     Cause:  Obstruction  Treatment: IV Fluids, Relieve obstruction and Urology consult.    BPH.  Tamsulosin    Essential hypertension.   Metoprolol    Hx of COPD.   May resume home meds once reconciled    Hx of type II DM.   Not on any medication since he lost weight.    Parkinson's disease.  He is on Sinemet 25/100.  He had his medication today but this should be activated early in the morning after reconciliation.    Seizure disorder.  On levetiracetam and topiramate    History of CVA, disabled, wheelchair-bound.  On Plavix.    History of PFO.    On Amiodarone.  Not found hx of AF. EKG tonight is NSR.     Clinically Significant Risk Factors Present on Admission                # Drug Induced Platelet Defect: home medication list includes an antiplatelet medication                Code Status   Full Code    Primary Care Physician   Suyapa Leon      Past Surgical History   I have reviewed this patient's surgical history and updated it with pertinent information if needed.  Past Surgical History:   Procedure Laterality Date     COLONOSCOPY  11/4/2015    Dr. Orozco [facility]     COLONOSCOPY N/A 11/4/2015    Procedure: COLONOSCOPY;  Surgeon: Yazmin Orozco MD;  Location:  GI     CYSTOSCOPY, RETROGRADES, EXTRACT STONE, COMBINED Left 11/25/2015    Procedure: COMBINED CYSTOSCOPY, RETROGRADES, EXTRACT STONE;  Surgeon: Neville Banuelos MD;  Location: [facility] OR     CYSTOSCOPY, TRANSURETHRAL RESECTION (TUR) PROSTATE, COMBINED N/A 10/3/2018    Procedure: COMBINED  CYSTOSCOPY, TRANSURETHRAL RESECTION (TUR) PROSTATE;  Cystoscopy, removal of bladder stones with holmium laser, transurethral resection of prostate using Olympus bipolar electrode;  Surgeon: Neville Banuelos MD;  Location: [facility] OR     ESOPHAGOSCOPY, GASTROSCOPY, DUODENOSCOPY (EGD), COMBINED  2015    Dr. Ernesto CONTRERAS     ESOPHAGOSCOPY, GASTROSCOPY, DUODENOSCOPY (EGD), COMBINED  2018    Dr. Ernesto CONTRERAS     GENITOURINARY SURGERY      kidney stone - lithotripsy     HERNIA REPAIR       LASER HOLMIUM LITHOTRIPSY BLADDER N/A 10/3/2018    Procedure: LASER HOLMIUM LITHOTRIPSY BLADDER;;  Surgeon: Neville Banuelos MD;  Location: [facility] OR     PHACOEMULSIFICATION CLEAR CORNEA WITH STANDARD INTRAOCULAR LENS IMPLANT  2011    Procedure:PHACOEMULSIFICATION CLEAR CORNEA WITH STANDARD INTRAOCULAR LENS IMPLANT; RIGHT PHACOEMULSIFICATION CLEAR CORNEA WITH STANDARD INTRAOCULAR LENS IMPLANT ; Surgeon:GUILHERME KAUFMAN; Location:[facility] NONSPECIFIC PROCEDURE      colonoscopy        Prior to Admission Medications   Prior to Admission Medications   Prescriptions Last Dose Informant Patient Reported? Taking?   FLUoxetine (PROZAC) 40 MG capsule   No No   Sig: Take 1 capsule (40 mg) by mouth daily   Topiramate (TOPAMAX PO)   Yes No   Sig: Take 50 mg by mouth 2 times daily Through Neurologist   albuterol (PROAIR HFA/PROVENTIL HFA/VENTOLIN HFA) 108 (90 Base) MCG/ACT inhaler   Yes No   Sig: Inhale 2 puffs into the lungs every 6 hours as needed for shortness of breath / dyspnea or wheezing   amiodarone (PACERONE) 200 MG tablet   Yes No   Si mg daily Through cardiologist   budesonide-formoterol (SYMBICORT) 160-4.5 MCG/ACT Inhaler   No No   Sig: Inhale 2 puffs into the lungs 2 times daily   carbidopa-levodopa (SINEMET)  MG tablet   Yes No   Sig: Through Neurologist   clopidogrel (PLAVIX) 75 MG tablet   Yes No   Sig: Take 75 mg by mouth daily Through Neurologist   fenofibrate (TRIGLIDE/LOFIBRA) 160 MG tablet    No No   Sig: Take 1 tablet (160 mg) by mouth daily   ferrous sulfate (IRON) 325 (65 Fe) MG tablet   Yes No   Sig: Take 325 mg by mouth 2 times daily   fluticasone-vilanterol (BREO ELLIPTA) 100-25 MCG/INH inhaler   No No   Sig: INHALE 2 PUFFS BY MOUTH ONCE DAILY FOR COPD   folic acid (FOLVITE) 1 MG tablet   No No   Sig: Take 1 tablet (1 mg) by mouth daily   insulin glargine (LANTUS PEN) 100 UNIT/ML pen   No No   Sig: Inject 20 Units Subcutaneous At Bedtime   levETIRAcetam (KEPPRA) 500 MG tablet   Yes No   Sig: Take 500 mg by mouth 2 times daily Through Neurologist   metoprolol succinate ER (TOPROL-XL) 50 MG 24 hr tablet   No No   Sig: Take 1.5 tablets (75 mg) by mouth daily   omeprazole (PRILOSEC) 40 MG DR capsule   No No   Sig: Take 1 capsule (40 mg) by mouth daily   ondansetron (ZOFRAN) 4 MG tablet   No No   Sig: Take 1 tablet (4 mg) by mouth every 8 hours as needed for nausea   simvastatin (ZOCOR) 10 MG tablet   No No   Sig: Take 1 tablet (10 mg) by mouth At Bedtime   tamsulosin (FLOMAX) 0.4 MG capsule   No No   Sig: Take 1 capsule (0.4 mg) by mouth daily      Facility-Administered Medications: None     Allergies   Allergies   Allergen Reactions     Pcn [Penicillins]      Sore joints and he had to be hospitalized for it       Social History   I have personally reviewed the social history with the patient showing.  Social History     Tobacco Use     Smoking status: Former     Types: Cigarettes     Quit date: 1970     Years since quittin.3     Smokeless tobacco: Never     Tobacco comments:     quit age 28   Vaping Use     Vaping status: Not on file   Substance Use Topics     Alcohol use: Yes     Comment: 1 beer/week       Family History   I have reviewed this patient's family history and it is not contributory to the admission .       Review of Systems   Except as noted in the HPI, a 12-system Review of Systems was found to be negative.      Physical Exam   Vital Signs with Ranges  Temp:  [97.7  F (36.5   C)] 97.7  F (36.5  C)  Pulse:  [71-81] 71  Resp:  [22] 22  BP: (103-112)/(63-69) 112/68  SpO2:  [96 %-100 %] 99 %  0 lbs 0 oz    GEN:  Alert, oriented x 3, appears comfortable, NAD.  HEENT:  Normocephalic/atraumatic, no scleral icterus, no nasal discharge, mouth moist.  CV:  Regular rate and rhythm, no murmur or JVD.  S1 + S2 noted, no S3 or S4.  LUNGS:  Clear to auscultation bilaterally without rales/rhonchi/wheezing/retractions.  Symmetric chest rise on inhalation noted.  ABD:  Active bowel sounds, soft, non-tender/non-distended.  No rebound/guarding/rigidity.  EXT:  No edema or cyanosis.  No joint synovitis noted.  SKIN:  Dry to touch, no exanthems noted in the visualized areas.       Data   I personally reviewed the EKG tracing showing NSR .  Results for orders placed or performed during the hospital encounter of 04/15/23 (from the past 24 hour(s))   CBC with platelets differential    Narrative    The following orders were created for panel order CBC with platelets differential.  Procedure                               Abnormality         Status                     ---------                               -----------         ------                     CBC with platelets and d...[[id number]]  Abnormal            Final result                 Please view results for these tests on the individual orders.   Basic metabolic panel   Result Value Ref Range    Sodium 142 136 - 145 mmol/L    Potassium 4.2 3.4 - 5.3 mmol/L    Chloride 107 98 - 107 mmol/L    Carbon Dioxide (CO2) 22 22 - 29 mmol/L    Anion Gap 13 7 - 15 mmol/L    Urea Nitrogen 42.3 (H) 8.0 - 23.0 mg/dL    Creatinine 1.90 (H) 0.67 - 1.17 mg/dL    Calcium 8.7 (L) 8.8 - 10.2 mg/dL    Glucose 139 (H) 70 - 99 mg/dL    GFR Estimate 35 (L) >60 mL/min/1.73m2   CBC with platelets and differential   Result Value Ref Range    WBC Count 5.8 4.0 - 11.0 10e3/uL    RBC Count 4.15 (L) 4.40 - 5.90 10e6/uL    Hemoglobin 11.0 (L) 13.3 - 17.7 g/dL    Hematocrit 36.7 (L) 40.0 -  53.0 %    MCV 88 78 - 100 fL    MCH 26.5 26.5 - 33.0 pg    MCHC 30.0 (L) 31.5 - 36.5 g/dL    RDW 14.6 10.0 - 15.0 %    Platelet Count 167 150 - 450 10e3/uL    % Neutrophils 73 %    % Lymphocytes 15 %    % Monocytes 9 %    % Eosinophils 2 %    % Basophils 1 %    % Immature Granulocytes 0 %    NRBCs per 100 WBC 0 <1 /100    Absolute Neutrophils 4.2 1.6 - 8.3 10e3/uL    Absolute Lymphocytes 0.9 0.8 - 5.3 10e3/uL    Absolute Monocytes 0.5 0.0 - 1.3 10e3/uL    Absolute Eosinophils 0.1 0.0 - 0.7 10e3/uL    Absolute Basophils 0.0 0.0 - 0.2 10e3/uL    Absolute Immature Granulocytes 0.0 <=0.4 10e3/uL    Absolute NRBCs 0.0 10e3/uL   CT Abdomen Pelvis w/o Contrast    Narrative    EXAM: CT ABDOMEN PELVIS W/O CONTRAST  LOCATION: [facility]  DATE/TIME: 4/15/2023 8:49 PM CDT    INDICATION: Hematuria.  COMPARISON: 11/16/2015.  TECHNIQUE: CT scan of the abdomen and pelvis was performed without IV contrast. Multiplanar reformats were obtained. Dose reduction techniques were used.  CONTRAST: None.    FINDINGS:      Absence of intravenous contrast limits the sensitivity of this examination for detection of infectious/inflammatory change, post traumatic abnormalities, vascular abnormalities, and visceral lesions. Study additionally degraded by motion.    LOWER CHEST: Mild to moderate cardiomegaly. Atherosclerotic calcifications of coronary arteries and thoracic aorta. Trace pericardial fluid. Tiny bilateral pleural effusions. Calcified bilateral pleural plaques.    HEPATOBILIARY: Liver is normal in attenuation and size.    Cholelithiasis.    No intrahepatic or intrahepatic biliary ductal dilatation.    PANCREAS: Normal attenuation. No peripancreatic inflammatory fat stranding.    SPLEEN: Normal attenuation. Normal size.    ADRENAL GLANDS: Normal.    KIDNEYS: Mild dilatation of the distal left ureter, with diffuse urothelial thickening and mild left perinephric stranding. A discretely obstructing stone or mass  lesion is not identified, within limitation of the CT technique.    No nephroureterolithiasis. Simple bilateral renal cysts; no dedicated follow-up is recommended.    Mildly thick-walled urinary bladder, which is incompletely distended.    PELVIC ORGANS: Mild prostatomegaly.    BOWEL: No evidence of acute gastrointestinal inflammation or obstruction. Colonic diverticulosis.    No intraperitoneal free fluid or free air.    LYMPH NODES: Single, mildly enlarged left para-aortic lymph node, axial image 90, measuring 10 mm in short axis.    VASCULATURE: No abdominal aortic aneurysm. Moderate atherosclerotic calcifications.    MUSCULOSKELETAL: No suspicious abnormality. Degenerative ankylosis of both sacroiliac joints.    OTHER: No additionally significant abnormalities.      Impression    IMPRESSION:   1.  Mild left perinephric stranding, diffuse urothelial thickening and mild dilatation of the distal left ureter. A discretely obstructing stone or mass lesion is identified, within limitation of the noncontrast technique. Findings may be attributable to   an underlying upper urinary tract infection; correlation with urinalysis is recommended. CT urography follow-up is recommended, given the reported hematuria.  2.  Diffuse bladder wall thickening, possibly attributable to incomplete distention, though a concomitant cystitis is not excluded.  3.  Mild left para-aortic lymphadenopathy. Attention on follow-up.  4.  Mild-to-moderate cardiomegaly.  5.  Cholelithiasis.  6.  Mild prostatomegaly.       UA reflex to Microscopic   Result Value Ref Range    Color Urine Dark Brown (A) Colorless, Straw, Light Yellow, Yellow    Appearance Urine Cloudy (A) Clear    Glucose Urine Negative Negative mg/dL    Bilirubin Urine Negative Negative    Ketones Urine Negative Negative mg/dL    Specific Gravity Urine 1.026 1.003 - 1.035    Blood Urine Large (A) Negative    pH Urine 5.5 5.0 - 7.0    Protein Albumin Urine 100 (A) Negative mg/dL     Urobilinogen Urine Normal Normal, 2.0 mg/dL    Nitrite Urine Negative Negative    Leukocyte Esterase Urine Small (A) Negative    Bacteria Urine Moderate (A) None Seen /HPF    RBC Urine >182 (H) <=2 /HPF    WBC Urine 12 (H) <=5 /HPF    Squamous Epithelials Urine 2 (H) <=1 /HPF    Budding Yeast Urine Few (A) None Seen /HPF    Mucus Urine Present (A) None Seen /LPF       Securely message with the Vocera Web Console (learn more here)  Text page via [employer] Paging/Directory        Disclaimer: This note consists of symbols derived from keyboarding, dictation and/or voice recognition software. As a result, there may be errors in the script that have gone undetected. Please consider this when interpreting information found in this chart.

## 2023-04-16 NOTE — CONSULTS
Care Management Initial Consult    General Information  Assessment completed with: Michelle Luu   Type of CM/SW Visit: Initial Assessment    Primary Care Provider verified and updated as needed:     Readmission within the last 30 days:        Reason for Consult: discharge planning  Advance Care Planning:            Communication Assessment  Patient's communication style: spoken language (English or Bilingual)    Hearing Difficulty or Deaf: no   Wear Glasses or Blind: yes    Cognitive  Cognitive/Neuro/Behavioral: WDL, .WDL except, speech  Level of Consciousness: alert  Arousal Level: opens eyes spontaneously  Orientation: oriented x 4  Mood/Behavior: cooperative, calm  Best Language: 1 - Mild to moderate  Speech: garbled (baseline)    Living Environment:   People in home:       Current living Arrangements:        Able to return to prior arrangements:         Family/Social Support:  Care provided by:    Provides care for:    Marital Status:   Children          Description of Support System: Supportive, Involved         Current Resources:   Patient receiving home care services: Yes  Skilled Home Care Services: Physical Therapy, Speech Therapy, Occupational Therapy  Community Resources: Home Care  Equipment currently used at home:    Supplies currently used at home:      Employment/Financial:  Employment Status: retired, , previous service        Financial Concerns:             Lifestyle & Psychosocial Needs:  Social Determinants of Health     Tobacco Use: Medium Risk (9/23/2019)    Patient History      Smoking Tobacco Use: Former      Smokeless Tobacco Use: Never      Passive Exposure: Not on file   Alcohol Use: Not on file   Financial Resource Strain: Not on file   Food Insecurity: Not on file   Transportation Needs: Not on file   Physical Activity: Not on file   Stress: Not on file   Social Connections: Not on file   Intimate Partner Violence: Not on file   Depression: Not at risk (6/19/2020)     PHQ-2      PHQ-2 Score: 0   Housing Stability: Not on file       Functional Status:  Prior to admission patient needed assistance:   Dependent ADLs:: Bathing, Dressing, Grooming, Positioning, Transfers, Wheelchair-with assist  Dependent IADLs:: Cleaning, Cooking, Laundry, Shopping, Meal Preparation, Medication Management, Money Management, Transportation       Mental Health Status:  Mental Health Status: No Current Concerns       Chemical Dependency Status:  Chemical Dependency Status: No Current Concerns             Values/Beliefs:  Spiritual, Cultural Beliefs, Latter day Practices, Values that affect care:                 Additional Information:  Met with pt to introduce self/role, he requested SW talk with his dtr Michelle.  Michelle reports pt has lived at [facility] for past 3 yrs [phone].  Pt is total care and his baseline is wc bound and Ax2 but no lift required.  Michelle reports pt has HC SPT/HHPT/OT, however, is not sure which provider, however, will provide agency name with SW on Monday. [facility] is Nikolas [phone].      Michelle reports pt's wife passed away 2 months ago, but reports pt seems to be managing fairly well with it.     Plan:  Rupal will f/u with Michelle on Monday for HC provider.     Kandace STARR, [facility] [place]  Inpatient Care Coordination   [facility]   [phone]

## 2023-04-16 NOTE — UTILIZATION REVIEW
Concurrent stay review; Secondary Review Determination    Under the authority of the Utilization Management Committee, the utilization review process indicated a secondary review on the above patient. The review outcome is based on review of the medical records, discussions with staff, and applying clinical experience noted on the date of the review.    (x) Observation Status Appropriate - Concurrent stay review    RATIONALE FOR DETERMINATION    Donnie Vincent is a 77 year old male with past medical history including stroke with dysarthria who resides in a nursing home, kidney stones, BPH, diet-controlled diabetes, COPD, parkinsonism and seizure disorder admitted on 4/15/2023 with new onset hematuria and mild low back pain.     Here in the ER he was hemodynamically stable.  CBC was relatively unremarkable (hgb 11 but normal wbc of 5.8).  BMP showed evidence of acute kidney injury (bun 42.3 and creatinine 1.9).  CT of the abdomen and pelvis without contrast showed perinephric stranding of the left kidney and dilation of the distal ureter with a possible obstructing stone.  He was started on IV ceftriaxone, IV fluids and admitted for further cares. Urology was consulted and was not convinced that there was an obstructing urinary stone.  No urologic procedures planned at this time.  Acute kidney injury has improved with IV fluids.  Continued stay observation is appropriate for ongoing treatment of UTI without sepsis but with associated hematuria.     Patient is clinically improving and there is no clear indication to change patient's status to inpatient. The severity of illness, intensity of service provided, expected LOS and risk for adverse outcome make the care appropriate for observation.    This document was produced using voice recognition software    The information on this document is developed by the utilization review team in order for the business office to ensure compliance. This only denotes the  appropriateness of proper admission status and does not reflect the quality of care rendered.    The definitions of Inpatient Status and Observation Status used in making the determination above are those provided in the CMS Coverage Manual, Chapter 1 and Chapter 6, section 70.4.    Sincerely,    Carlyle Calvo MD     Utilization Review    Physician Advisor    [facility].

## 2023-04-16 NOTE — PROGRESS NOTES
ROOM # 208-1    Living Situation (if not independent, order SW consult):[facility]  Facility name:  : [facility] - Daughter    Activity level at baseline: W/C bound  Activity level on admit: Lift      Patient registered to observation; given Patient Bill of Rights; given the opportunity to ask questions about observation status and their plan of care.  Patient has been oriented to the observation room, bathroom and call light is in place.    Discussed discharge goals and expectations with patient/family.

## 2023-04-16 NOTE — PROGRESS NOTES
Urology    Chart reviewed, patient has not been seen nor examined yet  Will not be able to see patient until afternoon due to acute patient care issues at another hospital    I do not see any obstructing stone on CT scan. It is unclear whether a urine culture was sent yesterday, I have ordered this, hopefully can use the existing specimen which was collected prior to initiation of abx. UA showed budding yeast; if yeast does grow I would consider treatment of this given the hematuria and left perinephric stranding    Recommend bladder scans to ensure patient is emptying    He should eventually undergo a cystoscopy as an outpatient. Unfortunately patient has [employer] insurance so he should seek to establish urologic care elsewhere lest he incur a large medical bill by seeing me at [facility] for diet from urology standpoint  - bladder scan  - continue abx, follow cultures  - outpatient cystoscopy (not at [facility] d/t [facility])    Leonidas Sánchez MD   [facility] Urology  [phone] clinic phone

## 2023-04-16 NOTE — ED TRIAGE NOTES
Pt presents to ED From [facility] due to concerns of hematuria since yesterday. Pt denies pain. Denies trauma. Not on blood thinners.      Triage Assessment     Row Name 04/15/23 1951       Triage Assessment (Adult)    Airway WDL WDL

## 2023-04-16 NOTE — PLAN OF CARE
Goal Outcome Evaluation:       Pt is alert and oriented x4, vital signs stable on RA. PIV infusing NS @ 125ML/HR, denies pain, Pt is able to make needs known and uses the bedside urinal. Wheelchair bound at baseline requires an assist of 2. No concerns since arrival, Nursing will continue to monitor.

## 2023-04-16 NOTE — PLAN OF CARE
* pt and family received OBS brochure handout* Educated on  Goal Outcome Evaluation: ongoing  PRIMARY DIAGNOSIS: PYELONEPHRITIS  OUTPATIENT/OBSERVATION GOALS TO BE MET BEFORE DISCHARGE:  1. Diagnostic test and consults (if applicable) complete: Yes    2. Vitals signs stable or return to baseline: Yes    3. Tolerate oral intake to maintain hydration:  kept NPO    4. Pain status: Pain free.    5. Tolerating oral antibiotics or has plans for home infusion setup:   IV abx inpatient so far    6. Return to near baseline physical activity: No    Pt kept NPO, await urology consult.  Tolerates po meds with sips, ice chips.  Continued IVF.  Pt did void 125 tea-lamonte cloudy urine x1 so far today.    Discharge Planner Nurse   Safe discharge environment identified: Yes  Barriers to discharge: No       Entered by: Michelle Cameron RN 04/16/2023 11:02 AM     Please review provider order for any additional goals.   Nurse to notify provider when observation goals have been met and patient is ready for discharge.

## 2023-04-16 NOTE — ED PROVIDER NOTES
History     Chief Complaint:  Hematuria      HPI   Donnie Vincent is a 77 year old male with a history of hypertension, hyperlipidemia, diabetes, seizures, and a CVA who presents via EMS accompanied by his daughter for evaluation of hematuria. Yesterday the patient started to develop some new hematuria at his living facility. He felt well that day and declined being evaluated for this. This morning he urinated without any apparent blood but tonight he had recurrent hematuria, and due to concern for this EMS was called to bring him into the ED for evaluation. Here in the ED the patient reports that he is feeling well and he denies any fever, difficulty urinating, dysuria, testicular pain, abdominal pain, flank pain, or back pain. He reports that he was previously on a blood thinner but is no longer anticoagulated.       Independent Historian:   Daughter - They report that the patient had hematuria yesterday, clear urine this morning, and recurrent hematuria tonight.     Review of External Notes: None    ROS:  Review of Systems   Constitutional: Negative for fever.   Gastrointestinal: Negative for abdominal pain.   Genitourinary: Positive for hematuria. Negative for difficulty urinating, dysuria, flank pain and testicular pain.   Musculoskeletal: Negative for back pain.   All other systems reviewed and are negative.      Allergies:  Penicillins     Medications:    Albuterol inhaler  Amiodarone   Symbicort inhaler  Sinemet  Fenofibrate  Iron  Prozac  Breo ellipta inhaler  Folic acid  Lantus  Keppra   Toprol-XL   Omeprazole  Zofran  Simvastatin  Flomax  Topiramate     Past Medical History:    Anemia  BPH  Benign brain tumor  COPD  CVA  Diabetes mellitus, type II   Diverticulitis  GERD   Hernia   Hypertension  Hyperlipidemia  Migraine  Patent foramen ovale  Seizures  Renal disease  Sleep apnea  Congenital anomaly of lung     Past Surgical History:    Colonoscopy  Cystoscopy, retrogrades, extract stone, combined,  left   Cystoscopy, transurethral resection prostate, combined  EGD, combined  Lithotripsy   Hernia repair  Cataract iol     Social History:  The patient presents to the ED via EMS.  The patient was accompanied by his daughter.     Physical Exam     Patient Vitals for the past 24 hrs:   BP Temp Temp src Pulse Resp SpO2   04/15/23 2337 112/68 -- -- 71 -- 99 %   04/15/23 2330 103/69 -- -- -- -- --   04/15/23 2040 -- -- -- -- -- 100 %   04/15/23 2030 -- -- -- -- -- 100 %   04/15/23 2015 -- -- -- -- -- 96 %   04/15/23 2000 -- -- -- -- -- 97 %   04/15/23 1951 104/63 97.7  F (36.5  C) Oral 81 22 100 %        Physical Exam  General: alert, lying comfortably on gurney  HENT: mucous membranes moist  CV: regular rate, regular rhythm  Resp: normal effort, clear throughout, no crackles or wheezing  GI: abdomen soft and nontender, no guarding, no CVA tenderness.  : Normal male genitalia.  No blood at the meatus.  No rash.  Testicles non-tender.  No hernia.  MSK: no bony tenderness  Skin: appropriately warm and dry  Extremities: no edema, calves non-tender  Neuro: alert, oriented.  Speech difficult to understand.  Psych: normal mood and affect      Emergency Department Course     Imaging:  CT Abdomen Pelvis w/o Contrast   Final Result   IMPRESSION:    1.  Mild left perinephric stranding, diffuse urothelial thickening and mild dilatation of the distal left ureter. A discretely obstructing stone or mass lesion is identified, within limitation of the noncontrast technique. Findings may be attributable to    an underlying upper urinary tract infection; correlation with urinalysis is recommended. CT urography follow-up is recommended, given the reported hematuria.   2.  Diffuse bladder wall thickening, possibly attributable to incomplete distention, though a concomitant cystitis is not excluded.   3.  Mild left para-aortic lymphadenopathy. Attention on follow-up.   4.  Mild-to-moderate cardiomegaly.   5.  Cholelithiasis.   6.  Mild  prostatomegaly.            Report per radiology    Laboratory:  Labs Ordered and Resulted from Time of ED Arrival to Time of ED Departure   URINE MACROSCOPIC WITH REFLEX TO MICRO - Abnormal       Result Value    Color Urine Dark Brown (*)     Appearance Urine Cloudy (*)     Glucose Urine Negative      Bilirubin Urine Negative      Ketones Urine Negative      Specific Gravity Urine 1.026      Blood Urine Large (*)     pH Urine 5.5      Protein Albumin Urine 100 (*)     Urobilinogen Urine Normal      Nitrite Urine Negative      Leukocyte Esterase Urine Small (*)     Bacteria Urine Moderate (*)     RBC Urine >182 (*)     WBC Urine 12 (*)     Squamous Epithelials Urine 2 (*)     Budding Yeast Urine Few (*)     Mucus Urine Present (*)    BASIC METABOLIC PANEL - Abnormal    Sodium 142      Potassium 4.2      Chloride 107      Carbon Dioxide (CO2) 22      Anion Gap 13      Urea Nitrogen 42.3 (*)     Creatinine 1.90 (*)     Calcium 8.7 (*)     Glucose 139 (*)     GFR Estimate 35 (*)    CBC WITH PLATELETS AND DIFFERENTIAL - Abnormal    WBC Count 5.8      RBC Count 4.15 (*)     Hemoglobin 11.0 (*)     Hematocrit 36.7 (*)     MCV 88      MCH 26.5      MCHC 30.0 (*)     RDW 14.6      Platelet Count 167      % Neutrophils 73      % Lymphocytes 15      % Monocytes 9      % Eosinophils 2      % Basophils 1      % Immature Granulocytes 0      NRBCs per 100 WBC 0      Absolute Neutrophils 4.2      Absolute Lymphocytes 0.9      Absolute Monocytes 0.5      Absolute Eosinophils 0.1      Absolute Basophils 0.0      Absolute Immature Granulocytes 0.0      Absolute NRBCs 0.0          Emergency Department Course & Assessments:     Interventions:  2336 Rocephin 1 g IV     Assessments:  2015: The patient was seen and evaluated.     Independent Interpretation (X-rays, CTs, rhythm strip):  None    Consultations/Discussion of Management or Tests:  I spoke to Dr. Cardenas of the hospitalist service.        Social Determinants of Health affecting  care:   None    Disposition:  The patient was admitted to the hospital under the care of Dr. Cardenas.     Impression & Plan    Medical Decision Making:  Donnie Vincent is a 77 year old male with previous history of stroke, atrial fibrillation, type 2 diabetes, who presents today with painless hematuria.  On exam, the patient has normal vitals.  No obvious trauma or infection involving the perineum or penis.  Labs demonstrate an elevated creatinine compared to baseline.  Hemoglobin is slightly low.  UA consistent with infection.  CT demonstrates a left-sided perinephric stranding, and possible obstruction involving the left ureter.  No obvious masses or stones seen.  The patient will be brought in for observation for IV antibiotics, and urology evaluation to determine if the infection is complicated by obstruction of the ureter.  He was started on gentle fluid resuscitation, given impaired kidney function.     Diagnosis:    ICD-10-CM    1. Pyelonephritis, acute  N10       2. Acute renal failure, unspecified acute renal failure type (H)  N17.9              Scribe Disclosure:  I, Lee Zuniga, am serving as a scribe at 8:12 PM on 4/15/2023 to document services personally performed by Jelena Villegas MD based on my observations and the provider's statements to me.   4/15/2023   Jelena Villegas MD Pepper, Tracy Lynn, MD  04/16/23 0035

## 2023-04-16 NOTE — ED NOTES
[facility]  ED Nurse Handoff Report    Donnie Vincent is a 77 year old male   ED Chief complaint: Hematuria  . ED Diagnosis:   Final diagnoses:   None     Allergies:   Allergies   Allergen Reactions     Pcn [Penicillins]      Sore joints and he had to be hospitalized for it       Code Status: Full Code  Activity level - Baseline/Home:  Assist X 1. Activity Level - Current:   Assist X 1. Lift room needed: No. Bariatric: No   Needed: No   Isolation: No. Infection: Not Applicable.     Vital Signs:   Vitals:    04/15/23 1951   BP: 104/63   Pulse: 81   Resp: 22   Temp: 97.7  F (36.5  C)   TempSrc: Oral   SpO2: 100%       Cardiac Rhythm:  ,      Pain level:    Patient confused: Yes. Patient Falls Risk: Yes.   Elimination Status: Has voided   Patient Report - Initial Complaint: hematuria. Focused Assessment: Donnie Vincent is a 77 year old male with a history of hypertension, hyperlipidemia, diabetes, seizures, and a CVA who presents via EMS accompanied by his daughter for evaluation of hematuria. Yesterday the patient started to develop some new hematuria at his living facility. He felt well that day and declined being evaluated for this. This morning he urinated without any apparent blood but tonight he had recurrent hematuria, and due to concern for this EMS was called to bring him into the ED for evaluation. Here in the ED the patient reports that he is feeling well and he denies any fever, difficulty urinating, dysuria, testicular pain, abdominal pain, flank pain, or back pain. He reports that he was previously on a blood thinner but is no longer anticoagulated.    Tests Performed: labs, UA. Abnormal Results:   Labs Ordered and Resulted from Time of ED Arrival to Time of ED Departure   BASIC METABOLIC PANEL - Abnormal       Result Value    Sodium 142      Potassium 4.2      Chloride 107      Carbon Dioxide (CO2) 22      Anion Gap 13      Urea Nitrogen 42.3 (*)     Creatinine 1.90 (*)      Calcium 8.7 (*)     Glucose 139 (*)     GFR Estimate 35 (*)    CBC WITH PLATELETS AND DIFFERENTIAL - Abnormal    WBC Count 5.8      RBC Count 4.15 (*)     Hemoglobin 11.0 (*)     Hematocrit 36.7 (*)     MCV 88      MCH 26.5      MCHC 30.0 (*)     RDW 14.6      Platelet Count 167      % Neutrophils 73      % Lymphocytes 15      % Monocytes 9      % Eosinophils 2      % Basophils 1      % Immature Granulocytes 0      NRBCs per 100 WBC 0      Absolute Neutrophils 4.2      Absolute Lymphocytes 0.9      Absolute Monocytes 0.5      Absolute Eosinophils 0.1      Absolute Basophils 0.0      Absolute Immature Granulocytes 0.0      Absolute NRBCs 0.0     URINE MACROSCOPIC WITH REFLEX TO MICRO     CT Abdomen Pelvis w/o Contrast   Final Result   IMPRESSION:    1.  Mild left perinephric stranding, diffuse urothelial thickening and mild dilatation of the distal left ureter. A discretely obstructing stone or mass lesion is identified, within limitation of the noncontrast technique. Findings may be attributable to    an underlying upper urinary tract infection; correlation with urinalysis is recommended. CT urography follow-up is recommended, given the reported hematuria.   2.  Diffuse bladder wall thickening, possibly attributable to incomplete distention, though a concomitant cystitis is not excluded.   3.  Mild left para-aortic lymphadenopathy. Attention on follow-up.   4.  Mild-to-moderate cardiomegaly.   5.  Cholelithiasis.   6.  Mild prostatomegaly.              .   Treatments provided: see MAR  Family Comments: family at bedside  OBS brochure/video discussed/provided to patient:  Yes  ED Medications: Medications - No data to display  Drips infusing:  No  For the majority of the shift, the patient's behavior Green. Interventions performed were NA.    Sepsis treatment initiated: No     Patient tested for COVID 19 prior to admission: NO    ED Nurse Name/Phone Number: Leonor Moreno RN,   10:19 PM  RECEIVING UNIT ED  HANDOFF REVIEW    Above ED Nurse Handoff Report was reviewed: Yes  Reviewed by: Anayeli Pearson RN on April 16, 2023 at 11:41 AM

## 2023-04-16 NOTE — PLAN OF CARE
PRIMARY DIAGNOSIS: UTI    OUTPATIENT/OBSERVATION GOALS TO BE MET BEFORE DISCHARGE  1. Pain Status: Improved-controlled with oral pain medications. Tylenol given for abdominal pain    2. Tolerating adequate PO diet: Yes    3. Surgical Intervention planned: Urology consulted; needing outpatient cystoscopy     4. Cleared by consultants (if involved): No, urology to see 4/17    5. Return to near baseline physical activity: Yes    Discharge Planner Nurse   Safe discharge environment identified: Yes  Barriers to discharge: No       Entered by: Anayeli Pearson RN 04/16/2023 5:46 PM   Update 1830- incontinent of urine, large amount.  Writer bladder scanned patient for 189 ml, but unsure when he urinated.   Please review provider order for any additional goals.   Nurse to notify provider when observation goals have been met and patient is ready for discharge.

## 2023-04-17 NOTE — DISCHARGE SUMMARY
[facility]  Hospitalist Discharge Summary      Date of Admission:  4/15/2023  Date of Discharge:  4/17/2023  Discharging Provider: ZAK Tracey PA-C  Discharge Service: Hospitalist Service    Discharge Diagnoses   Hematuria with possible left-sided ureteral obstruction,   HARSHAL  Metabolic acidosis    Follow-ups Needed After Discharge   Follow up with primary care provider, No primary care provider on file.,   within 7 days for hospital follow- up. Labs to check: kidney function, BMP    Follow-up with urology in 1 to 2 weeks for repeat imaging to reassess   possible kidney stone.       Discharge Disposition   Discharged to home  Condition at discharge: Stable    Hospital Course   Summary of Stay: Donnie Vincent is a 77 year old male with past medical history including stroke with dysarthria who resides in a nursing home, kidney stones, BPH, diet-controlled diabetes, COPD, parkinsonism and seizure disorder admitted on 4/15/2023 with new onset hematuria and mild low back pain.     Here in the ER he was hemodynamically stable.  CBC was relatively unremarkable.  BMP showed evidence of acute kidney injury.  CT of the abdomen and pelvis without contrast showed perinephric stranding of the left kidney and dilation of the distal ureter with a possible obstructing stone.  He was started on IV ceftriaxone, fluids and urology was consulted.     Per urology there are no immediate plans for cystoscopy as is unclear if he truly has an obstructing stone.    Urology recommended continuing Flomax, antibiotics and straining the urine to look for any ureteral stones.  At time of discharge urine culture did not grow any specific organisms however I opted to cover him with cefdinir twice daily for 8 days as there was concern for pyelonephritis.  The CT scan did show perinephritic stranding.  Patient may benefit from future imaging or CT urogram to further assess.  Patient needs to follow-up with his primary care  provider in 1 week for repeat kidney function and to follow-up on hematuria.  [facility] outpatient referral [facility]  Creatinine improved from 1.9-1.41 at time of discharge.  Patient does appear to have a history of elevated kidney function, However there is no recent comparisons in the last couple years to determine if it is HARSHAL versus chronic kidney disease.  Encourage the patient to continue drinking fluids. PVR continued to be ok and no concerns for urinary retention during admission.      Problem List/Assessment and Plan:   1. Hematuria with possible left-sided ureteral obstruction, remote history of kidney stones: Possible but not definitive presence of an obstructing stone in the left.  Consider other causes of obstruction as well.  Urine analysis primarily shows hematuria with at least some mild pyuria  --Cover for urinary tract infection with IV ceftriaxone  --Strain urine  --Continue IV hydration, monitor urine output and recheck BMP in the morning  -- Urology consulted  -- As per urology no immediate plans for cystoscopy while inpatient  -- Due to insurance coverage he might have to follow-up in another system for outpatient cystoscopy     2.   Acute kidney injury - improved   Creatinine 1.90 upon presentation, baseline appears to be around 1.2.  May be obstructive versus prerenal state.  --Gradually improving, recheck BMP in the morning  --Continue IV fluids overnight     3.   Metabolic acidosis   Suspect primarily related to renal failure and possibly saline acidosis.  Seems to be well perfused so doubt lactic acidosis.  --Recheck BMP in the morning  --Continue hydration, antibiotics     4.   History of COPD not in acute exacerbation: Per med rec he may not be on any inhalers.     5.   Remote history of CVA, seizure disorder: Resides in a nursing home.  Seems to have reasonably good motor function but is quite dysarthric though able to make his needs  known.  -- Resume home Keppra, Trileptal and Topamax     6.  History of BPH: Resume home Flomax.  Monitor for retention.    Consultations This Hospital Stay   CARE MANAGEMENT / SOCIAL WORK IP CONSULT  UROLOGY IP CONSULT    Code Status   Full Code    Time Spent on this Encounter   IZAK PA-C, personally saw the patient today and spent less than or equal to 30 minutes discharging this patient.       ZAK Tracey PA-C  [facility] OBSERVATION DEPT  [address] [place] [address]  Phone: [phone]  ______________________________________________________________________    Physical Exam   Vital Signs: Temp: 98.5  F (36.9  C) Temp src: Oral BP: 102/65 Pulse: 82   Resp: 16 SpO2: 96 % O2 Device: None (Room air)    Weight: 178 lbs 8 oz    GENERAL:  Alert, Comfortable, No acute distress. Laying in bed.   PSYCH: pleasant, oriented.  HEENT:  Normocephalic, No scleral icterus or conjunctival injection, normal hearing.  NECK:  Supple  HEART:  Normal S1, S2 with no murmur, RRR  LUNGS:  Normal Respiratory effort. Shallow breaths normal for him after rib fracture few years ago. Clear to auscultation bilaterally.  ABDOMEN:  Soft, non-tender, non distended. No peritoneal signs.   EXTREMITIES:  No pedal edema, No cyanosis.   SKIN:  Warm, dry to touch.  NEUROLOGIC:  Speech dysarthric, alert & orientated x 4, no focal deficits.          Primary Care Physician   No primary care provider on file.    Discharge Orders      Adult Urology  Referral      Reason for your hospital stay    You are admitted to the observation unit due to new onset hematuria and mild low back pain.  CT imaging of your abdomen showed a possible obstructing stone and some irritation of the left kidney.  You were started on IV antibiotics fluids and urology was consulted.  You will be discharged on a oral antibiotic to continue for 8 days to cover for a possible bladder and kidney infection.  Urology recommends  continuing Flomax, straining your urine to look for any stones.  They recommend you follow-up with urology outpatient for repeat imaging, possible cystoscopy or voiding imaging. Your kidney function was slightly elevated upon admission however it has resolved almost back to baseline.  You will also need to have your kidney function checked at your hospital follow-up with your primary care provider in 1 to 2 weeks.  Return to the emergency department if you have fever, chills, flank/back pain.     Follow-up and recommended labs and tests     Follow up with primary care provider, No primary care provider on file., within 7 days for hospital follow- up.    Follow-up with urology in 1 to 2 weeks for repeat imaging to reassess possible kidney stone.     Activity    Your activity upon discharge: activity as tolerated     Diet    Follow this diet upon discharge: Regular Diet Adult       Significant Results and Procedures   Results for orders placed or performed during the hospital encounter of 04/15/23   CT Abdomen Pelvis w/o Contrast    Narrative    EXAM: CT ABDOMEN PELVIS W/O CONTRAST  LOCATION: [facility]  DATE/TIME: 4/15/2023 8:49 PM CDT    INDICATION: Hematuria.  COMPARISON: 11/16/2015.  TECHNIQUE: CT scan of the abdomen and pelvis was performed without IV contrast. Multiplanar reformats were obtained. Dose reduction techniques were used.  CONTRAST: None.    FINDINGS:      Absence of intravenous contrast limits the sensitivity of this examination for detection of infectious/inflammatory change, post traumatic abnormalities, vascular abnormalities, and visceral lesions. Study additionally degraded by motion.    LOWER CHEST: Mild to moderate cardiomegaly. Atherosclerotic calcifications of coronary arteries and thoracic aorta. Trace pericardial fluid. Tiny bilateral pleural effusions. Calcified bilateral pleural plaques.    HEPATOBILIARY: Liver is normal in attenuation and  size.    Cholelithiasis.    No intrahepatic or intrahepatic biliary ductal dilatation.    PANCREAS: Normal attenuation. No peripancreatic inflammatory fat stranding.    SPLEEN: Normal attenuation. Normal size.    ADRENAL GLANDS: Normal.    KIDNEYS: Mild dilatation of the distal left ureter, with diffuse urothelial thickening and mild left perinephric stranding. A discretely obstructing stone or mass lesion is not identified, within limitation of the CT technique.    No nephroureterolithiasis. Simple bilateral renal cysts; no dedicated follow-up is recommended.    Mildly thick-walled urinary bladder, which is incompletely distended.    PELVIC ORGANS: Mild prostatomegaly.    BOWEL: No evidence of acute gastrointestinal inflammation or obstruction. Colonic diverticulosis.    No intraperitoneal free fluid or free air.    LYMPH NODES: Single, mildly enlarged left para-aortic lymph node, axial image 90, measuring 10 mm in short axis.    VASCULATURE: No abdominal aortic aneurysm. Moderate atherosclerotic calcifications.    MUSCULOSKELETAL: No suspicious abnormality. Degenerative ankylosis of both sacroiliac joints.    OTHER: No additionally significant abnormalities.      Impression    IMPRESSION:   1.  Mild left perinephric stranding, diffuse urothelial thickening and mild dilatation of the distal left ureter. A discretely obstructing stone or mass lesion is identified, within limitation of the noncontrast technique. Findings may be attributable to   an underlying upper urinary tract infection; correlation with urinalysis is recommended. CT urography follow-up is recommended, given the reported hematuria.  2.  Diffuse bladder wall thickening, possibly attributable to incomplete distention, though a concomitant cystitis is not excluded.  3.  Mild left para-aortic lymphadenopathy. Attention on follow-up.  4.  Mild-to-moderate cardiomegaly.  5.  Cholelithiasis.  6.  Mild prostatomegaly.             Discharge Medications    Current Discharge Medication List      START taking these medications    Details   cefdinir (OMNICEF) 300 MG capsule Take 1 capsule (300 mg) by mouth 2 times daily for 8 days  Qty: 16 capsule, Refills: 0    Associated Diagnoses: Pyelonephritis, acute         CONTINUE these medications which have NOT CHANGED    Details   acetaminophen (TYLENOL) 325 MG tablet Take 650 mg by mouth every 6 hours as needed for mild pain      aspirin (ASA) 325 MG EC tablet Take 325 mg by mouth daily as needed for moderate pain (migraine)      atorvastatin (LIPITOR) 40 MG tablet Take 40 mg by mouth daily      diclofenac (VOLTAREN) 1 % topical gel Apply 2 g topically 4 times daily as needed for moderate pain      FLUoxetine (PROZAC) 40 MG capsule Take 1 capsule (40 mg) by mouth daily  Qty: 90 capsule, Refills: 3    Associated Diagnoses: Major depressive disorder, recurrent episode, mild (H)      levETIRAcetam (KEPPRA) 500 MG tablet Take 500 mg by mouth 2 times daily Through Neurologist      magnesium hydroxide (MILK OF MAGNESIA) 400 MG/5ML suspension Take 30 mLs by mouth daily as needed for constipation or heartburn      melatonin 3 MG tablet Take 3 mg by mouth nightly as needed for sleep      multivitamin w/minerals (THERA-VIT-M) tablet Take 1 tablet by mouth daily      OXcarbazepine (TRILEPTAL) 150 MG tablet Take 150 mg by mouth 2 times daily      tamsulosin (FLOMAX) 0.4 MG capsule Take 1 capsule (0.4 mg) by mouth daily  Qty: 90 capsule, Refills: 3    Associated Diagnoses: Benign prostatic hyperplasia with lower urinary tract symptoms, symptom details unspecified      topiramate ER (QUDEXY XR) 50 MG 24 hr capsule Take 50 mg by mouth 2 times daily      zinc gluconate 50 MG tablet Take 50 mg by mouth daily           Allergies   Allergies   Allergen Reactions     Pcn [Penicillins]      Sore joints and he had to be hospitalized for it

## 2023-04-17 NOTE — PROGRESS NOTES
Care Management Discharge Note    Discharge Date: 04/17/2023       Discharge Disposition:  [facility]    Discharge Services:  Continue HC through [facility]    Discharge DME:  None    Discharge Transportation: Family        Education Provided on the Discharge Plan:  Yes  Persons Notified of Discharge Plans:   DaughterMaryan at [facility], bedside nurse  Patient/Family in Agreement with the Plan: yes    Additional Information:    Patient receives services as follows: Full [facility] services    [facility] contact (name/number): [phone]  Fax #: [phone]  Barriers to pt returning: None  Time of preferred return: No restrictions  [facility] Will need RX for antibiotic filled at  Pharmacy  Transportation: Family states they are able to transport.    Pt is ready to discharge back to [facility] today. Spoke with Maryan at the [facility] [phone], Fax [phone]. Orders have been faxed to [facility]. Confirmed discharge with daughterMichelle. She states she and her brother will transport pt around 1800. [facility] aware of return time. [facility], [facility] filled at [facility].  Call to [facility] to inform pt would be discharging today with continuation of care.    Please call with any additional needs.    Nicole Miller RN   Inpatient Care Coordination  [facility]   Phone: [phone]

## 2023-04-17 NOTE — PLAN OF CARE
PRIMARY DIAGNOSIS: UTI, HEMATURIA, KIDNEY STONE    OUTPATIENT/OBSERVATION GOALS TO BE MET BEFORE DISCHARGE  1. Pain Status: Pain free.    2. Tolerating adequate PO diet: Yes    3. Surgical Intervention planned: Outpatient cysto    4. Cleared by consultants (if involved): No    5. Return to near baseline physical activity: Yes    Discharge Planner Nurse   Safe discharge environment identified: Yes  Barriers to discharge: Yes       Entered by: Carey Pearson RN 04/17/2023 2:08 AM     Vitals are Temp: 98.5  F (36.9  C) Temp src: Oral BP: 115/70 Pulse: 93   Resp: 16 SpO2: 96 %.  Patient is Alert and Oriented x4 but forgetful. Incontinent of urine, external catheter applied. Urine cloudy, dark reddish lamonte. Denies pain and burning with urination. Straining urine. They are 2 assist.  Regular diet. Patient has Normal Saline 0.9% running at 125 mL per hour. Will continue to monitor and provide supportive cares.       Please review provider order for any additional goals.   Nurse to notify provider when observation goals have been met and patient is ready for discharge.

## 2023-04-17 NOTE — PLAN OF CARE
PRIMARY DIAGNOSIS: ACUTE RENAL Failure    OUTPATIENT/OBSERVATION GOALS TO BE MET BEFORE DISCHARGE  1. Pain Status: Pain free.    2. Tolerating adequate PO diet: No    3. Surgical Intervention planned: No    4. Cleared by consultants (if involved): Yes    5. Return to near baseline physical activity: Yes    Discharge Planner Nurse   Safe discharge environment identified: Yes  Barriers to discharge: No       Entered by: Eri Melendez RN 04/17/2023 4:32 PM     Please review provider order for any additional goals.   Nurse to notify provider when observation goals have been met and patient is ready for discharge.Goal Outcome Evaluation:       Pt  discharged back to home facility with family. Pt discharged instruction reviewed.    OBSERVATION patient END time:1643  Patient's After Visit Summary was reviewed with patient and/family.   Patient verbalized understanding of After Visit Summary, recommended follow up and was given an opportunity to ask questions.   Discharge medications sent home with patient/family: YES, No   Discharged with son and daughter.

## 2023-04-17 NOTE — PLAN OF CARE
PRIMARY DIAGNOSIS: HEMATURIA, KIDNEY STONES PATIENT/OBSERVATION GOALS TO BE MET BEFORE DISCHARGE:  1. ADLs back to baseline: Yes    2. Activity and level of assistance: Up with maximum assistance. Consider SW and/or PT evaluation.     3. Pain status: Pain free.    4. Return to near baseline physical activity: Yes     Discharge Planner Nurse   Safe discharge environment identified: Yes  Barriers to discharge: Yes       Entered by: Eri Melendez RN 04/17/2023 10:08 AM     Please review provider order for any additional goals.   Nurse to notify provider when observation goals have been met and patient is ready for discharge.Goal Outcome Evaluation:

## 2023-04-17 NOTE — PLAN OF CARE
PRIMARY DIAGNOSIS: UTI, HEMATURIA, KIDNEY STONE    OUTPATIENT/OBSERVATION GOALS TO BE MET BEFORE DISCHARGE  1. Pain Status: Pain free.    2. Tolerating adequate PO diet: Yes    3. Surgical Intervention planned: Outpatient cysto    4. Cleared by consultants (if involved): No    5. Return to near baseline physical activity: Yes    Discharge Planner Nurse   Safe discharge environment identified: Yes  Barriers to discharge: Yes       Entered by: Carey Pearson RN 04/17/2023 4:37 AM     Vitals are Temp: 98.7  F (37.1  C) Temp src: Oral BP: 124/85 Pulse: 78   Resp: 16 SpO2: 96 %.  Patient is Alert and Oriented x4 but forgetful. Baseline garbled speech. Able to make needs known. Incontinent of urine, external catheter on. Urine cloudy and red. Bladder scan for 191. Denies pain and burning with urination. Straining urine. They are 2 assist. Regular diet. Patient has Normal Saline 0.9% running at 125 mL per hour. Will continue to monitor and provide supportive cares.       Please review provider order for any additional goals.   Nurse to notify provider when observation goals have been met and patient is ready for discharge.

## 2023-04-17 NOTE — CONSULTS
[facility] Consultation by [facility]    Donnie Vincent MRN# [id number]   Age: 77 year old YOB: 1945     Date of Admission:  4/15/2023    Reason for consult: Gross hematuria, left hydroureter with possible distal stone, left-sided pyelonephritis       Requesting PA/MD: Dr. Cardenas       Level of consult: Consult, follow and place orders           Assessment and Plan:   Assessment:   Gross hematuria  Urinary tract infection  Hydronephrosis without definitive cause  HARSHAL v. CKD (no recent creatinine for comparison  COPD  Remote history of CVA and seizure disorder  BPH      Plan:   -Continue with Flomax 0.4 mg once daily.  -Continue with IV antibiotics.  Follow cultures.  Transition to culture specific as available and oral when clinically appropriate.  -Reasonable to strain urine, but low suspicion for ureteral stone.  -Continue to monitor postvoid residuals.  If postvoid residuals greater than 300-350 mL would consider straight catheterization.  If persistently elevated, may need to consider Sanabria catheter placement in the future.  Anticipate that this will not be needed.  -Will need outpatient cystoscopy.  Due to patient's insurance, he will need to find a [employer] approved provider for incur a significant out-of-pocket cost.  -May benefit from repeat imaging in the future to include CT urogram after treatment of likely infection.  -Have recommended that if urine culture grows out yeast that this be treated due to findings on CT as well as gross hematuria.  -Continue to monitor creatinine.  -We will continue to follow along.  Ellen Cardenas PA-C   [facility]  [phone]               Chief Complaint:   Gross hematuria, left hydroureter with possible distal stone, left-sided pyelonephritis     History is obtained from the patient and EMR.         History of Present Illness:   This patient is a 77 year old male who presented to the ER late on 04/15/2023 due to hematuria that  started the day prior.  Patient does not take any blood thinning medications.  He denies any pain.  Patient underwent CT imaging which shows left hydroureter with possible distal stone, but none visualized, and concern for left-sided hydronephrosis.    Urinalysis was concerning for possible infection.  Urine culture was added on by Dr. Sánchez.  Patient was started on IV Rocephin.  His Flomax 0.4 mg was restarted.  Patient generally does not have difficulties with urination.  He has had some episodes of incontinence here.  He denies any incontinence at home.  External catheter in place with lamonte-colored urine.  Bladder scans have been 191 mL or less.  Patient denies any flank pain.  He generally feels like he empties his bladder adequately.    Creatinine was elevated to 1.90 with a EGFR of 35.  This is slowly improving, most recent creatinine 1.41 EGFR 51.  Uncertain of his baseline creatinine, his most recent creatinine was from 2019.  Patient is currently afebrile without tachycardia.  His blood pressure was initially soft, but is now adequate.    He currently denies any nausea, vomiting, fevers, chills, shortness of breath, or chest pain.  He denies any lightheaded or dizziness.  He is a former smoker.  He denies any difficulties with his bowel movements.           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     COPD (chronic obstructive pulmonary disease) (H)      CVA (cerebral infarction)      Diabetes new 4/09    initial A1C 6.8 4/08;; has done diabetic teaching      Diverticulitis of colon (without mention of hemorrhage)(562.11) 2001; 9/06     Gastro-oesophageal reflux disease      Hernia, abdominal      History of blood transfusion      HTN (hypertension)      Hyperlipidemia LDL goal < 100      Hypertriglyceridemia      Other  forms of migraine, without mention of intractable migraine without mention of status migrainosus      PFO (patent foramen ovale)     small per echo 11/2013     Renal disease     Hx kidney stones 12 yrs ago     Seizure disorder (H)     sees neurologist     Seizures (H)     8 years ago - no recurrence     Sleep apnea     wears CPAP     Stroke (H) early april 2017     Unspecified congenital anomaly of lung     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 possibly from asbestos exposure in [employer] (remote)              Past Surgical History:     Past Surgical History:   Procedure Laterality Date     COLONOSCOPY  11/4/2015    Dr. Ernesto SPEARS     COLONOSCOPY N/A 11/4/2015    Procedure: COLONOSCOPY;  Surgeon: Yazmin Orozco MD;  Location: [facility]     CYSTOSCOPY, RETROGRADES, EXTRACT STONE, COMBINED Left 11/25/2015    Procedure: COMBINED CYSTOSCOPY, RETROGRADES, EXTRACT STONE;  Surgeon: Neville Banuelos MD;  Location: [facility]     CYSTOSCOPY, TRANSURETHRAL RESECTION (TUR) PROSTATE, COMBINED N/A 10/3/2018    Procedure: COMBINED CYSTOSCOPY, TRANSURETHRAL RESECTION (TUR) PROSTATE;  Cystoscopy, removal of bladder stones with holmium laser, transurethral resection of prostate using Olympus bipolar electrode;  Surgeon: Neville Banuelos MD;  Location: [facility]     ESOPHAGOSCOPY, GASTROSCOPY, DUODENOSCOPY (EGD), COMBINED  11/5/2015    Dr. Ernesto CONTRERAS     ESOPHAGOSCOPY, GASTROSCOPY, DUODENOSCOPY (EGD), COMBINED  06/27/2018    Dr. Ernesto CONTRERAS     GENITOURINARY SURGERY      kidney stone - lithotripsy     HERNIA REPAIR       LASER HOLMIUM LITHOTRIPSY BLADDER N/A 10/3/2018    Procedure: LASER HOLMIUM LITHOTRIPSY BLADDER;;  Surgeon: Neville Banuelos MD;  Location: [facility]     PHACOEMULSIFICATION CLEAR CORNEA WITH STANDARD INTRAOCULAR LENS IMPLANT  12/20/2011    Procedure:PHACOEMULSIFICATION CLEAR CORNEA WITH STANDARD INTRAOCULAR LENS IMPLANT; RIGHT PHACOEMULSIFICATION CLEAR CORNEA WITH STANDARD INTRAOCULAR LENS IMPLANT ;  Surgeon:GUILHERME KAUFMAN; Location:[facility] NONSPECIFIC PROCEDURE      colonoscopy              Social History:     Social History     Tobacco Use     Smoking status: Former     Types: Cigarettes     Quit date: 1970     Years since quittin.3     Smokeless tobacco: Never     Tobacco comments:     quit age 28   Vaping Use     Vaping status: Not on file   Substance Use Topics     Alcohol use: Yes     Comment: 1 beer/week             Family History:     Family History   Problem Relation Age of Onset     Family History Negative Mother         migraines     Family History Negative Father         lived to be 92     Colon Cancer No family hx of      Family history reviewed.             Allergies:     Allergies   Allergen Reactions     Pcn [Penicillins]      Sore joints and he had to be hospitalized for it             Medications:     Current Facility-Administered Medications   Medication     acetaminophen (TYLENOL) tablet 650 mg    Or     acetaminophen (TYLENOL) Suppository 650 mg     cefTRIAXone (ROCEPHIN) 1 g vial to attach to  mL bag for ADULTS or NS 50 mL bag for PEDS     levETIRAcetam (KEPPRA) tablet 500 mg     magnesium hydroxide (MILK OF MAGNESIA) suspension 30 mL     melatonin tablet 1 mg     ondansetron (ZOFRAN ODT) ODT tab 4 mg    Or     ondansetron (ZOFRAN) injection 4 mg     OXcarbazepine (TRILEPTAL) tablet 150 mg     sodium chloride 0.9% infusion     tamsulosin (FLOMAX) capsule 0.4 mg     topiramate (TOPAMAX) tablet 50 mg     Facility-Administered Medications Ordered in Other Encounters   Medication     gadobutrol (GADAVIST) injection 15 mL             Review of Systems:   A comprehensive 10-point review of systems was performed and found to be negative except as described in the HPI.     /65 (BP Location: Right arm)   Pulse 82   Temp 98.5  F (36.9  C) (Oral)   Resp 16   Ht 1.829 m (6')   Wt 81 kg (178 lb 8 oz)   SpO2 96%   BMI 24.21 kg/m    PSYCH: NAD  EYES: EOMI  MOUTH:  MMM  NECK: Supple, no notable adenopathy  RESP: Unlabored breathing  CARDIAC: No LE edema, regular radial pulse  SKIN: Warm, no rashes  ABD: soft, Nontender, no CVA tenderness bilaterally.  NEURO: AAO x3  URO: External catheter in place with lamonte-colored urine           Data:     Lab Results   Component Value Date    WBC 4.8 04/16/2023    HGB 10.3 (L) 04/16/2023    HCT 32.8 (L) 04/16/2023    MCV 86 04/16/2023     (L) 04/16/2023     Lab Results   Component Value Date    CR 1.41 (H) 04/17/2023    CR 1.58 (H) 04/16/2023     Recent Labs   Lab 04/15/23  2241   COLOR Dark Brown*   APPEARANCE Cloudy*   URINEGLC Negative   URINEBILI Negative   URINEKETONE Negative   SG 1.026   URINEPH 5.5   PROTEIN 100*   NITRITE Negative   LEUKEST Small*   RBCU >182*   WBCU 12*      Urine culture: in process.    CT Abdomen Pelvis w/o Contrast    Result Date: 4/15/2023  EXAM: CT ABDOMEN PELVIS W/O CONTRAST LOCATION: [facility] DATE/TIME: 4/15/2023 8:49 PM CDT INDICATION: Hematuria. COMPARISON: 11/16/2015. TECHNIQUE: CT scan of the abdomen and pelvis was performed without IV contrast. Multiplanar reformats were obtained. Dose reduction techniques were used. CONTRAST: None. FINDINGS:  Absence of intravenous contrast limits the sensitivity of this examination for detection of infectious/inflammatory change, post traumatic abnormalities, vascular abnormalities, and visceral lesions. Study additionally degraded by motion. LOWER CHEST: Mild to moderate cardiomegaly. Atherosclerotic calcifications of coronary arteries and thoracic aorta. Trace pericardial fluid. Tiny bilateral pleural effusions. Calcified bilateral pleural plaques. HEPATOBILIARY: Liver is normal in attenuation and size. Cholelithiasis. No intrahepatic or intrahepatic biliary ductal dilatation. PANCREAS: Normal attenuation. No peripancreatic inflammatory fat stranding. SPLEEN: Normal attenuation. Normal size. ADRENAL GLANDS: Normal. KIDNEYS: Mild  dilatation of the distal left ureter, with diffuse urothelial thickening and mild left perinephric stranding. A discretely obstructing stone or mass lesion is not identified, within limitation of the CT technique. No nephroureterolithiasis. Simple bilateral renal cysts; no dedicated follow-up is recommended. Mildly thick-walled urinary bladder, which is incompletely distended. PELVIC ORGANS: Mild prostatomegaly. BOWEL: No evidence of acute gastrointestinal inflammation or obstruction. Colonic diverticulosis. No intraperitoneal free fluid or free air. LYMPH NODES: Single, mildly enlarged left para-aortic lymph node, axial image 90, measuring 10 mm in short axis. VASCULATURE: No abdominal aortic aneurysm. Moderate atherosclerotic calcifications. MUSCULOSKELETAL: No suspicious abnormality. Degenerative ankylosis of both sacroiliac joints. OTHER: No additionally significant abnormalities.     IMPRESSION: 1.  Mild left perinephric stranding, diffuse urothelial thickening and mild dilatation of the distal left ureter. A discretely obstructing stone or mass lesion is identified, within limitation of the noncontrast technique. Findings may be attributable to  an underlying upper urinary tract infection; correlation with urinalysis is recommended. CT urography follow-up is recommended, given the reported hematuria. 2.  Diffuse bladder wall thickening, possibly attributable to incomplete distention, though a concomitant cystitis is not excluded. 3.  Mild left para-aortic lymphadenopathy. Attention on follow-up. 4.  Mild-to-moderate cardiomegaly. 5.  Cholelithiasis. 6.  Mild prostatomegaly.

## 2023-04-17 NOTE — PLAN OF CARE
PRIMARY DIAGNOSIS: UTI, HEMATURIA, KIDNEY STONE    OUTPATIENT/OBSERVATION GOALS TO BE MET BEFORE DISCHARGE  1. Pain Status: Pain free.    2. Tolerating adequate PO diet: Yes    3. Surgical Intervention planned: Outpatient cysto    4. Cleared by consultants (if involved): No    5. Return to near baseline physical activity: Yes    Discharge Planner Nurse   Safe discharge environment identified: Yes  Barriers to discharge: Yes       Entered by: Carey Pearson RN 04/17/2023 2:12 AM     Vitals are Temp: 98.5  F (36.9  C) Temp src: Oral BP: 115/70 Pulse: 93   Resp: 16 SpO2: 96 %.  Patient is Alert and Oriented x4 but forgetful. Baseline garbled speech. Able to make needs known. Incontinent of urine, external catheter applied. Urine cloudy, dark reddish lamonte. Denies pain and burning with urination. Straining urine. Pt refused to be bladder scanned at this time after education provided. Bladder non distended upon palpation. Pt denies bladder discomfort. They are 2 assist. Regular diet. Patient has Normal Saline 0.9% running at 125 mL per hour. Will continue to monitor and provide supportive cares.       Please review provider order for any additional goals.   Nurse to notify provider when observation goals have been met and patient is ready for discharge.

## 2023-04-24 NOTE — TELEPHONE ENCOUNTER
Per Dr Jaimes:  I spoke with the patient's daughter Dang on the phone regarding Mr. 1 HoLEP.  Given that he has [employer] insurance, this is not something [place] excepts.  If we proceed with the visit today, he would likely get a large out of network bill for the visit.     I informed the to find another urologist.  I recommended Dr. Mildred Gibson at [facility] and left a message on Dang's phone with phone numbers in order to get something scheduled with Dr. Gibson.

## 2023-04-25 NOTE — ED TRIAGE NOTES
Pt brought by EMS from [facility]. Staff reported noticing dark blood in stool yesterday, today noticed bright red blood today. Pt has hx of parkinsons and a-fib, no blood thinner noted on facility med list     Triage Assessment     Row Name 04/25/23 0831       Triage Assessment (Adult)    Airway WDL WDL       Respiratory WDL    Respiratory WDL WDL       Skin Circulation/Temperature WDL    Skin Circulation/Temperature WDL WDL       Cardiac WDL    Cardiac WDL X;chest pain       Chest Pain Assessment    Chest Pain Location anterior chest, left       Peripheral/Neurovascular WDL    Peripheral Neurovascular WDL WDL       Cognitive/Neuro/Behavioral WDL    Cognitive/Neuro/Behavioral WDL WDL

## 2023-04-25 NOTE — DISCHARGE INSTRUCTIONS
I recommend you stay adequately hydrated to try to help flush your urinary system, [facility].  If you develop flank pain, fever, be unable to urinate increased swelling or increasing shortness of breath, you should return the emergency department.

## 2023-04-25 NOTE — ED PROVIDER NOTES
History     Chief Complaint:  Hematuria       HPI   Donnie Vincent is a 77 year old male past medical history seen for stroke, dysarthria, COPD, Parkinson's and seizure disorder who was hospitalized for 2 days 1 week prior for flank pain, hematuria thought secondary to renal mass versus UTI associate with mild HARSHAL presenting for recurrent hematuria.  He also endorses some left-sided chest pain.   he denies any difficulty voiding, no fevers, no chills.  Denies any shortness of breath      Independent Historian:   None - Patient Only    Review of External Notes:  4/17/23, discharged to hospital presumed UTI, HARSHAL and questionable obstructive nephropathy.    ROS:  Review of Systems  10 point ROS completed, negative except as indicated in the HPI.    Allergies:  Pcn [Penicillins]     Medications:    oxyCODONE (ROXICODONE) 5 MG tablet  acetaminophen (TYLENOL) 325 MG tablet  aspirin (ASA) 325 MG EC tablet  atorvastatin (LIPITOR) 40 MG tablet  cefdinir (OMNICEF) 300 MG capsule  diclofenac (VOLTAREN) 1 % topical gel  FLUoxetine (PROZAC) 40 MG capsule  levETIRAcetam (KEPPRA) 500 MG tablet  magnesium hydroxide (MILK OF MAGNESIA) 400 MG/5ML suspension  melatonin 3 MG tablet  multivitamin w/minerals (THERA-VIT-M) tablet  OXcarbazepine (TRILEPTAL) 150 MG tablet  tamsulosin (FLOMAX) 0.4 MG capsule  topiramate ER (QUDEXY XR) 50 MG 24 hr capsule  zinc gluconate 50 MG tablet        Past Medical History:    Past Medical History:   Diagnosis Date     Anemia      BPH (benign prostatic hypertrophy)      Brain tumor (benign) (H)      COPD (chronic obstructive pulmonary disease) (H)      CVA (cerebral infarction)      Diabetes new 4/09     Diverticulitis of colon (without mention of hemorrhage)(562.11) 2001; 9/06     Gastro-oesophageal reflux disease      Hernia, abdominal      History of blood transfusion      HTN (hypertension)      Hyperlipidemia LDL goal < 100      Hypertriglyceridemia      Other forms of migraine, without mention  of intractable migraine without mention of status migrainosus      PFO (patent foramen ovale)      Renal disease      Seizure disorder (H)      Seizures (H)      Sleep apnea      Stroke (H) early april 2017     Unspecified congenital anomaly of lung        Past Surgical History:    Past Surgical History:   Procedure Laterality Date     COLONOSCOPY  11/4/2015    Dr. Orozco On license of [facility]     COLONOSCOPY N/A 11/4/2015    Procedure: COLONOSCOPY;  Surgeon: Yazmin Orozco MD;  Location:  GI     CYSTOSCOPY, RETROGRADES, EXTRACT STONE, COMBINED Left 11/25/2015    Procedure: COMBINED CYSTOSCOPY, RETROGRADES, EXTRACT STONE;  Surgeon: Neville Banuelos MD;  Location: [facility] OR     CYSTOSCOPY, TRANSURETHRAL RESECTION (TUR) PROSTATE, COMBINED N/A 10/3/2018    Procedure: COMBINED CYSTOSCOPY, TRANSURETHRAL RESECTION (TUR) PROSTATE;  Cystoscopy, removal of bladder stones with holmium laser, transurethral resection of prostate using Olympus bipolar electrode;  Surgeon: Neville Banuelos MD;  Location:  OR     ESOPHAGOSCOPY, GASTROSCOPY, DUODENOSCOPY (EGD), COMBINED  11/5/2015    Dr. Ernesto CONTRERAS     ESOPHAGOSCOPY, GASTROSCOPY, DUODENOSCOPY (EGD), COMBINED  06/27/2018    Dr. Ernesto CONTRERAS     GENITOURINARY SURGERY      kidney stone - lithotripsy     HERNIA REPAIR       LASER HOLMIUM LITHOTRIPSY BLADDER N/A 10/3/2018    Procedure: LASER HOLMIUM LITHOTRIPSY BLADDER;;  Surgeon: Neville Banuelos MD;  Location:  OR     PHACOEMULSIFICATION CLEAR CORNEA WITH STANDARD INTRAOCULAR LENS IMPLANT  12/20/2011    Procedure:PHACOEMULSIFICATION CLEAR CORNEA WITH STANDARD INTRAOCULAR LENS IMPLANT; RIGHT PHACOEMULSIFICATION CLEAR CORNEA WITH STANDARD INTRAOCULAR LENS IMPLANT ; Surgeon:GUILHERME KAUFMAN; Location:[facility] NONSPECIFIC PROCEDURE      colonoscopy 2005        Family History:    family history includes Family History Negative in his father and mother.    Social History:   reports that he quit smoking about 53 years ago. His  smoking use included cigarettes. He has never used smokeless tobacco. He reports current alcohol use. He reports that he does not use drugs.  PCP: [facility]     Physical Exam     Patient Vitals for the past 24 hrs:   BP Temp Temp src Pulse Resp SpO2   04/25/23 1115 104/65 -- -- 93 -- --   04/25/23 1100 112/73 -- -- 87 -- --   04/25/23 1045 108/74 -- -- 87 -- --   04/25/23 0900 109/68 -- -- 83 -- 100 %   04/25/23 0829 109/73 98  F (36.7  C) Oral 87 16 99 %        Physical Exam  Constitutional: Alert, attentive, GCS 15  HENT:    Nose: Nose normal.    Mouth/Throat: Oropharynx is clear, mucous membranes are moist  Eyes: EOM are normal, anicteric, conjugate gaze  CV: regular rate and rhythm; no murmurs  Chest: Effort normal and breath sounds clear without wheezing or rales, symmetric bilaterally   GI:  non tender. No distension. No guarding or rebound.    MSK: No LE edema, no tenderness to palpation of BLE.  Neurological: Alert, attentive, moving all extremities equally.   Skin: Skin is warm and dry.      Emergency Department Course     ECG results from 04/25/23   EKG 12-lead, tracing only     Value    Systolic Blood Pressure     Diastolic Blood Pressure     Ventricular Rate 84    Atrial Rate 84    FL Interval 168    QRS Duration 148        QTc 519    P Axis 20    R AXIS -41    T Axis 125    Interpretation ECG      Sinus rhythm  Left axis deviation  Left bundle branch block  Abnormal ECG  When compared with ECG of 16-APR-2023 02:16,  No significant change was found             Imaging:  CT Chest/Abdomen/Pelvis w Contrast   Preliminary Result   IMPRESSION:   1.  Infiltrative appearing prominent mass occupying the left upper to   mid kidney most consistent with malignancy. Recommend further   oncologic workup. There is also nonspecific mild wall thickening   involving portions of the left ureter and along the posterior bladder   wall. Neoplasm at these locations are also possible.   2.   Indeterminate enlarged left periaortic retroperitoneal lymph node.   3.  Lucent bone lesion along the left lateral ninth rib along with a   mass along the anterior aspect of the left ninth rib and chest wall   worrisome for metastatic disease.   4.  Indeterminate small hypodense nodule at the left liver. Recommend   further assessment with nonurgent liver MRI.   5.  Stable small left adrenal nodule.   6.  Trace bibasilar pleural fluid.   7.  Stable enlarged ascending thoracic aorta measuring 4.4 cm.         Report per radiology    Laboratory:  Labs Ordered and Resulted from Time of ED Arrival to Time of ED Departure   TROPONIN T, HIGH SENSITIVITY - Abnormal       Result Value    Troponin T, High Sensitivity 39 (*)    COMPREHENSIVE METABOLIC PANEL - Abnormal    Sodium 141      Potassium 4.2      Chloride 109 (*)     Carbon Dioxide (CO2) 19 (*)     Anion Gap 13      Urea Nitrogen 27.1 (*)     Creatinine 1.57 (*)     Calcium 8.9      Glucose 133 (*)     Alkaline Phosphatase 91      AST 18      ALT 13      Protein Total 6.9      Albumin 3.9      Bilirubin Total 0.3      GFR Estimate 45 (*)    INR - Abnormal    INR 1.16 (*)    NT PROBNP INPATIENT - Abnormal    N terminal Pro BNP Inpatient 10,620 (*)    CBC WITH PLATELETS AND DIFFERENTIAL - Abnormal    WBC Count 5.3      RBC Count 3.97 (*)     Hemoglobin 10.5 (*)     Hematocrit 34.9 (*)     MCV 88      MCH 26.4 (*)     MCHC 30.1 (*)     RDW 14.5      Platelet Count 179      % Neutrophils 74      % Lymphocytes 14      % Monocytes 8      % Eosinophils 2      % Basophils 1      % Immature Granulocytes 1      NRBCs per 100 WBC 0      Absolute Neutrophils 4.0      Absolute Lymphocytes 0.8      Absolute Monocytes 0.4      Absolute Eosinophils 0.1      Absolute Basophils 0.1      Absolute Immature Granulocytes 0.0      Absolute NRBCs 0.0     ROUTINE UA WITH MICROSCOPIC - Abnormal    Color Urine Dark Brown (*)     Appearance Urine Cloudy (*)     Glucose Urine Negative       Bilirubin Urine Negative      Ketones Urine Negative      Specific Gravity Urine 1.022      Blood Urine Large (*)     pH Urine 5.5      Protein Albumin Urine 100 (*)     Urobilinogen Urine Normal      Nitrite Urine Negative      Leukocyte Esterase Urine Small (*)     RBC Urine >182 (*)     WBC Urine >182 (*)    TROPONIN T, HIGH SENSITIVITY - Abnormal    Troponin T, High Sensitivity 39 (*)    PARTIAL THROMBOPLASTIN TIME - Normal    aPTT 26     URINE CULTURE          Emergency Department Course & Assessments:  Interventions:  Medications   iopamidol (ISOVUE-370) solution 500 mL (84 mLs Intravenous $Given 4/25/23 1202)   CT scan flush use (61 mLs As instructed $Given 4/25/23 1203)      Independent Interpretation (X-rays, CTs, rhythm strip):  None    Consultations/Discussion of Management or Tests:  None     Social Determinants of Health affecting care:   None    Disposition:  The patient was discharged to home.     Impression & Plan    Medical Decision Making:  Very pleasant 77-year-old male past medical history seen for stroke, severe dysarthria, Parkinson's disease, COPD presenting for recurrent painless hematuria associated with some left-sided chest pain.  He was in the hospital 4/15- 4/17 for hematuria thought secondary to infection versus mass based on noncontrasted CT study.  Due to insurance issues, urology did not think he needed emergent cystoscopy for better delineation of potential renal mass.  He reports chronic left-sided chest pain, screening EKG does not show definite ischemia, troponins are detectable but stable, with lower suspicion for ACS given CT findings done again today which show metastatic likely renal cancer with bony rib mets.  There was a clear renal mass seen on his CT scan with what looks like a definite bony mets.  He was however able to void spontaneously, kidney function appears stable and his hemoglobin is not significantly down.  CT scan of his chest does not show definite pulmonary  edema though his BNP is elevated, I suspect this is likely related to IV fluids while in the hospital and he does not report anasarca.  In discussion with hospitalist service, they do not think any emergent work-up was needed here in the hospital as such she was felt safe for discharge and [facility]  I recommended he follow-up with his PCP, oncology and urology.  Return precautions including inability to void, fevers, flank pain or difficulty breathing were reviewed.  I did send him with a short course of pain medication given his bony mets.    Diagnosis:    ICD-10-CM    1. Gross hematuria  R31.0       2. Renal mass  N28.89       3. Elevated brain natriuretic peptide (BNP) level  R79.89       4. 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

## 2023-04-27 NOTE — TELEPHONE ENCOUNTER
[facility] Emergency Department/Urgent Care Lab result notification     1. Reason for call    Notify of lab results    Assess patient symptoms [if necessary]    Review ED Providers recommendations/discharge instructions (if necessary)    Advise per [facility] ED lab result protocol     2. Lab Result (including Rx patient on, if applicable).  If culture, copy of lab report at bottom.  Final urine culture on 4/27/23 shows the presence of bacteria(s): 10,000-50,000 CFU/mL Pseudomonas aeruginosa   [facility] Emergency Dept discharge antibiotic: None  Recommendations in treatment per [facility] ED lab result Urine Culture protocol.    3. RN Assessment (Patient's current Symptoms):    Time of call: 11:39 AM    Assessment: Per patient's [facility] nurse Sapna RN. The patient works directly with a nurse [facility] the [facility], Elder RN. Elder does all his medication ordering, etc. Elder's cell number is [phone], his office phone is [phone], the other [facility] nurse is Peace [phone] and their fax is [phone]    The [facility] nurse thinks that the patient may have been seen by an oncology provider yesterday but she wasn't sure.     4/27/23 11:59AM: Spoke with patient's primary care nurse from the [facility], Peace ROMAN (Elder RN is out of the office).  Peace asked that I speak with Elmo, the patient's pharmacist.   12:03PM;  Left message for Elmo the patient's pharmacist to call back to discuss treatment.     Tonie Peres RN  [facility] [employer] [facility]  Emergency Dept Lab Result RN  Ph# [phone]

## 2023-04-27 NOTE — TELEPHONE ENCOUNTER
[facility] Emergency Department/Urgent Care Lab result notification  [Note:  ED Lab Results RN will reference the [facility] Emergency Dept visit note prior to contacting patient AND/OR prior to consulting Emergency Dept Provider.  Highlights of Emergency Dept visit in information summary at the bottom of this telephone note]    1. Reason for call    Notify of lab results    Assess patient symptoms [if necessary]    Review ED Providers recommendations/discharge instructions (if necessary)    Advise per [facility] ED lab result protocol    2. Lab Result (including Rx patient on, if applicable).  If culture, copy of lab report at bottom.  Final urine culture on 4/27/23 shows the presence of bacteria(s): 10,000-50,000 CFU/mL Pseudomonas aeruginosa   [facility] Emergency Dept discharge antibiotic: None  Recommendations in treatment per [facility] ED lab result Urine Culture protocol.    3. RN Assessment (Patient's current Symptoms):  Time of call: 11:11AM   Left voicemail message requesting a call back to [facility] ED Lab Result RN at [phone].  RN is available every day between 9 a.m. and 5:30 p.m.    Information summary from Emergency Dept/Urgent Care visit on 4/25/23  Symptoms reported at ED visit (Chief complaint, HPI) Hematuria        HPI   Donnie Vincent is a 77 year old male past medical history seen for stroke, dysarthria, COPD, Parkinson's and seizure disorder who was hospitalized for 2 days 1 week prior for flank pain, hematuria thought secondary to renal mass versus UTI associate with mild HARSHAL presenting for recurrent hematuria.  He also endorses some left-sided chest pain.   he denies any difficulty voiding, no fevers, no chills.  Denies any shortness of breath   Significant Medical hx, if applicable (i.e. CKD, diabetes) Reviewed   Allergies Allergies   Allergen Reactions     Pcn [Penicillins]      Sore joints and he had to be hospitalized for it       Weight, if applicable Wt Readings from Last 2 Encounters:   04/24/23 76.2 kg (168 lb)   04/16/23 81 kg (178 lb 8 oz)      Coumadin/Warfarin [Yes /No] No   Creatinine Level (mg/dl) Creatinine   Date Value Ref Range Status   04/25/2023 1.57 (H) 0.67 - 1.17 mg/dL Final   09/27/2019 1.16 0.66 - 1.25 mg/dL Final      Creatinine clearance (ml/min), if applicable Serum creatinine: 1.57 mg/dL (H) 04/25/23 0847  Estimated creatinine clearance: 41.1 mL/min (A)   ED providers Impression and Plan (applicable information) Very pleasant 77-year-old male past medical history seen for stroke, severe dysarthria, Parkinson's disease, COPD presenting for recurrent painless hematuria associated with some left-sided chest pain.  He was in the hospital 4/15- 4/17 for hematuria thought secondary to infection versus mass based on noncontrasted CT study.  Due to insurance issues, urology did not think he needed emergent cystoscopy for better delineation of potential renal mass.  He reports chronic left-sided chest pain, screening EKG does not show definite ischemia, troponins are detectable but stable, with lower suspicion for ACS given CT findings done again today which show metastatic likely renal cancer with bony rib mets.  There was a clear renal mass seen on his CT scan with what looks like a definite bony mets.  He was however able to void spontaneously, kidney function appears stable and his hemoglobin is not significantly down.  CT scan of his chest does not show definite pulmonary edema though his BNP is elevated, I suspect this is likely related to IV fluids while in the hospital and he does not report anasarca.  In discussion with hospitalist service, they do not think any emergent work-up was needed here in the hospital as such she was felt safe for discharge and [facility]  I recommended he follow-up with his PCP, oncology and urology.  Return precautions including inability to void, fevers, flank pain  or difficulty breathing were reviewed.  I did send him with a short course of pain medication given his bony mets.   ED diagnosis 1. Gross hematuria  R31.0         2. Renal mass  N28.89         3. Elevated brain natriuretic peptide (BNP) level  R79.89         4. 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)  C79.51         ED provider Gilson Dukes MD       Copy of Lab report (if applicable)  Urine Culture  Order: [id number]   Collected 4/25/2023 10:27 AM      Status: Final result      Visible to patient: Yes (not seen)     Specimen Information: Urine, Clean Catch    2 Result Notes  Culture 10,000-50,000 CFU/mL Pseudomonas aeruginosa Abnormal             Resulting Agency: [facility]     Susceptibility     Pseudomonas aeruginosa     MEIR     Amikacin <=2 ug/mL Susceptible     Cefepime 2 ug/mL Susceptible     Ceftazidime 4 ug/mL Susceptible     Ciprofloxacin <=0.25 ug/mL Susceptible     Gentamicin <=1 ug/mL Susceptible     Levofloxacin 0.5 ug/mL Susceptible     Meropenem <=0.25 ug/mL Susceptible     Piperacillin/Tazobactam 8 ug/mL Susceptible     Tobramycin <=1 ug/mL Susceptible                  Specimen Collected: 04/25/23 10:27 AM Last Resulted: 04/27/23  4:54 AM                 Tonie Peres RN  [facility]  Emergency Dept Lab Result RN  Ph# [phone]

## 2023-04-28 NOTE — TELEPHONE ENCOUNTER
[facility]) Emergency Department/Urgent Care Lab result notification  [Note:  ED Lab Results RN will reference the [facility] Emergency Dept visit note prior to contacting patient AND/OR prior to consulting Emergency Dept Provider.  Highlights of Emergency Dept visit in information summary at the bottom of this telephone note]    1. Reason for call    Notify of lab results    Assess patient symptoms [if necessary]    Review ED Providers recommendations/discharge instructions (if necessary)    Advise per [facility] ED lab result protocol    2. Lab Result (including Rx patient on, if applicable).  If culture, copy of lab report at bottom.  Final urine culture on 4/27/23 shows the presence of bacteria(s): 10,000-50,000 CFU/mL Pseudomonas aeruginosa   [facility] Emergency Dept discharge antibiotic: None  Recommendations in treatment per [facility] ED lab result Urine Culture protocol.    3. RN Assessment (Patient's current Symptoms):    Time of call: 4/28/2023 10:03 AM  Assessment:  ED results team has discussed treatment of patient for UTI due to urine culture report & recent history of kidney infection.  Pseudomonas requires specific antibiotic treatment with limited outpatient choices. [facility] for dispensing medication x 2 and left message.  Call attempt to  Assisted living facility J[facility]IE [facility]jo Left message advising if [facility] has care coordination processes to follow the facility needs to take the lab result and discuss with PCP/Pharmacy.  ED results team has offered assessment & treatment.  We are concerned about care coordination barriers leading to advancement of infection and if this is occurring patient is always welcome to return to the emergency department at anytime to receive treatment immediately.    Writer also notes primary care involvement Peace ROMAN - who advised contacting "[facility] Pharmacy - no number for Elom or pharmacy name -  writer left message again discussing care coordination barriers should be management by [facility] team - ED results team can offer treatment or patient is welcome to return to ED - Left voicemail message requesting a call back to [facility] ED Lab Result RN at [phone].  RN is available every day between 9 a.m. and 5:30 p.m.      Copy of Lab report (if applicable)        Radha Romeo RN  [facility]  Emergency Dept Lab Result RN  Ph# [phone]

## 2023-04-28 NOTE — TELEPHONE ENCOUNTER
[facility]) Emergency Department/Urgent Care Lab result notification  [Note:  ED Lab Results RN will reference the [facility] Emergency Dept visit note prior to contacting patient AND/OR prior to consulting Emergency Dept Provider.  Highlights of Emergency Dept visit in information summary at the bottom of this telephone note]    1. Reason for call    Notify of lab results    Assess patient symptoms [if necessary]    Review ED Providers recommendations/discharge instructions (if necessary)    Advise per [facility] ED lab result protocol    2. Lab Result (including Rx patient on, if applicable).  If culture, copy of lab report at bottom.  Final urine culture on 4/27/23 shows the presence of bacteria(s): 10,000-50,000 CFU/mL Pseudomonas aeruginosa   [facility] Emergency Dept discharge antibiotic: None  Recommendations in treatment per [facility] lab result Urine Culture protocol.    3. RN Assessment (Patient's current Symptoms):    Time of call: 4/28/2023 12:31 PM    Assessment: Return call from Daughter - Dang - who assists with management of patient medications - we discussed urine culture and recommendation for treatment    4. RN Recommendations/Instructions per [facility] ED lab result protocol    [facility] ED lab result protocol used: Urine culture    Patient daughter Dang notified of lab result and treatment recommendations (Yes/No): YES    Start Rx for Ciprofloxacin (Cipro) 250 mg tablet, 1 tablet (250 mg) by mouth 2 times daily for 3 days. sent to [Pharmacy - [facility] [place], [place]].      RN reviewed information about to follow up if symptoms return or don't improve in 3 days - to return at anytime for worsening especially signs of kidney infection - fever, flank pain, nausea/vomiting, hematuria - daughter verbalized understanding and agrees with plan.      5. Please Contact your PCP clinic or return to the Emergency department if  your:    Symptoms return.    Symptoms do not improve after 3 days on antibiotic.    Symptoms do not resolve after completing antibiotic.    Symptoms worsen or other concerning symptoms.      Copy of Lab report (if applicable)        Radha Romeo RN  [facility]  Emergency Dept Lab Result RN  Ph# [phone]

## 2023-04-28 NOTE — TELEPHONE ENCOUNTER
[facility]) Emergency Department/Urgent Care Lab result notification  [Note:  ED Lab Results RN will reference the [facility] Emergency Dept visit note prior to contacting patient AND/OR prior to consulting Emergency Dept Provider.  Highlights of Emergency Dept visit in information summary at the bottom of this telephone note]    1. Reason for call    Notify of lab results    Assess patient symptoms [if necessary]    Review ED Providers recommendations/discharge instructions (if necessary)    Advise per [facility] ED lab result protocol    2. Lab Result (including Rx patient on, if applicable).  If culture, copy of lab report at bottom.  Final urine culture on 4/27/23 shows the presence of bacteria(s): 10,000-50,000 CFU/mL Pseudomonas aeruginosa   [facility] Emergency Dept discharge antibiotic: None  Recommendations in treatment per [facility] ED lab result Urine Culture protocol.    3. RN Assessment (Patient's current Symptoms):    Time of call: 4/28/2023 12:04 PM    Assessment: return call from Assisted Living Facility - they say they don't order meds for him, they pass meds, family handles 'all of that' - family are the decision makers who would provide pharmacy and  med to bring to facility - primarily Michelle - Daughter - consent to communicate on file - nurse did not provide any update to patient status    Call to Michelle Buenrostro Daughter - Left voicemail message requesting a call back to [facility] ED Lab Result RN at [phone].  RN is available every day between 9 a.m. and 5:30 p.m.      Radha Romeo RN  [facility] [place]  Emergency Dept Lab Result RN  Ph# [phone]

## 2023-07-02 PROBLEM — J69.0 ASPIRATION PNEUMONIA OF LEFT LUNG, UNSPECIFIED ASPIRATION PNEUMONIA TYPE, UNSPECIFIED PART OF LUNG (H): Status: ACTIVE | Noted: 2023-01-01

## 2023-07-02 PROBLEM — J96.21 ACUTE AND CHRONIC RESPIRATORY FAILURE WITH HYPOXIA (H): Status: ACTIVE | Noted: 2023-01-01

## 2023-07-02 NOTE — ED TRIAGE NOTES
Patient arrives via EMS from a skilled facility. Patient was normal this morning when staff checked on him at 2pm he was found to have labored breathing. Patient was found to have blood around his mouth. Patient was 70% on RA.  Patient is DNR/DNI but Bi-Pap can be used.      Triage Assessment     Row Name 07/02/23 1441       Triage Assessment (Adult)    Airway WDL WDL       Respiratory WDL    Respiratory WDL WDL       Skin Circulation/Temperature WDL    Skin Circulation/Temperature WDL WDL       Cardiac WDL    Cardiac WDL WDL       Peripheral/Neurovascular WDL    Peripheral Neurovascular WDL WDL       Cognitive/Neuro/Behavioral WDL    Cognitive/Neuro/Behavioral WDL WDL

## 2023-07-02 NOTE — ED PROVIDER NOTES
History     Chief Complaint:  Respiratory Distress     History is limited due to patient condition.  Additional information gained by calling the facility talking to the family as well as EMS  HPI   Donnie Vincent is a 77 year old male who presents from his care facilty with labored breathing that staff noticed around 1400, 40 minutes ago, during a routine, every 4 hour vital check. He also had been vomitting some bright red blood; EMS reports this was more spitting up blood than vomtting however. En route, he had 70% O2 on room air which was corrected to 98 on 15 L. His systolic pressure en route was as low as 69 and corrected to 106 after reciving NS. His BG was 215. He was given oxycodone around 1300. He was recently diagnosed with lung and kidney cancer; he had radiation for this a month ago, and his next radiation is on Friday. He had no symptoms while he was eating breakfast this morning.         Independent Historian:   EMS - They report all of the above.     Review of External Notes:   Previous notes on medical history as well as records from the facility reviewed      Medications:    Asprin 325 ng   Atorvastatin   Diclofenac   Fluoxitine   Keppra  Magnesium   Oxcarbazepine   Tamsulosin   Topiramate      Past Medical History:    Anemia   BPH   Brain tumor    COPD   CVA     Diverticulitis of colon  Gastro-oesophageal reflux disease   Hernia, abdominal   History of blood transfusion   Hypertension   Hyperlipidemia   Migraine  PFO   Renal disease   Seizure disorder    Sleep apnea    Unspecified congenital anomaly of lung   Diverticulitis  Obstructive sleep apnea  Meningioma  Advanced directives  Thoracic aortic aneurysm   Bilateral renal cysts  Increased homocysteine  Type 2 diabetes with stage 3 chronic kidney disease   Morbid obesity   Gastrointestinal hemorrhage, unspecified gastrointestinal hemorrhage type  Essential hypertension  Thoracic aortic aneurysm without rupture   Renal failure  Iron  deficiency anemia  Hematuria, gross  Atrial fibrillation  Pyelonephritis  Acute renal failure  Aneurysm of thoracic aorta    Past Surgical History:    TURP   Lithotripsy   Phacoemulsification   Hernia repair     Physical Exam     Patient Vitals for the past 24 hrs:   BP Pulse Resp SpO2   07/02/23 1606 105/71 (!) 134 20 99 %   07/02/23 1551 101/77 (!) 135 20 98 %   07/02/23 1549 106/69 (!) 134 18 98 %   07/02/23 1534 101/78 (!) 131 20 97 %   07/02/23 1523 -- -- -- 100 %   07/02/23 1519 91/66 (!) 134 20 100 %   07/02/23 1504 100/73 (!) 131 22 100 %   07/02/23 1449 (!) 72/58 (!) 140 (!) 95 100 %   07/02/23 1445 (!) 83/57 (!) 134 24 94 %        Physical Exam  General: The patient is ventilating spontaneously the head is turned to the left he is making few spontaneous motions does not communicate nor open his eyes to stimulation    HENT: Mucous membranes moist.  Pupils are equal and not pinpoint there is no blood or foreign body in the oropharynx nonrebreather is in place no overt signs of trauma    Cardiovascular: Tachycardic with adequate pulses    Respiratory: Irregular breathing with significantly decreased breath sounds on the left no crepitus to the chest wall    Gastrointestinal: Abdomen soft. No guarding    Musculoskeletal: No gross deformity.     Skin: No rashes or petechiae.     Neurologic: The patient is ventilating spontaneously there is adequate tone in the extremities he makes some motions with his mouth but does not communicate and does not reply to verbal stimuli nor follow commands    Lymphatic: No cervical adenopathy. No lower extremity swelling.        Emergency Department Course     ECG  ECG taken at 1455, ECG read at 1456  Sinus tachycardia   Left bundle branch block: old   Rate 135 bpm. CT interval 112 ms. QRS duration 138 ms. QT/QTc 352/528 ms. P-R-T axes -3 -19 113.     Imaging:  XR Chest Port 1 View   Final Result   IMPRESSION: Heart is markedly enlarged. Near complete opacification of the left  hemithorax with small aeration in the left mid to upper lung. Findings concerning for a large effusion with associated atelectasis. Superimposed pneumonia not excluded. Right    lung clear.         Report per radiology    Laboratory:  Labs Ordered and Resulted from Time of ED Arrival to Time of ED Departure   BASIC METABOLIC PANEL - Abnormal       Result Value    Sodium 142      Potassium 4.2      Chloride 108 (*)     Carbon Dioxide (CO2) 16 (*)     Anion Gap 18 (*)     Urea Nitrogen 33.8 (*)     Creatinine 1.65 (*)     Calcium 8.8      Glucose 231 (*)     GFR Estimate 42 (*)    LACTIC ACID WHOLE BLOOD - Abnormal    Lactic Acid 3.3 (*)    TROPONIN T, HIGH SENSITIVITY - Abnormal    Troponin T, High Sensitivity 82 (*)    NT PROBNP INPATIENT - Abnormal    N terminal Pro BNP Inpatient 27,645 (*)    BLOOD GAS VENOUS - Abnormal    pH Venous 7.09 (*)     pCO2 Venous 56 (*)     pO2 Venous 39      Bicarbonate Venous 17 (*)     Base Excess/Deficit (+/-) -13.1 (*)     FIO2 15     ROUTINE UA WITH MICROSCOPIC REFLEX TO CULTURE - Abnormal    Color Urine Yellow      Appearance Urine Clear      Glucose Urine Negative      Bilirubin Urine Negative      Ketones Urine Trace (*)     Specific Gravity Urine 1.031      Blood Urine Negative      pH Urine 5.0      Protein Albumin Urine 30 (*)     Urobilinogen Urine Normal      Nitrite Urine Negative      Leukocyte Esterase Urine Small (*)     Mucus Urine Present (*)     RBC Urine 3 (*)     WBC Urine 14 (*)     Hyaline Casts Urine 1     CBC WITH PLATELETS AND DIFFERENTIAL - Abnormal    WBC Count 12.2 (*)     RBC Count 4.77      Hemoglobin 12.0 (*)     Hematocrit 42.2      MCV 89      MCH 25.2 (*)     MCHC 28.4 (*)     RDW 18.1 (*)     Platelet Count 377      % Neutrophils 77      % Lymphocytes 15      % Monocytes 6      % Eosinophils 0      % Basophils 1      % Immature Granulocytes 1      NRBCs per 100 WBC 0      Absolute Neutrophils 9.4 (*)     Absolute Lymphocytes 1.8      Absolute  Monocytes 0.7      Absolute Eosinophils 0.0      Absolute Basophils 0.2      Absolute Immature Granulocytes 0.1      Absolute NRBCs 0.0     ISTAT BASIC CHEM ICA HEMATOCRIT POCT - Abnormal    Chloride POCT 114 (*)     Potassium POCT 4.2      Sodium POCT 145 (*)     UREA NITROGEN POCT 32 (*)     Calcium, Ionized Whole Blood POCT 4.8      Glucose Whole Blood POCT 229 (*)     Anion Gap POCT 19.0 (*)     Hemoglobin POCT 13.6      Hematocrit POCT 40      Creatinine POCT 1.8 (*)     TOTAL CO2 POCT 18 (*)    INR - Abnormal    INR 1.31 (*)    ISTAT GASES LACTATE VENOUS POCT - Abnormal    Lactic Acid POCT 3.0 (*)     Bicarbonate Venous POCT 18 (*)     O2 Sat, Venous POCT 46 (*)     pCO2 Venous POCT 58 (*)     pH Venous POCT 7.10 (*)     pO2 Venous POCT 34     ISTAT BASIC CHEM ICA HEMATOCRIT POCT - Abnormal    Chloride POCT 116 (*)     Potassium POCT 3.8      Sodium POCT 147 (*)     UREA NITROGEN POCT 31 (*)     Calcium, Ionized Whole Blood POCT 4.6      Glucose Whole Blood POCT 225 (*)     Anion Gap POCT 17.0 (*)     Hemoglobin POCT 11.2 (*)     Hematocrit POCT 33 (*)     Creatinine POCT 1.7 (*)     TOTAL CO2 POCT 18 (*)    ISTAT GASES LACTATE VENOUS POCT - Abnormal    Lactic Acid POCT 1.8      Bicarbonate Venous POCT 16 (*)     O2 Sat, Venous POCT 87 (*)     pCO2 Venous POCT 52 (*)     pH Venous POCT 7.10 (*)     pO2 Venous POCT 71 (*)    COVID-19 VIRUS (CORONAVIRUS) BY PCR - Normal    SARS CoV2 PCR Negative     PARTIAL THROMBOPLASTIN TIME - Normal    aPTT 31     OCCULT BLOOD STOOL - Normal    Occult Blood Negative     KEPPRA (LEVETIRACETAM) LEVEL   TYPE AND SCREEN, ADULT    ABO/RH(D) A POS      SPECIMEN EXPIRATION DATE [id number]     BLOOD CULTURE   BLOOD CULTURE   URINE CULTURE   ABO/RH TYPE AND SCREEN      Emergency Department Course & Assessments:  Interventions:  Medications   levofloxacin (LEVAQUIN) infusion 750 mg (750 mg Intravenous $New Bag 7/2/23 5978)   sodium chloride 0.9% infusion ( Intravenous $New Bag  7/2/23 1549)   norepinephrine (LEVOPHED) 4 mg in  mL PERIPHERAL infusion (0.1 mcg/kg/min × 76.2 kg Intravenous Restarted 7/2/23 1547)   0.9% sodium chloride BOLUS (0 mLs Intravenous Stopped 7/2/23 1549)   methylPREDNISolone sodium succinate (solu-MEDROL) injection 125 mg (125 mg Intravenous $Given 7/2/23 1514)   ipratropium - albuterol 0.5 mg/2.5 mg/3 mL (DUONEB) neb solution 3 mL (3 mLs Nebulization $Given 7/2/23 1524)      Assessments:  1439  I obtained the history and examined the patient as noted above.     Independent Interpretation (X-rays, CTs, rhythm strip):  Chest x-ray demonstrates new complete consolidation of the left lung    Consultations/Discussion of Management or Tests:  1500 I spoke with Shirley from the patient's care facility. The report from staff was that he has defecating blood and vomiting blood.    1526 I spoke with the patient's family. They would like a central line. They report that the patient was spitting up blood this morning, not vomiting.   1527 I spoke with the patient's care facility.   1617 I spoke with the hospitalist Dr. Hall regarding admission.      Social Determinants of Health affecting care:   None and Healthcare Access/Compliance    Disposition:  The patient was admitted to the hospital under the care of Dr. Hall.     Impression & Plan      Medical Decision Making:  I have verified with the family as well as with the paperwork that the patient is not to be into intubated.  The patient unfortunately has a history of stroke as well as other medical problems and cannot communicate at baseline.  There is clear signs of respiratory distress with hypoxia following the patient's wishes per paperwork and family the patient was placed on BiPAP and was not to be intubated.  An emergent chest x-ray was performed as labs were drawn.  I did consider that the episode of decreased responsiveness and hypotension could be related to things such as bleeding arrhythmia electrolyte  abnormality such as potassium issues toxins infection antibiotics were ordered.  The patient is meeting septic criteria the stat portable chest x-ray demonstrates a pneumonia.  Looking at old records I was able to look at images while there is a history of cancer there was previous aeration of the left lung and therefore this is new.  I did call the facility after EMS had reported there have been some blood in the patient's mouth.  They reported that there patient had had blood in his stool at the same time as vomiting blood.  I discussed NG with the family who wanted to hold off.  The patient did have some dips in blood pressure was aggressively with IV fluid.  I had ordered a peripheral dose pressors however the patient not require that.  Blood pressure dipped again we discussed performing a central line with the blood pressures came up.  Antibiotics have been given.  The the patient is significantly acidotic but I did not feel that bicarb is appropriate and this point is not likely metabolic.  His lactic acid level is high but came down was treated with Levaquin for broad-spectrum antibiotic coverage.  The urine did show some signs suggesting signs of infection.  I do not feel the patient is likely in CHF.  I performed a rectal exam that did not show gross blood I did a type and screen but the hemoglobin is adequate and when rechecked appears to be somewhat stable.  I do not feel is a delisa GI bleed.  There is nothing to suggest that there is an upper GI bleed from my evaluation in the ER the patient was admitted to the ICU in guarded condition.    Critical care time 80 minutes in excluding procedures.        Diagnosis:    ICD-10-CM    1. Acute and chronic respiratory failure with hypoxia (H)  J96.21       2. Aspiration pneumonia of left lung, unspecified aspiration pneumonia type, unspecified part of lung (H)  J69.0                Scribe Disclosure:  I, Joshua Kjer, am serving as a scribe at 4:18 PM on 7/2/2023  to document services personally performed by Terry Crawford MD based on my observations and the provider's statements to me.   7/2/2023   Terry Crawford MD Farnan, Christopher M, MD  07/02/23 1950

## 2023-07-02 NOTE — H&P
[facility]  History and Physical  Hospitalist - Renato Hall DO       Date of Admission:  7/2/2023    Chief Complaint   Hypoxia, nausea and vomiting    History is obtained from the patient's son and daughter at bedside, the emergency department physician, as well as the electronic medical record.    History of Present Illness   Donnie Vincent is a 77 year old male with past medical history of CVA with residual dysarthria, recent diagnosis of metastatic kidney cancer to the lung on immunotherapy, COPD, BPH, history of seizures, diet-controlled type 2 diabetes mellitus, parkinsonism, nephrolithiasis who presented on 7/2/2023 with chief complaint of hypoxia with nausea and vomiting.  Much of the history is obtained from the patient's son and daughter at bedside as well as the emergency department physician given the patient's encephalopathy.  The patient currently lives at a nursing home/assisted living facility.  He is quite debilitated at baseline and is almost wheelchair-bound.  He was hospitalized in 4/2023 for hematuria and diagnosed with kidney cancer with metastasis to the lung.  [facility] and [facility] weeks ago received his first dose of immunotherapy.  Family states he tolerated this well.  They have noticed over the last 2 weeks he has had progressive decline in his functional status.  I last saw him on Friday evening.  They stated he did not look very good on Friday evening.  Today at the facility staff checked on him found him to have blood-tinged vomit and was hypoxic to the 70s.  Upon arrival, EMS noted to be more of blood-tinged sputum.  Currently, the patient does open his eyes to his name and mumbles answers that are unintelligible.    In the emergency department, the patient was found to have a heart rate of 134, blood pressure 83/57, respiratory rate 24, SPO2 94% on oxy mask.  Initial lab work showed chloride 108, bicarb 16,  BUN/creatinine 33.8/1.65, anion gap 18, glucose 231, lactic acid 3.3, proBNP 27,645, high-sensitivity troponin 82, leukocytosis of 12.2, hemoglobin 12.0.  VBG returned showing pH 7.09, venous PCO2 56.  COVID was negative.  EKG showed sinus tachycardia with left bundle branch block.  Chest x-ray showed markedly enlarged heart, near complete opacification of the left hemithorax with small aeration in the left mid to upper lung with findings concerning for a large effusion with associated atelectasis, superimposed pneumonia not excluded, right lung clear.  UA showed no evidence of infection.  The patient was placed on BiPAP, however had episode of vomiting in the emergency department and so BiPAP was discontinued and high flow oxygen was initiated.  The patient was confirmed DNR/DNI with the patient's son and daughter at bedside.  They report that they are realistic of their fathers declining health status.    ASSESSMENT/PLAN    Acute Hypoxic and Hypercapnic Respiratory Failure  COPD Exacerbation  Aspiration Pneumonia with Septic Shock  Acute Intractable Nausea and Vomiting  Lactic Acidosis  - , WBC 12.2, LA 3.3  - Check CT c/a/p stat -> if CT confirms pleural effusion, would consider thoracentesis in AM  - Ceftriaxone and Metronidazole  - Blood cultures pending  - IVF  - IV solumedrol 40 mg BID  - Duonebs QID and prn  - Repeat VBG and LA at 1800  - Antiemetics prn  - NPO  - Appreciate SLP recommendations  - Wean O2 as able  - If pt's N/V improves, could consider bipap again but for now will start HFNC  - Wean pressors as able  ADDENDUM: CT shows large L effusion.  Also shows progression of CA.  Will get diagnostic and therapeutic thoracentesis.  Will also ask for Palliative Care assistance with further GOC.    NSTEMI  LBBB  - Possibly secondary to hypoxia and sepsis  - Trend trops q4h x3  - Check echocardiogram  - Telemetry    Concern for GIB  - Facility noted him to have blood in vomit.  Hgb dropped slightly  upon recheck, possibly dilutional.  BUN elevated  - PPI IV BID  - Trend hgb q12h  - Hold  mg    Hypernatremia  - IVF as above  - Daily BMP    Chronic Kidney Disease Stage 3a/b  - Baseline Cr = 1.4-1.6  - IVF  - Daily BMP    Type 2 Diabetes Mellitus with Hyperglycemia  - A1C pending  - ISS q4h while NPO  - Appears diet-controlled at baseline    Recent Diagnosis of Metastatic Kidney Cancer with Mets to Lung  Chronic Pain with Opiate Dependence  - [facility]  Recent on immunotherapy with last dose ~2 weeks ago and plans for next dose next week.  Unclear what medication this is as [facility] currently  - Pt takes oxycodone 5 mg q4h scheduled.  Will add hydromorphone 0.2 mg IV prn for pain.  Will ask for pain team recommendations give his NPO status.    Acute Metabolic/Septic Encephalopathy  Hx of CVA with Dysarthria  Seizure Disorder  - Family reports he occasionally uses a walker, but for the most part is wheelchair bound.  His speech is very difficult to understand at baseline.  Family reports no dietary limitations on consistency prior to hospitalization  - Keppra 500 mg IV BID while NPO  - Consider Neurology evaluation for additional AEP as pt currently unable to tolerate PO oxcarbazepine pending progress with SLP    Mild Coagulopathy  - Likely secondary to sepsis    Goals of Care  - Discussed with son and daughter in the ED. the patient is confirmed DNR/DNI with the patient's son and daughter at bedside.  They report the patient has been steadily declining and they are aware of the severity of his current health status.  We discussed that we will try antibiotics, IV fluids, and monitor him in the ICU.  If his condition worsens he would be recommended to pursue comfort care.  They expressed understanding and were in agreement.  They state that they have spoken with the patient about this at length and have been prepared for this.    Chronic Medical  Problems:  BPH  Hyperlipidemia    PLAN: Resume home medications as appropriate once confirmed by pharmacy.  I participated in 40 minutes of critical care time in the evaluation and treatment of this patient with hypoxic and hypercapnic respiratory failure requiring BiPAP in the setting of aspiration pneumonia.    DVT Prophylaxis: Pneumatic Compression Devices  Code Status: DNR / DNI  Discharge Plan:    Expected Discharge Date: 07/04/2023                 Renato Hall DO  Securely message with Vocera (more info)  Text page via [employer]     Primary Care Physician   [facility]    -----------------------------------------------------------------------------------------------------------------------------------------------------------------------------------------------------    Past Medical History    I have reviewed this patient's medical history and updated it with pertinent information if needed.   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     COPD (chronic obstructive pulmonary disease) (H)      CVA (cerebral infarction)      Diabetes new 4/09    initial A1C 6.8 4/08;; has done diabetic teaching      Diverticulitis of colon (without mention of hemorrhage)(562.11) 2001; 9/06     Gastro-oesophageal reflux disease      Hernia, abdominal      History of blood transfusion      HTN (hypertension)      Hyperlipidemia LDL goal < 100      Hypertriglyceridemia      Other forms of migraine, without mention of intractable migraine without mention of status migrainosus      PFO (patent foramen ovale)     small per echo 11/2013     Renal disease     Hx kidney stones 12 yrs ago     Seizure disorder (H)     sees neurologist     Seizures (H)     8 years ago - no recurrence     Sleep apnea     wears CPAP     Stroke (H) early april 2017      Unspecified congenital anomaly of lung     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 possibly from asbestos exposure in [employer] (remote)        Past Surgical History   I have reviewed this patient's surgical history and updated it with pertinent information if needed.  Past Surgical History:   Procedure Laterality Date     COLONOSCOPY  11/4/2015    Dr. Ernesto CONTRERAS     COLONOSCOPY N/A 11/4/2015    Procedure: COLONOSCOPY;  Surgeon: Yazmin Orozco MD;  Location: [facility]     CYSTOSCOPY, RETROGRADES, EXTRACT STONE, COMBINED Left 11/25/2015    Procedure: COMBINED CYSTOSCOPY, RETROGRADES, EXTRACT STONE;  Surgeon: Neville Banuelos MD;  Location: [facility] OR     CYSTOSCOPY, TRANSURETHRAL RESECTION (TUR) PROSTATE, COMBINED N/A 10/3/2018    Procedure: COMBINED CYSTOSCOPY, TRANSURETHRAL RESECTION (TUR) PROSTATE;  Cystoscopy, removal of bladder stones with holmium laser, transurethral resection of prostate using Olympus bipolar electrode;  Surgeon: Neville Banuelos MD;  Location:  OR     ESOPHAGOSCOPY, GASTROSCOPY, DUODENOSCOPY (EGD), COMBINED  11/5/2015    Dr. Ernesto CONTRERAS     ESOPHAGOSCOPY, GASTROSCOPY, DUODENOSCOPY (EGD), COMBINED  06/27/2018    Dr. Ernesto CONTRERAS     GENITOURINARY SURGERY      kidney stone - lithotripsy     HERNIA REPAIR       LASER HOLMIUM LITHOTRIPSY BLADDER N/A 10/3/2018    Procedure: LASER HOLMIUM LITHOTRIPSY BLADDER;;  Surgeon: Neville Banuelos MD;  Location:  OR     PHACOEMULSIFICATION CLEAR CORNEA WITH STANDARD INTRAOCULAR LENS IMPLANT  12/20/2011    Procedure:PHACOEMULSIFICATION CLEAR CORNEA WITH STANDARD INTRAOCULAR LENS IMPLANT; RIGHT PHACOEMULSIFICATION CLEAR CORNEA WITH STANDARD INTRAOCULAR LENS IMPLANT ; Surgeon:GUILHERME KAUFMAN; Location:[facility] NONSPECIFIC PROCEDURE      colonoscopy 2005       Prior to Admission Medications   Prior to Admission Medications   Prescriptions Last Dose Informant Patient Reported? Taking?   FLUoxetine (PROZAC) 40 MG capsule 7/2/2023 at 0800  No Yes    Sig: Take 1 capsule (40 mg) by mouth daily   OXcarbazepine (TRILEPTAL) 150 MG tablet 7/2/2023 at 0800  Yes Yes   Sig: Take 150 mg by mouth 2 times daily   acetaminophen (TYLENOL) 325 MG tablet Unknown at prn  Yes Yes   Sig: Take 325-650 mg by mouth every 6 hours as needed for mild pain   acetaminophen (TYLENOL) 500 MG tablet 7/2/2023 at 1300  Yes Yes   Sig: Take 500-1,000 mg by mouth every 4 hours   aspirin (ASA) 325 MG EC tablet Unknown at prn  Yes Yes   Sig: Take 325 mg by mouth daily as needed (as needed for migraine pain)   atorvastatin (LIPITOR) 40 MG tablet 7/2/2023 at 0800  Yes Yes   Sig: Take 40 mg by mouth daily   diclofenac (VOLTAREN) 1 % topical gel Unknown at prn  Yes Yes   Sig: Apply 2 g topically 4 times daily as needed for moderate pain   levETIRAcetam (KEPPRA) 500 MG tablet 7/2/2023 at 0800  Yes Yes   Sig: Take 500 mg by mouth 2 times daily Through Neurologist   lidocaine (LIDODERM) 5 % patch 7/2/2023 at 0800  Yes Yes   Sig: Place onto the skin every 24 hours To prevent lidocaine toxicity, patient should be patch free for 12 hrs daily.  Apply to rib and neck pain   magnesium hydroxide (MILK OF MAGNESIA) 400 MG/5ML suspension Unknown at prn  Yes Yes   Sig: Take 30 mLs by mouth daily as needed for constipation or heartburn   melatonin 3 MG tablet Unknown at prn  Yes Yes   Sig: Take 3 mg by mouth nightly as needed for sleep   multivitamin w/minerals (THERA-VIT-M) tablet 7/2/2023 at 0800  Yes Yes   Sig: Take 1 tablet by mouth daily   ondansetron (ZOFRAN) 4 MG tablet Unknown at prn  Yes Yes   Sig: Take 4 mg by mouth 2 times daily as needed for nausea   oxyCODONE (ROXICODONE) 5 MG tablet 7/2/2023 at 1300  Yes Yes   Sig: Take 5 mg by mouth every 4 hours   oxyCODONE (ROXICODONE) 5 MG tablet Unknown at prn  Yes Yes   Sig: Take 5 mg by mouth every 4 hours as needed for severe pain   prochlorperazine (COMPAZINE) 10 MG tablet Unknown at prn  Yes Yes   Sig: Take 10 mg by mouth every 6 hours as needed for nausea  or vomiting   tamsulosin (FLOMAX) 0.4 MG capsule 7/1/2023 at 1830  No Yes   Sig: Take 1 capsule (0.4 mg) by mouth daily   Patient taking differently: Take 0.4 mg by mouth every evening   topiramate ER (QUDEXY XR) 50 MG 24 hr capsule 7/2/2023 at 0800  Yes Yes   Sig: Take 50 mg by mouth 2 times daily   zinc gluconate 50 MG tablet 7/2/2023 at 0800  Yes Yes   Sig: Take 50 mg by mouth daily      Facility-Administered Medications: None     Allergies   Allergies   Allergen Reactions     Pcn [Penicillins]      Sore joints and he had to be hospitalized for it       Social History   I have reviewed this patient's social history and updated it with pertinent information if needed. Donnie Vincent  reports that he quit smoking about 53 years ago. His smoking use included cigarettes. He has never used smokeless tobacco. He reports current alcohol use. He reports that he does not use drugs.    Family History   I have reviewed this patient's family history and updated it with pertinent information if needed.   Family History   Problem Relation Age of Onset     Family History Negative Mother         migraines     Family History Negative Father         lived to be 92     Colon Cancer No family hx of        -----------------------------------------------------------------------------------------------------------------------------------------------------------------------------------------------------    Review of Systems   The 10 point Review of Systems is negative other than noted in the HPI or here.     Physical Exam       BP: 105/71 Pulse: (!) 134   Resp: 20 SpO2: 99 % O2 Device: BiPAP/CPAP    Vital Signs with Ranges  Pulse:  [131-140] 134  Resp:  [18-95] 20  BP: ()/(57-78) 105/71  SpO2:  [94 %-100 %] 99 %  0 lbs 0 oz    Constitutional: Awake, alert, cooperative, no apparent distress.  Eyes: Conjunctiva and pupils examined and normal.  HEENT: Moist mucous membranes, normal dentition.  Respiratory: Diffuse exp wheezing on  R, no BS on L  Cardiovascular: Regular rate and rhythm, normal S1 and S2, and no murmur noted.  GI: Soft, non-distended, non-tender, normal bowel sounds.  Lymph/Hematologic: No anterior cervical or supraclavicular adenopathy.  Skin: No rashes, no cyanosis, no edema.  Musculoskeletal: No joint swelling, erythema or tenderness.  Neurologic: Cranial nerves 2-12 intact, normal strength and sensation.  Psychiatric: Alert, oriented to person, place and time, no obvious anxiety or depression.     Data     Recent Labs   Lab 07/02/23  1541 07/02/23  1509 07/02/23  1454 07/02/23  1451   WBC  --   --   --  12.2*   HGB 11.2*  --  13.6 12.0*   MCV  --   --   --  89   PLT  --   --   --  377   INR  --  1.31*  --   --    *  --  145* 142   POTASSIUM 3.8  --  4.2 4.2   CHLORIDE 116*  --  114* 108*   CO2  --   --   --  16*   BUN 31*  --  32* 33.8*   CR 1.7*  --  1.8* 1.65*   ANIONGAP  --   --   --  18*   ZAYRA  --   --   --  8.8   *  --  229* 231*       Recent Results (from the past 24 hour(s))   XR Chest Port 1 View    Narrative    EXAM: XR CHEST PORT 1 VIEW  LOCATION: [facility]  DATE: 7/2/2023    INDICATION: aspiration  COMPARISON: 11/14/2018      Impression    IMPRESSION: Heart is markedly enlarged. Near complete opacification of the left hemithorax with small aeration in the left mid to upper lung. Findings concerning for a large effusion with associated atelectasis. Superimposed pneumonia not excluded. Right   lung clear.

## 2023-07-02 NOTE — ED NOTES
[facility]  ED Nurse Handoff Report    ED Chief complaint: Respiratory Distress  . ED Diagnosis:   Final diagnoses:   Acute and chronic respiratory failure with hypoxia (H)   Aspiration pneumonia of left lung, unspecified aspiration pneumonia type, unspecified part of lung (H)       Allergies:   Allergies   Allergen Reactions     Pcn [Penicillins]      Sore joints and he had to be hospitalized for it       Code Status: DNR / DNI    Activity level - Baseline/Home:  in bed.  Activity Level - Current:   in bed.   Lift room needed: No.   Bariatric: No   Needed: No   Isolation: No.   Infection: Not Applicable.     Respiratory status: High Flow Nasal Cannula    Vital Signs (within 30 minutes):   Vitals:    07/02/23 1700 07/02/23 1715 07/02/23 1724 07/02/23 1745   BP: 99/62 94/59  97/66   Pulse: (!) 142 (!) 141  (!) 133   Resp: 18 18  18   SpO2: 98% 98% 99% 99%       Cardiac Rhythm:  ,      Pain level:    Patient confused: Yes.   Patient Falls Risk: room door open.   Elimination Status: Has voided     Patient Report - Initial Complaint: Patient arrived after episode of vomiting at nursing home. Patient was found to have labored breathing.   .   Focused Assessment: Patient was found by staff to have labored breathing and staff stated that he vomited blood and had a bloody stool.  Patient arrived with altered mental status.  Patient is DNR/DNI    Abnormal Results:   Labs Ordered and Resulted from Time of ED Arrival to Time of ED Departure   BASIC METABOLIC PANEL - Abnormal       Result Value    Sodium 142      Potassium 4.2      Chloride 108 (*)     Carbon Dioxide (CO2) 16 (*)     Anion Gap 18 (*)     Urea Nitrogen 33.8 (*)     Creatinine 1.65 (*)     Calcium 8.8      Glucose 231 (*)     GFR Estimate 42 (*)    LACTIC ACID WHOLE BLOOD - Abnormal    Lactic Acid 3.3 (*)    TROPONIN T, HIGH SENSITIVITY - Abnormal    Troponin T, High Sensitivity 82 (*)    NT PROBNP INPATIENT - Abnormal    N  terminal Pro BNP Inpatient 27,645 (*)    BLOOD GAS VENOUS - Abnormal    pH Venous 7.09 (*)     pCO2 Venous 56 (*)     pO2 Venous 39      Bicarbonate Venous 17 (*)     Base Excess/Deficit (+/-) -13.1 (*)     FIO2 15     ROUTINE UA WITH MICROSCOPIC REFLEX TO CULTURE - Abnormal    Color Urine Yellow      Appearance Urine Clear      Glucose Urine Negative      Bilirubin Urine Negative      Ketones Urine Trace (*)     Specific Gravity Urine 1.031      Blood Urine Negative      pH Urine 5.0      Protein Albumin Urine 30 (*)     Urobilinogen Urine Normal      Nitrite Urine Negative      Leukocyte Esterase Urine Small (*)     Mucus Urine Present (*)     RBC Urine 3 (*)     WBC Urine 14 (*)     Hyaline Casts Urine 1     CBC WITH PLATELETS AND DIFFERENTIAL - Abnormal    WBC Count 12.2 (*)     RBC Count 4.77      Hemoglobin 12.0 (*)     Hematocrit 42.2      MCV 89      MCH 25.2 (*)     MCHC 28.4 (*)     RDW 18.1 (*)     Platelet Count 377      % Neutrophils 77      % Lymphocytes 15      % Monocytes 6      % Eosinophils 0      % Basophils 1      % Immature Granulocytes 1      NRBCs per 100 WBC 0      Absolute Neutrophils 9.4 (*)     Absolute Lymphocytes 1.8      Absolute Monocytes 0.7      Absolute Eosinophils 0.0      Absolute Basophils 0.2      Absolute Immature Granulocytes 0.1      Absolute NRBCs 0.0     ISTAT BASIC CHEM ICA HEMATOCRIT POCT - Abnormal    Chloride POCT 114 (*)     Potassium POCT 4.2      Sodium POCT 145 (*)     UREA NITROGEN POCT 32 (*)     Calcium, Ionized Whole Blood POCT 4.8      Glucose Whole Blood POCT 229 (*)     Anion Gap POCT 19.0 (*)     Hemoglobin POCT 13.6      Hematocrit POCT 40      Creatinine POCT 1.8 (*)     TOTAL CO2 POCT 18 (*)    INR - Abnormal    INR 1.31 (*)    ISTAT GASES LACTATE VENOUS POCT - Abnormal    Lactic Acid POCT 3.0 (*)     Bicarbonate Venous POCT 18 (*)     O2 Sat, Venous POCT 46 (*)     pCO2 Venous POCT 58 (*)     pH Venous POCT 7.10 (*)     pO2 Venous POCT 34     ISTAT BASIC  CHEM ICA HEMATOCRIT POCT - Abnormal    Chloride POCT 116 (*)     Potassium POCT 3.8      Sodium POCT 147 (*)     UREA NITROGEN POCT 31 (*)     Calcium, Ionized Whole Blood POCT 4.6      Glucose Whole Blood POCT 225 (*)     Anion Gap POCT 17.0 (*)     Hemoglobin POCT 11.2 (*)     Hematocrit POCT 33 (*)     Creatinine POCT 1.7 (*)     TOTAL CO2 POCT 18 (*)    ISTAT GASES LACTATE VENOUS POCT - Abnormal    Lactic Acid POCT 1.8      Bicarbonate Venous POCT 16 (*)     O2 Sat, Venous POCT 87 (*)     pCO2 Venous POCT 52 (*)     pH Venous POCT 7.10 (*)     pO2 Venous POCT 71 (*)    COVID-19 VIRUS (CORONAVIRUS) BY PCR - Normal    SARS CoV2 PCR Negative     PARTIAL THROMBOPLASTIN TIME - Normal    aPTT 31     OCCULT BLOOD STOOL - Normal    Occult Blood Negative     LACTIC ACID WHOLE BLOOD - Normal    Lactic Acid 1.8     KEPPRA (LEVETIRACETAM) LEVEL   TYPE AND SCREEN, ADULT    ABO/RH(D) A POS      Antibody Screen Negative      SPECIMEN EXPIRATION DATE [id number]     BLOOD CULTURE   BLOOD CULTURE   URINE CULTURE   ABO/RH TYPE AND SCREEN        XR Chest Port 1 View   Final Result   IMPRESSION: Heart is markedly enlarged. Near complete opacification of the left hemithorax with small aeration in the left mid to upper lung. Findings concerning for a large effusion with associated atelectasis. Superimposed pneumonia not excluded. Right    lung clear.      CT Chest (PE) Abdomen Pelvis w Contrast    (Results Pending)       Treatments provided: Bipap, Hi flow, medications,   Family Comments: Family up to date  OBS brochure/video discussed/provided to patient:  N/A  ED Medications:   Medications   norepinephrine (LEVOPHED) 4 mg in  mL PERIPHERAL infusion (0.06 mcg/kg/min × 76.2 kg Intravenous Rate/Dose Change 7/2/23 7732)   sodium chloride 0.45% infusion ( Intravenous $New Bag 7/2/23 8956)   levofloxacin (LEVAQUIN) infusion 750 mg (0 mg Intravenous Stopped 7/2/23 3925)   0.9% sodium chloride BOLUS (0 mLs Intravenous Stopped  7/2/23 9759)   methylPREDNISolone sodium succinate (solu-MEDROL) injection 125 mg (125 mg Intravenous $Given 7/2/23 1514)   ipratropium - albuterol 0.5 mg/2.5 mg/3 mL (DUONEB) neb solution 3 mL (3 mLs Nebulization $Given 7/2/23 1524)   iopamidol (ISOVUE-370) solution 500 mL (70 mLs Intravenous $Given 7/2/23 7406)   sodium chloride (PF) 0.9% PF flush 100 mL (80 mLs Intravenous $Given 7/2/23 8136)       Drips infusing:  Yes  For the majority of the shift this patient was Green.   Interventions performed were None.    Sepsis treatment initiated: No    Cares/treatment/interventions/medications to be completed following ED care: None    ED Nurse Name: Alanis Hamilton RN  6:03 PM

## 2023-07-02 NOTE — PROGRESS NOTES
RT note:  Patient placed on BIPAP per MD order. Pt tolerating mask. RT will continue to monitor. .Cortes English,  on 7/2/2023 at 3:02 PM

## 2023-07-02 NOTE — PHARMACY-ADMISSION MEDICATION HISTORY
Pharmacist Admission Medication History    Admission medication history is complete. The information provided in this note is only as accurate as the sources available at the time of the update.    Medication reconciliation/reorder completed by provider prior to medication history? No    Information Source(s): Facility ([facility]/) medication list/MAR via N/A    Pertinent Information:     Changes made to PTA medication list:    Added: prochloperazine, oxycodone and lidocaine patch    Deleted: None    Changed: None    Medication Affordability:       Allergies reviewed with patient and updates made in EHR: yes    Medication History Completed By: Licha Oneill RPH 7/2/2023 3:14 PM    Prior to Admission medications    Medication Sig Last Dose Taking? Auth Provider Long Term End Date   acetaminophen (TYLENOL) 325 MG tablet Take 325-650 mg by mouth every 6 hours as needed for mild pain Unknown at prn Yes Unknown, Entered By History     acetaminophen (TYLENOL) 500 MG tablet Take 500-1,000 mg by mouth every 4 hours 7/2/2023 at 1300 Yes Unknown, Entered By History     aspirin (ASA) 325 MG EC tablet Take 325 mg by mouth daily as needed (as needed for migraine pain) Unknown at prn Yes Unknown, Entered By History No    atorvastatin (LIPITOR) 40 MG tablet Take 40 mg by mouth daily 7/2/2023 at 0800 Yes Unknown, Entered By History No    diclofenac (VOLTAREN) 1 % topical gel Apply 2 g topically 4 times daily as needed for moderate pain Unknown at prn Yes Unknown, Entered By History     FLUoxetine (PROZAC) 40 MG capsule Take 1 capsule (40 mg) by mouth daily 7/2/2023 at 0800 Yes Suyapa Leon MD Yes    levETIRAcetam (KEPPRA) 500 MG tablet Take 500 mg by mouth 2 times daily Through Neurologist 7/2/2023 at 0800 Yes Reported, Patient     lidocaine (LIDODERM) 5 % patch Place onto the skin every 24 hours To prevent lidocaine toxicity, patient should be patch free for 12 hrs daily.  Apply to rib and neck pain 7/2/2023 at 0800  Yes Unknown, Entered By History     magnesium hydroxide (MILK OF MAGNESIA) 400 MG/5ML suspension Take 30 mLs by mouth daily as needed for constipation or heartburn Unknown at prn Yes Unknown, Entered By History     melatonin 3 MG tablet Take 3 mg by mouth nightly as needed for sleep Unknown at prn Yes Unknown, Entered By History     multivitamin w/minerals (THERA-VIT-M) tablet Take 1 tablet by mouth daily 7/2/2023 at 0800 Yes Unknown, Entered By History     ondansetron (ZOFRAN) 4 MG tablet Take 4 mg by mouth 2 times daily as needed for nausea Unknown at prn Yes Unknown, Entered By History     OXcarbazepine (TRILEPTAL) 150 MG tablet Take 150 mg by mouth 2 times daily 7/2/2023 at 0800 Yes Unknown, Entered By History Yes    oxyCODONE (ROXICODONE) 5 MG tablet Take 5 mg by mouth every 4 hours 7/2/2023 at 1300 Yes Unknown, Entered By History     oxyCODONE (ROXICODONE) 5 MG tablet Take 5 mg by mouth every 4 hours as needed for severe pain Unknown at prn Yes Unknown, Entered By History     prochlorperazine (COMPAZINE) 10 MG tablet Take 10 mg by mouth every 6 hours as needed for nausea or vomiting Unknown at prn Yes Unknown, Entered By History     tamsulosin (FLOMAX) 0.4 MG capsule Take 1 capsule (0.4 mg) by mouth daily  Patient taking differently: Take 0.4 mg by mouth every evening 7/1/2023 at 1830 Yes Suyapa Leon MD     topiramate ER (QUDEXY XR) 50 MG 24 hr capsule Take 50 mg by mouth 2 times daily 7/2/2023 at 0800 Yes Unknown, Entered By History No    zinc gluconate 50 MG tablet Take 50 mg by mouth daily 7/2/2023 at 0800 Yes Unknown, Entered By History

## 2023-07-03 NOTE — PROGRESS NOTES
SPIRITUAL HEALTH SERVICES Progress Note  RH Med/Surg 5    Saw pt's family per referral from Palliative Care Nurse Anayeli Tristan, requesting emotional support for family upon pt's death.  Pt's daughter Michelle and son Bogdan were present, pt's grandson Earl arrived during the encounter.  Family engaged in memory sharing and reflected on pt's personality.  They welcomed a bedside prayer service of commendation and blessing.    Plan: This author and other chaplains remain available per family's request, as needs arise.    Jaime Berkowitz M.Div., [facility]  Staff     [facility] routine referrals *[phone]  [facility] available 24/7 for emergent requests/referrals, either by paging the on-call  or by entering an ASAP/STAT consult in Epic (this will also page the on-call ).

## 2023-07-03 NOTE — DISCHARGE SUMMARY
[facility]  Discharge Summary  Name: Donnie Vincent    MRN: [id number]  YOB: 1945    Age: 77 year old  Date of Discharge:  7/3/2023  Date of Admission: 7/2/2023  Primary Care Provider: [facility]  Discharge Physician:  Elder Self MD  Discharging Service:  Hospitalist      Hospital Course/Discharge Diagnoses:    Please see admission history and physical from yesterday evening from Dr. Enrique Hall.  In brief, Ortega Vincent was a 77-year-old male with history of COPD and recent diagnosis of metastatic kidney cancer with mets to lung who presented [facility] emergency room on 7/2/2023 with acute hypoxemic and hypercapnic respiratory failure with suspicion for aspiration pneumonia and septic shock.  CT showed a large left-sided effusion and progression of his cancer.  He was admitted to the ICU to undergo aggressive treatment but declined clinically and after revisiting with family the decision was made to transition to comfort care status.  I assumed care this morning but before I met Ortega he had passed away peacefully in his room at 8:05 AM.  Family is coming in to see him and will be meeting with palliative care and the  shortly.    Acute Hypoxic and Hypercapnic Respiratory Failure, transitioned to comfort care status.  COPD Exacerbation  Aspiration Pneumonia with Septic Shock  Acute Intractable Nausea and Vomiting  Lactic Acidosis  NSTEMI  LBBB  -Felt most likely secondary to hypoxia and sepsis  Concern for GIB  Hypernatremia  Chronic Kidney Disease Stage 3a/b  Type 2 Diabetes Mellitus with Hyperglycemia  Recent Diagnosis of Metastatic Kidney Cancer with Mets to Lung  Chronic Pain with Opiate Dependence  Acute Metabolic/Septic Encephalopathy  Hx of CVA with Dysarthria  Seizure Disorder      Other chronic Medical Problems:  BPH  Hyperlipidemia    Discharge Disposition:  Discharged [place] [facility] [place]         Condition on Discharge:  Discharge condition:         Code status on discharge: Comfort Care     History of Illness:  See detailed admission note for full details.    Physical Exam:  See death pronouncement.      Total time spent in face to face contact with the patient and coordinating discharge was:  35 Minutes.

## 2023-07-03 NOTE — PLAN OF CARE
ICU End of Shift Summary.  For vital signs and complete assessments, please see documentation flowsheets.     Pertinent assessments: Transitioned off of bipap to comfort cares.   Major Shift Events: Giving morphine PRN for S.O.B. Tunred off levophed.  visited with family and patient last evening.   Plan (Upcoming Events): Continue comfort cares. Call pts daughter if any changes occur.  Discharge/Transfer Needs: Plan to transfer pt to 5th floor at change of shift.    Bedside Shift Report Completed : y  Bedside Safety Check Completed:y   update:   Gave report to RN on 5th floor. Pt will move to room 500. Called Dang (pts daughter) to make aware of transfer.      Goal Outcome Evaluation:      Plan of Care Reviewed With: family    Overall Patient Progress: decliningOverall Patient Progress: declining

## 2023-07-03 NOTE — DEATH PRONOUNCEMENT
MD DEATH PRONOUNCEMENT    Called to pronounce Donnie KNIGHT [place] dead.    Physical Exam: Unresponsive to noxious stimuli, Spontaneous respirations absent, Breath sounds absent, Carotid pulse absent, Heart sounds absent and Pupillary light reflex absent    Patient was pronounced dead at 08:05 AM, July 3, 2023.    Preliminary Cause of Death:     Acute on chronic hypoxemic and hypercapnic respiratory failure, changed to comfort care status.      Significant comorbidities include PAD, parkinsonism and recent diagnosis of metastatic kidney cancer.    Principal Problem:    Aspiration pneumonia of left lung, unspecified aspiration pneumonia type, unspecified part of lung (H)  Active Problems:    Acute and chronic respiratory failure with hypoxia (H)

## 2023-07-03 NOTE — PLAN OF CARE
Patient transferred from ICU this shift. Unresponsive on transfer, no signs of discomfort. Transferred to bed, cleaned up and repositioned on side. Oxygen removed for comfort. Bradypnea & prolonged periods of apnea noted. Shortly after, patient found by other care provider without heart rate or respirations. MD and family notified by EMILIA Guadalupe.

## 2023-07-03 NOTE — PROGRESS NOTES
Pt arrived in ICU.  Nursing notified patient unresponsive.  Repeat blood gas slightly worse with pH 7.08.  Patient taken off BiPAP in the emergency department for vomiting with the BiPAP on.  Patient dosed with Zofran and placed back on BiPAP.  Family contacted and recommended they return to see the patient.  Discussed goals of care with the patient's son and daughter at bedside.  We reviewed the CT results showing the large left-sided pleural effusion as well as the progression of his cancer.  We discussed possible courses of action including continuing her current treatments with plans for thoracentesis in the morning.  Concern remains regarding the nature of the pleural effusion.  Overall, patient lost his wife earlier this year.  He has been declining in his health status over the past few weeks.  They also reports several years of ongoing shortness of breath, likely related to his COPD.  Transitioning to comfort care was recommended.  Patient's son and daughter were agreeable.  We discussed the process of comfort care, which include stopping IV medications and lab draws and vital checks.  We would also discontinue the BiPAP and continue with nasal cannula oxygen for comfort.  We will treat any signs of respiratory distress or pain with IV morphine.  Pain management team and palliative care are consulted to see the patient.  All questions were answered.  Transition to comfort care.

## 2023-07-03 NOTE — CONSULTS
Palliative Care Consultation Note  [facility]      Patient: Donnie Vincent  Date of Admission:  7/2/2023    Requesting Clinician / Team: Hospitalist  Reason for consult: Symptom management  Goals of care  Decisional support  Patient and family support     Recommendations & Counseling     GOALS OF CARE:     Comfort focused      ADVANCE CARE PLANNING:    Advance Directive has been requested.    There is no POLST form on file, plan to complete prior to DC.    Code status: No CPR- Do NOT Intubate    MEDICAL MANAGEMENT:   Comfort Care      #Pain  - 1st choice:Morphine PO/SL 5-10 mg q 1 hour as needed.   - 2nd choice:Morphine IV 1-2 mg q 15 minutes as needed   - Adjunct: Tylenol NM    #Air hunger/Dyspnea   - 1st choice:Morphine PO/SL 5-10 mg q 1 hour as needed.   - 2nd choice:Morphine IV 1-2 mg q 15 minutes as needed      #Anxiety    - 1st choice: Lorazepam PO/SL q 1 mg  q 3 hour as needed.   - 2nd choice: Lorazepam IV q 1 mg  q 3 hour as needed    #Secretion burden   -Robinul 0.1 mg  q 4 hours as needed. If ineffective, increase up to 0.3 mg   -Atropine as needed     #Nausea   -Zofran 4 mg q 6 hours as needed. Can increase to 8 mg   -Zyprexa 5 mg q 6 hours as needed     #Agitation, hallucinations, restlessness  -Due to parkinsonism-Would use Zyprexa as last resort  -3rd choice: Lorazepam as above     PSYCHOSOCIAL/SPIRITUAL SUPPORT:    Family   [place] community: [place]   Would appreciate Spiritual Health Services    Palliative Care will continue to follow. Thank you for the consult and allowing us to aid in the care of Donnie Vincent.    These recommendations have been discussed with Medical team.    Anayeli Tristan NP  Securely message with [email or web address] (more info)  Text page via [facility] Paging/Directory       Palliative Summary/HPI     Donnie Vincent is a 77 year old male with a past medical history of CVA with residual dysarthria, recent diagnosis of metastatic kidney cancer with mets  to the lung on immunotherapy, COPD, BPH, history of seizures, diet controlled DM2, parkinsonism, nephrolithiasis who presented on hypoxia with nausea and vomiting, encephalopathy. [facility] and is wheelchair bound.      Today, the patient was seen for:  Family support  Symptom management    Palliative Care Summary:   Met with daughter and son at the bedside..   I introduced our role as an extra layer of support and how we help patients and families dealing with serious, potentially life-limiting illnesses. I explained the composition of the palliative care team.  Palliative care helps patients and families navigate their care while focusing on the whole person; providing emotional, social and spiritual support  Palliative care often assists with symptom management, information sharing about what to expect from the illness, available treatment options and what effect those options may have on the disease course, and provide effective communication and caring support.    Prognosis, Goals, & Planning:      Functional Status just prior to this current hospitalization:    per H and P patient has hada decline over the last 2 weeks.      Prognosis, Goals, and/or Advance Care Planning:    Patient on comfort cares      Code Status was addressed today:     No      Patient has decision-making capacity today for complex decisions:Unreliable            Coping, Meaning, & Spirituality:     Mood, coping, and/or meaning in the context of serious illness were addressed today: Yes    Social:   Living situation:resides in assisted living -wheelchair bound    Medications:  I have reviewed this patient's medication profile and medications from this hospitalization. Notable medications:     ROS:  Comprehensive ROS is reviewed and is negative except as here & per HPI:     Physical Exam   Vital Signs with Ranges  Temp:  [98.4  F (36.9  C)-99  F (37.2  C)] 98.4  F (36.9  C)  Pulse:  [] 104  Resp:  [18-95] 20  BP:  ()/(57-85) 99/68  FiO2 (%):  [70 %-100 %] 100 %  SpO2:  [94 %-100 %] 100 %  152 lbs 8.93 oz    PHYSICAL EXAM:  Constitutional: patient passed with me in the room   Cardiovascular: positive findings: absent heart tones  Respiratory: positive findings: patient took last breath  Psychiatric: unresponsive  Abdomen: Abdomen softNo masses, organomegaly  Pain: passed without s/s of pain    Data reviewed:  Results for orders placed or performed during the hospital encounter of 07/02/23 (from the past 24 hour(s))   CBC with platelets differential    Narrative    The following orders were created for panel order CBC with platelets differential.  Procedure                               Abnormality         Status                     ---------                               -----------         ------                     CBC with platelets and d...[[id number]]  Abnormal            Final result                 Please view results for these tests on the individual orders.   Basic metabolic panel   Result Value Ref Range    Sodium 142 136 - 145 mmol/L    Potassium 4.2 3.4 - 5.3 mmol/L    Chloride 108 (H) 98 - 107 mmol/L    Carbon Dioxide (CO2) 16 (L) 22 - 29 mmol/L    Anion Gap 18 (H) 7 - 15 mmol/L    Urea Nitrogen 33.8 (H) 8.0 - 23.0 mg/dL    Creatinine 1.65 (H) 0.67 - 1.17 mg/dL    Calcium 8.8 8.8 - 10.2 mg/dL    Glucose 231 (H) 70 - 99 mg/dL    GFR Estimate 42 (L) >60 mL/min/1.73m2   Lactic acid whole blood   Result Value Ref Range    Lactic Acid 3.3 (H) 0.7 - 2.0 mmol/L   Troponin T, High Sensitivity   Result Value Ref Range    Troponin T, High Sensitivity 82 (H) <=22 ng/L   Nt probnp inpatient (BNP)   Result Value Ref Range    N terminal Pro BNP Inpatient 27,645 (H) 0 - 1,800 pg/mL   Blood gas venous   Result Value Ref Range    pH Venous 7.09 (LL) 7.32 - 7.43    pCO2 Venous 56 (H) 40 - 50 mm Hg    pO2 Venous 39 25 - 47 mm Hg    Bicarbonate Venous 17 (L) 21 - 28 mmol/L    Base Excess/Deficit (+/-) -13.1 (L) -7.7 - 1.9 mmol/L     FIO2 15    ABO/Rh type and screen    Narrative    The following orders were created for panel order ABO/Rh type and screen.  Procedure                               Abnormality         Status                     ---------                               -----------         ------                     Adult Type and Screen[[id number]]                            Edited Result - FINAL        Please view results for these tests on the individual orders.   CBC with platelets and differential   Result Value Ref Range    WBC Count 12.2 (H) 4.0 - 11.0 10e3/uL    RBC Count 4.77 4.40 - 5.90 10e6/uL    Hemoglobin 12.0 (L) 13.3 - 17.7 g/dL    Hematocrit 42.2 40.0 - 53.0 %    MCV 89 78 - 100 fL    MCH 25.2 (L) 26.5 - 33.0 pg    MCHC 28.4 (L) 31.5 - 36.5 g/dL    RDW 18.1 (H) 10.0 - 15.0 %    Platelet Count 377 150 - 450 10e3/uL    % Neutrophils 77 %    % Lymphocytes 15 %    % Monocytes 6 %    % Eosinophils 0 %    % Basophils 1 %    % Immature Granulocytes 1 %    NRBCs per 100 WBC 0 <1 /100    Absolute Neutrophils 9.4 (H) 1.6 - 8.3 10e3/uL    Absolute Lymphocytes 1.8 0.8 - 5.3 10e3/uL    Absolute Monocytes 0.7 0.0 - 1.3 10e3/uL    Absolute Eosinophils 0.0 0.0 - 0.7 10e3/uL    Absolute Basophils 0.2 0.0 - 0.2 10e3/uL    Absolute Immature Granulocytes 0.1 <=0.4 10e3/uL    Absolute NRBCs 0.0 10e3/uL   Adult Type and Screen   Result Value Ref Range    ABO/RH(D) A POS     Antibody Screen Negative Negative    SPECIMEN EXPIRATION DATE [id number]    Keppra (Levetiracetam) Level   Result Value Ref Range    Keppra (Levetiracetam) Level 22.6 10.0 - 40.0  g/mL   Lactate Dehydrogenase   Result Value Ref Range    Lactate Dehydrogenase 342 (H) 0 - 250 U/L   Protein total   Result Value Ref Range    Protein Total 6.8 6.4 - 8.3 g/dL   iStat Gases (lactate) venous, POCT   Result Value Ref Range    Lactic Acid POCT 3.0 (H) <=2.0 mmol/L    Bicarbonate Venous POCT 18 (L) 21 - 28 mmol/L    O2 Sat, Venous POCT 46 (L) 94 - 100 %    pCO2 Venous POCT 58  (H) 40 - 50 mm Hg    pH Venous POCT 7.10 (LL) 7.32 - 7.43    pO2 Venous POCT 34 25 - 47 mm Hg   iStat Basic Chem ICA Hematocrit, POCT   Result Value Ref Range    Chloride POCT 114 (H) 94 - 109 mmol/L    Potassium POCT 4.2 3.4 - 5.3 mmol/L    Sodium POCT 145 (H) 133 - 144 mmol/L    UREA NITROGEN POCT 32 (H) 7 - 30 mg/dL    Calcium, Ionized Whole Blood POCT 4.8 4.4 - 5.2 mg/dL    Glucose Whole Blood POCT 229 (H) 70 - 99 mg/dL    Anion Gap POCT 19.0 (H) 3.0 - 14.0 mmol/L    Hemoglobin POCT 13.6 13.3 - 17.7 g/dL    Hematocrit POCT 40 40 - 53 %    Creatinine POCT 1.8 (H) 0.7 - 1.3 mg/dL    TOTAL CO2 POCT 18 (L) 20 - 32 mmol/L   XR Chest Port 1 View    Narrative    EXAM: XR CHEST PORT 1 VIEW  LOCATION: [facility]  DATE: 7/2/2023    INDICATION: aspiration  COMPARISON: 11/14/2018      Impression    IMPRESSION: Heart is markedly enlarged. Near complete opacification of the left hemithorax with small aeration in the left mid to upper lung. Findings concerning for a large effusion with associated atelectasis. Superimposed pneumonia not excluded. Right   lung clear.   EKG 12-lead, tracing only   Result Value Ref Range    Systolic Blood Pressure  mmHg    Diastolic Blood Pressure  mmHg    Ventricular Rate 135 BPM    Atrial Rate 135 BPM    SD Interval 112 ms    QRS Duration 138 ms     ms    QTc 528 ms    P Axis -3 degrees    R AXIS -19 degrees    T Axis 113 degrees    Interpretation ECG       Sinus tachycardia  Left bundle branch block  Abnormal ECG  When compared with ECG of 25-APR-2023 08:52,  Vent. rate has increased BY  51 BPM  Confirmed by - EMERGENCY ROOM, PHYSICIAN ([id number]),  NEPTALI MENDEZ (Neeta) on 7/3/2023 7:13:28 AM     Symptomatic COVID-19 Virus (Coronavirus) by PCR Nasopharyngeal    Specimen: Nasopharyngeal; Swab   Result Value Ref Range    SARS CoV2 PCR Negative Negative    Narrative    Testing was performed using the Xpert Xpress SARS-CoV-2 Assay on the Cepheid Gene-Xpert Instrument  Systems. Additional information about this Emergency Use Authorization (EUA) assay can be found via the Lab Guide. This test should be ordered for the detection of SARS-CoV-2 in individuals who meet SARS-CoV-2 clinical and/or epidemiological criteria as well as from individuals without symptoms or other reasons to suspect COVID-19. Test performance for asymptomatic patients has only been established in anterior nasal swab specimens. This test is for in vitro diagnostic use under the FDA EUA for laboratories certified under CLIA to perform high complexity testing. This test has not been FDA cleared or approved. A negative result does not rule out the presence of PCR inhibitors in the specimen or target RNA concentration below the limit of detection for the assay. The possibility of a false negative should be considered if the patient's recent exposure or clinical presentation suggests COVID-19. This test was validated by the [facility]. This laboratory is certified under the Clinical Laboratory Improvement Amendments (CLIA) as qualified to perform high complexity laboratory testing.     UA with Microscopic reflex to Culture    Specimen: Urine, [place] Catheter   Result Value Ref Range    Color Urine Yellow Colorless, Straw, Light Yellow, Yellow    Appearance Urine Clear Clear    Glucose Urine Negative Negative mg/dL    Bilirubin Urine Negative Negative    Ketones Urine Trace (A) Negative mg/dL    Specific Gravity Urine 1.031 1.003 - 1.035    Blood Urine Negative Negative    pH Urine 5.0 5.0 - 7.0    Protein Albumin Urine 30 (A) Negative mg/dL    Urobilinogen Urine Normal Normal, 2.0 mg/dL    Nitrite Urine Negative Negative    Leukocyte Esterase Urine Small (A) Negative    Mucus Urine Present (A) None Seen /LPF    RBC Urine 3 (H) <=2 /HPF    WBC Urine 14 (H) <=5 /HPF    Hyaline Casts Urine 1 <=2 /LPF    Narrative    Urine Culture ordered based on laboratory criteria   PTT   Result Value  Ref Range    aPTT 31 22 - 38 Seconds   INR   Result Value Ref Range    INR 1.31 (H) 0.85 - 1.15   Occult blood stool   Result Value Ref Range    Occult Blood Negative Negative   iStat Basic Chem ICA Hematocrit, POCT   Result Value Ref Range    Chloride POCT 116 (H) 94 - 109 mmol/L    Potassium POCT 3.8 3.4 - 5.3 mmol/L    Sodium POCT 147 (H) 133 - 144 mmol/L    UREA NITROGEN POCT 31 (H) 7 - 30 mg/dL    Calcium, Ionized Whole Blood POCT 4.6 4.4 - 5.2 mg/dL    Glucose Whole Blood POCT 225 (H) 70 - 99 mg/dL    Anion Gap POCT 17.0 (H) 3.0 - 14.0 mmol/L    Hemoglobin POCT 11.2 (L) 13.3 - 17.7 g/dL    Hematocrit POCT 33 (L) 40 - 53 %    Creatinine POCT 1.7 (H) 0.7 - 1.3 mg/dL    TOTAL CO2 POCT 18 (L) 20 - 32 mmol/L   iStat Gases (lactate) venous, POCT   Result Value Ref Range    Lactic Acid POCT 1.8 <=2.0 mmol/L    Bicarbonate Venous POCT 16 (L) 21 - 28 mmol/L    O2 Sat, Venous POCT 87 (L) 94 - 100 %    pCO2 Venous POCT 52 (H) 40 - 50 mm Hg    pH Venous POCT 7.10 (LL) 7.32 - 7.43    pO2 Venous POCT 71 (H) 25 - 47 mm Hg   CT Chest (PE) Abdomen Pelvis w Contrast    Narrative    EXAM: CT CHEST PE ABDOMEN PELVIS W CONTRAST  LOCATION: [facility]  DATE: 7/2/2023    INDICATION: Some onset of vomiting and hypoxia. Assess for pulmonary embolus and bowel obstruction. History of metastatic kidney cancer.  COMPARISON: PET/CT  TECHNIQUE: CT chest pulmonary angiogram and routine CT abdomen pelvis with IV contrast. Arterial phase through the chest and venous phase through the abdomen and pelvis. Multiplanar reformats and MIP reconstructions were performed. Dose reduction   techniques were used.   CONTRAST: 70mL Isovue 370    FINDINGS: Study compromised by respiratory motion.    ANGIOGRAM CHEST: Enlarged central pulmonary arteries with main pulmonary trunk measuring 34 mm. No central pulmonary emboli. Motion artifact obscures lower lobe subsegmental branches. Stable 4.7 cm ascending aortic dilation. No evidence of  aortic   dissection. No CT evidence of right heart strain.     LUNGS AND PLEURA: Very large right pleural effusion with complete atelectasis/consolidation of the left lung. Left mainstem bronchus filled with fluid or mucous plug. Generalized bronchial wall thickening throughout the right lung with patchy   peribronchial vascular groundglass opacities likely infectious/inflammatory. Narrowing of the distal trachea and mainstem bronchi suggests some degree of tracheobronchomalacia.    MEDIASTINUM/AXILLAE: Air-fluid level within the esophagus compatible with gastroesophageal reflux. No mass/adenopathy. Physiologic pericardial effusion. Panchamber cardiac enlargement redemonstrated.    CORONARY ARTERY CALCIFICATION: Severe.    HEPATOBILIARY: New anterior left eighth rib lucent lesion. 1.9 cm segment II left hepatic mass previously measured 1.7 cm. Cholelithiasis with tiny gallstones. No cholecystitis.    PANCREAS: Normal.    SPLEEN: Normal.    ADRENAL GLANDS: Normal.    KIDNEYS/BLADDER: Unchanged 5.9 x 6.1 x 6.1 cm left upper pole mass, when measured similarly. 6 cm cystic component previously measured 5.6 cm. Stable simple right renal cortical cysts which do not require imaging follow-up. No hydronephrosis. Asymmetric   posterior lateral left bladder wall thickening less well-visualized with nondistended bladder.    BOWEL: The stomach is distended with fluid and air. Scattered air-fluid levels throughout nondilated small bowel and colon. Scattered colonic diverticulosis, most pronounced sigmoid region. No obstruction or inflammatory change. Large amount of stool in   the rectosigmoid region.    LYMPH NODES: 1.6 x 1.1 cm left para-aortic lymph node previously measured 1.4 x 1.0 cm.    VASCULATURE: No aortoiliac aneurysm.    PELVIC ORGANS: Normal.    MUSCULOSKELETAL: New lucent lesions right T4 and left T6 vertebra suspicious for osseous metastases. Interval increase in size of lucent lesion left iliac bone. Destructive  lesion left anterior seventh rib is similar to slightly increased.      Impression    IMPRESSION:  1.  Chest CTA compromised by respiratory motion. No central pulmonary emboli.  2.  Enlarged central pulmonary arteries suggests some degree of chronic pulmonary arterial hypertension.  3.  Very large left pleural effusion with complete atelectasis/consolidation left lung. Fluid or mucous plug occluding the left mainstem bronchus.  4.  Bronchial wall thickening right lung with patchy peribronchial vascular groundglass airspace opacities, likely infectious/inflammatory. Presence of air-fluid level within the esophagus raises the possibility of aspiration.  5.  Narrowing of the distal trachea and bronchi suggests some degree of tracheobronchomalacia.  6.  Progression of metastatic left renal cell carcinoma with increase in size of left para-aortic lymph node, increased left hepatic mass and new/increasing skeletal metastatic disease as described above.  7.  No evidence of bowel obstruction. Large amount of stool within the rectosigmoid region possibly representing obstipation.   Lactic acid whole blood   Result Value Ref Range    Lactic Acid 1.8 0.7 - 2.0 mmol/L   Hemoglobin A1c   Result Value Ref Range    Hemoglobin A1C 6.2 (H) <5.7 %   Lactic acid whole blood   Result Value Ref Range    Lactic Acid 2.0 0.7 - 2.0 mmol/L   Blood gas venous   Result Value Ref Range    pH Venous 7.08 (LL) 7.32 - 7.43    pCO2 Venous 58 (H) 40 - 50 mm Hg    pO2 Venous 64 (H) 25 - 47 mm Hg    Bicarbonate Venous 17 (L) 21 - 28 mmol/L    Base Excess/Deficit (+/-) -12.8 (L) -7.7 - 1.9 mmol/L    FIO2 0    Glucose by meter   Result Value Ref Range    GLUCOSE BY METER POCT 228 (H) 70 - 99 mg/dL          Medical Decision Making     Please see A&P for additional details of medical decision making.  61 MINUTES SPENT BY ME on the date of service doing chart review, history, exam, documentation & further activities per the note.

## 2023-07-03 NOTE — CONSULTS
SPIRITUAL HEALTH SERVICES Progress Note  [facility]  ICU    Responded to page for pt Donnie Vincent's family per transitioning to comfort care. Pt laying in bed and family at bedside. Family requested a prayer, which I provided.     Spiritual Health Services remain available for emotional and spiritual support.     HEIDE CastroDiv  Chaplain Resident   [phone]

## 2023-07-04 LAB — BACTERIA UR CULT: NO GROWTH

## 2023-07-07 LAB
BACTERIA BLD CULT: NO GROWTH
BACTERIA BLD CULT: NO GROWTH

## (undated) DEVICE — LINEN TOWEL PACK X5 5464

## (undated) DEVICE — SOL WATER IRRIG 1000ML BOTTLE 2F7114

## (undated) DEVICE — BAG CLEAR TRASH 1.3M 39X33" P4040C

## (undated) DEVICE — Device

## (undated) DEVICE — BASIN SET MINOR DISP

## (undated) DEVICE — SYR 70ML TOOMEY 041170

## (undated) DEVICE — SYR ELLIK EVACUATOR W/ADAPT LATEX

## (undated) DEVICE — SOL NACL 0.9% IRRIG 3000ML BAG 2B7477

## (undated) DEVICE — PACK CYSTO CUSTOM RIDGES

## (undated) DEVICE — PAD CHUX UNDERPAD 30X36" P3036C

## (undated) DEVICE — LINEN FULL SHEET 5511

## (undated) DEVICE — GLOVE PROTEXIS POWDER FREE SMT 7.5  2D72PT75X

## (undated) DEVICE — KIT ENDO TURNOVER/PROCEDURE W/CLEAN A SCOPE LINERS 103888

## (undated) DEVICE — LINEN HALF SHEET 5512

## (undated) DEVICE — LASER FIBER HOLMIUM 550UM HTB-550

## (undated) DEVICE — COVER FOOTSWITCH W/CINCH 20X24" 923267

## (undated) DEVICE — BAG URINARY DRAIN 4000ML LF 153509

## (undated) DEVICE — SPECIMEN CONTAINER 4OZ

## (undated) DEVICE — SOL WATER IRRIG 3000ML BAG 2B7117

## (undated) DEVICE — CLIP HEMOCLIP ENDOSCOPIC INSTINCT 2.8X230CM INSC-7-230-SS

## (undated) DEVICE — TUBING IRRIG TUR Y TYPE 96" LF 6543-01

## (undated) DEVICE — ENDO SNARE EXACTO COLD 9MM LOOP 2.4MMX230CM 00711115

## (undated) DEVICE — PREP TECHNI-CARE CHLOROXYLENOL 3% 4OZ BOTTLE C222-4ZWO

## (undated) DEVICE — SYR 50ML CATH TIP W/O NDL 309620

## (undated) DEVICE — CATH URETERAL FLEX TIP TIGERTAIL 06FRX70CM 139006

## (undated) DEVICE — ENDO TRAP POLYP QUICK CATCH 710201

## (undated) RX ORDER — FENTANYL CITRATE 50 UG/ML
INJECTION, SOLUTION INTRAMUSCULAR; INTRAVENOUS
Status: DISPENSED
Start: 2018-10-18

## (undated) RX ORDER — DEXAMETHASONE SODIUM PHOSPHATE 4 MG/ML
INJECTION, SOLUTION INTRA-ARTICULAR; INTRALESIONAL; INTRAMUSCULAR; INTRAVENOUS; SOFT TISSUE
Status: DISPENSED
Start: 2018-10-03

## (undated) RX ORDER — FENTANYL CITRATE 50 UG/ML
INJECTION, SOLUTION INTRAMUSCULAR; INTRAVENOUS
Status: DISPENSED
Start: 2018-10-03

## (undated) RX ORDER — PROPOFOL 10 MG/ML
INJECTION, EMULSION INTRAVENOUS
Status: DISPENSED
Start: 2018-10-03

## (undated) RX ORDER — FENTANYL CITRATE 50 UG/ML
INJECTION, SOLUTION INTRAMUSCULAR; INTRAVENOUS
Status: DISPENSED
Start: 2018-07-25

## (undated) RX ORDER — PHENYLEPHRINE HCL IN 0.9% NACL 1 MG/10 ML
SYRINGE (ML) INTRAVENOUS
Status: DISPENSED
Start: 2018-10-03

## (undated) RX ORDER — FENTANYL CITRATE 50 UG/ML
INJECTION, SOLUTION INTRAMUSCULAR; INTRAVENOUS
Status: DISPENSED
Start: 2018-06-27

## (undated) RX ORDER — DEXAMETHASONE SODIUM PHOSPHATE 4 MG/ML
INJECTION, SOLUTION INTRA-ARTICULAR; INTRALESIONAL; INTRAMUSCULAR; INTRAVENOUS; SOFT TISSUE
Status: DISPENSED
Start: 2018-10-18

## (undated) RX ORDER — LIDOCAINE HYDROCHLORIDE 10 MG/ML
INJECTION, SOLUTION EPIDURAL; INFILTRATION; INTRACAUDAL; PERINEURAL
Status: DISPENSED
Start: 2018-10-18

## (undated) RX ORDER — GLYCOPYRROLATE 0.2 MG/ML
INJECTION INTRAMUSCULAR; INTRAVENOUS
Status: DISPENSED
Start: 2018-10-03

## (undated) RX ORDER — ONDANSETRON 2 MG/ML
INJECTION INTRAMUSCULAR; INTRAVENOUS
Status: DISPENSED
Start: 2018-10-18

## (undated) RX ORDER — KETOROLAC TROMETHAMINE 30 MG/ML
INJECTION, SOLUTION INTRAMUSCULAR; INTRAVENOUS
Status: DISPENSED
Start: 2018-10-03

## (undated) RX ORDER — PROPOFOL 10 MG/ML
INJECTION, EMULSION INTRAVENOUS
Status: DISPENSED
Start: 2018-10-18

## (undated) RX ORDER — LIDOCAINE HYDROCHLORIDE 10 MG/ML
INJECTION, SOLUTION EPIDURAL; INFILTRATION; INTRACAUDAL; PERINEURAL
Status: DISPENSED
Start: 2018-10-03

## (undated) RX ORDER — CEFAZOLIN SODIUM 2 G/100ML
INJECTION, SOLUTION INTRAVENOUS
Status: DISPENSED
Start: 2018-10-03

## (undated) RX ORDER — ONDANSETRON 2 MG/ML
INJECTION INTRAMUSCULAR; INTRAVENOUS
Status: DISPENSED
Start: 2018-10-03